# Patient Record
Sex: MALE | Race: WHITE | NOT HISPANIC OR LATINO | Employment: UNEMPLOYED | URBAN - METROPOLITAN AREA
[De-identification: names, ages, dates, MRNs, and addresses within clinical notes are randomized per-mention and may not be internally consistent; named-entity substitution may affect disease eponyms.]

---

## 2018-04-03 ENCOUNTER — OFFICE VISIT CONVERTED (OUTPATIENT)
Dept: CARDIOLOGY | Facility: CLINIC | Age: 53
End: 2018-04-03
Attending: SPECIALIST

## 2018-05-01 ENCOUNTER — OFFICE VISIT CONVERTED (OUTPATIENT)
Dept: CARDIOLOGY | Facility: CLINIC | Age: 53
End: 2018-05-01
Attending: SPECIALIST

## 2018-12-06 ENCOUNTER — OFFICE VISIT CONVERTED (OUTPATIENT)
Dept: ORTHOPEDIC SURGERY | Facility: CLINIC | Age: 53
End: 2018-12-06
Attending: ORTHOPAEDIC SURGERY

## 2018-12-31 ENCOUNTER — OFFICE VISIT CONVERTED (OUTPATIENT)
Dept: ORTHOPEDIC SURGERY | Facility: CLINIC | Age: 53
End: 2018-12-31
Attending: ORTHOPAEDIC SURGERY

## 2019-02-19 ENCOUNTER — HOSPITAL ENCOUNTER (OUTPATIENT)
Dept: PERIOP | Facility: HOSPITAL | Age: 54
Setting detail: HOSPITAL OUTPATIENT SURGERY
Discharge: HOME | End: 2019-02-19
Attending: ORTHOPAEDIC SURGERY

## 2019-02-19 LAB
ANION GAP SERPL CALC-SCNC: 15 MMOL/L (ref 8–19)
BUN SERPL-MCNC: 17 MG/DL (ref 5–25)
BUN/CREAT SERPL: 21 {RATIO} (ref 6–20)
CALCIUM SERPL-MCNC: 9.4 MG/DL (ref 8.7–10.4)
CHLORIDE SERPL-SCNC: 104 MMOL/L (ref 99–111)
CONV CO2: 28 MMOL/L (ref 22–32)
CREAT UR-MCNC: 0.8 MG/DL (ref 0.7–1.2)
GFR SERPLBLD BASED ON 1.73 SQ M-ARVRAT: >60 ML/MIN/{1.73_M2}
GLUCOSE BLD-MCNC: 105 MG/DL (ref 70–99)
GLUCOSE SERPL-MCNC: 114 MG/DL (ref 70–99)
OSMOLALITY SERPL CALC.SUM OF ELEC: 298 MOSM/KG (ref 273–304)
POTASSIUM SERPL-SCNC: 4 MMOL/L (ref 3.5–5.3)
SODIUM SERPL-SCNC: 143 MMOL/L (ref 135–147)

## 2019-03-04 ENCOUNTER — OFFICE VISIT CONVERTED (OUTPATIENT)
Dept: ORTHOPEDIC SURGERY | Facility: CLINIC | Age: 54
End: 2019-03-04
Attending: ORTHOPAEDIC SURGERY

## 2019-03-04 ENCOUNTER — HOSPITAL ENCOUNTER (OUTPATIENT)
Dept: PHYSICAL THERAPY | Facility: CLINIC | Age: 54
Setting detail: RECURRING SERIES
Discharge: HOME | End: 2019-05-02
Attending: ORTHOPAEDIC SURGERY

## 2019-03-25 ENCOUNTER — OFFICE VISIT CONVERTED (OUTPATIENT)
Dept: ORTHOPEDIC SURGERY | Facility: CLINIC | Age: 54
End: 2019-03-25
Attending: ORTHOPAEDIC SURGERY

## 2019-04-01 ENCOUNTER — OFFICE VISIT CONVERTED (OUTPATIENT)
Dept: ORTHOPEDIC SURGERY | Facility: CLINIC | Age: 54
End: 2019-04-01
Attending: ORTHOPAEDIC SURGERY

## 2019-04-26 ENCOUNTER — CONVERSION ENCOUNTER (OUTPATIENT)
Dept: ORTHOPEDIC SURGERY | Facility: CLINIC | Age: 54
End: 2019-04-26

## 2019-04-26 ENCOUNTER — OFFICE VISIT CONVERTED (OUTPATIENT)
Dept: ORTHOPEDIC SURGERY | Facility: CLINIC | Age: 54
End: 2019-04-26
Attending: ORTHOPAEDIC SURGERY

## 2019-05-09 ENCOUNTER — HOSPITAL ENCOUNTER (OUTPATIENT)
Dept: GENERAL RADIOLOGY | Facility: HOSPITAL | Age: 54
Discharge: HOME | End: 2019-05-09
Attending: ORTHOPAEDIC SURGERY

## 2019-05-13 ENCOUNTER — CONVERSION ENCOUNTER (OUTPATIENT)
Dept: ORTHOPEDIC SURGERY | Facility: CLINIC | Age: 54
End: 2019-05-13

## 2019-05-13 ENCOUNTER — OFFICE VISIT CONVERTED (OUTPATIENT)
Dept: ORTHOPEDIC SURGERY | Facility: CLINIC | Age: 54
End: 2019-05-13
Attending: ORTHOPAEDIC SURGERY

## 2019-06-10 ENCOUNTER — HOSPITAL ENCOUNTER (OUTPATIENT)
Dept: PERIOP | Facility: HOSPITAL | Age: 54
Setting detail: HOSPITAL OUTPATIENT SURGERY
Discharge: HOME | End: 2019-06-10
Attending: ORTHOPAEDIC SURGERY

## 2019-06-10 LAB
ANION GAP SERPL CALC-SCNC: 15 MMOL/L (ref 8–19)
BUN SERPL-MCNC: 11 MG/DL (ref 5–25)
BUN/CREAT SERPL: 12 {RATIO} (ref 6–20)
CALCIUM SERPL-MCNC: 9.7 MG/DL (ref 8.7–10.4)
CHLORIDE SERPL-SCNC: 101 MMOL/L (ref 99–111)
CONV CO2: 26 MMOL/L (ref 22–32)
CREAT UR-MCNC: 0.9 MG/DL (ref 0.7–1.2)
GFR SERPLBLD BASED ON 1.73 SQ M-ARVRAT: >60 ML/MIN/{1.73_M2}
GLUCOSE BLD-MCNC: 260 MG/DL (ref 70–99)
GLUCOSE BLD-MCNC: 280 MG/DL (ref 70–99)
GLUCOSE BLD-MCNC: 302 MG/DL (ref 70–99)
GLUCOSE SERPL-MCNC: 383 MG/DL (ref 70–99)
OSMOLALITY SERPL CALC.SUM OF ELEC: 301 MOSM/KG (ref 273–304)
POTASSIUM SERPL-SCNC: 4.1 MMOL/L (ref 3.5–5.3)
SODIUM SERPL-SCNC: 138 MMOL/L (ref 135–147)

## 2019-06-17 ENCOUNTER — HOSPITAL ENCOUNTER (OUTPATIENT)
Dept: PHYSICAL THERAPY | Facility: CLINIC | Age: 54
Setting detail: RECURRING SERIES
Discharge: HOME | End: 2019-08-22
Attending: ORTHOPAEDIC SURGERY

## 2019-06-27 ENCOUNTER — OFFICE VISIT CONVERTED (OUTPATIENT)
Dept: ORTHOPEDIC SURGERY | Facility: CLINIC | Age: 54
End: 2019-06-27
Attending: PHYSICIAN ASSISTANT

## 2019-07-25 ENCOUNTER — OFFICE VISIT CONVERTED (OUTPATIENT)
Dept: ORTHOPEDIC SURGERY | Facility: CLINIC | Age: 54
End: 2019-07-25
Attending: PHYSICIAN ASSISTANT

## 2019-08-15 ENCOUNTER — CONVERSION ENCOUNTER (OUTPATIENT)
Dept: ORTHOPEDIC SURGERY | Facility: CLINIC | Age: 54
End: 2019-08-15

## 2019-08-15 ENCOUNTER — OFFICE VISIT CONVERTED (OUTPATIENT)
Dept: ORTHOPEDIC SURGERY | Facility: CLINIC | Age: 54
End: 2019-08-15
Attending: PHYSICIAN ASSISTANT

## 2019-09-12 ENCOUNTER — OFFICE VISIT CONVERTED (OUTPATIENT)
Dept: ORTHOPEDIC SURGERY | Facility: CLINIC | Age: 54
End: 2019-09-12
Attending: ORTHOPAEDIC SURGERY

## 2019-09-23 ENCOUNTER — HOSPITAL ENCOUNTER (OUTPATIENT)
Dept: GENERAL RADIOLOGY | Facility: HOSPITAL | Age: 54
Discharge: HOME | End: 2019-09-23
Attending: ORTHOPAEDIC SURGERY

## 2019-09-26 ENCOUNTER — OFFICE VISIT CONVERTED (OUTPATIENT)
Dept: ORTHOPEDIC SURGERY | Facility: CLINIC | Age: 54
End: 2019-09-26
Attending: ORTHOPAEDIC SURGERY

## 2019-10-10 ENCOUNTER — OFFICE VISIT CONVERTED (OUTPATIENT)
Dept: SURGERY | Facility: CLINIC | Age: 54
End: 2019-10-10
Attending: PHYSICIAN ASSISTANT

## 2019-11-08 ENCOUNTER — HOSPITAL ENCOUNTER (OUTPATIENT)
Dept: OTHER | Facility: HOSPITAL | Age: 54
Discharge: HOME | End: 2019-11-08
Attending: PHYSICIAN ASSISTANT

## 2019-11-08 LAB — PSA SERPL-MCNC: 0.38 NG/ML (ref 0–4)

## 2019-11-11 ENCOUNTER — OFFICE VISIT CONVERTED (OUTPATIENT)
Dept: SURGERY | Facility: CLINIC | Age: 54
End: 2019-11-11
Attending: PHYSICIAN ASSISTANT

## 2019-11-11 ENCOUNTER — CONVERSION ENCOUNTER (OUTPATIENT)
Dept: SURGERY | Facility: CLINIC | Age: 54
End: 2019-11-11

## 2019-11-15 ENCOUNTER — OFFICE VISIT CONVERTED (OUTPATIENT)
Dept: ORTHOPEDIC SURGERY | Facility: CLINIC | Age: 54
End: 2019-11-15
Attending: PHYSICIAN ASSISTANT

## 2020-06-18 ENCOUNTER — OFFICE VISIT CONVERTED (OUTPATIENT)
Dept: ORTHOPEDIC SURGERY | Facility: CLINIC | Age: 55
End: 2020-06-18
Attending: ORTHOPAEDIC SURGERY

## 2020-07-09 ENCOUNTER — HOSPITAL ENCOUNTER (OUTPATIENT)
Dept: MRI IMAGING | Facility: HOSPITAL | Age: 55
Discharge: HOME | End: 2020-07-09
Attending: ORTHOPAEDIC SURGERY

## 2020-07-16 ENCOUNTER — OFFICE VISIT CONVERTED (OUTPATIENT)
Dept: ORTHOPEDIC SURGERY | Facility: CLINIC | Age: 55
End: 2020-07-16
Attending: ORTHOPAEDIC SURGERY

## 2020-07-20 ENCOUNTER — CONVERSION ENCOUNTER (OUTPATIENT)
Dept: SURGERY | Facility: CLINIC | Age: 55
End: 2020-07-20

## 2020-07-20 ENCOUNTER — OFFICE VISIT CONVERTED (OUTPATIENT)
Dept: UROLOGY | Facility: CLINIC | Age: 55
End: 2020-07-20
Attending: UROLOGY

## 2020-09-28 ENCOUNTER — OFFICE VISIT CONVERTED (OUTPATIENT)
Dept: UROLOGY | Facility: CLINIC | Age: 55
End: 2020-09-28
Attending: UROLOGY

## 2020-12-17 ENCOUNTER — OFFICE VISIT CONVERTED (OUTPATIENT)
Dept: CARDIOLOGY | Facility: CLINIC | Age: 55
End: 2020-12-17
Attending: SPECIALIST

## 2020-12-17 ENCOUNTER — CONVERSION ENCOUNTER (OUTPATIENT)
Dept: OTHER | Facility: HOSPITAL | Age: 55
End: 2020-12-17

## 2021-01-06 ENCOUNTER — HOSPITAL ENCOUNTER (OUTPATIENT)
Dept: NUCLEAR MEDICINE | Facility: HOSPITAL | Age: 56
Discharge: HOME | End: 2021-01-06
Attending: SPECIALIST

## 2021-03-25 ENCOUNTER — OFFICE VISIT CONVERTED (OUTPATIENT)
Dept: ORTHOPEDIC SURGERY | Facility: CLINIC | Age: 56
End: 2021-03-25
Attending: ORTHOPAEDIC SURGERY

## 2021-04-06 ENCOUNTER — HOSPITAL ENCOUNTER (OUTPATIENT)
Dept: MRI IMAGING | Facility: HOSPITAL | Age: 56
Discharge: HOME | End: 2021-04-06
Attending: ORTHOPAEDIC SURGERY

## 2021-04-09 ENCOUNTER — OFFICE VISIT CONVERTED (OUTPATIENT)
Dept: ORTHOPEDIC SURGERY | Facility: CLINIC | Age: 56
End: 2021-04-09
Attending: ORTHOPAEDIC SURGERY

## 2021-04-21 ENCOUNTER — HOSPITAL ENCOUNTER (OUTPATIENT)
Dept: PREADMISSION TESTING | Facility: HOSPITAL | Age: 56
Discharge: HOME | End: 2021-04-21
Attending: ORTHOPAEDIC SURGERY

## 2021-04-21 LAB — SARS-COV-2 RNA SPEC QL NAA+PROBE: NOT DETECTED

## 2021-04-26 ENCOUNTER — HOSPITAL ENCOUNTER (OUTPATIENT)
Dept: PERIOP | Facility: HOSPITAL | Age: 56
Setting detail: HOSPITAL OUTPATIENT SURGERY
Discharge: HOME | End: 2021-04-26
Attending: ORTHOPAEDIC SURGERY

## 2021-04-26 LAB
ANION GAP SERPL CALC-SCNC: 12 MMOL/L (ref 8–19)
BUN SERPL-MCNC: 12 MG/DL (ref 5–25)
BUN/CREAT SERPL: 15 {RATIO} (ref 6–20)
CALCIUM SERPL-MCNC: 9.3 MG/DL (ref 8.7–10.4)
CHLORIDE SERPL-SCNC: 100 MMOL/L (ref 99–111)
CONV CO2: 29 MMOL/L (ref 22–32)
CREAT UR-MCNC: 0.8 MG/DL (ref 0.7–1.2)
GFR SERPLBLD BASED ON 1.73 SQ M-ARVRAT: >60 ML/MIN/{1.73_M2}
GLUCOSE BLD-MCNC: 169 MG/DL (ref 70–99)
GLUCOSE SERPL-MCNC: 205 MG/DL (ref 70–99)
OSMOLALITY SERPL CALC.SUM OF ELEC: 288 MOSM/KG (ref 273–304)
POTASSIUM SERPL-SCNC: 4.6 MMOL/L (ref 3.5–5.3)
SODIUM SERPL-SCNC: 136 MMOL/L (ref 135–147)

## 2021-05-10 NOTE — H&P
History and Physical      Patient Name: Moreno Aleman   Patient ID: [id number]   Sex: Male   YOB: 1965    Primary Care Provider: Ana Flores MD   Referring Provider: Ana Flores MD    Visit Date: March 25, 2021    Provider: Kam Dick MD   Location: [facility]   Location Address: [address] [place] [address]  [id number]   Location Phone: [phone]          Chief Complaint  · Left shoulder pain       History Of Present Illness  Moreno Aleman is a 55 year old /White male who presents today to [facility].      The patient presents here today for evaluation of his left shoulder. He states about a month ago he reached to get an extension cord and his shoulder popped. He states he has been having a lot of pain in his shoulder. He locates the pain to the anterior lateral aspect of his shoulder. He had an x-ray that was negative. He has no other complaints today.       Past Medical History  Arthritis; Back pain; Bursitis: Right Shoulder; CAD (coronary artery disease); Chest pain; Colitis; Diabetes; Diabetes Mellitus, Type II; Fx -4th Metacarpal; Gallstones; Hernia; Hyperlipidemia; Hypertension; Left Epididymitis; Migraine; Phimosis; Right Shoulder: Rotator Cuff Tear; Sinus trouble; Status-post: Right shoulder arthroscopic RTC repair, SAD; 02/19/2019; Vitamin D deficiency         Past Surgical History  Back; Back surgery; Circumcision; Colonoscopy; Gallbladder; Hernia; Hernia Repair; Metal implants; Rotator Cuff repair; Surgical Clips         Medication List  Admelog SoloStar U-100 Insulin 100 unit/mL subcutaneous insulin pen; amlodipine 5 mg oral tablet; aspirin oral; atorvastatin 20 mg oral tablet; carvedilol 12.5 mg oral tablet; clopidogrel 75 mg oral tablet; isosorbide mononitrate 30 mg oral tablet extended release 24 hr; lisinopril 30 mg oral tablet; oxybutynin chloride 5 mg oral tablet extended release 24hr; sertraline 25 mg oral tablet; tadalafil 5  "mg oral tablet; tamsulosin 0.4 mg oral capsule; Tresiba FlexTouch U-100 100 unit/mL (3 mL) subcutaneous insulin pen; Trulicity subcutaneous         Allergy List  fentanyl; Procardia       Allergies Reconciled  Family Medical History  Stroke; Heart Disease; Congestive Heart Failure; Cancer, Unspecified; Diabetes, unspecified type; Family history of Arthritis         Social History  Active but no formal exercise; Alcohol Use (Current some day); Claustophobic (Unknown); .; lives with other; No known infection risk; Recreational Drug Use (Never); Single.; Tobacco (Never); Unemployed.; Working         Review of Systems  · Constitutional  o Denies  o : fever, chills, weight loss  · Cardiovascular  o Denies  o : chest pain, shortness of breath  · Gastrointestinal  o Denies  o : liver disease, heartburn, nausea, blood in stools  · Genitourinary  o Denies  o : painful urination, blood in urine  · Integument  o Denies  o : rash, itching  · Neurologic  o Denies  o : headache, weakness, loss of consciousness  · Musculoskeletal  o Denies  o : painful, swollen joints  · Psychiatric  o Denies  o : drug/alcohol addiction, anxiety, depression      Vitals  Date Time BP Position Site L\R Cuff Size HR RR TEMP (F) WT  HT  BMI kg/m2 BSA m2 O2 Sat FR L/min FiO2 HC       03/25/2021 02:44 PM      94 - R   192lbs 4oz 5'  8\" 29.23 2.05 98 %            Physical Examination  · Constitutional  o Appearance  o : well developed, well-nourished, no obvious deformities present  · Head and Face  o Head  o :   § Inspection  § : normocephalic  o Face  o :   § Inspection  § : no facial lesions  · Eyes  o Conjunctivae  o : conjunctivae normal  o Sclerae  o : sclerae white  · Ears, Nose, Mouth and Throat  o Ears  o :   § External Ears  § : appearance within normal limits  § Hearing  § : intact  o Nose  o :   § External Nose  § : appearance normal  · Neck  o Inspection/Palpation  o : normal appearance  o Range of Motion  o : full range of " motion  · Respiratory  o Respiratory Effort  o : breathing unlabored  o Inspection of Chest  o : normal appearance  o Auscultation of Lungs  o : no audible wheezing or rales  · Cardiovascular  o Heart  o : regular rate  · Gastrointestinal  o Abdominal Examination  o : soft and non-tender  · Skin and Subcutaneous Tissue  o General Inspection  o : intact, no rashes  · Psychiatric  o General  o : Alert and oriented x3  o Judgement and Insight  o : judgment and insight intact  o Mood and Affect  o : mood normal, affect appropriate  · Left Shoulder  o Inspection  o : Pain over the Anterior lateral shoulder and anterior shoulder. . Abduction 145. ER 90. IR 40. 4+ Supraspinatus strength. 5/5 infraspinatus, infrared subscap. IR to L5. Negative impingement test. Pain with cross arm adduction. Positive O'Briens.   · Injection Note/Aspiration Note  o Site  o : left shoulder  o Procedure  o : Procedure: After educating the patient, patient gave consent for procedure. After using Chloraprep, the joint space was injected. The patient tolerated the procedure well.  o Medication  o : 80 mg of DepoMedrol with 9cc of 1% Lidocaine  · Imaging  o Imaging  o : [facility] 3/15/2021- Noacutefinding          Assessment  · Left shoulder pain, unspecified chronicity     719.41/M25.512  · Rotator cuff injury     959.2/S46.009A      Plan  · Orders  o Depo-Medrol injection 80mg () - - 03/25/2021   Lot [id number] Exp 02 2022 [employer] Administered by SERGIO QUINTANA MD   o Shoulder Intra-articular Injection without US [facility] (34022) - - 03/25/2021   Lot [id number] Exp 02 01 2022 [employer] Administered by SERGIO QUINTANA MD   · Medications  o Medications have been Reconciled  o Transition of Care or Provider Policy  · Instructions  o X-rays reviewed by Dr. Quintana.  o Reviewed the patient's Past Medical, Social, and Family history as well as the ROS at today's visit, no changes.  o Call or return if worsening  symptoms.  o The above service was scribed by Leila Oscar on my behalf and I attest to the accuracy of the note. jsb  o Discussed the treatment plan with the patient. Discussed the risks and benefits of a steroid injection in his left shoulder. The patient expressed understanding and wished to proceed. He tolerated the injection well. Plan for MRI of the left shoulder to evaluate his rotator cuff. Follow up after MRI for results.   o Electronically Identified Patient Education Materials Provided Electronically            Electronically Signed by: Leila Oscar MA -Author on March 26, 2021 03:21:04 PM  Electronically Co-signed by: Kam Dick MD -Reviewer on March 28, 2021 09:17:03 PM

## 2021-05-11 ENCOUNTER — OFFICE VISIT CONVERTED (OUTPATIENT)
Dept: ORTHOPEDIC SURGERY | Facility: CLINIC | Age: 56
End: 2021-05-11
Attending: ORTHOPAEDIC SURGERY

## 2021-05-13 NOTE — PROGRESS NOTES
Progress Note      Patient Name: Moreno Aleman   Patient ID: [id number]   Sex: Male   YOB: 1965    Primary Care Provider: Ana Flores MD   Referring Provider: Ana Flores MD    Visit Date: July 16, 2020    Provider: Kam Dick MD   Location: [facility]   Location Address: [address] [place] [address]  [id number]   Location Phone: [phone]          Chief Complaint  · Follow up Right Shoulder      History Of Present Illness  Moreno Aleman is a 54 year old /White male who presents today to [facility].      The patient presents today for follow up after MRI for right shoulder pain. He states that the injection helped a lot.     He has improved a lot. He is still having neck pain that radiates down the extremities. He would like to have this evaluated.                                  Past Medical History  Arthritis; Back pain; Bursitis: Right Shoulder; CAD (coronary artery disease); Chest pain; Colitis; Diabetes; Diabetes Mellitus, Type II; Fx -4th Metacarpal; Gallstones; Hernia; Hyperlipidemia; Hypertension; Left Epididymitis; Migraine; Phimosis; Right Shoulder: Rotator Cuff Tear; Sinus trouble; Status-post: Right shoulder arthroscopic RTC repair, SAD; 02/19/2019; Vitamin D deficiency         Past Surgical History  Back; Back surgery; Circumcision; Colonoscopy; Gallbladder; Hernia; Hernia Repair; Metal implants; Rotator Cuff repair; Surgical Clips         Medication List  Admelog SoloStar U-100 Insulin 100 unit/mL subcutaneous insulin pen; amlodipine 5 mg oral tablet; aspirin oral; atorvastatin 20 mg oral tablet; carvedilol 12.5 mg oral tablet; clopidogrel 75 mg oral tablet; isosorbide mononitrate 30 mg oral tablet extended release 24 hr; lisinopril 30 mg oral tablet; oxybutynin chloride 5 mg oral tablet extended release 24hr; sertraline 25 mg oral tablet; tadalafil 5 mg oral tablet; tamsulosin 0.4 mg oral capsule; Tresiba FlexTouch U-100 100 unit/mL (3  "mL) subcutaneous insulin pen; Trulicity subcutaneous         Allergy List  fentanyl; Procardia         Family Medical History  Stroke; Heart Disease; Congestive Heart Failure; Cancer, Unspecified; Diabetes, unspecified type         Social History  Active but no formal exercise; Alcohol Use (Current some day); Claustophobic (Unknown); .; lives with other; No known infection risk; Recreational Drug Use (Never); Single.; Tobacco (Never); Working         Immunizations  Name Date Admin   Influenza    Influenza    Prevnar 13          Review of Systems  · Constitutional  o Denies  o : fever, chills, weight loss  · Cardiovascular  o Denies  o : chest pain, shortness of breath  · Gastrointestinal  o Denies  o : liver disease, heartburn, nausea, blood in stools  · Genitourinary  o Denies  o : painful urination, blood in urine  · Integument  o Denies  o : rash, itching  · Neurologic  o Denies  o : headache, weakness, loss of consciousness  · Musculoskeletal  o Denies  o : painful, swollen joints  · Psychiatric  o Denies  o : drug/alcohol addiction, anxiety, depression      Vitals  Date Time BP Position Site L\R Cuff Size HR RR TEMP (F) WT  HT  BMI kg/m2 BSA m2 O2 Sat        07/16/2020 01:34 PM      96 - R   200lbs 0oz 5'  8\" 30.41 2.09 99 %          Physical Examination  · Constitutional  o Appearance  o : well developed, well-nourished, no obvious deformities present  · Head and Face  o Head  o :   § Inspection  § : normocephalic  o Face  o :   § Inspection  § : no facial lesions  · Eyes  o Conjunctivae  o : conjunctivae normal  o Sclerae  o : sclerae white  · Ears, Nose, Mouth and Throat  o Ears  o :   § External Ears  § : appearance within normal limits  § Hearing  § : intact  o Nose  o :   § External Nose  § : appearance normal  · Neck  o Inspection/Palpation  o : normal appearance  o Range of Motion  o : full range of motion  · Respiratory  o Respiratory Effort  o : breathing unlabored  o Inspection of " Chest  o : normal appearance  o Auscultation of Lungs  o : no audible wheezing or rales  · Cardiovascular  o Heart  o : regular rate  · Gastrointestinal  o Abdominal Examination  o : soft and non-tender  · Skin and Subcutaneous Tissue  o General Inspection  o : intact, no rashes  · Psychiatric  o General  o : Alert and oriented x3  o Judgement and Insight  o : judgment and insight intact  o Mood and Affect  o : mood normal, affect appropriate  · Right Shoulder  o Inspection  o : Elbow ROM intact. Well healed scars over the shoulder E:165 AB: 120 ER: 60 IR: 20 IR:L5 4+ superspinatius. Negative impingement signs. Limited ROM of right shoulder positive spurling.   · Imaging  o Imaging  o : MRI: Mild degenerative disc disease bulge with slight left lateral component at c6-c7. There is no significant spinal canal or neural foraminal stenosis. There is no free disc fragment or significant protrusion.               Assessment  · Aftercare following rotator cuff repair     V54.81  · Right shoulder pain, unspecified chronicity     719.41/M25.511  · Cervicalgia     723.1/M54.2      Plan  · Medications  o Medications have been Reconciled  o Transition of Care or Provider Policy  · Instructions  o Dr. Dick saw and examined the patient and agrees with plan.   o Reviewed the patient's Past Medical, Social, and Family history as well as the ROS at today's visit, no changes.  o Call or return if worsening symptoms.  o The above service was scribed by Milton Martinez on my behalf and I attest to the accuracy of the note. jsb  o We discussed the plan with the patient. We discussed that there is not much to do for the bulging disc, we recommend he sees neuro-surgery. We will refer him to pain management and Neurosurgery.   o Electronically Identified Patient Education Materials Provided Electronically            Electronically Signed by: Curly Martinez - , Other -Author on July 22, 2020 09:19:10  AM  Electronically Co-signed by: Kam Dick MD -Reviewer on July 22, 2020 09:38:47 PM

## 2021-05-13 NOTE — PROGRESS NOTES
Progress Note      Patient Name: Moreno Aleman   Patient ID: [id number]   Sex: Male   YOB: 1965    Primary Care Provider: Ana Flores MD   Referring Provider: nAa Flores MD    Visit Date: September 28, 2020    Provider: Elizabeth Chambers MD   Location: [facility]   Location Address: [address] [place] [address]  [id number]   Location Phone: [phone]          Chief Complaint  · pt here for urologic issues      History Of Present Illness  The patient presents for follow-up of frequent urination, intermittent urination, urgency, splayed stream, nocturia, and post-void dribbling for the past 1 year. The frequency of urination is every 2 hours. The nocturia occurs times 2. The symptoms are continuous, worsening and moderately bothersome. The patient notes worsening associated with no known aggravating factors. The patient denies incontinence, weak stream, subjective incomplete emptying of the bladder, straining to urinate, and hematuria.   He has had no prior prostate biopsies.   Prior tests to evaluate the symptoms have not been done. Tests today to evaluate the symptoms have not been done.   There has been no prior treatment. He also complains of erectile dysfunction. He states the ED began approximately 7 months ago and has progressively worsened. He states that he has difficulty initiating and maintaining erection. He has tried no prescription medications.      10/10/19:   Initial visit with patient   Patient is a DM.   - on Trulicity  Patient is with CAD - and has had stent to LAD - medically managed with Plavix, statin and bblocker  Patient has prescription for Imdur but does not take it do to side effects - and how it makes him feel.  He if he taking - it is rarely    Patient prior to being on current antibiotic was with blood in semen.  He has not been sexually active since on antibiotic, so he does not know if it has helped.        Patient is with PSA  11/19:   .38    11/11/19:  Patient is without improvement with Flomax  Patient is a DM.  He has seen Endocrinologist.  He was started on medications to help his BS.     He is not compliant with his diet  Patient's girlfriend is with him today  He also has trouble with ED.       We discussed that some of his frequency will be related to his DM and elevated blood sugar.  If he starts watching what he eats that should improve.  If not we can then focus on his OAB that is not related to his elevated blood sugar    Patient has not had sexual activity since antibiotic to see if with resolution of hematospermia    7/20/2020: Patient presents for follow-up after 6 months.  He is not on any medication for urologic issues.  He has not seen a primary care provider or endocrinologist for several months and has been without his antidiabetic medications.  He continues to have trouble with EDtried Cialis previously although it was on-demand dosing and was expensive.  He also states he gets up nearly every hour at night to urinate.  He states that he has to push harder to urinate and has a feeling of incomplete emptying.  PVR today is 269.    9/28/2020:  He is here for follow up after starting Flomax and Cialis at his last visit.  His PVR is improved today at 173cc.  He states the Cialis works for his erections.  His Flomax has improved his stream and he states it is strong but he has still some frequency.  He does have 500 of sugar in his urine today.  We discussed that and that it could be contributing to his frequency.            Past Medical History  Arthritis; Back pain; Bursitis: Right Shoulder; CAD (coronary artery disease); Chest pain; Colitis; Diabetes; Diabetes Mellitus, Type II; Fx -4th Metacarpal; Gallstones; Hernia; Hyperlipidemia; Hypertension; Left Epididymitis; Migraine; Phimosis; Right Shoulder: Rotator Cuff Tear; Sinus trouble; Status-post: Right shoulder arthroscopic RTC repair, SAD; 02/19/2019; Vitamin D deficiency  "        Past Surgical History  Back; Back surgery; Circumcision; Colonoscopy; Gallbladder; Hernia; Hernia Repair; Metal implants; Rotator Cuff repair; Surgical Clips         Medication List  Admelog SoloStar U-100 Insulin 100 unit/mL subcutaneous insulin pen; amlodipine 5 mg oral tablet; aspirin oral; atorvastatin 20 mg oral tablet; carvedilol 12.5 mg oral tablet; clopidogrel 75 mg oral tablet; isosorbide mononitrate 30 mg oral tablet extended release 24 hr; lisinopril 30 mg oral tablet; oxybutynin chloride 5 mg oral tablet extended release 24hr; sertraline 25 mg oral tablet; tadalafil 5 mg oral tablet; tamsulosin 0.4 mg oral capsule; Tresiba FlexTouch U-100 100 unit/mL (3 mL) subcutaneous insulin pen; Trulicity subcutaneous         Allergy List  fentanyl; Procardia         Family Medical History  Stroke; Heart Disease; Congestive Heart Failure; Cancer, Unspecified; Diabetes, unspecified type         Social History  Active but no formal exercise; Alcohol Use (Current some day); Claustophobic (Unknown); .; lives with other; No known infection risk; Recreational Drug Use (Never); Single.; Tobacco (Never); Working         Immunizations  Name Date Admin   Influenza    Influenza    Prevnar 13          Review of Systems  · Constitutional  o Denies  o : chills, fever  · Gastrointestinal  o Denies  o : nausea, vomiting, flank pain      Vitals  Date Time BP Position Site L\R Cuff Size HR RR TEMP (F) WT  HT  BMI kg/m2 BSA m2 O2 Sat HC       09/28/2020 09:33 /94 Sitting       205lbs 4oz 5'  8\" 31.21 2.11           Physical Examination  · Constitutional  o Appearance  o : Well nourished, well developed patient in no acute distress. Ambulating without difficulty.  · Head and Face  o Head  o :   § Inspection  § : atraumatic, normocephalic  o Face  o :   § Inspection  § : no facial lesions  · Eyes  o Conjunctivae  o : conjunctivae normal  o Sclerae  o : sclerae white  · Neck  o Inspection/Palpation  o : normal " appearance, no masses or tenderness, trachea midline  · Respiratory  o Respiratory Effort  o : breathing unlabored  · Skin and Subcutaneous Tissue  o General Inspection  o : No rashes, lesions or areas of discoloration present. Skin turgor is normal.  · Neurologic  o Mental Status Examination  o :   § Orientation  § : grossly oriented to person, place and time  § Attention  § : attention normal, concentration abilities normal  § Fund of Knowledge  § : fund of knowledge within normal limits, patient aware of current events  o Gait and Station  o : normal gait, able to stand without difficulty  · Psychiatric  o Judgement and Insight  o : judgment and insight intact, judgement for everyday activities and social situations within normal limits, insight intact  o Mood and Affect  o : mood normal, affect appropriate          Results  · In-Office Procedures  o Lab procedure  § Automated dipstick urinalysis with microscopy (57121)   § Color Ur: Yellow   § Clarity Ur: Clear   § Glucose Ur Ql Strip: 500   § Bilirub Ur Ql Strip: Negative   § Ketones Ur Ql Strip: Negative   § Sp Gr Ur Qn: 1.020   § Hgb Ur Ql Strip: Negative   § pH Ur-LsCnc: 7.0   § Prot Ur Ql Strip: Negative   § Urobilinogen Ur Strip-mCnc: 0.2   § Nitrite Ur Ql Strip: Negative   § WBC Est Ur Ql Strip: Negative   § RBC UrnS Qn HPF: 0   § WBC UrnS Qn HPF: 0   § Bacteria UrnS Qn HPF: 0   § Crystals UrnS Qn HPF: 0   § Epithelial Cells (non renal): 0 /HPF  § Epithelial Cells (renal): 0   o Surgical procedure  § IOP - Bladder Scan/Residual Urine (87582)   § Specimen vol Ur: 173       Assessment  · Benign enlargement of prostate     600.00/N40.0  · Erectile dysfunction     607.84/N52.9  · Diabetes     250.00/E11.9  · Nocturia     788.43/R35.1  · Prostate cancer screening     V76.44/Z12.5      Plan  · Medications  o Medications have been Reconciled  o Transition of Care or Provider Policy  · Instructions  o DISCUSSION:  o The patient has symptoms of BPH. I have  discussed the diagnosis and options for treatment with him. Discussed the effects of poorly managed diabetes and elevated blood glucose levels on erectile dysfunction as well as renal function. Discussed that in conjunction with his BPH and ineffective emptying of urine he is at higher risk for renal injury and should seek care from his endocrinologist and PCP as he has not seen them in several months.   o Continue Flomax as well as daily Cialis and I will see him for PVR check in 6 months.   o Complaint of erectile dysfunction as he is with diabetes and coronary artery disease. Improved with Cialis.   o Patient needs to work on his diet. I am hoping he does that to help with his nocturia. WE discussed that his medication and his body will dump the sugar into his bladder. As long as he is with noncompliant diet he will continue dumping sugar into his bladder which could cause frequency  o Electronically Identified Patient Education Materials Provided Electronically  · Referrals  o ID: [id number] Date: 09/28/2020 Type: Inbound  Specialty: Urology  o ID: [id number] Date: 09/12/2019 Type: Inbound  Specialty: Urology            Electronically Signed by: Elizabeth Chambers MD -Author on September 28, 2020 10:31:21 AM
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
## 2021-05-13 NOTE — PROGRESS NOTES
"   Progress Note      Patient Name: Moreno Aleman   Patient ID: [id number]   Sex: Male   YOB: 1965    Primary Care Provider: Ana Flores MD   Referring Provider: Ana Flores MD    Visit Date: July 20, 2020    Provider: Elizabeth Chambers MD   Location: [facility]   Location Address: [address] [place] [address]  [id number]   Location Phone: [phone]          Chief Complaint  · \"I feel I have to go to the bathroom a lot\"            History Of Present Illness  The patient presents for follow-up of frequent urination, intermittent urination, urgency, splayed stream, nocturia, and post-void dribbling for the past 1 year. The frequency of urination is every 2 hours. The nocturia occurs times 2. The symptoms are continuous, worsening and moderately bothersome. The patient notes worsening associated with no known aggravating factors. The patient denies incontinence, weak stream, subjective incomplete emptying of the bladder, straining to urinate, and hematuria.   He has had no prior prostate biopsies.   Prior tests to evaluate the symptoms have not been done. Tests today to evaluate the symptoms have not been done.   There has been no prior treatment. He also complains of erectile dysfunction. He states the ED began approximately 7 months ago and has progressively worsened. He states that he has difficulty initiating and maintaining erection. He has tried no prescription medications.      10/10/19:   Initial visit with patient   Patient is a DM.   - on Trulicity  Patient is with CAD - and has had stent to LAD - medically managed with Plavix, statin and bblocker  Patient has prescription for Imdur but does not take it do to side effects - and how it makes him feel.  He if he taking - it is rarely    Patient prior to being on current antibiotic was with blood in semen.  He has not been sexually active since on antibiotic, so he does not know if it has helped.        Patient is with " PSA  11/19:  .38    11/11/19:  Patient is without improvement with Flomax  Patient is a DM.  He has seen Endocrinologist.  He was started on medications to help his BS.     He is not compliant with his diet  Patient's girlfriend is with him today  He also has trouble with ED.       We discussed that some of his frequency will be related to his DM and elevated blood sugar.  If he starts watching what he eats that should improve.  If not we can then focus on his OAB that is not related to his elevated blood sugar    Patient has not had sexual activity since antibiotic to see if with resolution of hematospermia    7/20/2020: Patient presents for follow-up after 6 months.  He is not on any medication for urologic issues.  He has not seen a primary care provider or endocrinologist for several months and has been without his antidiabetic medications.  He continues to have trouble with EDtried Cialis previously although it was on-demand dosing and was expensive.  He also states he gets up nearly every hour at night to urinate.  He states that he has to push harder to urinate and has a feeling of incomplete emptying.  PVR today is 269.           Past Medical History  Arthritis; Back pain; Bursitis: Right Shoulder; CAD (coronary artery disease); Chest pain; Colitis; Diabetes; Diabetes Mellitus, Type II; Fx -4th Metacarpal; Gallstones; Hernia; Hyperlipidemia; Hypertension; Left Epididymitis; Migraine; Phimosis; Right Shoulder: Rotator Cuff Tear; Sinus trouble; Status-post: Right shoulder arthroscopic RTC repair, SAD; 02/19/2019; Vitamin D deficiency         Past Surgical History  Back; Back surgery; Circumcision; Colonoscopy; Gallbladder; Hernia; Hernia Repair; Metal implants; Rotator Cuff repair; Surgical Clips         Medication List  Admelog SoloStar U-100 Insulin 100 unit/mL subcutaneous insulin pen; amlodipine 5 mg oral tablet; aspirin oral; atorvastatin 20 mg oral tablet; carvedilol 12.5 mg oral tablet; clopidogrel 75  "mg oral tablet; isosorbide mononitrate 30 mg oral tablet extended release 24 hr; lisinopril 30 mg oral tablet; oxybutynin chloride 5 mg oral tablet extended release 24hr; sertraline 25 mg oral tablet; Tresiba FlexTouch U-100 100 unit/mL (3 mL) subcutaneous insulin pen; Trulicity subcutaneous         Allergy List  fentanyl; Procardia         Family Medical History  Stroke; Heart Disease; Congestive Heart Failure; Cancer, Unspecified; Diabetes, unspecified type         Social History  Active but no formal exercise; Alcohol Use (Current some day); Claustophobic (Unknown); .; lives with other; No known infection risk; Recreational Drug Use (Never); Single.; Tobacco (Never); Working         Immunizations  Name Date Admin   Influenza    Influenza    Prevnar 13          Review of Systems  · Constitutional  o Denies  o : fever, chills  · Eyes  o Denies  o : double vision, impaired vision  · HENT  o Denies  o : headaches, vertigo  · Cardiovascular  o Denies  o : chest pain, irregular heart beats  · Respiratory  o Denies  o : hoarseness, abnormal sputum production  · Gastrointestinal  o Denies  o : nausea, vomiting  · Genitourinary  o Admits  o : frequency, nocturia, urinary hesitancy  o Denies  o : hematuria, incontinence  · Integument  o Denies  o : rash, itching      Vitals  Date Time BP Position Site L\R Cuff Size HR RR TEMP (F) WT  HT  BMI kg/m2 BSA m2 O2 Sat        07/20/2020 11:20 /81 Sitting       191lbs 0oz 5'  8\" 29.04 2.04           Physical Examination  · Constitutional  o Appearance  o : Well nourished, well developed patient in no acute distress. Ambulating without difficulty.  · Head and Face  o Head  o :   § Inspection  § : atraumatic, normocephalic  o Face  o :   § Inspection  § : no facial lesions  · Eyes  o Conjunctivae  o : conjunctivae normal  o Sclerae  o : sclerae white  · Neck  o Inspection/Palpation  o : normal appearance, no masses or tenderness, trachea " midline  · Respiratory  o Respiratory Effort  o : Breathing is unlabored without accessory muscle use  · Skin and Subcutaneous Tissue  o General Inspection  o : No rashes, lesions or areas of discoloration present. Skin turgor is normal.  · Neurologic  o Mental Status Examination  o :   § Orientation  § : grossly oriented to person, place and time  § Attention  § : attention normal, concentration abilities normal  § Fund of Knowledge  § : fund of knowledge within normal limits, patient aware of current events  o Gait and Station  o : normal gait, able to stand without difficulty  · Psychiatric  o Judgement and Insight  o : judgment and insight intact, judgement for everyday activities and social situations within normal limits, insight intact  o Mood and Affect  o : mood normal, affect appropriate          Results  · In-Office Procedures  o Lab procedure  § Automated dipstick urinalysis with microscopy (04505)   § Color Ur: Yellow   § Clarity Ur: Clear   § Glucose Ur Ql Strip: >=1000   § Bilirub Ur Ql Strip: Negative   § Ketones Ur Ql Strip: Negative   § Sp Gr Ur Qn: 1.015   § Hgb Ur Ql Strip: Negative   § pH Ur-LsCnc: 6.5   § Prot Ur Ql Strip: Negative   § Urobilinogen Ur Strip-mCnc: 0.2   § Nitrite Ur Ql Strip: Negative   § WBC Est Ur Ql Strip: Negative   o Surgical procedure  § IOP - Bladder Scan/Residual Urine (17341)   § Specimen vol Ur: 268       Assessment  · Benign enlargement of prostate     600.00/N40.0  · Bladder Neck Obstruction     596.0/N32.0  · Erectile dysfunction     607.84/N52.9  · Diabetes     250.00/E11.9  · Nocturia     788.43/R35.1  · Prostate cancer screening     V76.44/Z12.5      Plan  · Medications  o tamsulosin 0.4 mg oral capsule   SIG: take 1 capsule (0.4 mg) by oral route once daily 1/2 hour following the same meal each day for 30 days   DISP: (30) capsules with 11 refills  Prescribed on 07/20/2020     o tadalafil 5 mg oral tablet   SIG: take 1 tablet (5 mg) by oral route once daily for  30 days   DISP: (30) tablets with 11 refills  Prescribed on 07/20/2020     o Medications have been Reconciled  o Transition of Care or Provider Policy  · Instructions  o DISCUSSION:  o The patient has symptoms of BPH. I have discussed the diagnosis and options for treatment with him. Discussed the effects of poorly managed diabetes and elevated blood glucose levels on erectile dysfunction as well as renal function. Discussed that in conjunction with his BPH and ineffective emptying of urine he is at higher risk for renal injury and should seek care from his endocrinologist and PCP as he has not seen them in several months.   o PLAN:  o He would like to try medical therapy. Flomax as well as daily Cialis have been added and he will return in one month to see if he is with improvement in symptoms related to BPH. If not, we can discuss other options. If with side effects he knows to stop medication.  o Complaint of erectile dysfunction as he is with diabetes and coronary artery disease.  o Patient needs to work on his diet. I am hoping he does that to help with his nocturia. WE discussed that his medication and his body will dump the sugar into his bladder. As long as he is with noncompliant diet he will continue dumping sugar into his bladder which could cause frequency  o Electronically Identified Patient Education Materials Provided Electronically  · Referrals  o ID: [id number] Date: 09/12/2019 Type: Inbound  Specialty: Urology            Electronically Signed by: Elizabeth Chambers MD -Author on July 20, 2020 04:43:25 PM

## 2021-05-14 VITALS
SYSTOLIC BLOOD PRESSURE: 178 MMHG | BODY MASS INDEX: 29.74 KG/M2 | WEIGHT: 196.25 LBS | HEIGHT: 68 IN | HEART RATE: 65 BPM | DIASTOLIC BLOOD PRESSURE: 80 MMHG

## 2021-05-14 VITALS — BODY MASS INDEX: 28.85 KG/M2 | OXYGEN SATURATION: 98 % | HEART RATE: 102 BPM | HEIGHT: 68 IN | WEIGHT: 190.37 LBS

## 2021-05-14 VITALS — HEIGHT: 68 IN | BODY MASS INDEX: 29.14 KG/M2 | OXYGEN SATURATION: 98 % | HEART RATE: 94 BPM | WEIGHT: 192.25 LBS

## 2021-05-14 VITALS
HEIGHT: 68 IN | WEIGHT: 205.25 LBS | DIASTOLIC BLOOD PRESSURE: 94 MMHG | SYSTOLIC BLOOD PRESSURE: 166 MMHG | BODY MASS INDEX: 31.11 KG/M2

## 2021-05-14 NOTE — PROGRESS NOTES
"   Progress Note      Patient Name: Moreno Aleman   Patient ID: [id number]   Sex: Male   YOB: 1965    Primary Care Provider: Ana Flores MD   Referring Provider: Ana Flores MD    Visit Date: December 17, 2020    Provider: Ran Pan MD   Location: [facility]   Location Address: [address] [place] [address]  [id number]   Location Phone: [phone]          Chief Complaint  · Coronary artery disease   · Hypertension      History Of Present Illness  Moreno Aleman is a 55 year old /White male with history of Coronary artery disease; history of PTCA/stent a few years ago. His blood pressure has been uncontrolled for the last few weeks. He has some shortness of breath on exertion. He had an episode of chest pain about two weeks ago when his blood pressure was high lasted for about twenty minutes substernal aching not exertional. No exertional angina.   CURRENT MEDICATIONS: include . The dosage and frequency of the medications were reviewed with the patient. Lisinopril 40 mg qd; Omega 3-tid; Tamsulosin 0.4 mg qd; ASA 81 mg qd; Plavix 75 mg qd; Percocet 5-325 mg tid.   PAST MEDICAL HISTORY: Negative for diabetes. Positive for hypertension, Coronary artery disease; PTCA/stent.   PSYCHOSOCIAL HISTORY: rarely drinks alcohol and does not smoke.       Review of Systems  · Cardiovascular  o Admits  o : chest pain; shortness of breath   o Denies  o : palpitations (fast, fluttering, or skipping beats), swelling (feet, ankles, hands  · Respiratory  o Denies  o : chronic or frequent cough, asthma or wheezing      Vitals  Date Time BP Position Site L\R Cuff Size HR RR TEMP (F) WT  HT  BMI kg/m2 BSA m2 O2 Sat FR L/min FiO2 HC       12/17/2020 12:25 /80 Sitting    65 - R   196lbs 4oz 5'  8\" 29.84 2.07       12/17/2020 12:26 /84 Sitting    67 - R                   Physical Examination  · Constitutional  o Appearance  o : Awake, alert, " cooperative, pleasant.  · Respiratory  o Inspection of Chest  o : No chest wall deformities, moving equal.  o Auscultation of Lungs  o : Good air entry with vesicular breath sounds.  · Cardiovascular  o Heart  o :   § Auscultation of Heart  § : S1 and S2 regular. No S3. No S4. No murmurs.  o Peripheral Vascular System  o :   § Extremities  § : Peripheral pulses were well felt. No edema. No cyanosis.  · Gastrointestinal  o Abdominal Examination  o : No masses or organomegaly noted.  · EKG  o EKG  o : was reviewed which showed sinus rhythm with T wave motions          Assessment     IMPRESSION/PLAN    1. Coronary artery disease; history of PTCA/stent. Chest pain. In view of his chest pain and previous history of Coronary artery disease we will do a Sestamibi stress test to rule out any significant ischemia.   2. Essential hypertension uncontrolled. Hypertensive cardiovascular disease without heart failure. Continue current dose of Lisinopril. I will add Amlodipine 10 mg qd.   3. Hyperlipidemia. He has stopped his Lipitor. I will restart his Lipitor 20 mg qd. Check his lipid profile in six months.       MD KEMI Pérez/wt       Plan  · Instructions  o This note was transcribed by Aneta Jara. KEMI/wt  o The above service was transcribed by Aneta Jara on my behalf and I attest to the accuracy of the note. KEMI            Electronically Signed by: Adilia Jara-, -Author on December 18, 2020 09:54:46 AM  Electronically Co-signed by: Ran Pan MD -Reviewer on December 18, 2020 12:18:37 PM

## 2021-05-14 NOTE — PROGRESS NOTES
Progress Note      Patient Name: Moreno Aleman   Patient ID: [id number]   Sex: Male   YOB: 1965    Primary Care Provider: Ana Flores MD   Referring Provider: Ana Flores MD    Visit Date: April 9, 2021    Provider: Kam Dick MD   Location: [facility]   Location Address: [address] [place] [address]  [id number]   Location Phone: [phone]          Chief Complaint  · Left shoulder pain       History Of Present Illness  Moreno Aleman is a 55 year old /White male who presents today to [facility].      The patient presents here today for follow up evaluation of his left shoulder. He states in February he reached to get an extension cord and his shoulder popped. He states he has been having a lot of pain in his shoulder. He locates the pain to the anterior lateral aspect of his shoulder. He had an x-ray that was negative. I previously ordered an MRI of the left shoulder and is here today for the results. The patient is in pain management and takes Percocet 5/325. The patient states physical therapy made the pain worse and the injection didn't help. He has no other complaints today.       Past Medical History  Arthritis; Back pain; Bursitis: Right Shoulder; CAD (coronary artery disease); Chest pain; Colitis; Diabetes; Diabetes Mellitus, Type II; Fx -4th Metacarpal; Gallstones; Hernia; Hyperlipidemia; Hypertension; Left Epididymitis; Migraine; Phimosis; Right Shoulder: Rotator Cuff Tear; Sinus trouble; Status-post: Right shoulder arthroscopic RTC repair, SAD; 02/19/2019; Vitamin D deficiency         Past Surgical History  Back; Back surgery; Circumcision; Colonoscopy; Gallbladder; Hernia; Hernia Repair; Metal implants; Rotator Cuff repair; Surgical Clips         Medication List  Admelog SoloStar U-100 Insulin 100 unit/mL subcutaneous insulin pen; amlodipine 5 mg oral tablet; aspirin oral; atorvastatin 20 mg oral tablet; carvedilol 12.5 mg oral tablet;  "clopidogrel 75 mg oral tablet; isosorbide mononitrate 30 mg oral tablet extended release 24 hr; lisinopril 30 mg oral tablet; oxybutynin chloride 5 mg oral tablet extended release 24hr; sertraline 25 mg oral tablet; tadalafil 5 mg oral tablet; tamsulosin 0.4 mg oral capsule; Tresiba FlexTouch U-100 100 unit/mL (3 mL) subcutaneous insulin pen; Trulicity subcutaneous         Allergy List  fentanyl; Procardia       Allergies Reconciled  Family Medical History  Stroke; Heart Disease; Congestive Heart Failure; Cancer, Unspecified; Diabetes, unspecified type; Family history of Arthritis         Social History  Active but no formal exercise; Alcohol Use (Current some day); Claustophobic (Unknown); .; lives with other; No known infection risk; Recreational Drug Use (Never); Single.; Tobacco (Never); Unemployed.; Working         Review of Systems  · Constitutional  o Denies  o : fever, chills, weight loss  · Cardiovascular  o Denies  o : chest pain, shortness of breath  · Gastrointestinal  o Denies  o : liver disease, heartburn, nausea, blood in stools  · Genitourinary  o Denies  o : painful urination, blood in urine  · Integument  o Denies  o : rash, itching  · Neurologic  o Denies  o : headache, weakness, loss of consciousness  · Musculoskeletal  o Denies  o : painful, swollen joints  · Psychiatric  o Denies  o : drug/alcohol addiction, anxiety, depression      Vitals  Date Time BP Position Site L\R Cuff Size HR RR TEMP (F) WT  HT  BMI kg/m2 BSA m2 O2 Sat FR L/min FiO2 HC       04/09/2021 08:22 AM      102 - R   190lbs 6oz 5'  8\" 28.95 2.04 98 %            Physical Examination  · Constitutional  o Appearance  o : well developed, well-nourished, no obvious deformities present  · Head and Face  o Head  o :   § Inspection  § : normocephalic  o Face  o :   § Inspection  § : no facial lesions  · Eyes  o Conjunctivae  o : conjunctivae normal  o Sclerae  o : sclerae white  · Ears, Nose, Mouth and Throat  o Ears  o : "   § External Ears  § : appearance within normal limits  § Hearing  § : intact  o Nose  o :   § External Nose  § : appearance normal  · Neck  o Inspection/Palpation  o : normal appearance  o Range of Motion  o : full range of motion  · Respiratory  o Respiratory Effort  o : breathing unlabored  o Inspection of Chest  o : normal appearance  o Auscultation of Lungs  o : no audible wheezing or rales  · Cardiovascular  o Heart  o : regular rate  · Gastrointestinal  o Abdominal Examination  o : soft and non-tender  · Skin and Subcutaneous Tissue  o General Inspection  o : intact, no rashes  · Psychiatric  o General  o : Alert and oriented x3  o Judgement and Insight  o : judgment and insight intact  o Mood and Affect  o : mood normal, affect appropriate  · Left Shoulder  o Inspection  o : Pain over the Anterior lateral shoulder and anterior shoulder. FE Active 95. Passive 170. Abduction active 90. Passive 145. ER 90. IR 40. 4+ Supraspinatus strength. 5/5 infraspinatus, infrared subscap. IR to L5. Negative impingement test. Pain with cross arm adduction. Positive O'Briens.   · Imaging  o Imaging  o : MRI [facility] 4/2021- 1. Mild acromioclavicular osteoarthritis 2. Suspected full-thickness tear of the distal supraspinatus tendon with minimal tendon retraction 3. Intrasubstance tear of the infra spinatus at the musculotendinous junction 4. Mild supraspinatus and infra spinatus tendinopathy           Assessment  · Left shoulder AC (acromioclavicular) arthritis     716.91/M19.019  · Left shoulder pain, unspecified chronicity     719.41/M25.512  · Left shoulder Rotator cuff tear     840.4/M75.100      Plan  · Medications  o Medications have been Reconciled  o Transition of Care or Provider Policy  · Instructions  o Reviewed the patient's Past Medical, Social, and Family history as well as the ROS at today's visit, no changes.  o Call or return if worsening symptoms.  o Discussed surgery.  o Risks/benefits discussed with patient  including, but not limited to: infection, bleeding, neurovascular damage, malunion, nonunion, aesthetic deformity, need for further surgery, and death.  o Surgery pamphlet given.  o The above service was scribed by Leila Oscar on my behalf and I attest to the accuracy of the note. jsb  o Discussed the treatment options with patient, operative vs non-operative. Discussed the risks and benefits of operative treatment with the patient. The patient expressed understanding and wished to proceed. Plan for Left shoulder arthroscopic vs mini open rotator cuff repair, SAD, with possible biceps tenodesis.   o Electronically Identified Patient Education Materials Provided Electronically            Electronically Signed by: Leila Oscar MA -Author on April 9, 2021 08:59:21 AM  Electronically Co-signed by: Kam Dick MD -Reviewer on April 11, 2021 06:59:36 PM

## 2021-05-15 VITALS — HEIGHT: 68 IN | BODY MASS INDEX: 30.31 KG/M2 | WEIGHT: 200 LBS

## 2021-05-15 VITALS — HEIGHT: 68 IN | BODY MASS INDEX: 29.5 KG/M2 | HEART RATE: 83 BPM | OXYGEN SATURATION: 97 %

## 2021-05-15 VITALS — HEART RATE: 96 BPM | OXYGEN SATURATION: 99 % | BODY MASS INDEX: 30.31 KG/M2 | HEIGHT: 68 IN | WEIGHT: 200 LBS

## 2021-05-15 VITALS — HEART RATE: 68 BPM | BODY MASS INDEX: 29.5 KG/M2 | HEIGHT: 68 IN | OXYGEN SATURATION: 98 %

## 2021-05-15 VITALS — BODY MASS INDEX: 30.8 KG/M2 | HEART RATE: 96 BPM | OXYGEN SATURATION: 98 % | HEIGHT: 68 IN | WEIGHT: 203.25 LBS

## 2021-05-15 VITALS — BODY MASS INDEX: 28.25 KG/M2 | HEART RATE: 109 BPM | WEIGHT: 186.37 LBS | HEIGHT: 68 IN | OXYGEN SATURATION: 96 %

## 2021-05-15 VITALS — WEIGHT: 198.25 LBS | RESPIRATION RATE: 16 BRPM | HEIGHT: 68 IN | BODY MASS INDEX: 30.04 KG/M2

## 2021-05-15 VITALS
HEIGHT: 68 IN | SYSTOLIC BLOOD PRESSURE: 148 MMHG | WEIGHT: 191 LBS | DIASTOLIC BLOOD PRESSURE: 81 MMHG | BODY MASS INDEX: 28.95 KG/M2

## 2021-05-15 VITALS — OXYGEN SATURATION: 95 % | HEIGHT: 68 IN | BODY MASS INDEX: 29.1 KG/M2 | WEIGHT: 192 LBS | HEART RATE: 86 BPM

## 2021-05-15 VITALS — WEIGHT: 188.37 LBS | HEART RATE: 108 BPM | HEIGHT: 68 IN | OXYGEN SATURATION: 96 % | BODY MASS INDEX: 28.55 KG/M2

## 2021-05-15 VITALS — WEIGHT: 190.5 LBS | BODY MASS INDEX: 28.87 KG/M2 | HEART RATE: 108 BPM | HEIGHT: 68 IN | OXYGEN SATURATION: 96 %

## 2021-05-15 VITALS — HEART RATE: 89 BPM | HEIGHT: 68 IN | BODY MASS INDEX: 29.5 KG/M2 | OXYGEN SATURATION: 97 %

## 2021-05-15 VITALS — BODY MASS INDEX: 29.5 KG/M2 | HEART RATE: 90 BPM | OXYGEN SATURATION: 94 % | HEIGHT: 68 IN

## 2021-05-15 VITALS — HEIGHT: 68 IN | WEIGHT: 189.56 LBS | BODY MASS INDEX: 28.73 KG/M2 | RESPIRATION RATE: 12 BRPM

## 2021-05-15 VITALS — BODY MASS INDEX: 29.87 KG/M2 | HEART RATE: 113 BPM | OXYGEN SATURATION: 99 % | HEIGHT: 68 IN | WEIGHT: 197.12 LBS

## 2021-05-16 VITALS — OXYGEN SATURATION: 97 % | BODY MASS INDEX: 29.5 KG/M2 | HEIGHT: 68 IN | HEART RATE: 89 BPM

## 2021-05-16 VITALS
WEIGHT: 203 LBS | DIASTOLIC BLOOD PRESSURE: 100 MMHG | HEART RATE: 80 BPM | SYSTOLIC BLOOD PRESSURE: 128 MMHG | BODY MASS INDEX: 30.77 KG/M2 | HEIGHT: 68 IN

## 2021-05-16 VITALS
HEIGHT: 68 IN | SYSTOLIC BLOOD PRESSURE: 160 MMHG | BODY MASS INDEX: 31.07 KG/M2 | DIASTOLIC BLOOD PRESSURE: 94 MMHG | HEART RATE: 68 BPM | WEIGHT: 205 LBS

## 2021-05-16 VITALS — OXYGEN SATURATION: 98 % | HEIGHT: 68 IN | HEART RATE: 93 BPM | WEIGHT: 208.12 LBS | BODY MASS INDEX: 31.54 KG/M2

## 2021-05-16 VITALS — BODY MASS INDEX: 29.5 KG/M2 | HEIGHT: 68 IN | OXYGEN SATURATION: 92 % | HEART RATE: 96 BPM

## 2021-05-25 ENCOUNTER — CONVERSION ENCOUNTER (OUTPATIENT)
Dept: ORTHOPEDIC SURGERY | Facility: CLINIC | Age: 56
End: 2021-05-25

## 2021-05-25 ENCOUNTER — OFFICE VISIT CONVERTED (OUTPATIENT)
Dept: ORTHOPEDIC SURGERY | Facility: CLINIC | Age: 56
End: 2021-05-25
Attending: ORTHOPAEDIC SURGERY

## 2021-05-26 ENCOUNTER — HOSPITAL ENCOUNTER (OUTPATIENT)
Dept: OTHER | Facility: HOSPITAL | Age: 56
Setting detail: RECURRING SERIES
Discharge: STILL ADMITTED OR ADMINISTRATIVE | End: 2021-06-04
Attending: ORTHOPAEDIC SURGERY

## 2021-05-28 NOTE — PROCEDURES
Patient: GERARDO ELDRIDGE     Acct: [id number]     Report: #[id number]  MR #:  [id number]     DOS: 2021 1352     : 1965  DICTATING: KAM QUINTANA  ***Signed***  --------------------------------------------------------------------------------------------------------------------  Orthopedic Op/Procedure Note      Date       21            Pre-Operative Diagnosis:      L shoulder rtc tear            Post-Operative Diagnosis:      Same as pre-op diagnosis            Surgeon/Assistants      Kam Quintana MD            Anesthesia      General, Block            Procedure Performed/Technique      L shoulder arthroscopic sad, mini- open rtc repair            Specimen/Tissue Removed:      None            Findings:      None            Complications:      No            Estimated Blood Loss:      None            KAM QUINTANA        2021 13:52      Electronically signed by KAM QUINTANA  2021 13:52     Disclaimer: Converted hospital document may not contain table formatting or lab diagrams. Please see [employer] for authenticated document.

## 2021-05-28 NOTE — PROCEDURES
Patient: MORENO ELDRIDGE     Acct: [id number]     Report: #[id number]  MR #:  [id number]     DOS: 2021     : 1965  DICTATING: SERGIO QUINTANA  ***Signed***  --------------------------------------------------------------------------------------------------------------------                              [facility]                          [place], [place]  [address]           __________________________________________________________________________         Patient Name:                   Attending Physician:    Moreno EldridgeChelsea QUINTANA M.D.         Patient Visit # MR #            Admit Date  Disch Date     Location    [id number]    [id number]      2021                 [facility]- -         Date of Birth    1965    __________________________________________________________________________    0820 - OPERATIVE / PROCEDURE NOTE         DATE OF OPERATION/PROCEDURE:   2021         SURGEON/PHYSICIAN:             SERGIO QUINTANA M.D.         PREOPERATIVE DIAGNOSIS:    Shoulder rotator cuff tear.         POSTOPERATIVE DIAGNOSIS:    Shoulder rotator cuff tear.         PROCEDURES PERFORMED:    Left shoulder arthroscopic subacromial decompression, mini open rotator cuff    repair.         ANESTHESIA:    General anesthesia, interscalene nerve block.         COMPLICATIONS:    None.         CONDITION:    Stable to recovery.         ESTIMATED BLOOD LOSS:    20 mL.         FINDINGS:    Bursal sided rotator cuff tear, supraspinatus implants, 4.75 swivel lock    anchor x2, 5.5 Arthrex corkscrew anchor x2.         INDICATIONS:    Moreno is a 55-year-old gentleman who has left shoulder pain. MRI revealed    rotator cuff tear. He has failed conservative measures wishes to undergo    operative treatment. We have discussed risks and benefits of surgery with the    patient  including risk of bleeding, infection, damage, neurovascular    structures, heart attack, stroke, DVT/PE, anesthesia complications, including    death, continued pain, disability, need for additional procedures among    others. Informed consent was obtained. He wished to proceed.         DESCRIPTION OF PROCEDURE:    Operative site was  in the preoperative holding area. The patient was    brought to the operating room and general anesthesia was applied. The patient    was placed in a lateral decubitus position and the down arm was placed on an    arm board. The affected arm was prepped and draped in usual sterile fashion.    Preoperative antibiotic was given. SCD boots were placed on the lower    extremity. The patient shoulder was placed a deflated beanbag and axillary    roll was placed. Standard posterior portal was established and arthroscope    was inserted into the glenohumeral joint. Anterior portal was established in    the rotator interval and cannula was inserted anteriorly. Probe was used to    check the intraarticular structures. There was no evidence of significant    articulate sided tear. The cartilage was intact with mild grade 1-2    chondromalacia. The subscapularis tendon was intact. The biceps tendon and    superior labrum was intact. No evidence of labral tear. At this point, the    arthroscope was in switched to the subacromial space. The lateral portal was    established off the lateral acromion and ablation probe and motorized shaver    were used to clear the subacromial space. The acromion was resected with a    motorized bur and acromioplasty. The shoulder was externally rotated. I then    encountered a near full thickness high grade bursal sided tear of the    supraspinatus, measured at 1.5 cm from anterior to posterior, was debrided    with a motorized shaver to create a full thickness tear and then the lateral    portal was established  and enlarged to a 3 cm incision and the  subcutaneous    tissues and deltoid fascia were incised. The deltoid muscle was split in line    with the incision and the bursa was excised. The rotator cuff tear was    mobilized. A subchondral drill was used to create bleeding channels in the    tuberosity. Two 5.5 Arthrex anchors were placed anteriorly and posteriorly at    the medial footprint. The sutures were passed through the tendon using a    scorpion suture passer and were tied with alternating half hitch knots, 1    suture from each knot was then pulled back to the swivel lock anchor which    was placed in the lateral row anterior and posteriorly. The sutures were    tensioned and the anchors were deployed. The sutures were cut. At this point    the wound was irrigated. The fascia was closed with 0 Vicryl, subcutaneous    tissue with 2-0 Vicryl and the skin with staples. Portal incisions were    closed with nylon suture. Sterile dressings and cold therapy were placed.    Immobilizer was placed. The patient awoke from anesthesia in stable    condition. No complications. All counts correct. Stable to recovery.         To be electronically signed in [employer]    [id number] MEG INGRAM:vh    D:  04/26/2021 21:44    T:  04/27/2021 14:04    #[id number]         Until signed, this is an unconfirmed preliminary report that may contain    errors and is subject to change.         Electronically signed by SERGIO QUINTANA  04/27/2021 21:28     Disclaimer: Converted hospital document may not contain table formatting or lab diagrams. Please see [employer] for authenticated document.

## 2021-05-28 NOTE — PROCEDURES
Patient: GERARDO ELDRIDGE     Acct: [id number]     Report: #[id number]  MR #:  [id number]     DOS: 2021 1117     : 1965  DICTATING: JORDAN GRIMES  ***Signed***  --------------------------------------------------------------------------------------------------------------------  Anesthesia Blck Procedure Note      DATE: 21      Risks and benefits discussed and accepted with patient to include but not     limited to: bleeding, infection, paresthesia, pain at site of injection, limb     weakness, headaches, allergic reactions to injections, and seizures.            The block or continuous infusion is requested by the referring physician for     management of postoperative pain, or pain related to a procedure.      Diagnosis      Post-Op Pain Management      Block:  Single Interscalene      Site/Location:  Left      Prep Solution:  ChloraPrep      Technique:  Ultrasound (Deemed medically necessary)      Needle Type & Size:  Insulated 22 gauge Needle      Solution Injected:  0.5% Ropivacaine      Local Anesthetic Volume:  25 ml      Complications:  None      Comments:      Sterile technique used.  Negative IV test done. Negative heme with aspiration     every 5cc.  No paresthesia with block.  Patient tolerated well.            Start  1110      End    1114            JORDAN GRIMES                 2021 11:17      Electronically signed by JORDAN GRIMES  2021 11:17     Disclaimer: Converted hospital document may not contain table formatting or lab diagrams. Please see [employer] for authenticated document.

## 2021-06-05 NOTE — PROGRESS NOTES
Progress Note      Patient Name: Moreno Aleman   Patient ID: [id number]   Sex: Male   YOB: 1965    Primary Care Provider: Ana Flores MD   Referring Provider: Ana Flores MD    Visit Date: May 25, 2021    Provider: Kam Dick MD   Location: [facility]   Location Address: [address] [place] [address]  [id number]   Location Phone: [phone]          Chief Complaint  · Left shoulder pain      History Of Present Illness  Moreno Aleman is a 55 year old /White male who presents today to [facility].      The patient presents here today for follow up evaluation of his left shoulder. The patient is S/P Left shoulder arthroscopic subacromial decompression, mini open rotator cuff repair, 4/26/2021. The patient states he was dreaming the other night that he was falling and jerked his shoulder. He is wearing his sling. He reports pain to the anterior shoulder.       Past Medical History  Arthritis; Back pain; Bursitis: Right Shoulder; CAD (coronary artery disease); Chest pain; Colitis; Diabetes; Diabetes Mellitus, Type II; Fx -4th Metacarpal; Gallstones; Hernia; Hyperlipidemia; Hypertension; Left Epididymitis; Migraine; Phimosis; Right Shoulder: Rotator Cuff Tear; Sinus trouble; Status-post: Right shoulder arthroscopic RTC repair, SAD; 02/19/2019; Vitamin D deficiency         Past Surgical History  Back; Back surgery; Circumcision; Colonoscopy; Gallbladder; Hernia; Hernia Repair; Metal implants; Rotator Cuff repair; Surgical Clips         Medication List  Admelog SoloStar U-100 Insulin 100 unit/mL subcutaneous insulin pen; amlodipine 5 mg oral tablet; aspirin oral; atorvastatin 20 mg oral tablet; carvedilol 12.5 mg oral tablet; clopidogrel 75 mg oral tablet; isosorbide mononitrate 30 mg oral tablet extended release 24 hr; lisinopril 30 mg oral tablet; oxybutynin chloride 5 mg oral tablet extended release 24hr; Percocet 7.5-325 mg oral tablet; sertraline 25 mg  "oral tablet; tadalafil 5 mg oral tablet; tamsulosin 0.4 mg oral capsule; Tresiba FlexTouch U-100 100 unit/mL (3 mL) subcutaneous insulin pen; Trulicity subcutaneous         Allergy List  fentanyl; Procardia       Allergies Reconciled  Family Medical History  Stroke; Heart Disease; Congestive Heart Failure; Cancer, Unspecified; Diabetes, unspecified type; Family history of Arthritis         Social History  Active but no formal exercise; Alcohol Use (Current some day); Claustophobic (Unknown); .; lives with other; No known infection risk; Recreational Drug Use (Never); Single.; Tobacco (Never); Unemployed.; Working         Review of Systems  · Constitutional  o Denies  o : fever, chills, weight loss  · Cardiovascular  o Denies  o : chest pain, shortness of breath  · Gastrointestinal  o Denies  o : liver disease, heartburn, nausea, blood in stools  · Genitourinary  o Denies  o : painful urination, blood in urine  · Integument  o Denies  o : rash, itching  · Neurologic  o Denies  o : headache, weakness, loss of consciousness  · Musculoskeletal  o Denies  o : painful, swollen joints  · Psychiatric  o Denies  o : drug/alcohol addiction, anxiety, depression      Vitals  Date Time BP Position Site L\R Cuff Size HR RR TEMP (F) WT  HT  BMI kg/m2 BSA m2 O2 Sat FR L/min FiO2 HC       05/25/2021 11:02 AM         190lbs 0oz 5'  7.5\" 29.32 2.03             Physical Examination  · Constitutional  o Appearance  o : well developed, well-nourished, no obvious deformities present  · Head and Face  o Head  o :   § Inspection  § : normocephalic  o Face  o :   § Inspection  § : no facial lesions  · Eyes  o Conjunctivae  o : conjunctivae normal  o Sclerae  o : sclerae white  · Ears, Nose, Mouth and Throat  o Ears  o :   § External Ears  § : appearance within normal limits  § Hearing  § : intact  o Nose  o :   § External Nose  § : appearance normal  · Neck  o Inspection/Palpation  o : normal appearance  o Range of Motion  o : full " range of motion  · Respiratory  o Respiratory Effort  o : breathing unlabored  o Inspection of Chest  o : normal appearance  o Auscultation of Lungs  o : no audible wheezing or rales  · Cardiovascular  o Heart  o : regular rate  · Gastrointestinal  o Abdominal Examination  o : soft and non-tender  · Skin and Subcutaneous Tissue  o General Inspection  o : intact, no rashes  · Psychiatric  o General  o : Alert and oriented x3  o Judgement and Insight  o : judgment and insight intact  o Mood and Affect  o : mood normal, affect appropriate  · Left Shoulder  o Inspection  o : Tender to the anterior shoulder. Incision well healing. No signs of infection. No redness. Neurovascularly intact. Sensation to light touch median, radial, ulnar nerve. Positive AIN, PIN, ulnar nerve. Positive pulses.           Assessment  · Aftercare following Left shoulder arthroscopic subacromial decompression, mini open rotator cuff repair, 4/26/2021     V54.81  · Left shoulder pain, unspecified chronicity     719.41/M25.512      Plan  · Medications  o Medications have been Reconciled  o Transition of Care or Provider Policy  · Instructions  o Reviewed the patient's Past Medical, Social, and Family history as well as the ROS at today's visit, no changes.  o Call or return if worsening symptoms.  o The above service was scribed by Leila Oscar on my behalf and I attest to the accuracy of the note. jsb  o Discussed the treatment plan with the patient. Continue sling and physical therapy. Prescription refill of pain medication given today. Keep original follow up   o Electronically Identified Patient Education Materials Provided Electronically            Electronically Signed by: Leila Oscar MA -Author on May 26, 2021 08:18:41 AM  Electronically Co-signed by: Kam Dick MD -Reviewer on May 26, 2021 09:43:41 PM

## 2021-06-07 ENCOUNTER — TRANSCRIBE ORDERS (OUTPATIENT)
Dept: PHYSICAL THERAPY | Facility: CLINIC | Age: 56
End: 2021-06-07

## 2021-06-07 DIAGNOSIS — Z98.890 S/P LEFT ROTATOR CUFF REPAIR: Primary | ICD-10-CM

## 2021-06-13 ENCOUNTER — HOSPITAL ENCOUNTER (EMERGENCY)
Facility: HOSPITAL | Age: 56
Discharge: HOME | End: 2021-06-13
Attending: EMERGENCY MEDICINE | Admitting: EMERGENCY MEDICINE

## 2021-06-13 VITALS
TEMPERATURE: 98.1 F | HEIGHT: 68 IN | SYSTOLIC BLOOD PRESSURE: 196 MMHG | HEART RATE: 106 BPM | BODY MASS INDEX: 28.27 KG/M2 | WEIGHT: 186.51 LBS | OXYGEN SATURATION: 97 % | DIASTOLIC BLOOD PRESSURE: 109 MMHG | RESPIRATION RATE: 20 BRPM

## 2021-06-13 DIAGNOSIS — Z98.890 SHOULDER PAIN WITH HISTORY OF REPAIR OF ROTATOR CUFF: Primary | ICD-10-CM

## 2021-06-13 DIAGNOSIS — M25.519 SHOULDER PAIN WITH HISTORY OF REPAIR OF ROTATOR CUFF: Primary | ICD-10-CM

## 2021-06-13 PROCEDURE — 25010000002 HYDROMORPHONE 1 MG/ML SOLUTION: Performed by: EMERGENCY MEDICINE

## 2021-06-13 PROCEDURE — 99283 EMERGENCY DEPT VISIT LOW MDM: CPT

## 2021-06-13 PROCEDURE — 96372 THER/PROPH/DIAG INJ SC/IM: CPT

## 2021-06-13 RX ADMIN — HYDROMORPHONE HYDROCHLORIDE 1 MG: 1 INJECTION, SOLUTION INTRAMUSCULAR; INTRAVENOUS; SUBCUTANEOUS at 17:09

## 2021-06-13 NOTE — ED NOTES
Pt is aware that BP is elevated and will refill BP medication      Barbara Parra, ROMA  06/13/21 1739

## 2021-06-13 NOTE — ED TRIAGE NOTES
Pt had rotator cuff surgery by Dr. Dick on 4/26, now having severe pain in the shoulder, motrin not helping, has an appt with Mayelin on 6/22.

## 2021-06-13 NOTE — ED PROVIDER NOTES
Subjective   Patient complaining of left shoulder pain.  Patient states pain has been chronic since April.  Patient states had rotator cuff surgery back in May under the care of Dr. Dick.  Patient states will call Dr. Dick's office for appointment tomorrow.  But is currently in the ER trying to seek some pain management.      History provided by:  Patient   used: No        Review of Systems   Constitutional: Negative for chills and fever.   HENT: Negative for congestion, ear pain and sore throat.    Eyes: Negative for pain.   Respiratory: Negative for cough, chest tightness and shortness of breath.    Cardiovascular: Negative for chest pain.   Gastrointestinal: Negative for abdominal pain, diarrhea, nausea and vomiting.   Genitourinary: Negative for flank pain and hematuria.   Musculoskeletal: Negative for joint swelling.   Skin: Negative for pallor.   Neurological: Negative for seizures and headaches.   All other systems reviewed and are negative.      Past Medical History:   Diagnosis Date   • CAD (coronary artery disease)    • Chest pain    • Diabetes (CMS/HCC)    • Hyperlipidemia    • Hypertension        Allergies   Allergen Reactions   • Fentanyl Angioedema   • Procardia [Nifedipine] Hives and Itching       Past Surgical History:   Procedure Laterality Date   • CARDIAC CATHETERIZATION      showed significant coronary artery disease of the LAD with calcification.    • CHOLECYSTECTOMY     • CIRCUMCISION     • INGUINAL HERNIA REPAIR     • NOSE SURGERY     • AL RT/LT HEART CATHETERS N/A 3/22/2016    Procedure: Percutaneous Coronary Intervention - Rota/stent LAD;  Surgeon: Marek Emery MD;  Location: [facility] INVASIVE LOCATION;  Service: Cardiovascular   • ROTATOR CUFF REPAIR Left    • SPINAL FUSION         Family History   Problem Relation Age of Onset   • Hypertension Mother    • Diabetes Mother    • Heart attack Father    • Heart disease Father    • Heart failure Father    •  Diabetes Father        Social History     Socioeconomic History   • Marital status: Single     Spouse name: Not on file   • Number of children: Not on file   • Years of education: Not on file   • Highest education level: Not on file   Tobacco Use   • Smoking status: Never Smoker   • Smokeless tobacco: Never Used   Substance and Sexual Activity   • Alcohol use: Never     Comment: OCCASIONAL   • Drug use: Never           Objective   Physical Exam  Vitals and nursing note reviewed.   Constitutional:       General: He is not in acute distress.     Appearance: Normal appearance. He is not toxic-appearing.   HENT:      Head: Normocephalic and atraumatic.      Mouth/Throat:      Mouth: Mucous membranes are moist.   Eyes:      Extraocular Movements: Extraocular movements intact.      Pupils: Pupils are equal, round, and reactive to light.   Cardiovascular:      Rate and Rhythm: Normal rate and regular rhythm.      Pulses: Normal pulses.      Heart sounds: Normal heart sounds.   Pulmonary:      Effort: Pulmonary effort is normal. No respiratory distress.      Breath sounds: Normal breath sounds.   Abdominal:      General: Abdomen is flat.      Palpations: Abdomen is soft.      Tenderness: There is no abdominal tenderness.   Musculoskeletal:         General: Normal range of motion.        Arms:       Cervical back: Normal range of motion and neck supple.   Skin:     General: Skin is warm and dry.      Capillary Refill: Capillary refill takes less than 2 seconds.   Neurological:      Mental Status: He is alert and oriented to person, place, and time. Mental status is at baseline.         Procedures           ED Course                                           MDM  Number of Diagnoses or Management Options  Shoulder pain with history of repair of rotator cuff  Diagnosis management comments: Patient will follow up with Dr. Dick.  Will call office for next available appointment.  Patient states has been noncompliant with  hypertension medications.  Patient states will take medication and follow-up with PCP.  Patient denies any hypertension related complaints.    Risk of Complications, Morbidity, and/or Mortality  Presenting problems: low  Diagnostic procedures: low  Management options: low    Patient Progress  Patient progress: stable      Final diagnoses:   Shoulder pain with history of repair of rotator cuff       ED Disposition  ED Disposition     ED Disposition Condition Comment    Discharge Stable           Kam Dick MD  [address] [facility] [address]  [phone]    Call       Ana Flores MD  [address] [facility] [id number] [phone]      Take your blood pressure medication as directed.  Talk with your PCP about greater blood pressure control.         Medication List      No changes were made to your prescriptions during this visit.          Curly Orozco, APRN  06/13/21 174

## 2021-06-15 RX ORDER — CLONIDINE HYDROCHLORIDE 0.1 MG/1
0.1 TABLET ORAL DAILY
Qty: 30 TABLET | Refills: 0 | Status: SHIPPED | OUTPATIENT
Start: 2021-06-15 | End: 2021-08-31 | Stop reason: SDUPTHER

## 2021-06-15 RX ORDER — CLONIDINE HYDROCHLORIDE 0.1 MG/1
0.1 TABLET ORAL DAILY
COMMUNITY
Start: 2021-03-18 | End: 2021-06-15 | Stop reason: SDUPTHER

## 2021-06-15 NOTE — TELEPHONE ENCOUNTER
Caller: Moreno Aleman    Relationship: Self    Best call back number: [phone]     Medication needed:     CLONIDINE 0.1 MG     When do you need the refill by: ASAP    What additional details did the patient provide when requesting the medication: OUT OF MEDICATION.     Does the patient have less than a 3 day supply:  [x] Yes  [] No    What is the patient's preferred pharmacy: [id number]  [place], [place] - [address]  AT [facility] [address]  [phone] [facility] [phone] FX         PATIENT MADE A NEW PATIENT APPT AUG 3

## 2021-06-22 ENCOUNTER — OFFICE VISIT (OUTPATIENT)
Dept: ORTHOPEDIC SURGERY | Facility: CLINIC | Age: 56
End: 2021-06-22

## 2021-06-22 VITALS — OXYGEN SATURATION: 98 % | HEIGHT: 68 IN | WEIGHT: 185.4 LBS | HEART RATE: 111 BPM | BODY MASS INDEX: 28.1 KG/M2

## 2021-06-22 DIAGNOSIS — Z47.89 AFTERCARE FOLLOWING SURGERY OF THE MUSCULOSKELETAL SYSTEM: Primary | ICD-10-CM

## 2021-06-22 PROCEDURE — 99024 POSTOP FOLLOW-UP VISIT: CPT | Performed by: PHYSICIAN ASSISTANT

## 2021-06-22 RX ORDER — SITAGLIPTIN AND METFORMIN HYDROCHLORIDE 1000; 50 MG/1; MG/1
TABLET, FILM COATED ORAL
COMMUNITY
End: 2021-08-03

## 2021-06-22 RX ORDER — CYCLOBENZAPRINE HCL 10 MG
TABLET ORAL
COMMUNITY
Start: 2021-06-08 | End: 2021-08-03

## 2021-06-22 RX ORDER — SERTRALINE HYDROCHLORIDE 25 MG/1
TABLET, FILM COATED ORAL
COMMUNITY
End: 2021-08-03

## 2021-06-22 RX ORDER — TAMSULOSIN HYDROCHLORIDE 0.4 MG/1
CAPSULE ORAL
COMMUNITY
Start: 2021-06-18 | End: 2021-08-03 | Stop reason: SDUPTHER

## 2021-06-22 RX ORDER — CLOPIDOGREL BISULFATE 75 MG/1
TABLET ORAL
COMMUNITY
End: 2022-04-05 | Stop reason: SDUPTHER

## 2021-06-22 RX ORDER — INSULIN DEGLUDEC INJECTION 100 U/ML
INJECTION, SOLUTION SUBCUTANEOUS
COMMUNITY
End: 2021-08-03

## 2021-06-22 RX ORDER — INSULIN ASPART 100 [IU]/ML
INJECTION, SUSPENSION SUBCUTANEOUS
COMMUNITY
End: 2021-08-03

## 2021-06-22 NOTE — PATIENT INSTRUCTIONS
As we discussed, you have been given a refill of your pain medication, take as needed/as directed.  Restart physical therapy, work on range of motion and strength.  Follow-up in 4 weeks for reevaluation.

## 2021-06-22 NOTE — PROGRESS NOTES
"Chief Complaint  Pain and Follow-up of the Left Shoulder    Subjective          [unfilled] presents to [facility] for   History of Present Illness    Patient presents for follow-up evaluation of left shoulder arthroscopic subacromial decompression, mini open rotator cuff repair, 4/26/2021.  Patient states that he has been doing \"worse \"over the last 2 weeks due to his pain in his shoulder.  Patient states the pain is causing him to not be able to sleep at night, he has not been to therapy for 2 weeks due to the pain in his shoulder.  Patient ran out of pain medication 2 weeks ago.  He admits to a stabbing pain in the anterior shoulder with movement.  Patient was seen last by Dr. Dick on 5/25/2021 due to a new pain that he had he states that he was dreaming and he dream that he was falling and he jerked his shoulder during the dream and he was concerned about increased pain.  Patient states with pain medication he was able to tolerate therapy in the 2 weeks after that visit he ran out of pain medication and his pain has worsened.  Patient points anterior lateral shoulder is very worse pain.  Allergies   Allergen Reactions   • Fentanyl Angioedema   • Nifedipine Hives and Itching        Social History     Socioeconomic History   • Marital status: Single     Spouse name: Not on file   • Number of children: Not on file   • Years of education: Not on file   • Highest education level: Not on file   Tobacco Use   • Smoking status: Never Smoker   • Smokeless tobacco: Never Used   Vaping Use   • Vaping Use: Never used   Substance and Sexual Activity   • Alcohol use: Never     Comment: OCCASIONAL   • Drug use: Never        REVIEW OF SYSTEMS    Constitutional: Denies fevers, chills, weight loss  Cardiovascular: Denies chest pain, shortness of breath  Skin: Denies rashes, acute skin changes  Neurologic: Denies headache, loss of consciousness  MSK: Left shoulder pain      Objective   Vital " "Signs:   Pulse 111   Ht 171.5 cm (67.5\")   Wt 84.1 kg (185 lb 6.4 oz)   SpO2 98%   BMI 28.61 kg/m²     Body mass index is 28.61 kg/m².    Physical Exam    Incisions are well-healed, no erythema, ecchymosis, no swelling, no atrophy, no signs of infection.  Nontender to palpation, mild pain with range of motion, active forward elevation 90, passive forward elevation 130, active abduction 90, passive abduction 120, external rotation with abduction passive 60, internal rotation to his side.    Procedures    Imaging Results (Most Recent)     None           Result Review :   The following data was reviewed by: NICK Nvaa on 06/22/2021:               Assessment and Plan    Diagnoses and all orders for this visit:    1. Aftercare following surgery of left shoulder arthroscopic subacromial decompression, mini open rotator cuff repair, 4/26/2021 (Primary)  -     Ambulatory Referral to Physical Therapy Evaluate and treat (2-3 times per week for 6 to 8 weeks)        Discussed diagnosis and treatment options with the patient, we recommend that patient should start pain medication again he was given a refill of this, we advised him to restart physical therapy, continue working on range of motion and strength, follow-up in 4 weeks for reevaluation, may consider MRI for symptoms.    Call or return if worsening symptoms.    Follow Up   Return in about 4 weeks (around 7/20/2021).  Patient was given instructions and counseling regarding his condition or for health maintenance advice. Please see specific information pulled into the AVS if appropriate.   Patient Instructions   As we discussed, you have been given a refill of your pain medication, take as needed/as directed.  Restart physical therapy, work on range of motion and strength.  Follow-up in 4 weeks for reevaluation.      "

## 2021-06-23 DIAGNOSIS — Z98.890 S/P ROTATOR CUFF REPAIR: Primary | ICD-10-CM

## 2021-06-23 RX ORDER — HYDROCODONE BITARTRATE AND ACETAMINOPHEN 7.5; 325 MG/1; MG/1
1 TABLET ORAL EVERY 6 HOURS PRN
Qty: 30 TABLET | Refills: 0 | Status: SHIPPED | OUTPATIENT
Start: 2021-06-23 | End: 2021-07-20

## 2021-06-23 NOTE — TELEPHONE ENCOUNTER
PATIENT SAW CARYN YESTERDAY AND HIS PAIN MEDICINE WAS NEVER SENT IN. HE USES [facility] ON [address]. HIS PHONE NUMBER [phone]

## 2021-07-15 VITALS — BODY MASS INDEX: 29.82 KG/M2 | HEIGHT: 67 IN | WEIGHT: 190 LBS

## 2021-07-15 VITALS — WEIGHT: 190 LBS | HEIGHT: 67 IN | BODY MASS INDEX: 29.82 KG/M2

## 2021-07-19 ENCOUNTER — HOSPITAL ENCOUNTER (EMERGENCY)
Facility: HOSPITAL | Age: 56
Discharge: LEFT BEFORE DISCHARGE | End: 2021-07-19

## 2021-07-19 VITALS
TEMPERATURE: 99 F | OXYGEN SATURATION: 98 % | WEIGHT: 182.54 LBS | DIASTOLIC BLOOD PRESSURE: 99 MMHG | HEIGHT: 67 IN | RESPIRATION RATE: 16 BRPM | BODY MASS INDEX: 28.65 KG/M2 | HEART RATE: 93 BPM | SYSTOLIC BLOOD PRESSURE: 181 MMHG

## 2021-07-19 PROCEDURE — 99211 OFF/OP EST MAY X REQ PHY/QHP: CPT

## 2021-07-20 ENCOUNTER — OFFICE VISIT (OUTPATIENT)
Dept: ORTHOPEDIC SURGERY | Facility: CLINIC | Age: 56
End: 2021-07-20

## 2021-07-20 VITALS — WEIGHT: 182.4 LBS | HEART RATE: 79 BPM | OXYGEN SATURATION: 98 % | BODY MASS INDEX: 27.65 KG/M2 | HEIGHT: 68 IN

## 2021-07-20 DIAGNOSIS — Z47.89 AFTERCARE FOLLOWING SURGERY OF THE MUSCULOSKELETAL SYSTEM: Primary | ICD-10-CM

## 2021-07-20 PROCEDURE — 99213 OFFICE O/P EST LOW 20 MIN: CPT | Performed by: PHYSICIAN ASSISTANT

## 2021-07-20 RX ORDER — HYDROCODONE BITARTRATE AND ACETAMINOPHEN 5; 325 MG/1; MG/1
1 TABLET ORAL EVERY 6 HOURS PRN
Qty: 28 TABLET | Refills: 0 | OUTPATIENT
Start: 2021-07-20 | End: 2021-09-04

## 2021-07-20 NOTE — PROGRESS NOTES
"Chief Complaint  Pain and Follow-up of the Left Shoulder    Subjective          Moreno Aleman is a 55 y.o. male  presents to [facility] for   History of Present Illness    Patient presents for follow-up evaluation of left shoulder arthroscopic subacromial compression, mini open rotator cuff repair, 4/26/2021.  Patient states his left shoulder continues to cause him pain.Patient states his anterior shoulder has worse pain with movement, he states he was doing well after surgery, he was seen by Dr. Dick on 5/25/21 due to a new pain that he stated he started having after he had a dream and he jerked his shoulder during the dream and woke up with pain.  Patient was advised to continue therapy at that time, patient has been taking pain medication, he states his pain has worsened and when he runs out of medicine his pain persists. Patient denies numbness/tingling, denies new injury except for the dream episode. Patient states he has had to do HEP since he does not have a ride to therapy. He has not been to PT for several weeks.   Allergies   Allergen Reactions   • Fentanyl Angioedema   • Nifedipine Hives and Itching        Social History     Socioeconomic History   • Marital status: Single     Spouse name: Not on file   • Number of children: Not on file   • Years of education: Not on file   • Highest education level: Not on file   Tobacco Use   • Smoking status: Never Smoker   • Smokeless tobacco: Never Used   Vaping Use   • Vaping Use: Never used   Substance and Sexual Activity   • Alcohol use: Never     Comment: OCCASIONAL   • Drug use: Never        REVIEW OF SYSTEMS    Constitutional: Denies fevers, chills, weight loss  Cardiovascular: Denies chest pain, shortness of breath  Skin: Denies rashes, acute skin changes  Neurologic: Denies headache, loss of consciousness  MSK: Left shoulder pain.      Objective   Vital Signs:   Pulse 79   Ht 171.5 cm (67.5\")   Wt 82.7 kg (182 lb 6.4 " oz)   SpO2 98%   BMI 28.15 kg/m²     Body mass index is 28.15 kg/m².    Physical Exam    Incisions are well-healed, no erythema, ecchymosis, no swelling, no atrophy, no signs of infection.  Tender to palpation anterior shoulder and lateral shoulder, pain with range of motion, active forward elevation 85, passive forward elevation 160, active abduction 90, passive abduction 120, external rotation with abduction  60, internal rotation to his side. 4/5 supraspinatus, 5/5 infra and subscap.     Procedures    Imaging Results (Most Recent)     None           Result Review :   The following data was reviewed by: NICK Nava on 07/20/2021:               Assessment and Plan    Diagnoses and all orders for this visit:    1. Aftercare following surgery of left shoulder arthroscopic subacromial decompression, mini open rotator cuff repair, 4/26/2021 (Primary)        Discussed diagnosis treatment options with patient, he was given pain medication refill, he was advised we will order MRI of the left shoulder for further evaluation to rule out internal derangement.  Follow-up after MRI.    Call or return if worsening symptoms.    Follow Up   Return for After MRI.  Patient was given instructions and counseling regarding his condition or for health maintenance advice. Please see specific information pulled into the AVS if appropriate.

## 2021-07-28 DIAGNOSIS — Z98.890 S/P ROTATOR CUFF REPAIR: Primary | ICD-10-CM

## 2021-07-28 RX ORDER — DIAZEPAM 10 MG/1
TABLET ORAL
Qty: 1 TABLET | Refills: 0 | Status: SHIPPED | OUTPATIENT
Start: 2021-07-28 | End: 2021-08-03

## 2021-07-28 NOTE — TELEPHONE ENCOUNTER
Patient is having an MRI on Friday and is requesting something to help calm him because he is claustrophobic. He uses [facility] on [address]. His phone number is [phone].

## 2021-07-30 ENCOUNTER — HOSPITAL ENCOUNTER (OUTPATIENT)
Dept: MRI IMAGING | Facility: HOSPITAL | Age: 56
Discharge: HOME | End: 2021-07-30
Admitting: PHYSICIAN ASSISTANT

## 2021-07-30 DIAGNOSIS — Z47.89 AFTERCARE FOLLOWING SURGERY OF THE MUSCULOSKELETAL SYSTEM: ICD-10-CM

## 2021-07-30 PROCEDURE — 73221 MRI JOINT UPR EXTREM W/O DYE: CPT

## 2021-07-30 PROCEDURE — 73221 MRI JOINT UPR EXTREM W/O DYE: CPT | Performed by: RADIOLOGY

## 2021-07-31 ENCOUNTER — HOSPITAL ENCOUNTER (EMERGENCY)
Facility: HOSPITAL | Age: 56
Discharge: HOME | End: 2021-07-31
Attending: EMERGENCY MEDICINE | Admitting: EMERGENCY MEDICINE

## 2021-07-31 VITALS
BODY MASS INDEX: 27.77 KG/M2 | HEART RATE: 76 BPM | SYSTOLIC BLOOD PRESSURE: 128 MMHG | OXYGEN SATURATION: 100 % | RESPIRATION RATE: 16 BRPM | HEIGHT: 68 IN | TEMPERATURE: 98.6 F | WEIGHT: 183.2 LBS | DIASTOLIC BLOOD PRESSURE: 80 MMHG

## 2021-07-31 DIAGNOSIS — M25.512 LEFT SHOULDER PAIN, UNSPECIFIED CHRONICITY: Primary | ICD-10-CM

## 2021-07-31 DIAGNOSIS — M25.512 CHRONIC LEFT SHOULDER PAIN: ICD-10-CM

## 2021-07-31 DIAGNOSIS — G89.29 CHRONIC LEFT SHOULDER PAIN: ICD-10-CM

## 2021-07-31 PROCEDURE — 99283 EMERGENCY DEPT VISIT LOW MDM: CPT

## 2021-07-31 RX ORDER — HYDROCODONE BITARTRATE AND ACETAMINOPHEN 5; 325 MG/1; MG/1
1 TABLET ORAL EVERY 6 HOURS PRN
Qty: 15 TABLET | Refills: 0 | Status: SHIPPED | OUTPATIENT
Start: 2021-07-31 | End: 2021-08-06 | Stop reason: SDUPTHER

## 2021-07-31 RX ORDER — HYDROCODONE BITARTRATE AND ACETAMINOPHEN 5; 325 MG/1; MG/1
1 TABLET ORAL EVERY 6 HOURS PRN
Status: DISCONTINUED | OUTPATIENT
Start: 2021-07-31 | End: 2021-07-31 | Stop reason: HOSPADM

## 2021-07-31 RX ADMIN — HYDROCODONE BITARTRATE AND ACETAMINOPHEN 1 TABLET: 5; 325 TABLET ORAL at 18:43

## 2021-08-03 ENCOUNTER — OFFICE VISIT (OUTPATIENT)
Dept: FAMILY MEDICINE CLINIC | Facility: CLINIC | Age: 56
End: 2021-08-03

## 2021-08-03 VITALS
TEMPERATURE: 97.6 F | WEIGHT: 184 LBS | HEIGHT: 68 IN | SYSTOLIC BLOOD PRESSURE: 124 MMHG | HEART RATE: 92 BPM | BODY MASS INDEX: 27.89 KG/M2 | OXYGEN SATURATION: 96 % | DIASTOLIC BLOOD PRESSURE: 82 MMHG

## 2021-08-03 DIAGNOSIS — N52.9 ERECTILE DYSFUNCTION, UNSPECIFIED ERECTILE DYSFUNCTION TYPE: ICD-10-CM

## 2021-08-03 DIAGNOSIS — G47.00 INSOMNIA, UNSPECIFIED TYPE: Primary | ICD-10-CM

## 2021-08-03 DIAGNOSIS — I10 ESSENTIAL HYPERTENSION: ICD-10-CM

## 2021-08-03 DIAGNOSIS — Z12.5 SCREENING FOR PROSTATE CANCER: ICD-10-CM

## 2021-08-03 DIAGNOSIS — N40.0 BENIGN PROSTATIC HYPERPLASIA, UNSPECIFIED WHETHER LOWER URINARY TRACT SYMPTOMS PRESENT: ICD-10-CM

## 2021-08-03 DIAGNOSIS — Z13.220 SCREENING FOR LIPID DISORDERS: ICD-10-CM

## 2021-08-03 DIAGNOSIS — E11.65 TYPE 2 DIABETES MELLITUS WITH HYPERGLYCEMIA, WITHOUT LONG-TERM CURRENT USE OF INSULIN (HCC): ICD-10-CM

## 2021-08-03 PROBLEM — K52.3 INDETERMINATE COLITIS: Status: ACTIVE | Noted: 2021-08-03

## 2021-08-03 PROBLEM — G43.909 MIGRAINE: Status: ACTIVE | Noted: 2021-08-03

## 2021-08-03 PROBLEM — E11.9 DIABETES: Status: ACTIVE | Noted: 2021-08-03

## 2021-08-03 PROBLEM — M54.9 BACK PAIN: Status: ACTIVE | Noted: 2021-08-03

## 2021-08-03 PROBLEM — M19.90 ARTHRITIS: Status: ACTIVE | Noted: 2021-08-03

## 2021-08-03 PROCEDURE — 99203 OFFICE O/P NEW LOW 30 MIN: CPT | Performed by: NURSE PRACTITIONER

## 2021-08-03 RX ORDER — LISINOPRIL 30 MG/1
30 TABLET ORAL DAILY
Qty: 90 TABLET | Refills: 1 | Status: SHIPPED | OUTPATIENT
Start: 2021-08-03 | End: 2021-08-31

## 2021-08-03 RX ORDER — DAPAGLIFLOZIN AND METFORMIN HYDROCHLORIDE 10; 1000 MG/1; MG/1
1 TABLET, FILM COATED, EXTENDED RELEASE ORAL DAILY
Qty: 90 TABLET | Refills: 1 | Status: SHIPPED | OUTPATIENT
Start: 2021-08-03 | End: 2021-08-11 | Stop reason: CLARIF

## 2021-08-03 RX ORDER — TAMSULOSIN HYDROCHLORIDE 0.4 MG/1
1 CAPSULE ORAL DAILY
Qty: 90 CAPSULE | Refills: 1 | Status: SHIPPED | OUTPATIENT
Start: 2021-08-03 | End: 2021-09-27

## 2021-08-03 RX ORDER — SILDENAFIL 100 MG/1
100 TABLET, FILM COATED ORAL DAILY PRN
Qty: 30 TABLET | Refills: 1 | Status: SHIPPED | OUTPATIENT
Start: 2021-08-03 | End: 2023-02-07

## 2021-08-03 NOTE — PROGRESS NOTES
"Chief Complaint  Establish Care (Needing a new practioner ), Hypertension, Hyperlipidemia, and Diabetes    Subjective          Moreno Aleman presents to [facility]  Presents to the office today to establish care.    He does have a history of hypertension, diabetes and enlarged prostate.  Patient does state that he has not been on diabetic medications for over a year.  Patient has not had routine lab work completed in a while as well.  He does state that he is drinking sodas on a routine basis.  His blood pressure elevated 124/82.  Denies any chest pain shortness breath palpitations time.  Does state that he has been refills on his medications.  Patient also states that he is struggling with erectile dysfunction.  I did discuss that this could be secondary to his diabetes or his medications.  He is requesting a prescription for Viagra.      Objective   Vital Signs:   /82 (BP Location: Left arm, Patient Position: Sitting, Cuff Size: Adult)   Pulse 92   Temp 97.6 °F (36.4 °C) (Temporal)   Ht 171.5 cm (67.52\")   Wt 83.5 kg (184 lb)   SpO2 96%   BMI 28.38 kg/m²     Physical Exam  Vitals reviewed.   Constitutional:       Appearance: Normal appearance.   Cardiovascular:      Rate and Rhythm: Normal rate and regular rhythm.      Heart sounds: Normal heart sounds, S1 normal and S2 normal. No murmur heard.     Pulmonary:      Effort: Pulmonary effort is normal. No respiratory distress.      Breath sounds: Normal breath sounds.   Skin:     General: Skin is warm and dry.   Neurological:      Mental Status: He is alert and oriented to person, place, and time.   Psychiatric:         Attention and Perception: Attention normal.         Mood and Affect: Mood normal.         Behavior: Behavior normal.        Result Review :                Assessment and Plan    Diagnoses and all orders for this visit:    1. Insomnia, unspecified type (Primary)  Comments:  Continue OTC insomnia " medication.    2. Screening for prostate cancer  -     PSA SCREENING; Future    3. Type 2 diabetes mellitus with hyperglycemia, without long-term current use of insulin (CMS/HCC)  -     CBC (No Diff); Future  -     Lipid Panel; Future  -     Hemoglobin A1c; Future  -     dapagliflozin-metformin HCl ER (Xigduo XR)  MG tablet; Take 1 tablet by mouth Daily.  Dispense: 90 tablet; Refill: 1  -     lisinopril (PRINIVIL,ZESTRIL) 30 MG tablet; Take 1 tablet by mouth Daily.  Dispense: 90 tablet; Refill: 1    4. Essential hypertension  Comments:  Patient's blood pressure stable.  We will continue his lisinopril 30 mg daily  Orders:  -     CBC (No Diff); Future  -     Comprehensive Metabolic Panel; Future  -     lisinopril (PRINIVIL,ZESTRIL) 30 MG tablet; Take 1 tablet by mouth Daily.  Dispense: 90 tablet; Refill: 1    5. Screening for lipid disorders  -     Lipid Panel; Future    6. Erectile dysfunction, unspecified erectile dysfunction type  -     sildenafil (Viagra) 100 MG tablet; Take 1 tablet by mouth Daily As Needed for Erectile Dysfunction.  Dispense: 30 tablet; Refill: 1    7. Benign prostatic hyperplasia, unspecified whether lower urinary tract symptoms present  -     tamsulosin (FLOMAX) 0.4 MG capsule 24 hr capsule; Take 1 capsule by mouth Daily.  Dispense: 90 capsule; Refill: 1        Follow Up   Return in about 3 months (around 11/3/2021) for Recheck.  Patient was given instructions and counseling regarding his condition or for health maintenance advice. Please see specific information pulled into the AVS if appropriate.

## 2021-08-06 ENCOUNTER — OFFICE VISIT (OUTPATIENT)
Dept: ORTHOPEDIC SURGERY | Facility: CLINIC | Age: 56
End: 2021-08-06

## 2021-08-06 ENCOUNTER — TELEPHONE (OUTPATIENT)
Dept: FAMILY MEDICINE CLINIC | Facility: CLINIC | Age: 56
End: 2021-08-06

## 2021-08-06 VITALS — HEIGHT: 68 IN | HEART RATE: 98 BPM | WEIGHT: 190.6 LBS | BODY MASS INDEX: 28.89 KG/M2 | OXYGEN SATURATION: 94 %

## 2021-08-06 DIAGNOSIS — M25.512 LEFT SHOULDER PAIN, UNSPECIFIED CHRONICITY: ICD-10-CM

## 2021-08-06 DIAGNOSIS — Z47.89 AFTERCARE FOLLOWING SURGERY OF THE MUSCULOSKELETAL SYSTEM: Primary | ICD-10-CM

## 2021-08-06 DIAGNOSIS — Z47.89 AFTERCARE FOLLOWING SURGERY OF THE MUSCULOSKELETAL SYSTEM: ICD-10-CM

## 2021-08-06 PROCEDURE — 99213 OFFICE O/P EST LOW 20 MIN: CPT | Performed by: PHYSICIAN ASSISTANT

## 2021-08-06 RX ORDER — HYDROCODONE BITARTRATE AND ACETAMINOPHEN 5; 325 MG/1; MG/1
1 TABLET ORAL EVERY 6 HOURS PRN
Qty: 20 TABLET | Refills: 0 | OUTPATIENT
Start: 2021-08-06 | End: 2021-09-04

## 2021-08-06 RX ORDER — DICLOFENAC SODIUM 75 MG/1
75 TABLET, DELAYED RELEASE ORAL 2 TIMES DAILY
Qty: 60 TABLET | Refills: 2 | Status: SHIPPED | OUTPATIENT
Start: 2021-08-06 | End: 2021-11-08 | Stop reason: ALTCHOICE

## 2021-08-06 NOTE — PROGRESS NOTES
"Chief Complaint  Pain and Follow-up of the Left Shoulder    Subjective          Moreno Aleman is a 55 y.o. male  presents to [facility] for   History of Present Illness    Patient presents for follow-up evaluation of left shoulder arthroscopic subacromial decompression, mini open rotator cuff repair, 4/26/2021 and left shoulder pain.  Patient had MRI he is here to review the results.  Patient states he was having pain in the shoulder he went to the ER and they gave him a pain medication refill for about 15 pills.  Patient states he is about to run out of those pills.  Patient points anterior lateral shoulder as areas worse pain.  Patient denies swelling, denies numbness and tingling.  Patient has not been attending physical therapy for several weeks since he had to states he has a transportation issue.  He has been doing home exercises.  Allergies   Allergen Reactions   • Fentanyl Angioedema   • Nifedipine Hives and Itching        Social History     Socioeconomic History   • Marital status: Single     Spouse name: Not on file   • Number of children: Not on file   • Years of education: Not on file   • Highest education level: Not on file   Tobacco Use   • Smoking status: Never Smoker   • Smokeless tobacco: Never Used   Vaping Use   • Vaping Use: Never used   Substance and Sexual Activity   • Alcohol use: Yes     Comment: OCCASIONAL   • Drug use: Never   • Sexual activity: Defer        REVIEW OF SYSTEMS    Constitutional: Denies fevers, chills, weight loss  Cardiovascular: Denies chest pain, shortness of breath  Skin: Denies rashes, acute skin changes  Neurologic: Denies headache, loss of consciousness  MSK: Left shoulder pain:      Objective   Vital Signs:   Pulse 98   Ht 171.5 cm (67.5\")   Wt 86.5 kg (190 lb 9.6 oz)   SpO2 94%   BMI 29.41 kg/m²     Body mass index is 29.41 kg/m².    Physical Exam    Left shoulder: Incisions are well-healed, no erythema, ecchymosis, no swelling, no " signs of infection.  Active forward elevation 140, passive forward elevation 160, active abduction 110, external rotation with abduction 70, internal rotation to his buttock.  5 out of 5 strength    Procedures    Imaging Results (Most Recent)     None       MRI Shoulder Left Without Contrast    Result Date: 7/30/2021  Narrative: PROCEDURE: MRI SHOULDER LEFT WO CONTRAST  COMPARISON: [facility], MR, MRI LEFT SHOULDER WO CONTRAST, 4/06/2021, 12:59.  [facility], CR, SHOULDER >OR= 2V LT, 5/22/2021, 12:46.  INDICATIONS: Left shoulder pain, history of rotator cuff repair  TECHNIQUE: A variety of imaging planes and parameters were utilized for visualization of suspected pathology.  Images were performed without contrast.   FINDINGS:  No fracture or malalignment is identified.  The acromioclavicular joint appears within normal limits.  A small amount of fluid is noted in the subdeltoid/subacromial bursa  There is been a previous rotator cuff tear repair.  Moderate to severe supraspinatus tendinopathy is noted.  Signal changes may also be related to previous surgery.  There is articular surface tear/fraying of the supraspinatus tendon.  Moderate infra spinatus tendinopathy is noted.  The rotator cuff otherwise appears unremarkable.  No muscle body atrophy is seen.  Mild intermediate signal abnormality in the biceps tendon is consistent with tendinopathy.  There is a complex tear of the superior labrum at the biceps anchor extending anteriorly.  The tear extends into the biceps tendon.  The labrum otherwise appears unremarkable.  No significant joint effusion is seen.  Cartilage in the glenohumeral joint is intact.  No loose body is seen.  CONCLUSION:  1. Previous rotator cuff tear repair 2. Irregularity along the articular surface of the supraspinatus tendon may be postsurgical versus secondary to tearing/fraying. 3. Supraspinatus, infra spinatus and biceps tendinopathy, as above 4. Tear of the  superior labrum with extension into the biceps tendon.      Devon Garg M.D.       Electronically Signed and Approved By: Devon Garg M.D. on 7/30/2021 at 17:11               Result Review :   The following data was reviewed by: NICK Nava on 08/06/2021:  Data reviewed: Radiologic studies Reviewed by me with the patient, Dr. Dick also reviewed the results.             Assessment and Plan    Diagnoses and all orders for this visit:    1. Aftercare following surgery of left shoulder arthroscopic subacromial decompression, mini open rotator cuff repair, 4/26/2021 (Primary)  -     Ambulatory Referral to Physical Therapy Evaluate and treat (2-3x/week for 6-8 weeks)    2. Left shoulder pain, unspecified chronicity  -     Ambulatory Referral to Physical Therapy Evaluate and treat (2-3x/week for 6-8 weeks)    Other orders  -     diclofenac (VOLTAREN) 75 MG EC tablet; Take 1 tablet by mouth 2 (Two) Times a Day.  Dispense: 60 tablet; Refill: 2        Reviewed MRI with the patient, advised him that Dr. Dick and myself do not recommend surgical intervention, he was advised to continue physical therapy he was given new order for this and he will try to get a ride at least once a week and do home exercises.  Patient was also given prescription for diclofenac, follow-up in 6 weeks for reevaluation    Call or return if worsening symptoms.    Follow Up   Return in about 6 weeks (around 9/17/2021) for Recheck.  Patient was given instructions and counseling regarding his condition or for health maintenance advice. Please see specific information pulled into the AVS if appropriate.

## 2021-08-06 NOTE — TELEPHONE ENCOUNTER
Caller: Moreno Eldridge    Relationship: Self    Best call back number: [phone]     What is the best time to reach you: ANYTIME     Who are you requesting to speak with (clinical staff, provider,  specific staff member): CLINICAL STAFF     Do you know the name of the person who called: MORENO ELDRIDGE     What was the call regarding: PATIENT IS CALLING ABOUT THE INSURANCE NOT WANTING TO PAY FOR THE NEW MEDICATION YOU PRESCRIBE.CALLED  [facility] .    PATIENT IS ALSO NEEDING SOMETHING THAT CAN HELP HIM SLEEP. PLEASE CALL AND ADVISE.     Do you require a callback: YES

## 2021-08-06 NOTE — TELEPHONE ENCOUNTER
Patient in office this morning with Charlie ROMERO, and is needing a refill of pain medication. Norco 5/325 mg.

## 2021-08-10 ENCOUNTER — LAB (OUTPATIENT)
Dept: LAB | Facility: HOSPITAL | Age: 56
End: 2021-08-10

## 2021-08-10 ENCOUNTER — DOCUMENTATION (OUTPATIENT)
Dept: FAMILY MEDICINE CLINIC | Facility: CLINIC | Age: 56
End: 2021-08-10

## 2021-08-10 DIAGNOSIS — Z12.5 SCREENING FOR PROSTATE CANCER: ICD-10-CM

## 2021-08-10 DIAGNOSIS — E11.65 TYPE 2 DIABETES MELLITUS WITH HYPERGLYCEMIA, WITHOUT LONG-TERM CURRENT USE OF INSULIN (HCC): ICD-10-CM

## 2021-08-10 DIAGNOSIS — Z13.220 SCREENING FOR LIPID DISORDERS: ICD-10-CM

## 2021-08-10 DIAGNOSIS — I10 ESSENTIAL HYPERTENSION: ICD-10-CM

## 2021-08-10 LAB
ALBUMIN SERPL-MCNC: 4.6 G/DL (ref 3.5–5.2)
ALBUMIN/GLOB SERPL: 2.1 G/DL
ALP SERPL-CCNC: 126 U/L (ref 39–117)
ALT SERPL W P-5'-P-CCNC: 11 U/L (ref 1–41)
ANION GAP SERPL CALCULATED.3IONS-SCNC: 12.6 MMOL/L (ref 5–15)
AST SERPL-CCNC: 9 U/L (ref 1–40)
BILIRUB SERPL-MCNC: 0.4 MG/DL (ref 0–1.2)
BUN SERPL-MCNC: 9 MG/DL (ref 6–20)
BUN/CREAT SERPL: 10.8 (ref 7–25)
CALCIUM SPEC-SCNC: 9.4 MG/DL (ref 8.6–10.5)
CHLORIDE SERPL-SCNC: 97 MMOL/L (ref 98–107)
CHOLEST SERPL-MCNC: 188 MG/DL (ref 0–200)
CO2 SERPL-SCNC: 23.4 MMOL/L (ref 22–29)
CREAT SERPL-MCNC: 0.83 MG/DL (ref 0.76–1.27)
DEPRECATED RDW RBC AUTO: 43.2 FL (ref 37–54)
ERYTHROCYTE [DISTWIDTH] IN BLOOD BY AUTOMATED COUNT: 13.3 % (ref 12.3–15.4)
GFR SERPL CREATININE-BSD FRML MDRD: 97 ML/MIN/1.73
GLOBULIN UR ELPH-MCNC: 2.2 GM/DL
GLUCOSE SERPL-MCNC: 475 MG/DL (ref 65–99)
HBA1C MFR BLD: 13.39 % (ref 4.8–5.6)
HCT VFR BLD AUTO: 44.1 % (ref 37.5–51)
HDLC SERPL-MCNC: 34 MG/DL (ref 40–60)
HGB BLD-MCNC: 14.8 G/DL (ref 13–17.7)
LDLC SERPL CALC-MCNC: 85 MG/DL (ref 0–100)
LDLC/HDLC SERPL: 2.04 {RATIO}
MCH RBC QN AUTO: 29.9 PG (ref 26.6–33)
MCHC RBC AUTO-ENTMCNC: 33.6 G/DL (ref 31.5–35.7)
MCV RBC AUTO: 89.1 FL (ref 79–97)
PLATELET # BLD AUTO: 172 10*3/MM3 (ref 140–450)
PMV BLD AUTO: 10.2 FL (ref 6–12)
POTASSIUM SERPL-SCNC: 3.9 MMOL/L (ref 3.5–5.2)
PROT SERPL-MCNC: 6.8 G/DL (ref 6–8.5)
PSA SERPL-MCNC: 0.55 NG/ML (ref 0–4)
RBC # BLD AUTO: 4.95 10*6/MM3 (ref 4.14–5.8)
SODIUM SERPL-SCNC: 133 MMOL/L (ref 136–145)
TRIGL SERPL-MCNC: 423 MG/DL (ref 0–150)
VLDLC SERPL-MCNC: 69 MG/DL (ref 5–40)
WBC # BLD AUTO: 7.34 10*3/MM3 (ref 3.4–10.8)

## 2021-08-10 PROCEDURE — 80061 LIPID PANEL: CPT

## 2021-08-10 PROCEDURE — 85027 COMPLETE CBC AUTOMATED: CPT

## 2021-08-10 PROCEDURE — 80053 COMPREHEN METABOLIC PANEL: CPT

## 2021-08-10 PROCEDURE — 36415 COLL VENOUS BLD VENIPUNCTURE: CPT

## 2021-08-10 PROCEDURE — 83036 HEMOGLOBIN GLYCOSYLATED A1C: CPT

## 2021-08-10 PROCEDURE — G0103 PSA SCREENING: HCPCS

## 2021-08-10 RX ORDER — TRAZODONE HYDROCHLORIDE 50 MG/1
50 TABLET ORAL NIGHTLY
Qty: 90 TABLET | Refills: 1 | Status: SHIPPED | OUTPATIENT
Start: 2021-08-10 | End: 2021-11-08 | Stop reason: SDUPTHER

## 2021-08-10 NOTE — PROGRESS NOTES
Blood sugar; 475 and A1c above 13.0    I called patient to inform the critical blood work/blood sugar. Patient is currently not taking any medications for diabetes. Patient feels normal to his baseline, denies any symptoms of hyperglycemia. I have advised to eat healthy food and call the primary care doctor tomorrow morning. ER precautions discussed/ER visit offered for further evaluation and insulin shots.    Patient to call/follow-up with Ollie tomorrow morning.

## 2021-08-11 ENCOUNTER — TELEPHONE (OUTPATIENT)
Dept: FAMILY MEDICINE CLINIC | Facility: CLINIC | Age: 56
End: 2021-08-11

## 2021-08-11 DIAGNOSIS — E11.65 TYPE 2 DIABETES MELLITUS WITH HYPERGLYCEMIA, WITHOUT LONG-TERM CURRENT USE OF INSULIN (HCC): ICD-10-CM

## 2021-08-11 DIAGNOSIS — E11.43 TYPE 2 DIABETES MELLITUS WITH DIABETIC AUTONOMIC NEUROPATHY, WITHOUT LONG-TERM CURRENT USE OF INSULIN (HCC): Primary | ICD-10-CM

## 2021-08-11 RX ORDER — DAPAGLIFLOZIN 10 MG/1
10 TABLET, FILM COATED ORAL DAILY
Qty: 90 TABLET | Refills: 1 | Status: SHIPPED | OUTPATIENT
Start: 2021-08-11 | End: 2022-08-09 | Stop reason: SDUPTHER

## 2021-08-11 NOTE — TELEPHONE ENCOUNTER
----- Message from NINFA Walsh sent at 8/11/2021 10:23 AM EDT -----  Did send Metformin as well as Farxiga to the pharmacy for the patient to start to get his blood sugars under control.  All other labs were unremarkable

## 2021-08-29 PROBLEM — Z95.5 HISTORY OF CORONARY ANGIOPLASTY WITH INSERTION OF STENT: Status: ACTIVE | Noted: 2021-08-29

## 2021-08-29 NOTE — PROGRESS NOTES
[facility]  Cardiology progress Note    Patient Name: Moreno Aleman  : 1965    CHIEF COMPLAINT  Coronary artery disease   Hypertension      Subjective   Subjective     HISTORY OF PRESENT ILLNESS    Moreno Aleman is a 55 y.o. male history of coronary disease s/p PTCA/stent.  No further chest pain.  No further shortness of breath.  No exertional angina.    Review of Systems:   Constitutional no fever,  no weight loss   Skin no rash   Otolaryngeal no difficulty swallowing   Cardiovascular See HPI   Pulmonary no cough, no sputum production   Gastrointestinal no constipation, no diarrhea   Genitourinary no dysuria, no hematuria   Hematologic no easy bruisability, no abnormal bleeding   Musculoskeletal no muscle pain   Neurologic no dizziness, no falls         Personal History     Social History:  reports that he has never smoked. He has never used smokeless tobacco. He reports current alcohol use. He reports that he does not use drugs.    Home Medications:  Current Outpatient Medications on File Prior to Visit   Medication Sig   • clopidogrel (Plavix) 75 MG tablet Plavix 75 mg oral tablet take 1 tablet (75 mg) by oral route once daily   Suspended   • Dapagliflozin Propanediol (Farxiga) 10 MG tablet Take 10 mg by mouth Daily.   • lisinopril (PRINIVIL,ZESTRIL) 30 MG tablet Take 1 tablet by mouth Daily.   • metFORMIN (Glucophage) 1000 MG tablet Take 1 tablet by mouth 2 (Two) Times a Day With Meals.   • sildenafil (Viagra) 100 MG tablet Take 1 tablet by mouth Daily As Needed for Erectile Dysfunction.   • tamsulosin (FLOMAX) 0.4 MG capsule 24 hr capsule Take 1 capsule by mouth Daily.   • traZODone (DESYREL) 50 MG tablet Take 1 tablet by mouth Every Night.   • cloNIDine (CATAPRES) 0.1 MG tablet Take 1 tablet by mouth Daily.   • diclofenac (VOLTAREN) 75 MG EC tablet Take 1 tablet by mouth 2 (Two) Times a Day.   • HYDROcodone-acetaminophen (NORCO) 5-325 MG per tablet Take 1 tablet by mouth Every 6 (Six)  Hours As Needed for Moderate Pain .   • HYDROcodone-acetaminophen (NORCO) 5-325 MG per tablet Take 1 tablet by mouth Every 6 (Six) Hours As Needed for Moderate Pain .     No current facility-administered medications on file prior to visit.     Allergies:  Allergies   Allergen Reactions   • Fentanyl Angioedema   • Nifedipine Hives and Itching       Objective    Objective       Vitals:   Heart Rate:  [82] 82  BP: (138)/(76) 138/76  Body mass index is 27.83 kg/m².     Physical Exam:   Constitutional: Awake, alert, No acute distress    Eyes: PERRLA, sclerae anicteric, no conjunctival injection   HENT: NCAT, mucous membranes moist   Neck: Supple, no thyromegaly, no lymphadenopathy, trachea midline   Respiratory: Clear to auscultation bilaterally, nonlabored respirations    Cardiovascular: RRR, no murmurs or rubs. Palpable pedal pulses bilaterally   Gastrointestinal: Positive bowel sounds, soft, nontender, nondistended   Musculoskeletal: No bilateral ankle edema, no cyanosis to extremities   Psychiatric: Appropriate affect, cooperative   Neurologic: Oriented x 3, strength symmetric in all extremities, Cranial Nerves grossly intact to confrontation, speech clear   Skin: No rashes.    Result Review    Result Review:  I have personally reviewed the available results from  [x]  Laboratory  [x]  EKG  [x]  Cardiology  [x]  Medications  [x]  Old records  []  Other:   Procedures  Lab Results   Component Value Date    CHOL 188 08/10/2021     Lab Results   Component Value Date    TRIG 423 (H) 08/10/2021     Lab Results   Component Value Date    HDL 34 (L) 08/10/2021     Lab Results   Component Value Date    LDL 85 08/10/2021     Lab Results   Component Value Date    VLDL 69 (H) 08/10/2021         Impression/Plan:  1.  Coronary disease s/p PTCA/stent stable: Continue aspirin and Plavix.  Recent sestamibi stress test negative.  2.  Essential hypertension uncontrolled: Continue lisinopril.  Increase dose to 20 mg twice daily.   Increase clonidine 0.1 mg twice daily.  Monitor blood pressure regularly.  3.  Hyperlipidemia: Lipid profile reviewed.  Low-fat diet advised.  Continue Lipitor.  Low-carb diet was advised.  Check lipid and hepatic profile next visit.           Ran Pan MD   08/31/21   09:10 EDT

## 2021-08-31 ENCOUNTER — OFFICE VISIT (OUTPATIENT)
Dept: CARDIOLOGY | Facility: CLINIC | Age: 56
End: 2021-08-31

## 2021-08-31 VITALS
HEART RATE: 82 BPM | WEIGHT: 183 LBS | DIASTOLIC BLOOD PRESSURE: 76 MMHG | BODY MASS INDEX: 27.74 KG/M2 | HEIGHT: 68 IN | SYSTOLIC BLOOD PRESSURE: 138 MMHG

## 2021-08-31 DIAGNOSIS — I25.10 CORONARY ARTERY DISEASE INVOLVING NATIVE CORONARY ARTERY OF NATIVE HEART WITHOUT ANGINA PECTORIS: Primary | ICD-10-CM

## 2021-08-31 DIAGNOSIS — I10 HYPERTENSION, ESSENTIAL: ICD-10-CM

## 2021-08-31 DIAGNOSIS — Z95.5 HISTORY OF CORONARY ANGIOPLASTY WITH INSERTION OF STENT: ICD-10-CM

## 2021-08-31 DIAGNOSIS — E78.2 HYPERLIPEMIA, MIXED: ICD-10-CM

## 2021-08-31 PROCEDURE — 99214 OFFICE O/P EST MOD 30 MIN: CPT | Performed by: SPECIALIST

## 2021-08-31 RX ORDER — CLONIDINE HYDROCHLORIDE 0.1 MG/1
0.1 TABLET ORAL 2 TIMES DAILY
Qty: 30 TABLET | Refills: 0 | Status: SHIPPED | OUTPATIENT
Start: 2021-08-31 | End: 2021-09-13

## 2021-08-31 RX ORDER — LISINOPRIL 20 MG/1
20 TABLET ORAL 2 TIMES DAILY
Qty: 180 TABLET | Refills: 3 | Status: SHIPPED | OUTPATIENT
Start: 2021-08-31 | End: 2021-12-01

## 2021-09-04 ENCOUNTER — HOSPITAL ENCOUNTER (EMERGENCY)
Facility: HOSPITAL | Age: 56
Discharge: HOME | End: 2021-09-04
Attending: EMERGENCY MEDICINE | Admitting: EMERGENCY MEDICINE

## 2021-09-04 VITALS
HEART RATE: 84 BPM | WEIGHT: 190.7 LBS | HEIGHT: 67 IN | TEMPERATURE: 98.8 F | SYSTOLIC BLOOD PRESSURE: 112 MMHG | RESPIRATION RATE: 16 BRPM | DIASTOLIC BLOOD PRESSURE: 81 MMHG | OXYGEN SATURATION: 99 % | BODY MASS INDEX: 29.93 KG/M2

## 2021-09-04 DIAGNOSIS — K08.89 PAIN, DENTAL: Primary | ICD-10-CM

## 2021-09-04 DIAGNOSIS — S02.5XXA CLOSED FRACTURE OF TOOTH, INITIAL ENCOUNTER: ICD-10-CM

## 2021-09-04 DIAGNOSIS — K04.7 DENTAL INFECTION: ICD-10-CM

## 2021-09-04 PROCEDURE — 99283 EMERGENCY DEPT VISIT LOW MDM: CPT

## 2021-09-04 PROCEDURE — 63710000001 ONDANSETRON ODT 4 MG TABLET DISPERSIBLE: Performed by: EMERGENCY MEDICINE

## 2021-09-04 RX ORDER — OXYCODONE AND ACETAMINOPHEN 7.5; 325 MG/1; MG/1
1 TABLET ORAL ONCE AS NEEDED
Status: COMPLETED | OUTPATIENT
Start: 2021-09-04 | End: 2021-09-04

## 2021-09-04 RX ORDER — LIDOCAINE HYDROCHLORIDE 20 MG/ML
5 SOLUTION OROPHARYNGEAL
Status: DISCONTINUED | OUTPATIENT
Start: 2021-09-04 | End: 2021-09-04 | Stop reason: HOSPADM

## 2021-09-04 RX ORDER — HYDROCODONE BITARTRATE AND ACETAMINOPHEN 5; 325 MG/1; MG/1
1 TABLET ORAL EVERY 6 HOURS PRN
Qty: 8 TABLET | Refills: 0 | Status: SHIPPED | OUTPATIENT
Start: 2021-09-04 | End: 2022-02-20

## 2021-09-04 RX ORDER — ACETAMINOPHEN 160 MG/5ML
650 SOLUTION ORAL ONCE
Status: COMPLETED | OUTPATIENT
Start: 2021-09-04 | End: 2021-09-04

## 2021-09-04 RX ORDER — ONDANSETRON 4 MG/1
4 TABLET, ORALLY DISINTEGRATING ORAL ONCE
Status: COMPLETED | OUTPATIENT
Start: 2021-09-04 | End: 2021-09-04

## 2021-09-04 RX ORDER — CLINDAMYCIN HYDROCHLORIDE 300 MG/1
300 CAPSULE ORAL 4 TIMES DAILY
Qty: 40 CAPSULE | Refills: 0 | Status: SHIPPED | OUTPATIENT
Start: 2021-09-04 | End: 2021-09-14

## 2021-09-04 RX ADMIN — LIDOCAINE HYDROCHLORIDE 5 ML: 20 SOLUTION ORAL; TOPICAL at 17:08

## 2021-09-04 RX ADMIN — ACETAMINOPHEN 650 MG: 160 SOLUTION ORAL at 17:09

## 2021-09-04 RX ADMIN — ONDANSETRON 4 MG: 4 TABLET, ORALLY DISINTEGRATING ORAL at 18:08

## 2021-09-04 RX ADMIN — BENZOCAINE 2 SPRAY: 200 SPRAY DENTAL; ORAL; PERIODONTAL at 17:09

## 2021-09-04 RX ADMIN — OXYCODONE HYDROCHLORIDE AND ACETAMINOPHEN 1 TABLET: 7.5; 325 TABLET ORAL at 18:08

## 2021-09-04 NOTE — ED PROVIDER NOTES
Subjective   Cracked tooth about a week ago (right upper molar) and has been having pain since then. Saw his dentis who told him he needed to see an oral surgeon; he called the oral surgeon and they cannot get him in until December. He has an appointment with another Dentist in 3 days, but is having a lot of pain and it is radiating into his face with some swelling      History provided by:  Patient   used: No        Review of Systems   Constitutional: Negative.    HENT: Positive for dental problem.    Eyes: Negative.    Respiratory: Negative.    Cardiovascular: Negative.    Gastrointestinal: Negative.    Endocrine: Negative.    Genitourinary: Negative.    Musculoskeletal: Negative.    Skin: Negative.    Allergic/Immunologic: Negative.    Neurological: Negative.    Hematological: Negative.    Psychiatric/Behavioral: Negative.        Past Medical History:   Diagnosis Date   • CAD (coronary artery disease)    • Chest pain    • Diabetes (CMS/HCC)    • Hyperlipidemia    • Hypertension        Allergies   Allergen Reactions   • Fentanyl Angioedema   • Nifedipine Hives and Itching       Past Surgical History:   Procedure Laterality Date   • CARDIAC CATHETERIZATION      showed significant coronary artery disease of the LAD with calcification.    • CAROTID STENT     • CHOLECYSTECTOMY     • CIRCUMCISION     • INGUINAL HERNIA REPAIR     • NOSE SURGERY     • MA RT/LT HEART CATHETERS N/A 3/22/2016    Procedure: Percutaneous Coronary Intervention - Rota/stent LAD;  Surgeon: Marek Emery MD;  Location: [facility] INVASIVE LOCATION;  Service: Cardiovascular   • ROTATOR CUFF REPAIR Left    • SPINAL FUSION         Family History   Problem Relation Age of Onset   • Hypertension Mother    • Diabetes Mother    • Heart attack Father    • Heart disease Father    • Heart failure Father    • Diabetes Father        Social History     Socioeconomic History   • Marital status: Single     Spouse name: Not on file   • Number  of children: Not on file   • Years of education: Not on file   • Highest education level: Not on file   Tobacco Use   • Smoking status: Never Smoker   • Smokeless tobacco: Never Used   Vaping Use   • Vaping Use: Never used   Substance and Sexual Activity   • Alcohol use: Never     Comment: OCCASIONAL   • Drug use: Never   • Sexual activity: Defer           Objective   Physical Exam  Constitutional:       Appearance: Normal appearance.   HENT:      Head: Normocephalic and atraumatic.      Nose: Nose normal.      Mouth/Throat:      Mouth: Mucous membranes are moist.     Eyes:      Pupils: Pupils are equal, round, and reactive to light.   Cardiovascular:      Rate and Rhythm: Normal rate and regular rhythm.      Pulses: Normal pulses.   Pulmonary:      Effort: Pulmonary effort is normal.      Breath sounds: Normal breath sounds.   Abdominal:      General: Abdomen is flat. Bowel sounds are normal.      Palpations: Abdomen is soft.   Musculoskeletal:         General: Normal range of motion.      Cervical back: Normal range of motion.   Skin:     General: Skin is warm and dry.      Capillary Refill: Capillary refill takes less than 2 seconds.   Neurological:      General: No focal deficit present.      Mental Status: He is alert and oriented to person, place, and time. Mental status is at baseline.   Psychiatric:         Mood and Affect: Mood normal.         Procedures           ED Course                                           MDM  Number of Diagnoses or Management Options  Closed fracture of tooth, initial encounter: minor  Dental infection: minor  Pain, dental: minor  Risk of Complications, Morbidity, and/or Mortality  Presenting problems: low  Diagnostic procedures: low  Management options: low    Patient Progress  Patient progress: stable      Final diagnoses:   Pain, dental   Closed fracture of tooth, initial encounter   Dental infection       ED Disposition  ED Disposition     ED Disposition Condition Comment     Discharge Stable           Ollie Moreland, APRN  [address]  [address]  [place] [address]  [phone]      As needed         Medication List      New Prescriptions    clindamycin 300 MG capsule  Commonly known as: CLEOCIN  Take 1 capsule by mouth 4 (Four) Times a Day for 10 days.        Changed    HYDROcodone-acetaminophen 5-325 MG per tablet  Commonly known as: NORCO  Take 1 tablet by mouth Every 6 (Six) Hours As Needed for Moderate Pain .  What changed: Another medication with the same name was removed. Continue taking this medication, and follow the directions you see here.           Where to Get Your Medications      These medications were sent to [id number] - [place], [place] - [address] AT [facility] [phone]  - [phone] FX  [address] [place] [address]    Phone: [phone]   · clindamycin 300 MG capsule  · HYDROcodone-acetaminophen 5-325 MG per tablet          Mari Christopher, APRN  09/04/21 1945

## 2021-09-09 PROCEDURE — U0004 COV-19 TEST NON-CDC HGH THRU: HCPCS | Performed by: FAMILY MEDICINE

## 2021-09-13 RX ORDER — CLONIDINE HYDROCHLORIDE 0.1 MG/1
TABLET ORAL
Qty: 90 TABLET | Refills: 3 | Status: SHIPPED | OUTPATIENT
Start: 2021-09-13 | End: 2022-04-18

## 2021-09-13 NOTE — TELEPHONE ENCOUNTER
Patient last seen on 08.31.21 Medication was document at that visit and increase to BID     Next OV 04.05.22

## 2021-09-17 ENCOUNTER — OFFICE VISIT (OUTPATIENT)
Dept: ORTHOPEDIC SURGERY | Facility: CLINIC | Age: 56
End: 2021-09-17

## 2021-09-17 ENCOUNTER — OFFICE VISIT (OUTPATIENT)
Dept: FAMILY MEDICINE CLINIC | Facility: CLINIC | Age: 56
End: 2021-09-17

## 2021-09-17 VITALS — WEIGHT: 185 LBS | HEIGHT: 67 IN | BODY MASS INDEX: 29.03 KG/M2 | OXYGEN SATURATION: 97 % | HEART RATE: 75 BPM

## 2021-09-17 VITALS
OXYGEN SATURATION: 97 % | DIASTOLIC BLOOD PRESSURE: 72 MMHG | RESPIRATION RATE: 16 BRPM | HEART RATE: 80 BPM | SYSTOLIC BLOOD PRESSURE: 134 MMHG | TEMPERATURE: 96.9 F

## 2021-09-17 DIAGNOSIS — M25.512 CHRONIC LEFT SHOULDER PAIN: ICD-10-CM

## 2021-09-17 DIAGNOSIS — Z23 NEED FOR INFLUENZA VACCINATION: Primary | ICD-10-CM

## 2021-09-17 DIAGNOSIS — E11.65 TYPE 2 DIABETES MELLITUS WITH HYPERGLYCEMIA, WITHOUT LONG-TERM CURRENT USE OF INSULIN (HCC): ICD-10-CM

## 2021-09-17 DIAGNOSIS — R68.82 DECREASED LIBIDO: ICD-10-CM

## 2021-09-17 DIAGNOSIS — Z20.828 EXPOSURE TO HERPES SIMPLEX VIRUS (HSV): ICD-10-CM

## 2021-09-17 DIAGNOSIS — B37.49 CANDIDA INFECTION OF GENITAL REGION: ICD-10-CM

## 2021-09-17 DIAGNOSIS — Z47.89 AFTERCARE FOLLOWING SURGERY OF THE MUSCULOSKELETAL SYSTEM: Primary | ICD-10-CM

## 2021-09-17 DIAGNOSIS — G89.29 CHRONIC LEFT SHOULDER PAIN: ICD-10-CM

## 2021-09-17 DIAGNOSIS — R53.83 FATIGUE, UNSPECIFIED TYPE: ICD-10-CM

## 2021-09-17 PROBLEM — R07.9 CHEST PAIN: Status: ACTIVE | Noted: 2021-08-03

## 2021-09-17 PROBLEM — M75.50 BURSITIS OF SHOULDER: Status: ACTIVE | Noted: 2018-12-06

## 2021-09-17 PROBLEM — M67.919 DISORDER OF ROTATOR CUFF: Status: ACTIVE | Noted: 2018-12-06

## 2021-09-17 PROBLEM — Z47.1 AFTERCARE FOLLOWING JOINT REPLACEMENT: Status: ACTIVE | Noted: 2019-03-06

## 2021-09-17 PROCEDURE — 99214 OFFICE O/P EST MOD 30 MIN: CPT | Performed by: NURSE PRACTITIONER

## 2021-09-17 PROCEDURE — 99213 OFFICE O/P EST LOW 20 MIN: CPT | Performed by: PHYSICIAN ASSISTANT

## 2021-09-17 PROCEDURE — 90471 IMMUNIZATION ADMIN: CPT | Performed by: NURSE PRACTITIONER

## 2021-09-17 PROCEDURE — 90686 IIV4 VACC NO PRSV 0.5 ML IM: CPT | Performed by: NURSE PRACTITIONER

## 2021-09-17 RX ORDER — FLUCONAZOLE 150 MG/1
150 TABLET ORAL ONCE
Qty: 1 TABLET | Refills: 1 | Status: SHIPPED | OUTPATIENT
Start: 2021-09-17 | End: 2021-09-17

## 2021-09-17 NOTE — PROGRESS NOTES
Chief Complaint  Rash (around penis)    Subjective        Moreno Aleman presents to [facility]  Notes girlfriend had been exposed to herpes.  But has also started Farxiga in the last 2 months.  Noticed the rash a month after starting Farxiga.  Monistat took care the rash.  The rash was itching.  Has cold sores that he has had for year. Rash almost gone.    Also needs referral for pain management. States has chronic shoulder pain with ruptured bicep tendon.    Would like a test for fatigue and decreased libido.        Past History:    Medical History: has a past medical history of CAD (coronary artery disease), Chest pain, Diabetes (CMS/HCC), Hyperlipidemia, and Hypertension.     Surgical History: has a past surgical history that includes Cardiac catheterization; Inguinal hernia repair; Nose surgery; Cholecystectomy; Spinal fusion; Circumcision, non-; pr rt/lt heart catheters (N/A, 3/22/2016); Rotator cuff repair (Left); and Carotid stent.     Family History: family history includes Diabetes in his father and mother; Heart attack in his father; Heart disease in his father; Heart failure in his father; Hypertension in his mother.     Social History: reports that he has never smoked. He has never used smokeless tobacco. He reports that he does not drink alcohol and does not use drugs.    Allergies: Fentanyl and Nifedipine          Current Outpatient Medications:   •  cloNIDine (CATAPRES) 0.1 MG tablet, TAKE 1 TABLET BY MOUTH TWICE DAILY, Disp: 90 tablet, Rfl: 3  •  clopidogrel (Plavix) 75 MG tablet, Plavix 75 mg oral tablet take 1 tablet (75 mg) by oral route once daily   Suspended, Disp: , Rfl:   •  Dapagliflozin Propanediol (Farxiga) 10 MG tablet, Take 10 mg by mouth Daily., Disp: 90 tablet, Rfl: 1  •  diclofenac (VOLTAREN) 75 MG EC tablet, Take 1 tablet by mouth 2 (Two) Times a Day., Disp: 60 tablet, Rfl: 2  •  HYDROcodone-acetaminophen (NORCO) 5-325 MG per tablet, Take 1  tablet by mouth Every 6 (Six) Hours As Needed for Moderate Pain ., Disp: 8 tablet, Rfl: 0  •  lisinopril (PRINIVIL,ZESTRIL) 20 MG tablet, Take 1 tablet by mouth 2 (two) times a day., Disp: 180 tablet, Rfl: 3  •  metFORMIN (Glucophage) 1000 MG tablet, Take 1 tablet by mouth 2 (Two) Times a Day With Meals., Disp: 180 tablet, Rfl: 1  •  sildenafil (Viagra) 100 MG tablet, Take 1 tablet by mouth Daily As Needed for Erectile Dysfunction., Disp: 30 tablet, Rfl: 1  •  tamsulosin (FLOMAX) 0.4 MG capsule 24 hr capsule, Take 1 capsule by mouth Daily., Disp: 90 capsule, Rfl: 1  •  traZODone (DESYREL) 50 MG tablet, Take 1 tablet by mouth Every Night., Disp: 90 tablet, Rfl: 1  •  fluconazole (Diflucan) 150 MG tablet, Take 1 tablet by mouth 1 (One) Time for 1 dose., Disp: 1 tablet, Rfl: 1    There are no discontinued medications.      Review of Systems   Constitutional: Negative for fever.   Respiratory: Negative for cough and shortness of breath.    Cardiovascular: Negative for chest pain, palpitations and leg swelling.   Neurological: Negative for dizziness, weakness, numbness and headache.        Objective         Vitals:    09/17/21 0837   BP: 134/72   BP Location: Right arm   Patient Position: Sitting   Cuff Size: Adult   Pulse: 80   Resp: 16   Temp: 96.9 °F (36.1 °C)   TempSrc: Infrared   SpO2: 97%     There is no height or weight on file to calculate BMI.         Physical Exam  Vitals reviewed.   Constitutional:       Appearance: Normal appearance. He is well-developed.   HENT:      Head: Normocephalic and atraumatic.      Mouth/Throat:      Pharynx: No oropharyngeal exudate.   Eyes:      Conjunctiva/sclera: Conjunctivae normal.      Pupils: Pupils are equal, round, and reactive to light.   Cardiovascular:      Rate and Rhythm: Normal rate and regular rhythm.      Heart sounds: No murmur heard.   No friction rub. No gallop.    Pulmonary:      Effort: Pulmonary effort is normal.      Breath sounds: Normal breath sounds. No  wheezing or rhonchi.   Skin:     General: Skin is warm and dry.   Neurological:      Mental Status: He is alert and oriented to person, place, and time.   Psychiatric:         Mood and Affect: Mood and affect normal.         Behavior: Behavior normal.         Thought Content: Thought content normal.         Judgment: Judgment normal.             Result Review :               Assessment and Plan     Diagnoses and all orders for this visit:    1. Need for influenza vaccination (Primary)  -     FluLaval >6 Months ([id number])    2. Type 2 diabetes mellitus with hyperglycemia, without long-term current use of insulin (CMS/[facility])  Comments:  Yeast may be from A1C or medication farxiga will treat and check lab to see if has been a benefit.  Orders:  -     Hemoglobin A1c; Future    3. Fatigue, unspecified type  -     Testosterone; Future  -     TSH; Future  -     Vitamin B12; Future    4. Decreased libido  -     Testosterone; Future  -     TSH; Future  -     Vitamin B12; Future    5. Chronic left shoulder pain  -     Ambulatory Referral to Pain Management    6. Candida infection of genital region  -     fluconazole (Diflucan) 150 MG tablet; Take 1 tablet by mouth 1 (One) Time for 1 dose.  Dispense: 1 tablet; Refill: 1    7. Exposure to herpes simplex virus (HSV)  -     HSV 1 & 2 - Specific Antibody, IgG; Future              Follow Up     Return in about 1 month (around 10/17/2021) for Next scheduled follow up.    Patient was given instructions and counseling regarding his condition or for health maintenance advice. Please see specific information pulled into the AVS if appropriate.

## 2021-09-17 NOTE — PROGRESS NOTES
"Chief Complaint  Follow-up and Pain of the Left Shoulder    Subjective          Moreno Aleman is a 55 y.o. male  presents to [facility] for   History of Present Illness    Patient presents for follow-up evaluation of left shoulder arthroscopic subacromial decompression, mini open rotator cuff repair, 4/26/2021.  Patient at last visit had MRI reviewed, he was advised to continue conservative treatments he was taking diclofenac, doing home exercises and physical therapy.  Patient states that he has been doing well he states the shoulder is not hurting him he states he has regained range of motion to the shoulder and strength he states he gets some pain in the bicep occasionally points anterior shoulder/bicep region.  Patient denies limitations with his shoulder movement or bicep movement.  Denies numbness and tingling, patient has been taking diclofenac, no new complaints today.  Allergies   Allergen Reactions   • Fentanyl Angioedema   • Nifedipine Hives and Itching        Social History     Socioeconomic History   • Marital status: Single     Spouse name: Not on file   • Number of children: Not on file   • Years of education: Not on file   • Highest education level: Not on file   Tobacco Use   • Smoking status: Never Smoker   • Smokeless tobacco: Never Used   Vaping Use   • Vaping Use: Never used   Substance and Sexual Activity   • Alcohol use: Never     Comment: OCCASIONAL   • Drug use: Never   • Sexual activity: Defer        REVIEW OF SYSTEMS    Constitutional: Denies fevers, chills, weight loss  Cardiovascular: Denies chest pain, shortness of breath  Skin: Denies rashes, acute skin changes  Neurologic: Denies headache, loss of consciousness  MSK: Left shoulder pain      Objective   Vital Signs:   Pulse 75   Ht 170.2 cm (67\")   Wt 83.9 kg (185 lb)   SpO2 97%   BMI 28.98 kg/m²     Body mass index is 28.98 kg/m².    Physical Exam    Left shoulder: Incisions are well-healed, no " erythema, no ecchymosis, no swelling, no atrophy, no signs of infection, nontender to palpation, active forward elevation 175, active abduction 120, external rotation with abduction 80, internal rotation to T12, 5 out of 5 rotator cuff strength, neurovascularly intact.    Procedures    Imaging Results (Most Recent)     None           Result Review :{Labs  Result Review  Imaging  Med Tab  Media  Procedures :23}   The following data was reviewed by: NICK Nava on 09/17/2021:               Assessment and Plan    Diagnoses and all orders for this visit:    1. Aftercare following surgery of left shoulder arthroscopic subacromial decompression, mini open rotator cuff repair, 4/26/2021 (Primary)        Discussed diagnosis and treatment options with patient, patient was advised to continue home exercises, continue diclofenac as needed, follow-up in 8 weeks for reevaluation and possible release.    Call or return if worsening symptoms.    Follow Up   Return in about 8 weeks (around 11/12/2021) for Recheck.  Patient was given instructions and counseling regarding his condition or for health maintenance advice. Please see specific information pulled into the AVS if appropriate.

## 2021-09-27 DIAGNOSIS — N40.0 BENIGN PROSTATIC HYPERPLASIA, UNSPECIFIED WHETHER LOWER URINARY TRACT SYMPTOMS PRESENT: ICD-10-CM

## 2021-09-27 RX ORDER — TAMSULOSIN HYDROCHLORIDE 0.4 MG/1
CAPSULE ORAL
Qty: 30 CAPSULE | OUTPATIENT
Start: 2021-09-27

## 2021-09-27 RX ORDER — TAMSULOSIN HYDROCHLORIDE 0.4 MG/1
1 CAPSULE ORAL DAILY
Qty: 30 CAPSULE | Refills: 1 | Status: SHIPPED | OUTPATIENT
Start: 2021-09-27 | End: 2021-11-26

## 2021-09-28 ENCOUNTER — LAB (OUTPATIENT)
Dept: LAB | Facility: HOSPITAL | Age: 56
End: 2021-09-28

## 2021-09-28 DIAGNOSIS — E11.65 TYPE 2 DIABETES MELLITUS WITH HYPERGLYCEMIA, WITHOUT LONG-TERM CURRENT USE OF INSULIN (HCC): ICD-10-CM

## 2021-09-28 DIAGNOSIS — R68.82 DECREASED LIBIDO: ICD-10-CM

## 2021-09-28 DIAGNOSIS — R53.83 FATIGUE, UNSPECIFIED TYPE: ICD-10-CM

## 2021-09-28 DIAGNOSIS — Z20.828 EXPOSURE TO HERPES SIMPLEX VIRUS (HSV): ICD-10-CM

## 2021-09-28 LAB
HBA1C MFR BLD: 11.7 % (ref 4.8–5.6)
TESTOST SERPL-MCNC: 197 NG/DL (ref 193–740)

## 2021-09-28 PROCEDURE — 84403 ASSAY OF TOTAL TESTOSTERONE: CPT

## 2021-09-28 PROCEDURE — 86696 HERPES SIMPLEX TYPE 2 TEST: CPT

## 2021-09-28 PROCEDURE — 83036 HEMOGLOBIN GLYCOSYLATED A1C: CPT

## 2021-09-28 PROCEDURE — 84443 ASSAY THYROID STIM HORMONE: CPT

## 2021-09-28 PROCEDURE — 86695 HERPES SIMPLEX TYPE 1 TEST: CPT

## 2021-09-28 PROCEDURE — 82607 VITAMIN B-12: CPT

## 2021-09-28 PROCEDURE — 36415 COLL VENOUS BLD VENIPUNCTURE: CPT

## 2021-09-29 LAB
TSH SERPL DL<=0.05 MIU/L-ACNC: 1.64 UIU/ML (ref 0.27–4.2)
VIT B12 BLD-MCNC: 290 PG/ML (ref 211–946)

## 2021-09-30 LAB
HSV1 IGG SER IA-ACNC: 50.6 INDEX (ref 0–0.9)
HSV2 IGG SER IA-ACNC: <0.91 INDEX (ref 0–0.9)

## 2021-10-12 ENCOUNTER — HOSPITAL ENCOUNTER (EMERGENCY)
Facility: HOSPITAL | Age: 56
Discharge: HOME | End: 2021-10-12
Attending: EMERGENCY MEDICINE | Admitting: EMERGENCY MEDICINE

## 2021-10-12 VITALS
DIASTOLIC BLOOD PRESSURE: 75 MMHG | HEIGHT: 68 IN | WEIGHT: 183.42 LBS | SYSTOLIC BLOOD PRESSURE: 141 MMHG | RESPIRATION RATE: 20 BRPM | BODY MASS INDEX: 27.8 KG/M2 | TEMPERATURE: 98.2 F | OXYGEN SATURATION: 99 % | HEART RATE: 109 BPM

## 2021-10-12 DIAGNOSIS — L02.821 FURUNCLE OF HEAD OR SCALP: Primary | ICD-10-CM

## 2021-10-12 PROCEDURE — 99283 EMERGENCY DEPT VISIT LOW MDM: CPT

## 2021-10-12 RX ORDER — NAPROXEN 500 MG/1
500 TABLET ORAL 2 TIMES DAILY PRN
Qty: 16 TABLET | Refills: 0 | Status: SHIPPED | OUTPATIENT
Start: 2021-10-12 | End: 2021-11-26

## 2021-10-12 RX ORDER — NAPROXEN 250 MG/1
500 TABLET ORAL ONCE
Status: DISCONTINUED | OUTPATIENT
Start: 2021-10-12 | End: 2021-10-12 | Stop reason: HOSPADM

## 2021-10-12 RX ORDER — SULFAMETHOXAZOLE AND TRIMETHOPRIM 800; 160 MG/1; MG/1
1 TABLET ORAL 2 TIMES DAILY
Qty: 14 TABLET | Refills: 0 | Status: SHIPPED | OUTPATIENT
Start: 2021-10-12 | End: 2021-10-15

## 2021-10-12 RX ORDER — GINSENG 100 MG
1 CAPSULE ORAL ONCE
Status: COMPLETED | OUTPATIENT
Start: 2021-10-12 | End: 2021-10-12

## 2021-10-12 RX ORDER — GINSENG 100 MG
1 CAPSULE ORAL DAILY
Qty: 5 EACH | Refills: 0 | Status: SHIPPED | OUTPATIENT
Start: 2021-10-12 | End: 2021-11-26

## 2021-10-12 RX ADMIN — Medication 1 APPLICATION: at 18:47

## 2021-10-12 NOTE — ED PROVIDER NOTES
"Subjective   55-year-old male presents to the emergency department with concern about abscess on left side of scalp x2 days.  Patient reports the area was previously draining, he \"had his wife squeezed out which she could.\"  He reports a history of recurrent folliculitis, cellulitis, abscesses for which he has been treated.  He has a history of MRSA.  He reports no other pertinent medical history.  He has no other complaints to report at this time.  He is resting comfortably in the room.  Vitals within normal range.  He reports no history of fevers or chills, shortness of breath or difficulty breathing, chest pain, abdominal pain, bowel or bladder complaints.          Review of Systems   Constitutional: Negative for chills and fever.   HENT: Negative for congestion, ear pain and sore throat.    Eyes: Negative for pain.   Respiratory: Negative for cough, chest tightness and shortness of breath.    Cardiovascular: Negative for chest pain.   Gastrointestinal: Negative for abdominal pain, diarrhea, nausea and vomiting.   Genitourinary: Negative for flank pain and hematuria.   Musculoskeletal: Negative for joint swelling.   Skin: Negative for pallor.        Abscess left scalp.   Neurological: Negative for seizures and headaches.   All other systems reviewed and are negative.      Past Medical History:   Diagnosis Date   • CAD (coronary artery disease)    • Chest pain    • Diabetes (HCC)    • Hyperlipidemia    • Hypertension        Allergies   Allergen Reactions   • Fentanyl Angioedema   • Nifedipine Hives and Itching       Past Surgical History:   Procedure Laterality Date   • CARDIAC CATHETERIZATION      showed significant coronary artery disease of the LAD with calcification.    • CAROTID STENT     • CHOLECYSTECTOMY     • CIRCUMCISION     • INGUINAL HERNIA REPAIR     • NOSE SURGERY     • MS RT/LT HEART CATHETERS N/A 3/22/2016    Procedure: Percutaneous Coronary Intervention - Rota/stent LAD;  Surgeon: Marek Emery MD;  " Location: [facility] INVASIVE LOCATION;  Service: Cardiovascular   • ROTATOR CUFF REPAIR Left    • SPINAL FUSION         Family History   Problem Relation Age of Onset   • Hypertension Mother    • Diabetes Mother    • Heart attack Father    • Heart disease Father    • Heart failure Father    • Diabetes Father        Social History     Socioeconomic History   • Marital status: Single   Tobacco Use   • Smoking status: Never Smoker   • Smokeless tobacco: Never Used   Vaping Use   • Vaping Use: Never used   Substance and Sexual Activity   • Alcohol use: Never     Comment: OCCASIONAL   • Drug use: Never   • Sexual activity: Defer           Objective   Physical Exam  Vitals and nursing note reviewed.   Constitutional:       General: He is not in acute distress.     Appearance: Normal appearance. He is not toxic-appearing.   HENT:      Head: Normocephalic and atraumatic.      Mouth/Throat:      Mouth: Mucous membranes are moist.   Eyes:      Extraocular Movements: Extraocular movements intact.      Pupils: Pupils are equal, round, and reactive to light.   Cardiovascular:      Rate and Rhythm: Normal rate and regular rhythm.      Pulses: Normal pulses.      Heart sounds: Normal heart sounds.   Pulmonary:      Effort: Pulmonary effort is normal. No respiratory distress.      Breath sounds: Normal breath sounds.   Abdominal:      General: Abdomen is flat.      Palpations: Abdomen is soft.      Tenderness: There is no abdominal tenderness.   Musculoskeletal:         General: Normal range of motion.      Cervical back: Normal range of motion and neck supple.   Skin:     General: Skin is warm and dry.      Findings: Abscess and lesion present. No rash.      Comments: Furuncle present on the left scalp, area without drainage present.  Patient reported previous drainage.  Area tender to touch, with some redness, minimal swelling noted.   Neurological:      Mental Status: He is alert and oriented to person, place, and time. Mental  status is at baseline.         Procedures           ED Course                                           MDM  Number of Diagnoses or Management Options  Diagnosis management comments: Mr. Aleman came in with furuncle to the left scalp, as a result of initial folliculitis.  Patient reported a history of MRSA, I told him I would treat him accordingly.  There is no need for I&D at this time, area was indurated and nondraining.  Patient is appropriate for discharge with outpatient follow-up with primary care provider for reevaluation of wound in 1 week.  He should return to the emergency department for any new or worsening symptoms.  He verbalized understanding and agreement with this treatment plan.  Vitals stable for discharge at this time.    Risk of Complications, Morbidity, and/or Mortality  Presenting problems: low  Diagnostic procedures: low  Management options: low    Patient Progress  Patient progress: stable      Final diagnoses:   Furuncle of head or scalp       ED Disposition  ED Disposition     ED Disposition Condition Comment    Discharge Stable           Ollie Moreland, APRN  [address]  [address]  [place] [address]  [phone]    Schedule an appointment as soon as possible for a visit   As needed, If symptoms worsen         Medication List      New Prescriptions    bacitracin 500 UNIT/GM ointment  Apply 1 application topically to the appropriate area as directed Daily.     naproxen 500 MG EC tablet  Commonly known as: EC NAPROSYN  Take 1 tablet by mouth 2 (Two) Times a Day As Needed for Mild Pain  or Headache.     sulfamethoxazole-trimethoprim 800-160 MG per tablet  Commonly known as: BACTRIM DS,SEPTRA DS  Take 1 tablet by mouth 2 (Two) Times a Day for 7 days.           Where to Get Your Medications      These medications were sent to [id number] - [place] - [address] AT [facility] [phone] [facility] [phone]   [address],  [place] [address]    Phone: [phone]   · bacitracin 500 UNIT/GM ointment  · naproxen 500 MG EC tablet  · sulfamethoxazole-trimethoprim 800-160 MG per tablet          Stephanie Harvey PA-C  10/12/21 1845

## 2021-10-15 ENCOUNTER — HOSPITAL ENCOUNTER (EMERGENCY)
Facility: HOSPITAL | Age: 56
Discharge: HOME | End: 2021-10-15
Attending: EMERGENCY MEDICINE | Admitting: EMERGENCY MEDICINE

## 2021-10-15 VITALS
HEIGHT: 68 IN | OXYGEN SATURATION: 98 % | TEMPERATURE: 98.8 F | RESPIRATION RATE: 18 BRPM | DIASTOLIC BLOOD PRESSURE: 80 MMHG | WEIGHT: 186.29 LBS | SYSTOLIC BLOOD PRESSURE: 144 MMHG | BODY MASS INDEX: 28.23 KG/M2 | HEART RATE: 98 BPM

## 2021-10-15 DIAGNOSIS — L03.811 CELLULITIS OF HEAD EXCEPT FACE: Primary | ICD-10-CM

## 2021-10-15 LAB
ALBUMIN SERPL-MCNC: 4.2 G/DL (ref 3.5–5.2)
ALBUMIN/GLOB SERPL: 1.8 G/DL
ALP SERPL-CCNC: 105 U/L (ref 39–117)
ALT SERPL W P-5'-P-CCNC: 8 U/L (ref 1–41)
ANION GAP SERPL CALCULATED.3IONS-SCNC: 15.2 MMOL/L (ref 5–15)
AST SERPL-CCNC: 9 U/L (ref 1–40)
BASOPHILS # BLD AUTO: 0.04 10*3/MM3 (ref 0–0.2)
BASOPHILS NFR BLD AUTO: 0.5 % (ref 0–1.5)
BILIRUB SERPL-MCNC: 0.4 MG/DL (ref 0–1.2)
BUN SERPL-MCNC: 14 MG/DL (ref 6–20)
BUN/CREAT SERPL: 15.4 (ref 7–25)
CALCIUM SPEC-SCNC: 8.9 MG/DL (ref 8.6–10.5)
CHLORIDE SERPL-SCNC: 97 MMOL/L (ref 98–107)
CO2 SERPL-SCNC: 21.8 MMOL/L (ref 22–29)
CREAT SERPL-MCNC: 0.91 MG/DL (ref 0.76–1.27)
DEPRECATED RDW RBC AUTO: 37.5 FL (ref 37–54)
EOSINOPHIL # BLD AUTO: 0.09 10*3/MM3 (ref 0–0.4)
EOSINOPHIL NFR BLD AUTO: 1 % (ref 0.3–6.2)
ERYTHROCYTE [DISTWIDTH] IN BLOOD BY AUTOMATED COUNT: 12 % (ref 12.3–15.4)
GFR SERPL CREATININE-BSD FRML MDRD: 87 ML/MIN/1.73
GLOBULIN UR ELPH-MCNC: 2.4 GM/DL
GLUCOSE SERPL-MCNC: 334 MG/DL (ref 65–99)
HCT VFR BLD AUTO: 39.5 % (ref 37.5–51)
HGB BLD-MCNC: 14.1 G/DL (ref 13–17.7)
HOLD SPECIMEN: NORMAL
IMM GRANULOCYTES # BLD AUTO: 0.04 10*3/MM3 (ref 0–0.05)
IMM GRANULOCYTES NFR BLD AUTO: 0.5 % (ref 0–0.5)
LARGE PLATELETS: NORMAL
LYMPHOCYTES # BLD AUTO: 2.23 10*3/MM3 (ref 0.7–3.1)
LYMPHOCYTES NFR BLD AUTO: 25.5 % (ref 19.6–45.3)
MCH RBC QN AUTO: 30.8 PG (ref 26.6–33)
MCHC RBC AUTO-ENTMCNC: 35.7 G/DL (ref 31.5–35.7)
MCV RBC AUTO: 86.2 FL (ref 79–97)
MONOCYTES # BLD AUTO: 0.72 10*3/MM3 (ref 0.1–0.9)
MONOCYTES NFR BLD AUTO: 8.2 % (ref 5–12)
NEUTROPHILS NFR BLD AUTO: 5.63 10*3/MM3 (ref 1.7–7)
NEUTROPHILS NFR BLD AUTO: 64.3 % (ref 42.7–76)
NRBC BLD AUTO-RTO: 0 /100 WBC (ref 0–0.2)
PLATELET # BLD AUTO: 123 10*3/MM3 (ref 140–450)
PMV BLD AUTO: 9.7 FL (ref 6–12)
POTASSIUM SERPL-SCNC: 4.3 MMOL/L (ref 3.5–5.2)
PROT SERPL-MCNC: 6.6 G/DL (ref 6–8.5)
RBC # BLD AUTO: 4.58 10*6/MM3 (ref 4.14–5.8)
RBC MORPH BLD: NORMAL
SODIUM SERPL-SCNC: 134 MMOL/L (ref 136–145)
WBC # BLD AUTO: 8.75 10*3/MM3 (ref 3.4–10.8)
WBC MORPH BLD: NORMAL
WHOLE BLOOD HOLD SPECIMEN: NORMAL

## 2021-10-15 PROCEDURE — 85025 COMPLETE CBC W/AUTO DIFF WBC: CPT

## 2021-10-15 PROCEDURE — 99283 EMERGENCY DEPT VISIT LOW MDM: CPT

## 2021-10-15 PROCEDURE — 85007 BL SMEAR W/DIFF WBC COUNT: CPT

## 2021-10-15 PROCEDURE — 80053 COMPREHEN METABOLIC PANEL: CPT

## 2021-10-15 PROCEDURE — 36415 COLL VENOUS BLD VENIPUNCTURE: CPT

## 2021-10-15 RX ORDER — CLINDAMYCIN HYDROCHLORIDE 300 MG/1
300 CAPSULE ORAL ONCE
Status: COMPLETED | OUTPATIENT
Start: 2021-10-15 | End: 2021-10-15

## 2021-10-15 RX ORDER — DOXYCYCLINE 100 MG/1
100 CAPSULE ORAL ONCE
Status: COMPLETED | OUTPATIENT
Start: 2021-10-15 | End: 2021-10-15

## 2021-10-15 RX ORDER — HYDROCODONE BITARTRATE AND ACETAMINOPHEN 5; 325 MG/1; MG/1
1 TABLET ORAL EVERY 8 HOURS PRN
Qty: 9 TABLET | Refills: 0 | Status: SHIPPED | OUTPATIENT
Start: 2021-10-15 | End: 2021-10-18

## 2021-10-15 RX ORDER — HYDROCODONE BITARTRATE AND ACETAMINOPHEN 7.5; 325 MG/1; MG/1
1 TABLET ORAL ONCE
Status: COMPLETED | OUTPATIENT
Start: 2021-10-15 | End: 2021-10-15

## 2021-10-15 RX ORDER — CLINDAMYCIN HYDROCHLORIDE 300 MG/1
300 CAPSULE ORAL 4 TIMES DAILY
Qty: 28 CAPSULE | Refills: 0 | OUTPATIENT
Start: 2021-10-15 | End: 2021-10-16

## 2021-10-15 RX ORDER — DOXYCYCLINE HYCLATE 100 MG/1
100 TABLET, DELAYED RELEASE ORAL 2 TIMES DAILY
Qty: 14 TABLET | Refills: 0 | OUTPATIENT
Start: 2021-10-15 | End: 2021-10-16

## 2021-10-15 RX ADMIN — HYDROCODONE BITARTRATE AND ACETAMINOPHEN 1 TABLET: 7.5; 325 TABLET ORAL at 18:00

## 2021-10-15 RX ADMIN — DOXYCYCLINE 100 MG: 100 CAPSULE ORAL at 18:01

## 2021-10-15 RX ADMIN — CLINDAMYCIN HYDROCHLORIDE 300 MG: 300 CAPSULE ORAL at 18:21

## 2021-10-15 NOTE — ED PROVIDER NOTES
"Subjective   Moreno Aleman is a 55 y.o. male that presents to the ED via private vehicle accompanied by spouse for ABSCESS to the scalp. This started several days ago and is still present and constant. It is moderate in severity. Nothing improves or worsens symptoms.     Pt reports pain to the area that \"shoots into\" his neck. He was seen in this ED 3 days ago for the abscess and was started on bacitracin with no improvement.           History provided by:  Patient      Review of Systems   Constitutional: Negative for chills, diaphoresis and fever.   HENT: Negative for ear discharge and nosebleeds.    Eyes: Negative for photophobia.   Respiratory: Negative for shortness of breath.    Cardiovascular: Negative for chest pain.   Gastrointestinal: Negative for diarrhea, nausea and vomiting.   Genitourinary: Negative for dysuria.   Musculoskeletal: Negative for back pain and neck pain.   Skin: Negative for rash.        Abscess to the scalp.    Neurological: Negative for headaches.   All other systems reviewed and are negative.      Past Medical History:   Diagnosis Date   • CAD (coronary artery disease)    • Chest pain    • Diabetes (HCC)    • Hyperlipidemia    • Hypertension        Allergies   Allergen Reactions   • Cefdinir Itching   • Fentanyl Angioedema   • Nifedipine Hives and Itching       Past Surgical History:   Procedure Laterality Date   • CARDIAC CATHETERIZATION      showed significant coronary artery disease of the LAD with calcification.    • CAROTID STENT     • CHOLECYSTECTOMY     • CIRCUMCISION     • INGUINAL HERNIA REPAIR      x2   • NOSE SURGERY     • MS RT/LT HEART CATHETERS N/A 3/22/2016    Procedure: Percutaneous Coronary Intervention - Rota/stent LAD;  Surgeon: Marek Emery MD;  Location: [facility] INVASIVE LOCATION;  Service: Cardiovascular   • ROTATOR CUFF REPAIR Left    • SPINAL FUSION         Family History   Problem Relation Age of Onset   • Hypertension Mother    • Diabetes Mother    • Heart " attack Father    • Heart disease Father    • Heart failure Father    • Diabetes Father        Social History     Socioeconomic History   • Marital status: Single   Tobacco Use   • Smoking status: Never Smoker   • Smokeless tobacco: Never Used   Vaping Use   • Vaping Use: Never used   Substance and Sexual Activity   • Alcohol use: Never     Comment: OCCASIONAL   • Drug use: Never   • Sexual activity: Defer         Objective   Physical Exam  Vitals and nursing note reviewed.   Constitutional:       General: He is not in acute distress.     Appearance: Normal appearance.   HENT:      Head: Normocephalic and atraumatic.      Comments: 0.5 cm abscess of scalp with spontaneous drainage and surrounding cellulitis of scalp and left forehead.      Nose: Nose normal.   Eyes:      General: No scleral icterus.  Cardiovascular:      Rate and Rhythm: Normal rate and regular rhythm.      Heart sounds: Normal heart sounds.   Pulmonary:      Effort: Pulmonary effort is normal. No respiratory distress.      Breath sounds: Normal breath sounds.   Abdominal:      Palpations: Abdomen is soft.      Tenderness: There is no abdominal tenderness.   Musculoskeletal:         General: Normal range of motion.      Cervical back: Neck supple.      Right lower leg: No edema.      Left lower leg: No edema.   Skin:     General: Skin is warm and dry.   Neurological:      General: No focal deficit present.      Mental Status: He is alert and oriented to person, place, and time.      Sensory: No sensory deficit.      Motor: No weakness.         Procedures         ED Course                                            MDM  Number of Diagnoses or Management Options     Amount and/or Complexity of Data Reviewed  Clinical lab tests: reviewed  Obtain history from someone other than the patient: yes (Significant other.)  Review and summarize past medical records: yes (Pt was seen 3 days ago for abscess on head. )      Differential diagnosis includes  cellulitis, abscess, necrotizing fasciitis, and allergic reaction among others.    The patient's work-up indicates a normal CBC and comprehensive metabolic panel indicates hyperglycemia.  The anion gap and bicarbonate are very slightly off.  Type II diabetic.  Doubt DKA.    The patient will be changed to dual oral antibiotic therapy.  The abscess is already spontaneously drained prior to arrival with no remaining fluid collection.  Antonella reasons for early return to the emergency department with the patient and his significant other.  Final diagnoses:   Cellulitis of head except face       Documentation assistance provided by marisela Rose.  Information recorded by the scribe was done at my direction and has been verified and validated by me.     Marcella Rose  10/15/21 1715       Kam Leonard DO  10/15/21 7123

## 2021-10-16 ENCOUNTER — HOSPITAL ENCOUNTER (EMERGENCY)
Facility: HOSPITAL | Age: 56
Discharge: HOME | End: 2021-10-16
Attending: EMERGENCY MEDICINE | Admitting: EMERGENCY MEDICINE

## 2021-10-16 VITALS
BODY MASS INDEX: 27.4 KG/M2 | DIASTOLIC BLOOD PRESSURE: 105 MMHG | HEIGHT: 68 IN | OXYGEN SATURATION: 100 % | HEART RATE: 102 BPM | SYSTOLIC BLOOD PRESSURE: 183 MMHG | TEMPERATURE: 98.8 F | WEIGHT: 180.78 LBS | RESPIRATION RATE: 20 BRPM

## 2021-10-16 DIAGNOSIS — L03.811 CELLULITIS OF HEAD EXCEPT FACE: Primary | ICD-10-CM

## 2021-10-16 PROCEDURE — 99283 EMERGENCY DEPT VISIT LOW MDM: CPT

## 2021-10-16 RX ORDER — CEPHALEXIN 500 MG/1
500 CAPSULE ORAL 4 TIMES DAILY
Qty: 28 CAPSULE | Refills: 0 | Status: SHIPPED | OUTPATIENT
Start: 2021-10-16 | End: 2021-11-08

## 2021-10-16 RX ORDER — DOXYCYCLINE HYCLATE 100 MG/1
100 TABLET, DELAYED RELEASE ORAL 2 TIMES DAILY
Qty: 14 TABLET | Refills: 0 | Status: SHIPPED | OUTPATIENT
Start: 2021-10-16 | End: 2021-11-08

## 2021-10-16 RX ORDER — CEPHALEXIN 250 MG/1
500 CAPSULE ORAL ONCE
Status: COMPLETED | OUTPATIENT
Start: 2021-10-16 | End: 2021-10-16

## 2021-10-16 RX ORDER — DOXYCYCLINE 100 MG/1
100 CAPSULE ORAL ONCE
Status: COMPLETED | OUTPATIENT
Start: 2021-10-16 | End: 2021-10-16

## 2021-10-16 RX ORDER — HYDROCODONE BITARTRATE AND ACETAMINOPHEN 7.5; 325 MG/1; MG/1
1 TABLET ORAL ONCE
Status: COMPLETED | OUTPATIENT
Start: 2021-10-16 | End: 2021-10-16

## 2021-10-16 RX ADMIN — CEPHALEXIN 500 MG: 250 CAPSULE ORAL at 15:41

## 2021-10-16 RX ADMIN — DOXYCYCLINE 100 MG: 100 CAPSULE ORAL at 15:41

## 2021-10-16 RX ADMIN — HYDROCODONE BITARTRATE AND ACETAMINOPHEN 1 TABLET: 7.5; 325 TABLET ORAL at 15:41

## 2021-10-16 NOTE — ED PROVIDER NOTES
Time: 3:19 PM EDT  Arrived by: private car  Chief Complaint: Scalp Pain  History provided by: Patient  History is limited by: N/A     History of Present Illness:  Patient is a 55 y.o. year old male that presents to the emergency department with moderate intermittent scalp pain that began a few days ago but has not gotten better.    Yesterday, he was discharged with a prescription for hydrocodone. He was not able to pick it up from the pharmacy due to insurance issues. The patient states that he does not have money to  his prescription.    He does continue to have cellulitis on his head along with mild swelling on the forehead, which he was seen in the ED for and discharged with pain medication yesterday.    He denies having a cephalosporin allergy.      Pain  Location:  Scalp  Severity:  Moderate  Duration: A few days.  Timing:  Intermittent  Associated symptoms: no abdominal pain, no chest pain, no congestion, no cough, no diarrhea, no ear pain, no fever, no headaches, no nausea, no shortness of breath, no sore throat and no vomiting        Patient Care Team  Primary Care Provider: Ollie Moreland APRN    Past Medical History:     Allergies   Allergen Reactions   • Cefdinir Itching   • Fentanyl Angioedema   • Nifedipine Hives and Itching     Past Medical History:   Diagnosis Date   • CAD (coronary artery disease)    • Chest pain    • Diabetes (HCC)    • Hyperlipidemia    • Hypertension      Past Surgical History:   Procedure Laterality Date   • CARDIAC CATHETERIZATION      showed significant coronary artery disease of the LAD with calcification.    • CAROTID STENT     • CHOLECYSTECTOMY     • CIRCUMCISION     • INGUINAL HERNIA REPAIR      x2   • NOSE SURGERY     • SC RT/LT HEART CATHETERS N/A 3/22/2016    Procedure: Percutaneous Coronary Intervention - Rota/stent LAD;  Surgeon: Marek Emery MD;  Location: [facility] INVASIVE LOCATION;  Service: Cardiovascular   • ROTATOR CUFF REPAIR Left    • SPINAL FUSION        Family History   Problem Relation Age of Onset   • Hypertension Mother    • Diabetes Mother    • Heart attack Father    • Heart disease Father    • Heart failure Father    • Diabetes Father        Home Medications:  Prior to Admission medications    Medication Sig Start Date End Date Taking? Authorizing Provider   bacitracin 500 UNIT/GM ointment Apply 1 application topically to the appropriate area as directed Daily. 10/12/21   Stephanie Harvey PA-C   clindamycin (CLEOCIN) 300 MG capsule Take 1 capsule by mouth 4 (Four) Times a Day. 10/15/21   Kam Leonard DO   cloNIDine (CATAPRES) 0.1 MG tablet TAKE 1 TABLET BY MOUTH TWICE DAILY 9/13/21   Lisa Reece APRN   clopidogrel (Plavix) 75 MG tablet Plavix 75 mg oral tablet take 1 tablet (75 mg) by oral route once daily   Suspended    Provider, MD Jody   Dapagliflozin Propanediol (Farxiga) 10 MG tablet Take 10 mg by mouth Daily. 8/11/21   Ollie Moreland APRN   diclofenac (VOLTAREN) 75 MG EC tablet Take 1 tablet by mouth 2 (Two) Times a Day. 8/6/21   Kahlil Alejandra PA   doxycycline (DORYX) 100 MG enteric coated tablet Take 1 tablet by mouth 2 (Two) Times a Day. 10/15/21   Kam Leonard DO   HYDROcodone-acetaminophen (NORCO) 5-325 MG per tablet Take 1 tablet by mouth Every 6 (Six) Hours As Needed for Moderate Pain . 9/4/21   Donta Low DO   HYDROcodone-acetaminophen (NORCO) 5-325 MG per tablet Take 1 tablet by mouth Every 8 (Eight) Hours As Needed for Moderate Pain . 10/15/21   Kam Leonard DO   lisinopril (PRINIVIL,ZESTRIL) 20 MG tablet Take 1 tablet by mouth 2 (two) times a day. 8/31/21   Ran Pan MD   metFORMIN (Glucophage) 1000 MG tablet Take 1 tablet by mouth 2 (Two) Times a Day With Meals. 8/11/21   Ollie Moreland APRN   naproxen (EC NAPROSYN) 500 MG EC tablet Take 1 tablet by mouth 2 (Two) Times a Day As Needed for Mild Pain  or Headache. 10/12/21   Stephanie Harvey PA-C   sildenafil (Viagra) 100 MG tablet Take 1 tablet by  "mouth Daily As Needed for Erectile Dysfunction. 8/3/21   Ollie Moreland APRN   tamsulosin (FLOMAX) 0.4 MG capsule 24 hr capsule Take 1 capsule by mouth Daily for 60 days. 9/27/21 11/26/21  Dulce Miguel APRN   traZODone (DESYREL) 50 MG tablet Take 1 tablet by mouth Every Night. 8/10/21   Ollie Moreland APRN        Social History:   Social History     Tobacco Use   • Smoking status: Never Smoker   • Smokeless tobacco: Never Used   Vaping Use   • Vaping Use: Never used   Substance Use Topics   • Alcohol use: Never     Comment: OCCASIONAL   • Drug use: Never     Recent travel: not applicable     Review of Systems:  Review of Systems   Constitutional: Negative for chills and fever.   HENT: Negative for congestion, ear pain and sore throat.         Scalp pain and cellulitis   Eyes: Negative for pain.   Respiratory: Negative for cough, chest tightness and shortness of breath.    Cardiovascular: Negative for chest pain.   Gastrointestinal: Negative for abdominal pain, diarrhea, nausea and vomiting.   Genitourinary: Negative for flank pain and hematuria.   Musculoskeletal: Negative for joint swelling.   Skin: Negative for pallor.   Neurological: Negative for seizures and headaches.   All other systems reviewed and are negative.       Physical Exam:  BP (!) 183/105   Pulse 102   Temp 98.8 °F (37.1 °C)   Resp 20   Ht 172.7 cm (68\")   Wt 82 kg (180 lb 12.4 oz)   SpO2 100%   BMI 27.49 kg/m²     Physical Exam  Vitals and nursing note reviewed.   Constitutional:       General: He is not in acute distress.     Appearance: Normal appearance. He is not toxic-appearing.   HENT:      Head: Normocephalic.      Comments: Cellulitis of the left scalp and left forehead with no reaccumulation of abscess     Mouth/Throat:      Mouth: Mucous membranes are moist.   Eyes:      Extraocular Movements: Extraocular movements intact.      Pupils: Pupils are equal, round, and reactive to light.   Cardiovascular:      Rate and Rhythm: " Normal rate and regular rhythm.      Pulses: Normal pulses.      Heart sounds: Normal heart sounds.   Pulmonary:      Effort: Pulmonary effort is normal. No respiratory distress.      Breath sounds: Normal breath sounds.   Abdominal:      General: Abdomen is flat.      Palpations: Abdomen is soft.      Tenderness: There is no abdominal tenderness.   Musculoskeletal:         General: Normal range of motion.      Cervical back: Normal range of motion and neck supple.   Skin:     General: Skin is warm and dry.   Neurological:      Mental Status: He is alert and oriented to person, place, and time. Mental status is at baseline.                Medications in the Emergency Department:  Medications   cephalexin (KEFLEX) capsule 500 mg (500 mg Oral Given 10/16/21 1541)   doxycycline (MONODOX) capsule 100 mg (100 mg Oral Given 10/16/21 1541)   HYDROcodone-acetaminophen (NORCO) 7.5-325 MG per tablet 1 tablet (1 tablet Oral Given 10/16/21 1541)        Labs  Lab Results (last 24 hours)     ** No results found for the last 24 hours. **           Imaging:  No Radiology Exams Resulted Within Past 24 Hours    Procedures:  Procedures    Progress                            Medical Decision Making:  MDM  Number of Diagnoses or Management Options     Amount and/or Complexity of Data Reviewed  Obtain history from someone other than the patient: yes (Family member.)  Review and summarize past medical records: yes (The patient was seen twice in the past week in the emergency department for an infection of his scalp but has had difficulty filling prescriptions and taking medications and has not improved.)  Discuss the patient with other providers: yes (Spoke with case management regarding the patient.)    Differential diagnosis includes cellulitis, necrotizing fasciitis, abscess, and allergic reaction among others.    Case management was consulted to see the patient and because he is assigned to a pharmacy due to chronic opiate  prescriptions and due to that pharmacy being closed it was suggested that he received printed prescriptions and that his prescriptions be changed to 2 more common and generic things that should be easier to afford by cash.  These were provided to the patient.    Final diagnoses:   Cellulitis of head except face        Disposition:  ED Disposition     ED Disposition Condition Comment    Discharge Stable           This medical record created using voice recognition software and may contain unintended errors.    Documentation assistance provided by Brando Samayoa acting as scribe for Kam Leonard DO. Information recorded by the scribe was done at my direction and has been verified and validated by me.        Brando Samayoa  10/16/21 1558       Kam Leonard DO  10/16/21 2022

## 2021-10-18 ENCOUNTER — OFFICE VISIT (OUTPATIENT)
Dept: FAMILY MEDICINE CLINIC | Facility: CLINIC | Age: 56
End: 2021-10-18

## 2021-10-18 VITALS
HEART RATE: 96 BPM | DIASTOLIC BLOOD PRESSURE: 88 MMHG | OXYGEN SATURATION: 96 % | HEIGHT: 68 IN | WEIGHT: 190 LBS | BODY MASS INDEX: 28.79 KG/M2 | TEMPERATURE: 98 F | SYSTOLIC BLOOD PRESSURE: 124 MMHG

## 2021-10-18 DIAGNOSIS — L03.811 CELLULITIS OF HEAD EXCEPT FACE: Primary | ICD-10-CM

## 2021-10-18 PROCEDURE — 99213 OFFICE O/P EST LOW 20 MIN: CPT | Performed by: NURSE PRACTITIONER

## 2021-10-18 RX ORDER — CHLORAL HYDRATE 500 MG
CAPSULE ORAL
COMMUNITY
End: 2021-11-26

## 2021-10-18 NOTE — PROGRESS NOTES
"Chief Complaint  Follow-up (labs)    Subjective          Moreno Aleman presents to [facility]  Presents to the office today for follow-up regarding a recent emergency department visit.  Patient was seen for a wound to the left scalp.  Patient was placed on doxycycline as well as cephalexin.  He states that he started the antibiotics yesterday.  He denies any fevers at this time.  I did explain to the patient that I want to follow-up with him at the end of the week and if the wound was not getting better we would change the antibiotics.      Objective   Vital Signs:   /88 (BP Location: Right arm, Patient Position: Sitting, Cuff Size: Adult)   Pulse 96   Temp 98 °F (36.7 °C) (Temporal)   Ht 172.7 cm (68\")   Wt 86.2 kg (190 lb)   SpO2 96%   BMI 28.89 kg/m²     Physical Exam  Constitutional:       Appearance: Normal appearance.   Cardiovascular:      Rate and Rhythm: Normal rate and regular rhythm.      Heart sounds: Normal heart sounds, S1 normal and S2 normal. No murmur heard.      Pulmonary:      Effort: Pulmonary effort is normal. No respiratory distress.      Breath sounds: Normal breath sounds.   Skin:     General: Skin is warm and dry.      Findings: Wound present.      Comments: 1.5 x 3 cm gapping wound noted to the left parietal region of the scalp no drainage at this time.   Neurological:      Mental Status: He is alert and oriented to person, place, and time.   Psychiatric:         Attention and Perception: Attention normal.         Mood and Affect: Mood normal.         Behavior: Behavior normal.        Result Review :                Assessment and Plan    Diagnoses and all orders for this visit:    1. Cellulitis of head except face (Primary)  Comments:  Continue current antibiotics.  We will follow up with the patient in 1 week to reevaluate wound        Follow Up   Return in about 4 days (around 10/22/2021).  Patient was given instructions and counseling " regarding his condition or for health maintenance advice. Please see specific information pulled into the AVS if appropriate.

## 2021-11-08 ENCOUNTER — OFFICE VISIT (OUTPATIENT)
Dept: FAMILY MEDICINE CLINIC | Facility: CLINIC | Age: 56
End: 2021-11-08

## 2021-11-08 VITALS
BODY MASS INDEX: 29.86 KG/M2 | TEMPERATURE: 97.7 F | SYSTOLIC BLOOD PRESSURE: 122 MMHG | HEART RATE: 94 BPM | HEIGHT: 68 IN | DIASTOLIC BLOOD PRESSURE: 78 MMHG | OXYGEN SATURATION: 98 % | WEIGHT: 197 LBS

## 2021-11-08 DIAGNOSIS — I10 HYPERTENSION, ESSENTIAL: ICD-10-CM

## 2021-11-08 DIAGNOSIS — Z79.4 TYPE 2 DIABETES MELLITUS WITH HYPERGLYCEMIA, WITH LONG-TERM CURRENT USE OF INSULIN (HCC): Primary | ICD-10-CM

## 2021-11-08 DIAGNOSIS — F51.01 PRIMARY INSOMNIA: ICD-10-CM

## 2021-11-08 DIAGNOSIS — R51.9 ACUTE NONINTRACTABLE HEADACHE, UNSPECIFIED HEADACHE TYPE: ICD-10-CM

## 2021-11-08 DIAGNOSIS — E78.2 HYPERLIPEMIA, MIXED: ICD-10-CM

## 2021-11-08 DIAGNOSIS — E11.65 TYPE 2 DIABETES MELLITUS WITH HYPERGLYCEMIA, WITH LONG-TERM CURRENT USE OF INSULIN (HCC): Primary | ICD-10-CM

## 2021-11-08 DIAGNOSIS — R53.83 FATIGUE, UNSPECIFIED TYPE: ICD-10-CM

## 2021-11-08 DIAGNOSIS — E29.1 HYPOGONADISM IN MALE: ICD-10-CM

## 2021-11-08 DIAGNOSIS — N52.9 ERECTILE DYSFUNCTION, UNSPECIFIED ERECTILE DYSFUNCTION TYPE: ICD-10-CM

## 2021-11-08 LAB

## 2021-11-08 PROCEDURE — 96372 THER/PROPH/DIAG INJ SC/IM: CPT | Performed by: NURSE PRACTITIONER

## 2021-11-08 PROCEDURE — 99214 OFFICE O/P EST MOD 30 MIN: CPT | Performed by: NURSE PRACTITIONER

## 2021-11-08 PROCEDURE — U0004 COV-19 TEST NON-CDC HGH THRU: HCPCS | Performed by: NURSE PRACTITIONER

## 2021-11-08 PROCEDURE — 82962 GLUCOSE BLOOD TEST: CPT | Performed by: NURSE PRACTITIONER

## 2021-11-08 PROCEDURE — 80305 DRUG TEST PRSMV DIR OPT OBS: CPT | Performed by: NURSE PRACTITIONER

## 2021-11-08 RX ORDER — TESTOSTERONE CYPIONATE 100 MG/ML
50 INJECTION, SOLUTION INTRAMUSCULAR
Qty: 1 ML | Refills: 0 | Status: SHIPPED | OUTPATIENT
Start: 2021-11-08 | End: 2021-11-12

## 2021-11-08 RX ORDER — TRAZODONE HYDROCHLORIDE 50 MG/1
50 TABLET ORAL NIGHTLY
Qty: 90 TABLET | Refills: 1 | Status: SHIPPED | OUTPATIENT
Start: 2021-11-08 | End: 2022-02-03

## 2021-11-08 RX ORDER — KETOROLAC TROMETHAMINE 30 MG/ML
60 INJECTION, SOLUTION INTRAMUSCULAR; INTRAVENOUS ONCE
Status: COMPLETED | OUTPATIENT
Start: 2021-11-08 | End: 2021-11-08

## 2021-11-08 RX ORDER — INSULIN DEGLUDEC 200 U/ML
22 INJECTION, SOLUTION SUBCUTANEOUS DAILY
Qty: 2 PEN | Refills: 5 | Status: SHIPPED | OUTPATIENT
Start: 2021-11-08 | End: 2022-08-09 | Stop reason: SDUPTHER

## 2021-11-08 RX ADMIN — KETOROLAC TROMETHAMINE 60 MG: 30 INJECTION, SOLUTION INTRAMUSCULAR; INTRAVENOUS at 08:07

## 2021-11-08 NOTE — PROGRESS NOTES
"Chief Complaint  Diabetes (3 month follow up), Fever, and Headache    Subjective          Moreno Aleman presents to [facility]  Patient presents to the office today for 3-month follow-up regarding his diabetes, hyperlipidemia and hypertension.  Blood pressure arrival today is 120/78.  I did review recent lab work with patient with a glucose of 334.  I did explain to the patient that we were going to have to start insulin due to his uncontrolled glucose levels.  Patient states that he does not have a glucometer at home.  I did give the patient a paper prescription for glucometer and test strips of choice.  I did explain to him that he would need to check his fasting glucose every day and keep a log.  Did start an initial injection of Tresiba 20 units subcu.  Glucose was 283 this morning.  I did explain to the patient that I want him to call us in 2 weeks to inform us of his glucose readings.  Also states that he is still having difficult time with erectile dysfunction fatigue.  Patient did have a low testosterone level.  I did discuss starting testosterone injections.  He states that he will get the serum from the pharmacy and bring it into the office for the injection.  Patient also states he has been having a headache all weekend.  He states that he is taking Tylenol and ibuprofen with no improvement.  He states that his boss wants a Covid swab before he can return to work.      Objective   Vital Signs:   /78 (BP Location: Right arm, Patient Position: Sitting, Cuff Size: Adult)   Pulse 94   Temp 97.7 °F (36.5 °C) (Temporal)   Ht 172.7 cm (68\")   Wt 89.4 kg (197 lb)   SpO2 98%   BMI 29.95 kg/m²     Physical Exam  Vitals reviewed.   Constitutional:       Appearance: Normal appearance.   Cardiovascular:      Rate and Rhythm: Normal rate and regular rhythm.      Heart sounds: Normal heart sounds, S1 normal and S2 normal. No murmur heard.      Pulmonary:      Effort: " Pulmonary effort is normal. No respiratory distress.      Breath sounds: Normal breath sounds.   Skin:     General: Skin is warm and dry.   Neurological:      Mental Status: He is alert and oriented to person, place, and time.   Psychiatric:         Attention and Perception: Attention normal.         Mood and Affect: Mood normal.         Behavior: Behavior normal.        Result Review :     CMP    CMP 4/26/21 8/10/21 10/15/21   Glucose 205 (A) 475 (A) 334 (A)   BUN 12 9 14   Creatinine 0.80 0.83 0.91   eGFR Non African Am  97 87   Sodium 136 133 (A) 134 (A)   Potassium 4.6 3.9 4.3   Chloride 100 97 (A) 97 (A)   Calcium 9.3 9.4 8.9   Albumin  4.60 4.20   Total Bilirubin  0.4 0.4   Alkaline Phosphatase  126 (A) 105   AST (SGOT)  9 9   ALT (SGPT)  11 8   (A) Abnormal value            CBC    CBC 4/11/21 8/10/21 10/15/21   WBC 11.21 (A) 7.34 8.75   RBC 4.82 4.95 4.58   Hemoglobin 14.8 14.8 14.1   Hematocrit 42.0 44.1 39.5   MCV 87.1 89.1 86.2   MCH 30.7 29.9 30.8   MCHC 35.2 33.6 35.7   RDW 12.2 13.3 12.0 (A)   Platelets 141 172 123 (A)   (A) Abnormal value            Most Recent A1C    HGBA1C Most Recent 9/28/21   Hemoglobin A1C 11.70 (A)   (A) Abnormal value                      Assessment and Plan    Diagnoses and all orders for this visit:    1. Type 2 diabetes mellitus with hyperglycemia, with long-term current use of insulin (HCC) (Primary)  -     CBC (No Diff); Future  -     Comprehensive Metabolic Panel; Future  -     Hemoglobin A1c; Future  -     Insulin Degludec (Tresiba FlexTouch) 200 UNIT/ML solution pen-injector pen injection; Inject 22 Units under the skin into the appropriate area as directed Daily.  Dispense: 2 pen; Refill: 5  -     POCT Glucose    2. Acute nonintractable headache, unspecified headache type  -     COVID-19,[facility], NP SWAB IN TRANSPORT MEDIA 48-72 HR TAT - Swab, Nasopharynx; Future  -     COVID-19,[facility], NP SWAB IN TRANSPORT MEDIA 48-72 HR TAT - Swab,  Nasopharynx  -     ketorolac (TORADOL) injection 60 mg    3. Hyperlipemia, mixed  -     CBC (No Diff); Future  -     Comprehensive Metabolic Panel; Future  -     Lipid Panel; Future    4. Hypertension, essential  -     CBC (No Diff); Future  -     Comprehensive Metabolic Panel; Future    5. Erectile dysfunction, unspecified erectile dysfunction type  -     testosterone cypionate (Depo-Testosterone) 100 MG/ML solution injection; Inject 0.5 mL into the appropriate muscle as directed by prescriber Every 14 (Fourteen) Days.  Dispense: 1 mL; Refill: 0    6. Fatigue, unspecified type  -     COVID-19,[facility], NP SWAB IN TRANSPORT MEDIA 48-72 HR TAT - Swab, Nasopharynx; Future  -     COVID-19,[facility], NP SWAB IN TRANSPORT MEDIA 48-72 HR TAT - Swab, Nasopharynx    7. Primary insomnia  -     traZODone (DESYREL) 50 MG tablet; Take 1 tablet by mouth Every Night.  Dispense: 90 tablet; Refill: 1    8. Hypogonadism in male  -     testosterone cypionate (Depo-Testosterone) 100 MG/ML solution injection; Inject 0.5 mL into the appropriate muscle as directed by prescriber Every 14 (Fourteen) Days.  Dispense: 1 mL; Refill: 0  -     POC Urine Drug Screen Premier Bio-Cup        Follow Up   Return in about 3 months (around 2/8/2022) for Recheck.  Patient was given instructions and counseling regarding his condition or for health maintenance advice. Please see specific information pulled into the AVS if appropriate.

## 2021-11-09 ENCOUNTER — TELEPHONE (OUTPATIENT)
Dept: FAMILY MEDICINE CLINIC | Facility: CLINIC | Age: 56
End: 2021-11-09

## 2021-11-09 LAB — SARS-COV-2 RNA NOSE QL NAA+PROBE: NOT DETECTED

## 2021-11-09 NOTE — TELEPHONE ENCOUNTER
Caller: Moreno Aleman    Relationship: Self    Best call back number: [phone]    Who are you requesting to speak with (clinical staff, provider,  specific staff member): MEDICAL STAFF    What was the call regarding: PATIENT WANTED TO LET PCP KNOW THAT PHARMACY IS SENDING A FAX TO GET CLARIFICATION ON HOW MANY TIMES A DAY THAT PATIENT NEEDS TO USE GLUCOMETER. THEY ALSO NEED TO KNOW THE UNITS ON THE TESTOSTERONE.     [id number] [place] [facility] [address] AT [facility] - [phone] [facility] [phone] FX

## 2021-11-12 ENCOUNTER — HOSPITAL ENCOUNTER (EMERGENCY)
Facility: HOSPITAL | Age: 56
Discharge: HOME | End: 2021-11-12
Attending: EMERGENCY MEDICINE | Admitting: EMERGENCY MEDICINE

## 2021-11-12 ENCOUNTER — APPOINTMENT (OUTPATIENT)
Dept: GENERAL RADIOLOGY | Facility: HOSPITAL | Age: 56
End: 2021-11-12

## 2021-11-12 ENCOUNTER — APPOINTMENT (OUTPATIENT)
Dept: CT IMAGING | Facility: HOSPITAL | Age: 56
End: 2021-11-12

## 2021-11-12 ENCOUNTER — TELEPHONE (OUTPATIENT)
Dept: FAMILY MEDICINE CLINIC | Facility: CLINIC | Age: 56
End: 2021-11-12

## 2021-11-12 VITALS
TEMPERATURE: 98.4 F | WEIGHT: 192.24 LBS | DIASTOLIC BLOOD PRESSURE: 93 MMHG | OXYGEN SATURATION: 96 % | BODY MASS INDEX: 29.14 KG/M2 | RESPIRATION RATE: 21 BRPM | HEIGHT: 68 IN | SYSTOLIC BLOOD PRESSURE: 151 MMHG | HEART RATE: 97 BPM

## 2021-11-12 DIAGNOSIS — R79.89 ELEVATED LACTIC ACID LEVEL: ICD-10-CM

## 2021-11-12 DIAGNOSIS — J06.9 VIRAL URI: Primary | ICD-10-CM

## 2021-11-12 DIAGNOSIS — R51.9 ACUTE NONINTRACTABLE HEADACHE, UNSPECIFIED HEADACHE TYPE: ICD-10-CM

## 2021-11-12 LAB
ACETONE BLD QL: NEGATIVE
ALBUMIN SERPL-MCNC: 4.3 G/DL (ref 3.5–5.2)
ALBUMIN/GLOB SERPL: 1.5 G/DL
ALP SERPL-CCNC: 81 U/L (ref 39–117)
ALT SERPL W P-5'-P-CCNC: 9 U/L (ref 1–41)
ANION GAP SERPL CALCULATED.3IONS-SCNC: 15.1 MMOL/L (ref 5–15)
AST SERPL-CCNC: 11 U/L (ref 1–40)
BASE EXCESS BLDV CALC-SCNC: -0.3 MMOL/L (ref -2–2)
BASOPHILS # BLD AUTO: 0.04 10*3/MM3 (ref 0–0.2)
BASOPHILS NFR BLD AUTO: 0.5 % (ref 0–1.5)
BDY SITE: ABNORMAL
BILIRUB SERPL-MCNC: 0.6 MG/DL (ref 0–1.2)
BUN SERPL-MCNC: 11 MG/DL (ref 6–20)
BUN/CREAT SERPL: 12.4 (ref 7–25)
CA-I BLDA-SCNC: 1.09 MMOL/L (ref 1.13–1.32)
CALCIUM SPEC-SCNC: 9.4 MG/DL (ref 8.6–10.5)
CHLORIDE BLDV-SCNC: 99 MMOL/L (ref 98–106)
CHLORIDE SERPL-SCNC: 97 MMOL/L (ref 98–107)
CO2 SERPL-SCNC: 21.9 MMOL/L (ref 22–29)
COHGB MFR BLD: 1.4 % (ref 0–1.5)
CREAT SERPL-MCNC: 0.89 MG/DL (ref 0.76–1.27)
CRP SERPL-MCNC: 3.73 MG/DL (ref 0–0.5)
D-LACTATE SERPL-SCNC: 4.2 MMOL/L (ref 0.5–2)
DEPRECATED RDW RBC AUTO: 38.2 FL (ref 37–54)
EOSINOPHIL # BLD AUTO: 0.07 10*3/MM3 (ref 0–0.4)
EOSINOPHIL NFR BLD AUTO: 0.9 % (ref 0.3–6.2)
ERYTHROCYTE [DISTWIDTH] IN BLOOD BY AUTOMATED COUNT: 12.2 % (ref 12.3–15.4)
ERYTHROCYTE [SEDIMENTATION RATE] IN BLOOD: 22 MM/HR (ref 0–20)
FHHB: 14.4 % (ref 0–5)
FLUAV AG NPH QL: NEGATIVE
FLUBV AG NPH QL IA: NEGATIVE
GFR SERPL CREATININE-BSD FRML MDRD: 89 ML/MIN/1.73
GLOBULIN UR ELPH-MCNC: 2.9 GM/DL
GLUCOSE BLDA-MCNC: 272 MMOL/L (ref 70–99)
GLUCOSE BLDC GLUCOMTR-MCNC: 370 MG/DL (ref 70–99)
GLUCOSE SERPL-MCNC: 262 MG/DL (ref 65–99)
HCO3 BLDV-SCNC: 23.1 MMOL/L (ref 22–26)
HCT VFR BLD AUTO: 40.7 % (ref 37.5–51)
HGB BLD-MCNC: 14.6 G/DL (ref 13–17.7)
HGB BLDA-MCNC: 15.6 G/DL (ref 13.8–16.4)
HOLD SPECIMEN: NORMAL
HOLD SPECIMEN: NORMAL
IMM GRANULOCYTES # BLD AUTO: 0.02 10*3/MM3 (ref 0–0.05)
IMM GRANULOCYTES NFR BLD AUTO: 0.2 % (ref 0–0.5)
INHALED O2 CONCENTRATION: 21 %
LACTATE BLDA-SCNC: 4.1 MMOL/L (ref 0.5–2)
LYMPHOCYTES # BLD AUTO: 1.45 10*3/MM3 (ref 0.7–3.1)
LYMPHOCYTES NFR BLD AUTO: 17.8 % (ref 19.6–45.3)
MCH RBC QN AUTO: 30.7 PG (ref 26.6–33)
MCHC RBC AUTO-ENTMCNC: 35.9 G/DL (ref 31.5–35.7)
MCV RBC AUTO: 85.7 FL (ref 79–97)
METHGB BLD QL: 0.2 % (ref 0–1.5)
MODALITY: ABNORMAL
MONOCYTES # BLD AUTO: 0.71 10*3/MM3 (ref 0.1–0.9)
MONOCYTES NFR BLD AUTO: 8.7 % (ref 5–12)
NEUTROPHILS NFR BLD AUTO: 5.87 10*3/MM3 (ref 1.7–7)
NEUTROPHILS NFR BLD AUTO: 71.9 % (ref 42.7–76)
NRBC BLD AUTO-RTO: 0 /100 WBC (ref 0–0.2)
NT-PROBNP SERPL-MCNC: 819.7 PG/ML (ref 0–900)
OXYHGB MFR BLDV: 84 % (ref 94–99)
PCO2 BLDV: 34.5 MM HG (ref 41–51)
PH BLDV: 7.44 PH UNITS (ref 7.31–7.41)
PLATELET # BLD AUTO: 145 10*3/MM3 (ref 140–450)
PMV BLD AUTO: 9.3 FL (ref 6–12)
PO2 BLDV: 48.5 MM HG (ref 35–42)
POTASSIUM BLDV-SCNC: 4.3 MMOL/L (ref 3.5–5)
POTASSIUM SERPL-SCNC: 4.3 MMOL/L (ref 3.5–5.2)
PROT SERPL-MCNC: 7.2 G/DL (ref 6–8.5)
QT INTERVAL: 324 MS
RBC # BLD AUTO: 4.75 10*6/MM3 (ref 4.14–5.8)
SAO2 % BLDCOV: 85.4 % (ref 95–99)
SARS-COV-2 RNA RESP QL NAA+PROBE: NOT DETECTED
SODIUM BLDV-SCNC: 131.6 MMOL/L (ref 136–146)
SODIUM SERPL-SCNC: 134 MMOL/L (ref 136–145)
WBC # BLD AUTO: 8.16 10*3/MM3 (ref 3.4–10.8)
WHOLE BLOOD HOLD SPECIMEN: NORMAL
WHOLE BLOOD HOLD SPECIMEN: NORMAL

## 2021-11-12 PROCEDURE — U0003 INFECTIOUS AGENT DETECTION BY NUCLEIC ACID (DNA OR RNA); SEVERE ACUTE RESPIRATORY SYNDROME CORONAVIRUS 2 (SARS-COV-2) (CORONAVIRUS DISEASE [COVID-19]), AMPLIFIED PROBE TECHNIQUE, MAKING USE OF HIGH THROUGHPUT TECHNOLOGIES AS DESCRIBED BY CMS-2020-01-R: HCPCS | Performed by: NURSE PRACTITIONER

## 2021-11-12 PROCEDURE — 85652 RBC SED RATE AUTOMATED: CPT | Performed by: NURSE PRACTITIONER

## 2021-11-12 PROCEDURE — 99283 EMERGENCY DEPT VISIT LOW MDM: CPT

## 2021-11-12 PROCEDURE — 82820 HEMOGLOBIN-OXYGEN AFFINITY: CPT | Performed by: EMERGENCY MEDICINE

## 2021-11-12 PROCEDURE — 87804 INFLUENZA ASSAY W/OPTIC: CPT | Performed by: NURSE PRACTITIONER

## 2021-11-12 PROCEDURE — 93010 ELECTROCARDIOGRAM REPORT: CPT | Performed by: INTERNAL MEDICINE

## 2021-11-12 PROCEDURE — 94799 UNLISTED PULMONARY SVC/PX: CPT

## 2021-11-12 PROCEDURE — 25010000002 DROPERIDOL PER 5 MG: Performed by: NURSE PRACTITIONER

## 2021-11-12 PROCEDURE — 82962 GLUCOSE BLOOD TEST: CPT

## 2021-11-12 PROCEDURE — 36415 COLL VENOUS BLD VENIPUNCTURE: CPT

## 2021-11-12 PROCEDURE — 25010000002 DEXAMETHASONE PER 1 MG: Performed by: NURSE PRACTITIONER

## 2021-11-12 PROCEDURE — 25010000002 DIPHENHYDRAMINE PER 50 MG: Performed by: NURSE PRACTITIONER

## 2021-11-12 PROCEDURE — 96375 TX/PRO/DX INJ NEW DRUG ADDON: CPT

## 2021-11-12 PROCEDURE — 25010000002 KETOROLAC TROMETHAMINE PER 15 MG: Performed by: NURSE PRACTITIONER

## 2021-11-12 PROCEDURE — 63710000001 ONDANSETRON ODT 4 MG TABLET DISPERSIBLE: Performed by: NURSE PRACTITIONER

## 2021-11-12 PROCEDURE — 85025 COMPLETE CBC W/AUTO DIFF WBC: CPT | Performed by: NURSE PRACTITIONER

## 2021-11-12 PROCEDURE — 83880 ASSAY OF NATRIURETIC PEPTIDE: CPT | Performed by: NURSE PRACTITIONER

## 2021-11-12 PROCEDURE — 70450 CT HEAD/BRAIN W/O DYE: CPT

## 2021-11-12 PROCEDURE — 94640 AIRWAY INHALATION TREATMENT: CPT

## 2021-11-12 PROCEDURE — 71045 X-RAY EXAM CHEST 1 VIEW: CPT

## 2021-11-12 PROCEDURE — 83605 ASSAY OF LACTIC ACID: CPT | Performed by: NURSE PRACTITIONER

## 2021-11-12 PROCEDURE — 80053 COMPREHEN METABOLIC PANEL: CPT | Performed by: NURSE PRACTITIONER

## 2021-11-12 PROCEDURE — 82009 KETONE BODYS QUAL: CPT | Performed by: NURSE PRACTITIONER

## 2021-11-12 PROCEDURE — 82805 BLOOD GASES W/O2 SATURATION: CPT | Performed by: EMERGENCY MEDICINE

## 2021-11-12 PROCEDURE — 72125 CT NECK SPINE W/O DYE: CPT

## 2021-11-12 PROCEDURE — 86140 C-REACTIVE PROTEIN: CPT | Performed by: NURSE PRACTITIONER

## 2021-11-12 PROCEDURE — 96374 THER/PROPH/DIAG INJ IV PUSH: CPT

## 2021-11-12 PROCEDURE — 93005 ELECTROCARDIOGRAM TRACING: CPT | Performed by: EMERGENCY MEDICINE

## 2021-11-12 RX ORDER — SODIUM CHLORIDE 0.9 % (FLUSH) 0.9 %
10 SYRINGE (ML) INJECTION AS NEEDED
Status: DISCONTINUED | OUTPATIENT
Start: 2021-11-12 | End: 2021-11-12 | Stop reason: HOSPADM

## 2021-11-12 RX ORDER — DEXAMETHASONE SODIUM PHOSPHATE 10 MG/ML
6 INJECTION INTRAMUSCULAR; INTRAVENOUS ONCE
Status: COMPLETED | OUTPATIENT
Start: 2021-11-12 | End: 2021-11-12

## 2021-11-12 RX ORDER — BENZONATATE 100 MG/1
200 CAPSULE ORAL 3 TIMES DAILY PRN
Qty: 15 CAPSULE | Refills: 0 | Status: SHIPPED | OUTPATIENT
Start: 2021-11-12 | End: 2021-11-17

## 2021-11-12 RX ORDER — DROPERIDOL 2.5 MG/ML
2.5 INJECTION, SOLUTION INTRAMUSCULAR; INTRAVENOUS ONCE
Status: COMPLETED | OUTPATIENT
Start: 2021-11-12 | End: 2021-11-12

## 2021-11-12 RX ORDER — BENZONATATE 100 MG/1
200 CAPSULE ORAL ONCE
Status: COMPLETED | OUTPATIENT
Start: 2021-11-12 | End: 2021-11-12

## 2021-11-12 RX ORDER — KETOROLAC TROMETHAMINE 30 MG/ML
30 INJECTION, SOLUTION INTRAMUSCULAR; INTRAVENOUS ONCE
Status: COMPLETED | OUTPATIENT
Start: 2021-11-12 | End: 2021-11-12

## 2021-11-12 RX ORDER — DIPHENHYDRAMINE HYDROCHLORIDE 50 MG/ML
25 INJECTION INTRAMUSCULAR; INTRAVENOUS ONCE
Status: COMPLETED | OUTPATIENT
Start: 2021-11-12 | End: 2021-11-12

## 2021-11-12 RX ORDER — BUTALBITAL, ACETAMINOPHEN AND CAFFEINE 50; 325; 40 MG/1; MG/1; MG/1
1 TABLET ORAL EVERY 6 HOURS PRN
Qty: 8 TABLET | Refills: 0 | Status: SHIPPED | OUTPATIENT
Start: 2021-11-12 | End: 2021-11-26

## 2021-11-12 RX ORDER — ONDANSETRON 4 MG/1
4 TABLET, ORALLY DISINTEGRATING ORAL EVERY 6 HOURS PRN
Qty: 15 TABLET | Refills: 0 | Status: SHIPPED | OUTPATIENT
Start: 2021-11-12

## 2021-11-12 RX ORDER — ONDANSETRON 4 MG/1
4 TABLET, ORALLY DISINTEGRATING ORAL ONCE
Status: COMPLETED | OUTPATIENT
Start: 2021-11-12 | End: 2021-11-12

## 2021-11-12 RX ORDER — IPRATROPIUM BROMIDE AND ALBUTEROL SULFATE 2.5; .5 MG/3ML; MG/3ML
3 SOLUTION RESPIRATORY (INHALATION) ONCE
Status: COMPLETED | OUTPATIENT
Start: 2021-11-12 | End: 2021-11-12

## 2021-11-12 RX ORDER — TESTOSTERONE CYPIONATE 200 MG/ML
100 INJECTION, SOLUTION INTRAMUSCULAR
Qty: 1 ML | Refills: 0 | Status: SHIPPED | OUTPATIENT
Start: 2021-11-12 | End: 2021-11-15 | Stop reason: SDUPTHER

## 2021-11-12 RX ORDER — BUTALBITAL, ACETAMINOPHEN AND CAFFEINE 300; 40; 50 MG/1; MG/1; MG/1
1 CAPSULE ORAL ONCE
Status: COMPLETED | OUTPATIENT
Start: 2021-11-12 | End: 2021-11-12

## 2021-11-12 RX ADMIN — SODIUM CHLORIDE 1000 ML: 9 INJECTION, SOLUTION INTRAVENOUS at 11:20

## 2021-11-12 RX ADMIN — SODIUM CHLORIDE 1000 ML: 9 INJECTION, SOLUTION INTRAVENOUS at 08:43

## 2021-11-12 RX ADMIN — KETOROLAC TROMETHAMINE 30 MG: 30 INJECTION, SOLUTION INTRAMUSCULAR at 08:43

## 2021-11-12 RX ADMIN — DROPERIDOL 2.5 MG: 2.5 INJECTION, SOLUTION INTRAMUSCULAR; INTRAVENOUS at 15:33

## 2021-11-12 RX ADMIN — ONDANSETRON 4 MG: 4 TABLET, ORALLY DISINTEGRATING ORAL at 08:33

## 2021-11-12 RX ADMIN — DIPHENHYDRAMINE HYDROCHLORIDE 25 MG: 50 INJECTION, SOLUTION INTRAMUSCULAR; INTRAVENOUS at 15:34

## 2021-11-12 RX ADMIN — IPRATROPIUM BROMIDE AND ALBUTEROL SULFATE 3 ML: 2.5; .5 SOLUTION RESPIRATORY (INHALATION) at 08:42

## 2021-11-12 RX ADMIN — BENZONATATE 200 MG: 100 CAPSULE ORAL at 08:33

## 2021-11-12 RX ADMIN — DEXAMETHASONE SODIUM PHOSPHATE 6 MG: 10 INJECTION INTRAMUSCULAR; INTRAVENOUS at 08:43

## 2021-11-12 RX ADMIN — BUTALBITAL, ACETAMINOPHEN AND CAFFEINE 1 CAPSULE: 300; 40; 50 CAPSULE ORAL at 10:15

## 2021-11-12 NOTE — ED PROVIDER NOTES
"Subjective   Cough, chest congestion, productive cough for 1 week. Tested for COVID per PCP on 11/8/21, states it was negative. Has had persistent headache since yesterday with a few episodes of vomiting, rates pain \"8/10\" at this time.       History provided by:  Patient   used: No        Review of Systems   Constitutional: Negative.    HENT: Positive for congestion.    Eyes: Negative.    Respiratory: Positive for cough.    Cardiovascular: Negative.    Gastrointestinal: Negative.    Endocrine: Negative.    Genitourinary: Negative.    Musculoskeletal: Negative.    Skin: Negative.    Allergic/Immunologic: Negative.    Neurological: Positive for headaches.   Hematological: Negative.    Psychiatric/Behavioral: Negative.        Past Medical History:   Diagnosis Date   • CAD (coronary artery disease)    • Chest pain    • Diabetes (HCC)    • Hyperlipidemia    • Hypertension        Allergies   Allergen Reactions   • Cefdinir Itching   • Fentanyl Angioedema   • Nifedipine Hives and Itching       Past Surgical History:   Procedure Laterality Date   • CARDIAC CATHETERIZATION      showed significant coronary artery disease of the LAD with calcification.    • CAROTID STENT     • CHOLECYSTECTOMY     • CIRCUMCISION     • INGUINAL HERNIA REPAIR      x2   • NOSE SURGERY     • OR RT/LT HEART CATHETERS N/A 3/22/2016    Procedure: Percutaneous Coronary Intervention - Rota/stent LAD;  Surgeon: Marek Emery MD;  Location: [facility] INVASIVE LOCATION;  Service: Cardiovascular   • ROTATOR CUFF REPAIR Left    • SPINAL FUSION         Family History   Problem Relation Age of Onset   • Hypertension Mother    • Diabetes Mother    • Heart attack Father    • Heart disease Father    • Heart failure Father    • Diabetes Father        Social History     Socioeconomic History   • Marital status: Single   Tobacco Use   • Smoking status: Never Smoker   • Smokeless tobacco: Never Used   Vaping Use   • Vaping Use: Never used "   Substance and Sexual Activity   • Alcohol use: Never     Comment: OCCASIONAL   • Drug use: Never   • Sexual activity: Defer           Objective   Physical Exam  Vitals and nursing note reviewed.   Constitutional:       Appearance: Normal appearance. He is well-developed and normal weight.   HENT:      Head: Normocephalic and atraumatic.      Nose: Nose normal.      Mouth/Throat:      Mouth: Mucous membranes are moist.   Eyes:      Pupils: Pupils are equal, round, and reactive to light.   Cardiovascular:      Rate and Rhythm: Normal rate and regular rhythm.      Pulses: Normal pulses.   Pulmonary:      Effort: Pulmonary effort is normal.      Breath sounds: Decreased breath sounds present.   Abdominal:      General: Abdomen is flat. Bowel sounds are normal.      Palpations: Abdomen is soft.   Musculoskeletal:         General: Normal range of motion.      Cervical back: Normal range of motion.   Skin:     General: Skin is warm and dry.      Capillary Refill: Capillary refill takes less than 2 seconds.   Neurological:      General: No focal deficit present.      Mental Status: He is alert and oriented to person, place, and time. Mental status is at baseline.      Cranial Nerves: No cranial nerve deficit.      Motor: No weakness.   Psychiatric:         Mood and Affect: Mood normal.         Behavior: Behavior normal.         Procedures           ED Course  ED Course as of 11/12/21 1609   Fri Nov 12, 2021   1454 Staffed with Dr Myers. Discussed diagnostic results, continued stable vital signs, ambulating O2 sats of 94%, no active N/V, negative COVID and Influenza results and overall non toxic appearance. Although his glucose is elevated at 262, the patient reports that his PCP (JUAN F Moreland) is currently adjusting his medications in an attempt to get this under control. It was agreed that pt is stable to be discharged home for outpatient follow up with his PCP in 2 or 3 days and instructed to return to the ED for any onset  of high fever, intractable vomiting, abdominal pain, chest pain, shortness of breath, worsening headache, etc. The patient is agreeable to this treatment plan. [TP]      ED Course User Index  [TP] Mari Christopher APRN                                           MDM  Number of Diagnoses or Management Options  Acute nonintractable headache, unspecified headache type: new and requires workup  Elevated lactic acid level: new and requires workup  Viral URI: established and worsening  Diagnosis management comments: I have spoken with the patient. I have explained the patient´s condition, diagnoses and treatment plan based on the information available to me at this time. I have answered the pt's questions and addressed any concerns. The patient has a good  understanding of his diagnosis, condition, and treatment plan as can be expected at this point. The vital signs have been stable. The patient´s condition is stable and appropriate for discharge from the emergency department.      The patient will pursue further outpatient evaluation with the primary care physician or other designated or consulting physician as outlined in the discharge instructions. They are agreeable to this plan of care and follow-up instructions have been explained in detail. The pt has received these instructions in written format and have expressed an understanding of the discharge instructions. The patient is aware that any significant change in condition or worsening of symptoms should prompt an immediate return to this or the closest emergency department or call to 911.       Amount and/or Complexity of Data Reviewed  Clinical lab tests: reviewed and ordered  Tests in the radiology section of CPT®: reviewed and ordered  Tests in the medicine section of CPT®: reviewed and ordered    Risk of Complications, Morbidity, and/or Mortality  Presenting problems: low  Diagnostic procedures: low  Management options: low    Patient Progress  Patient  progress: stable      Final diagnoses:   Viral URI   Acute nonintractable headache, unspecified headache type   Elevated lactic acid level       ED Disposition  ED Disposition     ED Disposition Condition Comment    Discharge Stable           MerlyOllie, APRN  [address]  [facility] [address]  [facility] [id number] [phone]    In 3 days  for re evaluation         Medication List      New Prescriptions    benzonatate 100 MG capsule  Commonly known as: TESSALON  Take 2 capsules by mouth 3 (Three) Times a Day As Needed for Cough for up to 5 days.     butalbital-acetaminophen-caffeine -40 MG per tablet  Commonly known as: FIORICET, ESGIC  Take 1 tablet by mouth Every 6 (Six) Hours As Needed for Headache.     ondansetron ODT 4 MG disintegrating tablet  Commonly known as: ZOFRAN-ODT  Place 1 tablet on the tongue Every 6 (Six) Hours As Needed for Nausea or Vomiting.     Testosterone Cypionate 200 MG/ML injection  Commonly known as: Depo-Testosterone  Inject 0.5 mL into the appropriate muscle as directed by prescriber Every 14 (Fourteen) Days.           Where to Get Your Medications      These medications were sent to [facility] #[id number] - [place], [place] - [address] AT [facility] [phone] [facility] [phone] FX  [address] [place] [address]    Phone: [phone]   · benzonatate 100 MG capsule  · butalbital-acetaminophen-caffeine -40 MG per tablet  · ondansetron ODT 4 MG disintegrating tablet  · Testosterone Cypionate 200 MG/ML injection          Mari Christopher, APRN  11/12/21 9228

## 2021-11-12 NOTE — DISCHARGE INSTRUCTIONS
Drink plenty of fluids, rest, take OTC tylenol/ibuprofen as needed for headache/body aches/fever. Take your prescribed medications as directed and follow up with your PCP in 3 days for re evaluation. Return to the ED for chest pain, shortness of breath, fever, intractable vomiting, uncontrollable hyperglycemia or any other symptoms of concern.

## 2021-11-12 NOTE — TELEPHONE ENCOUNTER
Caller: Moreno Aleman    Relationship: Self    Best call back number: [phone]    Who are you requesting to speak with (clinical staff, provider,  specific staff member): MEDICAL STAFF    What was the call regarding: PATIENT SPOKE WITH PHARMACY AND THEY NEED SOME CLARIFICATION ON THE DOSAGE. INSURANCE WILL NOT COVER testosterone cypionate (Depo-Testosterone) 100 MG/ML solution injection AT THE MOMENT.

## 2021-11-15 ENCOUNTER — CLINICAL SUPPORT (OUTPATIENT)
Dept: FAMILY MEDICINE CLINIC | Facility: CLINIC | Age: 56
End: 2021-11-15

## 2021-11-15 DIAGNOSIS — E29.1 HYPOGONADISM IN MALE: Primary | ICD-10-CM

## 2021-11-15 PROCEDURE — 99211 OFF/OP EST MAY X REQ PHY/QHP: CPT | Performed by: NURSE PRACTITIONER

## 2021-11-15 PROCEDURE — 96372 THER/PROPH/DIAG INJ SC/IM: CPT | Performed by: NURSE PRACTITIONER

## 2021-11-15 RX ORDER — TESTOSTERONE CYPIONATE 200 MG/ML
100 INJECTION, SOLUTION INTRAMUSCULAR ONCE
Status: COMPLETED | OUTPATIENT
Start: 2021-11-15 | End: 2021-11-15

## 2021-11-15 RX ADMIN — TESTOSTERONE CYPIONATE 100 MG: 200 INJECTION, SOLUTION INTRAMUSCULAR at 08:50

## 2021-12-01 ENCOUNTER — OFFICE VISIT (OUTPATIENT)
Dept: FAMILY MEDICINE CLINIC | Facility: CLINIC | Age: 56
End: 2021-12-01

## 2021-12-01 VITALS
RESPIRATION RATE: 16 BRPM | SYSTOLIC BLOOD PRESSURE: 162 MMHG | DIASTOLIC BLOOD PRESSURE: 91 MMHG | OXYGEN SATURATION: 98 % | HEART RATE: 105 BPM | HEIGHT: 68 IN | WEIGHT: 187.8 LBS | BODY MASS INDEX: 28.46 KG/M2 | TEMPERATURE: 98.2 F

## 2021-12-01 DIAGNOSIS — E29.1 HYPOGONADISM IN MALE: Primary | ICD-10-CM

## 2021-12-01 DIAGNOSIS — J01.00 ACUTE NON-RECURRENT MAXILLARY SINUSITIS: ICD-10-CM

## 2021-12-01 DIAGNOSIS — Z79.4 TYPE 2 DIABETES MELLITUS WITH HYPERGLYCEMIA, WITH LONG-TERM CURRENT USE OF INSULIN (HCC): ICD-10-CM

## 2021-12-01 DIAGNOSIS — E11.65 TYPE 2 DIABETES MELLITUS WITH HYPERGLYCEMIA, WITH LONG-TERM CURRENT USE OF INSULIN (HCC): ICD-10-CM

## 2021-12-01 PROCEDURE — 99213 OFFICE O/P EST LOW 20 MIN: CPT | Performed by: NURSE PRACTITIONER

## 2021-12-01 PROCEDURE — 96372 THER/PROPH/DIAG INJ SC/IM: CPT | Performed by: NURSE PRACTITIONER

## 2021-12-01 RX ORDER — DOXYCYCLINE 100 MG/1
100 CAPSULE ORAL 2 TIMES DAILY
Qty: 20 CAPSULE | Refills: 0 | Status: SHIPPED | OUTPATIENT
Start: 2021-12-01 | End: 2022-02-08 | Stop reason: ALTCHOICE

## 2021-12-01 RX ORDER — TESTOSTERONE CYPIONATE 200 MG/ML
100 INJECTION, SOLUTION INTRAMUSCULAR
Qty: 1 ML | Refills: 0 | Status: SHIPPED | OUTPATIENT
Start: 2021-12-01 | End: 2022-02-08 | Stop reason: SDUPTHER

## 2021-12-01 RX ORDER — PEN NEEDLE, DIABETIC 32 GX 1/6"
30 NEEDLE, DISPOSABLE MISCELLANEOUS DAILY
Qty: 100 EACH | Refills: 2 | Status: SHIPPED | OUTPATIENT
Start: 2021-12-01 | End: 2022-02-08

## 2021-12-01 RX ORDER — TESTOSTERONE CYPIONATE 200 MG/ML
INJECTION, SOLUTION INTRAMUSCULAR
COMMUNITY
End: 2021-12-01 | Stop reason: SDUPTHER

## 2021-12-01 RX ORDER — BLOOD-GLUCOSE METER
KIT MISCELLANEOUS
COMMUNITY
Start: 2021-11-09 | End: 2022-10-31 | Stop reason: SDUPTHER

## 2021-12-01 RX ORDER — LISINOPRIL 30 MG/1
30 TABLET ORAL DAILY
COMMUNITY
Start: 2021-11-02 | End: 2022-02-03

## 2021-12-01 RX ORDER — SYRINGE W-NEEDLE,DISPOSAB,3 ML 25GX5/8"
100 SYRINGE, EMPTY DISPOSABLE MISCELLANEOUS
Qty: 20 EACH | Refills: 2 | Status: SHIPPED | OUTPATIENT
Start: 2021-12-01

## 2021-12-01 RX ORDER — BLOOD-GLUCOSE METER
KIT MISCELLANEOUS SEE ADMIN INSTRUCTIONS
COMMUNITY
Start: 2021-11-08

## 2021-12-01 RX ORDER — TESTOSTERONE CYPIONATE 200 MG/ML
100 INJECTION, SOLUTION INTRAMUSCULAR ONCE
Status: DISCONTINUED | OUTPATIENT
Start: 2021-12-01 | End: 2021-12-01

## 2021-12-01 RX ADMIN — TESTOSTERONE CYPIONATE 100 MG: 200 INJECTION, SOLUTION INTRAMUSCULAR at 16:53

## 2021-12-01 NOTE — PROGRESS NOTES
"Chief Complaint  Sinus pressure      Subjective          Moreno Aleman presents to [facility]  Patient presents to the office today with complaints of right sinus pressure.  He states that he went to urgent care on the last week and was placed on Augmentin.  Patient states the medication is not helping with any of his symptoms.  He states that he continues to have the maxillary pressure and pain.  I did discuss switching his antibiotic.  Explained to the patient if he was not doing better in the next 5 or 6 days that we would obtain a CT of his sinuses.  Patient does also state that he needs a refill of his testosterone and is needing pen needles for his insulin.  Patient does state that his fasting glucoses are staying around 200.  I did instruct him to increase his Tresiba 2 units every 3 days until his fasting glucoses are 140 or below.  Patient does state that he is still drinking regular sodas.  I did explain to the patient that he would have to make changes in his dietary considerations if he wanted to get his blood sugars under control      Objective   Vital Signs:   /91 (BP Location: Right arm, Patient Position: Sitting, Cuff Size: Adult)   Pulse 105   Temp 98.2 °F (36.8 °C) (Infrared)   Resp 16   Ht 172.7 cm (68\")   Wt 85.2 kg (187 lb 12.8 oz)   SpO2 98%   BMI 28.55 kg/m²     Physical Exam  Vitals reviewed.   Constitutional:       Appearance: Normal appearance.   HENT:      Nose:      Right Sinus: Maxillary sinus tenderness present.   Cardiovascular:      Rate and Rhythm: Normal rate and regular rhythm.      Heart sounds: Normal heart sounds, S1 normal and S2 normal. No murmur heard.      Pulmonary:      Effort: Pulmonary effort is normal. No respiratory distress.      Breath sounds: Normal breath sounds.   Skin:     General: Skin is warm and dry.   Neurological:      Mental Status: He is alert and oriented to person, place, and time.   Psychiatric:         " "Attention and Perception: Attention normal.         Mood and Affect: Mood normal.         Behavior: Behavior normal.        Result Review :                Assessment and Plan    Diagnoses and all orders for this visit:    1. Hypogonadism in male (Primary)  -     Discontinue: Testosterone Cypionate (DEPOTESTOTERONE CYPIONATE) injection 100 mg  -     Syringe 21G X 1\" 3 ML misc; 100 mg Every 14 (Fourteen) Days.  Dispense: 20 each; Refill: 2  -     Testosterone Cypionate (DEPOTESTOTERONE CYPIONATE) 200 MG/ML injection; Inject 0.5 mL into the appropriate muscle as directed by prescriber Every 14 (Fourteen) Days.  Dispense: 1 mL; Refill: 0    2. Acute non-recurrent maxillary sinusitis  -     doxycycline (MONODOX) 100 MG capsule; Take 1 capsule by mouth 2 (Two) Times a Day.  Dispense: 20 capsule; Refill: 0    3. Type 2 diabetes mellitus with hyperglycemia, with long-term current use of insulin (HCC)  -     Insulin Pen Needle (NovoFine Plus Pen Needle) 32G X 4 MM misc; 30 Units Daily.  Dispense: 100 each; Refill: 2        Follow Up   Return if symptoms worsen or fail to improve.  Patient was given instructions and counseling regarding his condition or for health maintenance advice. Please see specific information pulled into the AVS if appropriate.       "

## 2022-01-31 ENCOUNTER — TELEPHONE (OUTPATIENT)
Dept: ORTHOPEDIC SURGERY | Facility: CLINIC | Age: 57
End: 2022-01-31

## 2022-01-31 NOTE — TELEPHONE ENCOUNTER
Called and left message that Friday is the soonest we would be able to get him in. If he calls back, please schedule. He can even see Charlie.

## 2022-01-31 NOTE — TELEPHONE ENCOUNTER
Caller: PATIENT       Relationship to patient: SELF     Best call back number: [phone]      Chief complaint: LEFT SHOULDER AND BICEP PAIN    Type of visit: WORK IN       Additional notes: PT. STATES THAT DR. QUINTANA DID SURGERY ON HIS LEFT SHOULDER LAST April.   PT. STATES THAT HE IS HAVING SEVERE SHOULDER AND BICEP PAIN AND WOULD LIKE TO HAVE IT CHECKED OUT.   CHECKED DR. QUINTANA AND CARYN SMITH SCHEDULES.   NO OPENINGS UNTIL Friday.   PT. ASKING IF HE CAN BE WORKED IN SOONER.   PLEASE CALL TO ADVISE.

## 2022-02-03 DIAGNOSIS — E11.65 TYPE 2 DIABETES MELLITUS WITH HYPERGLYCEMIA, WITHOUT LONG-TERM CURRENT USE OF INSULIN: ICD-10-CM

## 2022-02-03 DIAGNOSIS — F51.01 PRIMARY INSOMNIA: ICD-10-CM

## 2022-02-03 RX ORDER — TRAZODONE HYDROCHLORIDE 50 MG/1
50 TABLET ORAL NIGHTLY
Qty: 90 TABLET | Refills: 1 | Status: SHIPPED | OUTPATIENT
Start: 2022-02-03 | End: 2022-02-08 | Stop reason: SDUPTHER

## 2022-02-03 RX ORDER — LISINOPRIL 30 MG/1
TABLET ORAL
Qty: 90 TABLET | Refills: 1 | Status: SHIPPED | OUTPATIENT
Start: 2022-02-03 | End: 2022-04-05 | Stop reason: SDUPTHER

## 2022-02-03 NOTE — TELEPHONE ENCOUNTER
Please advise medication   Last visit:12/1/2021  Next visit:2/8/2022      I do not see where you prescribe the Lisinopril.

## 2022-02-08 ENCOUNTER — OFFICE VISIT (OUTPATIENT)
Dept: FAMILY MEDICINE CLINIC | Facility: CLINIC | Age: 57
End: 2022-02-08

## 2022-02-08 ENCOUNTER — LAB (OUTPATIENT)
Dept: LAB | Facility: HOSPITAL | Age: 57
End: 2022-02-08

## 2022-02-08 VITALS
WEIGHT: 201.6 LBS | DIASTOLIC BLOOD PRESSURE: 82 MMHG | BODY MASS INDEX: 30.55 KG/M2 | HEIGHT: 68 IN | OXYGEN SATURATION: 97 % | SYSTOLIC BLOOD PRESSURE: 126 MMHG | TEMPERATURE: 97.8 F | HEART RATE: 88 BPM

## 2022-02-08 DIAGNOSIS — E78.2 HYPERLIPEMIA, MIXED: ICD-10-CM

## 2022-02-08 DIAGNOSIS — G47.00 INSOMNIA, UNSPECIFIED TYPE: ICD-10-CM

## 2022-02-08 DIAGNOSIS — E11.65 TYPE 2 DIABETES MELLITUS WITH HYPERGLYCEMIA, WITH LONG-TERM CURRENT USE OF INSULIN: ICD-10-CM

## 2022-02-08 DIAGNOSIS — Z79.4 TYPE 2 DIABETES MELLITUS WITH HYPERGLYCEMIA, WITH LONG-TERM CURRENT USE OF INSULIN: ICD-10-CM

## 2022-02-08 DIAGNOSIS — E29.1 HYPOGONADISM IN MALE: ICD-10-CM

## 2022-02-08 DIAGNOSIS — F51.01 PRIMARY INSOMNIA: ICD-10-CM

## 2022-02-08 DIAGNOSIS — R36.1 HEMATOSPERMIA: ICD-10-CM

## 2022-02-08 DIAGNOSIS — R36.1 HEMATOSPERMIA: Primary | ICD-10-CM

## 2022-02-08 DIAGNOSIS — R19.00 MASS OF SOFT TISSUE OF ABDOMEN: ICD-10-CM

## 2022-02-08 PROBLEM — M25.512 PAIN IN JOINT OF LEFT SHOULDER: Status: ACTIVE | Noted: 2021-11-16

## 2022-02-08 LAB
ALBUMIN SERPL-MCNC: 4.3 G/DL (ref 3.5–5.2)
ALBUMIN/GLOB SERPL: 2.2 G/DL
ALP SERPL-CCNC: 102 U/L (ref 39–117)
ALT SERPL W P-5'-P-CCNC: 14 U/L (ref 1–41)
ANION GAP SERPL CALCULATED.3IONS-SCNC: 12.3 MMOL/L (ref 5–15)
AST SERPL-CCNC: 8 U/L (ref 1–40)
BILIRUB BLD-MCNC: NEGATIVE MG/DL
BILIRUB SERPL-MCNC: 0.3 MG/DL (ref 0–1.2)
BUN SERPL-MCNC: 10 MG/DL (ref 6–20)
BUN/CREAT SERPL: 14.7 (ref 7–25)
C TRACH RRNA CVX QL NAA+PROBE: NOT DETECTED
CALCIUM SPEC-SCNC: 9.3 MG/DL (ref 8.6–10.5)
CHLORIDE SERPL-SCNC: 93 MMOL/L (ref 98–107)
CHOLEST SERPL-MCNC: 184 MG/DL (ref 0–200)
CLARITY, POC: CLEAR
CO2 SERPL-SCNC: 24.7 MMOL/L (ref 22–29)
COLOR UR: YELLOW
CREAT SERPL-MCNC: 0.68 MG/DL (ref 0.76–1.27)
DEPRECATED RDW RBC AUTO: 45.4 FL (ref 37–54)
ERYTHROCYTE [DISTWIDTH] IN BLOOD BY AUTOMATED COUNT: 13.4 % (ref 12.3–15.4)
EXPIRATION DATE: ABNORMAL
GFR SERPL CREATININE-BSD FRML MDRD: 121 ML/MIN/1.73
GLOBULIN UR ELPH-MCNC: 2 GM/DL
GLUCOSE SERPL-MCNC: 513 MG/DL (ref 65–99)
GLUCOSE UR STRIP-MCNC: ABNORMAL MG/DL
HBA1C MFR BLD: 12.6 % (ref 4.8–5.6)
HCT VFR BLD AUTO: 42.3 % (ref 37.5–51)
HDLC SERPL-MCNC: 37 MG/DL (ref 40–60)
HGB BLD-MCNC: 13.7 G/DL (ref 13–17.7)
KETONES UR QL: NEGATIVE
LDLC SERPL CALC-MCNC: 78 MG/DL (ref 0–100)
LDLC/HDLC SERPL: 1.65 {RATIO}
LEUKOCYTE EST, POC: NEGATIVE
Lab: ABNORMAL
MCH RBC QN AUTO: 29.9 PG (ref 26.6–33)
MCHC RBC AUTO-ENTMCNC: 32.4 G/DL (ref 31.5–35.7)
MCV RBC AUTO: 92.4 FL (ref 79–97)
N GONORRHOEA RRNA SPEC QL NAA+PROBE: NOT DETECTED
NITRITE UR-MCNC: NEGATIVE MG/ML
PH UR: 6 [PH] (ref 5–8)
PLATELET # BLD AUTO: 137 10*3/MM3 (ref 140–450)
PMV BLD AUTO: 10.6 FL (ref 6–12)
POTASSIUM SERPL-SCNC: 4.1 MMOL/L (ref 3.5–5.2)
PROT SERPL-MCNC: 6.3 G/DL (ref 6–8.5)
PROT UR STRIP-MCNC: NEGATIVE MG/DL
PSA SERPL-MCNC: 0.5 NG/ML (ref 0–4)
RBC # BLD AUTO: 4.58 10*6/MM3 (ref 4.14–5.8)
RBC # UR STRIP: NEGATIVE /UL
SODIUM SERPL-SCNC: 130 MMOL/L (ref 136–145)
SP GR UR: 1.01 (ref 1–1.03)
TRIGL SERPL-MCNC: 429 MG/DL (ref 0–150)
UROBILINOGEN UR QL: NORMAL
VLDLC SERPL-MCNC: 69 MG/DL (ref 5–40)
WBC NRBC COR # BLD: 6.3 10*3/MM3 (ref 3.4–10.8)

## 2022-02-08 PROCEDURE — 87491 CHLMYD TRACH DNA AMP PROBE: CPT

## 2022-02-08 PROCEDURE — 99214 OFFICE O/P EST MOD 30 MIN: CPT | Performed by: NURSE PRACTITIONER

## 2022-02-08 PROCEDURE — 80061 LIPID PANEL: CPT

## 2022-02-08 PROCEDURE — 80053 COMPREHEN METABOLIC PANEL: CPT

## 2022-02-08 PROCEDURE — 84153 ASSAY OF PSA TOTAL: CPT

## 2022-02-08 PROCEDURE — 87591 N.GONORRHOEAE DNA AMP PROB: CPT

## 2022-02-08 PROCEDURE — 36415 COLL VENOUS BLD VENIPUNCTURE: CPT

## 2022-02-08 PROCEDURE — 83036 HEMOGLOBIN GLYCOSYLATED A1C: CPT

## 2022-02-08 PROCEDURE — 85027 COMPLETE CBC AUTOMATED: CPT

## 2022-02-08 RX ORDER — PEN NEEDLE, DIABETIC 32 GX 1/6"
30 NEEDLE, DISPOSABLE MISCELLANEOUS DAILY
Qty: 100 EACH | Refills: 2 | Status: CANCELLED | OUTPATIENT
Start: 2022-02-08

## 2022-02-08 RX ORDER — TRAZODONE HYDROCHLORIDE 100 MG/1
100 TABLET ORAL NIGHTLY
Qty: 90 TABLET | Refills: 1 | Status: SHIPPED | OUTPATIENT
Start: 2022-02-08 | End: 2022-05-17

## 2022-02-08 RX ORDER — PEN NEEDLE, DIABETIC 32 GX 1/6"
22 NEEDLE, DISPOSABLE MISCELLANEOUS DAILY
Qty: 100 EACH | Refills: 5 | Status: SHIPPED | OUTPATIENT
Start: 2022-02-08

## 2022-02-08 RX ORDER — TESTOSTERONE CYPIONATE 200 MG/ML
100 INJECTION, SOLUTION INTRAMUSCULAR
Qty: 1 ML | Refills: 0 | Status: SHIPPED | OUTPATIENT
Start: 2022-02-08 | End: 2022-08-26 | Stop reason: SDUPTHER

## 2022-02-08 NOTE — PROGRESS NOTES
"Chief Complaint  Diabetes (3 month follow up)    Subjective          Moreno Aleman presents to [facility]  Patient presents to the office today for 3-month follow-up.  Patient states that he needs a refill of his pen needles for his Tresiba.  Patient also states that he needs a refill of his foster home.  Patient does state that he has noticed blood in his semen a couple of times.  Patient denies any scrotal pain.  He denies any pain with urination or pain with ejaculation.  He denies any history of STDs or any lesions.  He does state that Dr. Ocampo had circumcised him approximately 2 years ago.  He denies any recent urology procedures   Patient also states that he has noticed a nodule/mass to his right lower quadrant.  He states it is mildly tender with palpation.  Patient also states that he is not sleeping well.  He does state that he took 2 of his trazodone which worked much better.  I did discuss increasing it to 100 mg nightly.      Objective   Vital Signs:   /82 (BP Location: Right arm, Patient Position: Sitting, Cuff Size: Adult)   Pulse 88   Temp 97.8 °F (36.6 °C) (Temporal)   Ht 171.5 cm (67.5\")   Wt 91.4 kg (201 lb 9.6 oz)   SpO2 97%   BMI 31.11 kg/m²     Physical Exam  Vitals reviewed.   Constitutional:       Appearance: Normal appearance.   Cardiovascular:      Rate and Rhythm: Normal rate and regular rhythm.      Heart sounds: Normal heart sounds, S1 normal and S2 normal. No murmur heard.      Pulmonary:      Effort: Pulmonary effort is normal. No respiratory distress.      Breath sounds: Normal breath sounds.   Abdominal:      Palpations: There is mass.      Tenderness: There is abdominal tenderness.          Comments: 4 cm mass noted with palpation to the right lower quadrant.  Mobile, mild tenderness noted   Skin:     General: Skin is warm and dry.   Neurological:      Mental Status: He is alert and oriented to person, place, and time.   Psychiatric:   "       Attention and Perception: Attention normal.         Mood and Affect: Mood normal.         Behavior: Behavior normal.        Result Review :               Assessment and Plan    Diagnoses and all orders for this visit:    1. Hematospermia (Primary)  -     CBC (No Diff); Future  -     Comprehensive Metabolic Panel; Future  -     PSA DIAGNOSTIC ONLY; Future  -     US Scrotum & Testicles; Future  -     POCT urinalysis dipstick, automated  -     Chlamydia trachomatis, Neisseria gonorrhoeae, PCR - , Urine, Clean Catch; Future    2. Type 2 diabetes mellitus with hyperglycemia, with long-term current use of insulin (HCC)  -     Insulin Pen Needle (NovoFine Plus Pen Needle) 32G X 4 MM misc; 22 Units Daily.  Dispense: 100 each; Refill: 5    3. Mass of soft tissue of abdomen  -     US Soft Tissue; Future    4. Hypogonadism in male  -     Testosterone Cypionate (DEPOTESTOTERONE CYPIONATE) 200 MG/ML injection; Inject 0.5 mL into the appropriate muscle as directed by prescriber Every 14 (Fourteen) Days.  Dispense: 1 mL; Refill: 0    5. Insomnia, unspecified type    6. Primary insomnia  -     traZODone (DESYREL) 100 MG tablet; Take 1 tablet by mouth Every Night.  Dispense: 90 tablet; Refill: 1        Follow Up   Return in about 6 months (around 8/8/2022) for Recheck.  Patient was given instructions and counseling regarding his condition or for health maintenance advice. Please see specific information pulled into the AVS if appropriate.

## 2022-02-09 ENCOUNTER — TELEPHONE (OUTPATIENT)
Dept: FAMILY MEDICINE CLINIC | Facility: CLINIC | Age: 57
End: 2022-02-09

## 2022-02-09 NOTE — TELEPHONE ENCOUNTER
----- Message from NINFA Walsh sent at 2/9/2022  7:32 AM EST -----  Patient's glucose levels are extremely elevated.  Patient should be increasing his Tresiba 2 units every 3 days until his fasting glucoses are 150 or below.  If patient cannot get his sugars lowered by reducing his carbohydrates and sugars as well as taking his medications as prescribed then we will have to refer him to endocrinology for further evaluation and treatment.  A1c was 12.6%.

## 2022-02-10 ENCOUNTER — CLINICAL SUPPORT (OUTPATIENT)
Dept: FAMILY MEDICINE CLINIC | Facility: CLINIC | Age: 57
End: 2022-02-10

## 2022-02-10 DIAGNOSIS — E29.1 HYPOGONADISM IN MALE: Primary | ICD-10-CM

## 2022-02-10 PROCEDURE — 96372 THER/PROPH/DIAG INJ SC/IM: CPT | Performed by: NURSE PRACTITIONER

## 2022-02-10 RX ORDER — TESTOSTERONE CYPIONATE 100 MG/ML
100 INJECTION, SOLUTION INTRAMUSCULAR ONCE
Status: CANCELLED | OUTPATIENT
Start: 2022-02-10 | End: 2022-02-10

## 2022-02-10 NOTE — PROGRESS NOTES
Pt presents today for testosterone injection 0.5ml every 14 days.  Patient brought in his wife to teach her how to give injection. She verbalized understanding

## 2022-02-11 RX ORDER — TESTOSTERONE CYPIONATE 100 MG/ML
100 INJECTION, SOLUTION INTRAMUSCULAR ONCE
Status: COMPLETED | OUTPATIENT
Start: 2022-02-11 | End: 2022-02-11

## 2022-02-11 RX ADMIN — TESTOSTERONE CYPIONATE 100 MG: 100 INJECTION, SOLUTION INTRAMUSCULAR at 07:27

## 2022-02-20 ENCOUNTER — APPOINTMENT (OUTPATIENT)
Dept: GENERAL RADIOLOGY | Facility: HOSPITAL | Age: 57
End: 2022-02-20

## 2022-02-20 ENCOUNTER — HOSPITAL ENCOUNTER (EMERGENCY)
Facility: HOSPITAL | Age: 57
Discharge: HOME | End: 2022-02-20
Attending: EMERGENCY MEDICINE | Admitting: EMERGENCY MEDICINE

## 2022-02-20 VITALS
BODY MASS INDEX: 32.35 KG/M2 | DIASTOLIC BLOOD PRESSURE: 86 MMHG | HEART RATE: 79 BPM | OXYGEN SATURATION: 97 % | TEMPERATURE: 98.1 F | HEIGHT: 67 IN | RESPIRATION RATE: 18 BRPM | WEIGHT: 206.13 LBS | SYSTOLIC BLOOD PRESSURE: 138 MMHG

## 2022-02-20 DIAGNOSIS — M25.512 LEFT SHOULDER PAIN, UNSPECIFIED CHRONICITY: Primary | ICD-10-CM

## 2022-02-20 PROCEDURE — 73030 X-RAY EXAM OF SHOULDER: CPT

## 2022-02-20 PROCEDURE — 99282 EMERGENCY DEPT VISIT SF MDM: CPT

## 2022-02-20 PROCEDURE — 99283 EMERGENCY DEPT VISIT LOW MDM: CPT

## 2022-02-20 NOTE — ED NOTES
States call [facility] office and was instructed to come to Dinorah [place]cido, RN  02/20/22 2474

## 2022-02-20 NOTE — ED PROVIDER NOTES
Subjective   Pt reports left shoulder pain for about 1 year that is worsening. Denies recent injury but does have to use shovel at his job. He reports he seen Dr. Dick in March of last year for same issue. He called the office and was told to come to ER to be seen so they could make him an appointment in the office.       History provided by:  Patient      Review of Systems   Constitutional: Negative for chills and fever.   HENT: Negative for congestion, ear pain and sore throat.    Eyes: Negative for pain.   Respiratory: Negative for cough, chest tightness and shortness of breath.    Cardiovascular: Negative for chest pain.   Gastrointestinal: Negative for abdominal pain, diarrhea, nausea and vomiting.   Genitourinary: Negative for flank pain and hematuria.   Musculoskeletal: Negative for joint swelling.   Skin: Negative for pallor.   Neurological: Negative for seizures and headaches.   All other systems reviewed and are negative.      Past Medical History:   Diagnosis Date   • CAD (coronary artery disease)    • Chest pain    • Diabetes (HCC)    • Hyperlipidemia    • Hypertension        Allergies   Allergen Reactions   • Cefdinir Itching   • Fentanyl Angioedema   • Nifedipine Hives and Itching       Past Surgical History:   Procedure Laterality Date   • CARDIAC CATHETERIZATION      showed significant coronary artery disease of the LAD with calcification.    • CAROTID STENT     • CHOLECYSTECTOMY     • CIRCUMCISION     • INGUINAL HERNIA REPAIR      x2   • NOSE SURGERY     • CT RT/LT HEART CATHETERS N/A 3/22/2016    Procedure: Percutaneous Coronary Intervention - Rota/stent LAD;  Surgeon: Marek Emery MD;  Location: [facility] INVASIVE LOCATION;  Service: Cardiovascular   • ROTATOR CUFF REPAIR Left    • SPINAL FUSION         Family History   Problem Relation Age of Onset   • Hypertension Mother    • Diabetes Mother    • Heart attack Father    • Heart disease Father    • Heart failure Father    • Diabetes Father         Social History     Socioeconomic History   • Marital status: Single   Tobacco Use   • Smoking status: Never Smoker   • Smokeless tobacco: Never Used   Vaping Use   • Vaping Use: Never used   Substance and Sexual Activity   • Alcohol use: Yes     Comment: OCCASIONAL   • Drug use: Never   • Sexual activity: Defer           Objective   Physical Exam  Vitals and nursing note reviewed.   Constitutional:       General: He is not in acute distress.     Appearance: Normal appearance. He is not toxic-appearing.   HENT:      Head: Normocephalic and atraumatic.      Mouth/Throat:      Mouth: Mucous membranes are moist.   Eyes:      General: No scleral icterus.  Cardiovascular:      Rate and Rhythm: Normal rate and regular rhythm.      Pulses: Normal pulses.      Heart sounds: Normal heart sounds.   Pulmonary:      Effort: Pulmonary effort is normal. No respiratory distress.      Breath sounds: Normal breath sounds.   Abdominal:      General: Abdomen is flat.      Palpations: Abdomen is soft.      Tenderness: There is no abdominal tenderness.   Musculoskeletal:      Left shoulder: No swelling, deformity, effusion, laceration, bony tenderness or crepitus. Decreased range of motion. Normal pulse.        Arms:       Cervical back: Normal range of motion and neck supple.   Skin:     General: Skin is warm and dry.   Neurological:      Mental Status: He is alert and oriented to person, place, and time. Mental status is at baseline.         Procedures           ED Course                                                 MDM  Number of Diagnoses or Management Options  Left shoulder pain, unspecified chronicity: established and worsening  Diagnosis management comments: I have spoken with the patient. I have explained the patient´s condition, diagnoses and treatment plan based on the information available to me at this time. I have answered the patient's questions and addressed any concerns. The patient has a good  understanding of  the patient´s diagnosis, condition, and treatment plan as can be expected at this point. The vital signs have been stable. The patient´s condition is stable and appropriate for discharge from the emergency department.      The patient will pursue further outpatient evaluation with the primary care physician or other designated or consulting physician as outlined in the discharge instructions. They are agreeable to this plan of care and follow-up instructions have been explained in detail. The patient has received these instructions in written format and have expressed an understanding of the discharge instructions. The patient is aware that any significant change in condition or worsening of symptoms should prompt an immediate return to this or the closest emergency department or call to 911.       Amount and/or Complexity of Data Reviewed  Tests in the radiology section of CPT®: reviewed and ordered    Risk of Complications, Morbidity, and/or Mortality  Presenting problems: low  Diagnostic procedures: low  Management options: low    Patient Progress  Patient progress: stable      Final diagnoses:   Left shoulder pain, unspecified chronicity       ED Disposition  ED Disposition     ED Disposition Condition Comment    Discharge Stable           Ollie Moreland, APRN  [address]  [address]  [facility] [address]  [phone]    In 3 days      Kam Dick MD  [address]  [facility] [address]  [phone]    Schedule an appointment as soon as possible for a visit            Medication List      No changes were made to your prescriptions during this visit.          Davy Lindsay, APRN  02/20/22 [id number]

## 2022-02-24 ENCOUNTER — OFFICE VISIT (OUTPATIENT)
Dept: ORTHOPEDIC SURGERY | Facility: CLINIC | Age: 57
End: 2022-02-24

## 2022-02-24 VITALS — BODY MASS INDEX: 30.31 KG/M2 | HEIGHT: 68 IN | WEIGHT: 200 LBS

## 2022-02-24 DIAGNOSIS — S43.432D SUPERIOR GLENOID LABRUM LESION OF LEFT SHOULDER, SUBSEQUENT ENCOUNTER: ICD-10-CM

## 2022-02-24 DIAGNOSIS — Z47.89 AFTERCARE FOLLOWING SURGERY OF THE MUSCULOSKELETAL SYSTEM: Primary | ICD-10-CM

## 2022-02-24 DIAGNOSIS — M25.512 LEFT SHOULDER PAIN, UNSPECIFIED CHRONICITY: ICD-10-CM

## 2022-02-24 PROCEDURE — 99213 OFFICE O/P EST LOW 20 MIN: CPT | Performed by: ORTHOPAEDIC SURGERY

## 2022-02-24 NOTE — PROGRESS NOTES
"Chief Complaint  Pain of the Left Shoulder     Subjective      Moreno Aleman presents to [facility] for follow up evaluation of the left shoulder. The patient is S/P left shoulder arthroscopic subacromial decompression, mini open rotator cuff repair, 4/26/2021. He has returned to work. He reports his left shoulder is getting work. He has had an MRI since his surgery that showed Tear of the superior labrum with extension into the biceps tendon and irregularity to the rotator cuff in July. He has decreased ROM.     Allergies   Allergen Reactions   • Cefdinir Itching   • Fentanyl Angioedema   • Nifedipine Hives and Itching        Social History     Socioeconomic History   • Marital status: Single   Tobacco Use   • Smoking status: Never Smoker   • Smokeless tobacco: Never Used   Vaping Use   • Vaping Use: Never used   Substance and Sexual Activity   • Alcohol use: Yes     Comment: OCCASIONAL   • Drug use: Never   • Sexual activity: Defer        Review of Systems     Objective   Vital Signs:   Ht 172.7 cm (68\")   Wt 90.7 kg (200 lb)   BMI 30.41 kg/m²       Physical Exam  Constitutional:       Appearance: Normal appearance. The patient is well-developed and normal weight.   HENT:      Head: Normocephalic.      Right Ear: Hearing and external ear normal.      Left Ear: Hearing and external ear normal.      Nose: Nose normal.   Eyes:      Conjunctiva/sclera: Conjunctivae normal.   Cardiovascular:      Rate and Rhythm: Normal rate.   Pulmonary:      Effort: Pulmonary effort is normal.      Breath sounds: No wheezing or rales.   Abdominal:      Palpations: Abdomen is soft.      Tenderness: There is no abdominal tenderness.   Musculoskeletal:      Cervical back: Normal range of motion.   Skin:     Findings: No rash.   Neurological:      Mental Status: The patient is alert and oriented to person, place, and time.   Psychiatric:         Mood and Affect: Mood and affect normal.         Judgment: " Judgment normal.       Ortho Exam      Left shoulder- Forward elevation 165. Abduction 120. External Rotation 70. Internal rotation 50. 4+ supraspinatus strength 5/5 infraspinatus  And subscapularis. Possible biceps and labral testing. Positive O'isaac. Neurovascularly intact. Sensation to light touch median, radial, ulnar nerve. Positive AIN, PIN, ulnar nerve. Positive pulses. Scope scars well healed.     Procedures      Imaging Results (Most Recent)     None           Result Review :       XR Shoulder 2+ View Left    Result Date: 2/20/2022  Narrative: PROCEDURE: XR SHOULDER 2+ VW LEFT  COMPARISON: [facility], CR, SHOULDER >OR= 2V LT, 5/22/2021, 12:46.  INDICATIONS: LEFT SHOULDER PAIN /Injury  FINDINGS:  No fracture.  No dislocation.  Moderate glenohumeral and AC joint arthrosis.      Impression:  Degenerative change.  No acute findings      TOOTIE CASTRO MD       Electronically Signed and Approved By: TOOTIE CASTRO MD on 2/20/2022 at 12:13                      Assessment and Plan     DX: S/P left shoulder arthroscopic subacromial decompression, mini open rotator cuff repair  SLAP tear    Discussed the treatment options with the patient, operative vs non-operative. Plan for Repeat MRI of the left shoulder to evaluate for operative treatment.     Call or return if worsening symptoms.    Follow Up     After MRI      Patient was given instructions and counseling regarding his condition or for health maintenance advice. Please see specific information pulled into the AVS if appropriate.     Scribed for Kam Dick MD by Leila Oscar.  02/24/22   15:52 EST  I have personally performed the services described in this document as scribed by the above individual and it is both accurate and complete. Kam Dick MD 02/24/22

## 2022-02-28 ENCOUNTER — TELEPHONE (OUTPATIENT)
Dept: ORTHOPEDIC SURGERY | Facility: CLINIC | Age: 57
End: 2022-02-28

## 2022-02-28 DIAGNOSIS — Z47.89 AFTERCARE FOLLOWING SURGERY OF THE MUSCULOSKELETAL SYSTEM: Primary | ICD-10-CM

## 2022-02-28 RX ORDER — DIAZEPAM 10 MG/1
TABLET ORAL
Qty: 1 TABLET | Refills: 0 | Status: SHIPPED | OUTPATIENT
Start: 2022-02-28 | End: 2022-04-05

## 2022-02-28 NOTE — TELEPHONE ENCOUNTER
Provider: DR. EAN QUINTANA  Caller:  DUNIA ELDRIDGE  Relationship to Patient: WIFE (PATIENT IN ROOM)  Pharmacy: [facility] [phone]  Phone Number: [phone]  Reason for Call:  PATIENT IS HAVING LEFT SHOULDER MRI ON 3/16/22. PATIENT IS CLAUSTROPHOBIC AND ASKING FOR SEDATION RX.

## 2022-03-16 ENCOUNTER — HOSPITAL ENCOUNTER (OUTPATIENT)
Dept: MRI IMAGING | Facility: HOSPITAL | Age: 57
Discharge: HOME | End: 2022-03-16
Admitting: ORTHOPAEDIC SURGERY

## 2022-03-16 DIAGNOSIS — Z47.89 AFTERCARE FOLLOWING SURGERY OF THE MUSCULOSKELETAL SYSTEM: ICD-10-CM

## 2022-03-16 DIAGNOSIS — M25.512 LEFT SHOULDER PAIN, UNSPECIFIED CHRONICITY: ICD-10-CM

## 2022-03-16 PROCEDURE — 73221 MRI JOINT UPR EXTREM W/O DYE: CPT

## 2022-03-22 ENCOUNTER — PREP FOR SURGERY (OUTPATIENT)
Dept: OTHER | Facility: HOSPITAL | Age: 57
End: 2022-03-22

## 2022-03-22 ENCOUNTER — OFFICE VISIT (OUTPATIENT)
Dept: ORTHOPEDIC SURGERY | Facility: CLINIC | Age: 57
End: 2022-03-22

## 2022-03-22 VITALS — HEIGHT: 68 IN | WEIGHT: 200 LBS | BODY MASS INDEX: 30.31 KG/M2

## 2022-03-22 DIAGNOSIS — M75.22 BICEPS TENDONITIS ON LEFT: ICD-10-CM

## 2022-03-22 DIAGNOSIS — M75.102 TEAR OF LEFT ROTATOR CUFF, UNSPECIFIED TEAR EXTENT, UNSPECIFIED WHETHER TRAUMATIC: ICD-10-CM

## 2022-03-22 DIAGNOSIS — Z98.890 S/P ARTHROSCOPY OF LEFT SHOULDER: Primary | ICD-10-CM

## 2022-03-22 PROCEDURE — 99214 OFFICE O/P EST MOD 30 MIN: CPT | Performed by: ORTHOPAEDIC SURGERY

## 2022-03-22 RX ORDER — CLINDAMYCIN PHOSPHATE 900 MG/50ML
900 INJECTION INTRAVENOUS ONCE
Status: CANCELLED | OUTPATIENT
Start: 2022-03-22 | End: 2022-03-22

## 2022-03-23 ENCOUNTER — TELEPHONE (OUTPATIENT)
Dept: CARDIOLOGY | Facility: CLINIC | Age: 57
End: 2022-03-23

## 2022-03-23 NOTE — TELEPHONE ENCOUNTER
Procedure: L Shoulder Scope     Med Directive: Aspirin, Plavix    PMH: CAD sp stent 3/22/16, HTN, hyperlipidemia     Last Seen: 8/31/21

## 2022-03-24 ENCOUNTER — TELEPHONE (OUTPATIENT)
Dept: ORTHOPEDIC SURGERY | Facility: CLINIC | Age: 57
End: 2022-03-24

## 2022-03-24 NOTE — TELEPHONE ENCOUNTER
CALLED PATIENT AND INFORMED PER SHRUTI THAT PATIENT COULD HOLD ASPIRIN FOR 7 DAYS PRIOR TO SURGERY AND PLAVIX FOR 5 DAYS.  PATIENT VOICES UNDERSTANDING.  DR QUINTANA AWARE.

## 2022-04-01 ENCOUNTER — LAB (OUTPATIENT)
Dept: LAB | Facility: HOSPITAL | Age: 57
End: 2022-04-01

## 2022-04-01 DIAGNOSIS — Z98.890 S/P ARTHROSCOPY OF LEFT SHOULDER: ICD-10-CM

## 2022-04-01 DIAGNOSIS — E78.2 HYPERLIPEMIA, MIXED: ICD-10-CM

## 2022-04-01 DIAGNOSIS — I10 HYPERTENSION, ESSENTIAL: ICD-10-CM

## 2022-04-01 LAB
ALBUMIN SERPL-MCNC: 4.4 G/DL (ref 3.5–5.2)
ALP SERPL-CCNC: 80 U/L (ref 39–117)
ALT SERPL W P-5'-P-CCNC: 6 U/L (ref 1–41)
ANION GAP SERPL CALCULATED.3IONS-SCNC: 7 MMOL/L (ref 5–15)
AST SERPL-CCNC: 9 U/L (ref 1–40)
BILIRUB CONJ SERPL-MCNC: <0.2 MG/DL (ref 0–0.3)
BILIRUB INDIRECT SERPL-MCNC: NORMAL MG/DL
BILIRUB SERPL-MCNC: 0.3 MG/DL (ref 0–1.2)
BILIRUB UR QL STRIP: NEGATIVE
BUN SERPL-MCNC: 8 MG/DL (ref 6–20)
BUN/CREAT SERPL: 10.3 (ref 7–25)
CALCIUM SPEC-SCNC: 8.8 MG/DL (ref 8.6–10.5)
CHLORIDE SERPL-SCNC: 103 MMOL/L (ref 98–107)
CHOLEST SERPL-MCNC: 171 MG/DL (ref 0–200)
CLARITY UR: CLEAR
CO2 SERPL-SCNC: 28 MMOL/L (ref 22–29)
COLOR UR: YELLOW
CREAT SERPL-MCNC: 0.78 MG/DL (ref 0.76–1.27)
EGFRCR SERPLBLD CKD-EPI 2021: 105.3 ML/MIN/1.73
GLUCOSE SERPL-MCNC: 317 MG/DL (ref 65–99)
GLUCOSE UR STRIP-MCNC: ABNORMAL MG/DL
HDLC SERPL-MCNC: 39 MG/DL (ref 40–60)
HGB UR QL STRIP.AUTO: NEGATIVE
KETONES UR QL STRIP: NEGATIVE
LDLC SERPL CALC-MCNC: 101 MG/DL (ref 0–100)
LDLC/HDLC SERPL: 2.46 {RATIO}
LEUKOCYTE ESTERASE UR QL STRIP.AUTO: NEGATIVE
NITRITE UR QL STRIP: NEGATIVE
PH UR STRIP.AUTO: 6.5 [PH] (ref 5–8)
POTASSIUM SERPL-SCNC: 3.9 MMOL/L (ref 3.5–5.2)
PROT SERPL-MCNC: 6.2 G/DL (ref 6–8.5)
PROT UR QL STRIP: NEGATIVE
SODIUM SERPL-SCNC: 138 MMOL/L (ref 136–145)
SP GR UR STRIP: >=1.03 (ref 1–1.03)
TRIGL SERPL-MCNC: 181 MG/DL (ref 0–150)
UROBILINOGEN UR QL STRIP: ABNORMAL
VLDLC SERPL-MCNC: 31 MG/DL (ref 5–40)

## 2022-04-01 PROCEDURE — 36415 COLL VENOUS BLD VENIPUNCTURE: CPT

## 2022-04-01 PROCEDURE — 80061 LIPID PANEL: CPT

## 2022-04-01 PROCEDURE — 80076 HEPATIC FUNCTION PANEL: CPT

## 2022-04-01 PROCEDURE — 81003 URINALYSIS AUTO W/O SCOPE: CPT

## 2022-04-01 PROCEDURE — 80048 BASIC METABOLIC PNL TOTAL CA: CPT

## 2022-04-04 NOTE — PROGRESS NOTES
[facility]  Cardiology progress Note    Patient Name: Moreno Aleman  : 1965    CHIEF COMPLAINT  CORONARY ARTERY DISEASE, hypertension.      Subjective   Subjective     HISTORY OF PRESENT ILLNESS    Moreno Aleman is a 56 y.o. male with history of CAD PTCA/stent.  No chest pain or shortness of breath.    Review of Systems:   Constitutional no fever,  no weight loss   Skin no rash   Otolaryngeal no difficulty swallowing   Cardiovascular See HPI   Pulmonary no cough, no sputum production   Gastrointestinal no constipation, no diarrhea   Genitourinary no dysuria, no hematuria   Hematologic no easy bruisability, no abnormal bleeding   Musculoskeletal no muscle pain   Neurologic no dizziness, no falls         Personal History     Social History:  reports that he has never smoked. He has never used smokeless tobacco. He reports current alcohol use. He reports that he does not use drugs.    Home Medications:  Current Outpatient Medications on File Prior to Visit   Medication Sig   • Blood Glucose Monitoring Suppl (FreeStyle Lite) w/Device kit See Admin Instructions.   • cloNIDine (CATAPRES) 0.1 MG tablet TAKE 1 TABLET BY MOUTH TWICE DAILY   • Dapagliflozin Propanediol (Farxiga) 10 MG tablet Take 10 mg by mouth Daily.   • diazePAM (Valium) 10 MG tablet TAKE 1 TABLET PRIOR TO MRI   • docusate sodium (COLACE) 100 MG capsule Take 1 capsule by mouth 2 (Two) Times a Day.   • FREESTYLE LITE test strip USE TO TEST BLOOD SUGAR ONCE A DAY   • HYDROcodone-acetaminophen (NORCO) 7.5-325 MG per tablet Take 1 tablet by mouth 3 (Three) Times a Day As Needed.   • Insulin Degludec (Tresiba FlexTouch) 200 UNIT/ML solution pen-injector pen injection Inject 22 Units under the skin into the appropriate area as directed Daily.   • Insulin Pen Needle (NovoFine Plus Pen Needle) 32G X 4 MM misc 22 Units Daily.   • metFORMIN (GLUCOPHAGE) 1000 MG tablet TAKE 1 TABLET BY MOUTH TWICE DAILY WITH MEALS   • ondansetron ODT  "(ZOFRAN-ODT) 4 MG disintegrating tablet Place 1 tablet on the tongue Every 6 (Six) Hours As Needed for Nausea or Vomiting.   • sildenafil (Viagra) 100 MG tablet Take 1 tablet by mouth Daily As Needed for Erectile Dysfunction.   • Syringe 21G X 1\" 3 ML misc 100 mg Every 14 (Fourteen) Days.   • Testosterone Cypionate (DEPOTESTOTERONE CYPIONATE) 200 MG/ML injection Inject 0.5 mL into the appropriate muscle as directed by prescriber Every 14 (Fourteen) Days.   • traZODone (DESYREL) 100 MG tablet Take 1 tablet by mouth Every Night.   • [DISCONTINUED] clopidogrel (PLAVIX) 75 MG tablet Plavix 75 mg oral tablet take 1 tablet (75 mg) by oral route once daily   Suspended   • [DISCONTINUED] lisinopril (PRINIVIL,ZESTRIL) 30 MG tablet TAKE 1 TABLET BY MOUTH DAILY     No current facility-administered medications on file prior to visit.     Allergies:  Allergies   Allergen Reactions   • Cefdinir Itching   • Fentanyl Angioedema   • Nifedipine Hives and Itching       Objective    Objective       Vitals:   Heart Rate:  [66] 66  BP: (118)/(72) 118/72  Body mass index is 30.87 kg/m².     Physical Exam:   Constitutional: Awake, alert, No acute distress    Eyes: PERRLA, sclerae anicteric, no conjunctival injection   HENT: NCAT, mucous membranes moist   Neck: Supple, no thyromegaly, no lymphadenopathy, trachea midline   Respiratory: Clear to auscultation bilaterally, nonlabored respirations    Cardiovascular: RRR, no murmurs or rubs. Palpable pedal pulses bilaterally   Musculoskeletal: No bilateral ankle edema, no cyanosis to extremities   Psychiatric: Appropriate affect, cooperative   Neurologic: Oriented x 3, strength symmetric in all extremities, Cranial Nerves grossly intact to confrontation, speech clear   Skin: No rashes.    Result Review    Result Review:  I have personally reviewed the available results from  [x]  Laboratory  [x]  EKG  [x]  Cardiology  [x]  Medications  [x]  Old records  []  Other:   Procedures  Lab Results "   Component Value Date    CHOL 171 04/01/2022    CHOL 184 02/08/2022    CHOL 188 08/10/2021     Lab Results   Component Value Date    TRIG 181 (H) 04/01/2022    TRIG 429 (H) 02/08/2022    TRIG 423 (H) 08/10/2021     Lab Results   Component Value Date    HDL 39 (L) 04/01/2022    HDL 37 (L) 02/08/2022    HDL 34 (L) 08/10/2021     Lab Results   Component Value Date     (H) 04/01/2022    LDL 78 02/08/2022    LDL 85 08/10/2021     Lab Results   Component Value Date    VLDL 31 04/01/2022    VLDL 69 (H) 02/08/2022    VLDL 69 (H) 08/10/2021        Impression/Plan:  1.  Coronary disease s/p PTCA/stent stable: Continue aspirin 81 mg a day.  Continue Plavix 75 mg a day.  2.  Essential hypertension controlled: Continue lisinopril 30 mg a day.  3.  Hyperlipidemia: Could not tolerate Lipitor.  Start Crestor 10 mg once a day.  Low-fat diet advised.  Check lipid and hepatic profile in 3 months           Ran Pan MD   04/05/22   10:28 EDT

## 2022-04-05 ENCOUNTER — OFFICE VISIT (OUTPATIENT)
Dept: CARDIOLOGY | Facility: CLINIC | Age: 57
End: 2022-04-05

## 2022-04-05 VITALS
SYSTOLIC BLOOD PRESSURE: 118 MMHG | DIASTOLIC BLOOD PRESSURE: 72 MMHG | WEIGHT: 203 LBS | BODY MASS INDEX: 30.77 KG/M2 | HEIGHT: 68 IN | HEART RATE: 66 BPM

## 2022-04-05 DIAGNOSIS — I25.10 CORONARY ARTERY DISEASE INVOLVING NATIVE CORONARY ARTERY OF NATIVE HEART WITHOUT ANGINA PECTORIS: Primary | ICD-10-CM

## 2022-04-05 DIAGNOSIS — Z95.5 HISTORY OF CORONARY ANGIOPLASTY WITH INSERTION OF STENT: ICD-10-CM

## 2022-04-05 DIAGNOSIS — E78.2 HYPERLIPEMIA, MIXED: ICD-10-CM

## 2022-04-05 DIAGNOSIS — I10 HYPERTENSION, ESSENTIAL: ICD-10-CM

## 2022-04-05 PROCEDURE — 99214 OFFICE O/P EST MOD 30 MIN: CPT | Performed by: SPECIALIST

## 2022-04-05 RX ORDER — CLOPIDOGREL BISULFATE 75 MG/1
75 TABLET ORAL DAILY
Qty: 30 TABLET | Refills: 11 | Status: SHIPPED | OUTPATIENT
Start: 2022-04-05

## 2022-04-05 RX ORDER — ROSUVASTATIN CALCIUM 10 MG/1
10 TABLET, COATED ORAL DAILY
Qty: 90 TABLET | Refills: 3 | Status: SHIPPED | OUTPATIENT
Start: 2022-04-05 | End: 2022-08-10 | Stop reason: SDUPTHER

## 2022-04-05 RX ORDER — HYDROCODONE BITARTRATE AND ACETAMINOPHEN 7.5; 325 MG/1; MG/1
1 TABLET ORAL 3 TIMES DAILY PRN
COMMUNITY
Start: 2022-03-18 | End: 2022-04-11 | Stop reason: HOSPADM

## 2022-04-05 RX ORDER — LISINOPRIL 30 MG/1
30 TABLET ORAL DAILY
Qty: 30 TABLET | Refills: 11 | Status: SHIPPED | OUTPATIENT
Start: 2022-04-05 | End: 2022-08-08

## 2022-04-05 NOTE — PRE-PROCEDURE INSTRUCTIONS
Patient instructed to have no food past midnight, clears up to 2 hours prior to arrival time. Patient instructed to wear no lotions, jewelry or piercing's day of surgery.  Patient to shower with surgical soap am of surgery.  Patient to take 1/2 dose of Tresiba, Crestor, Clonidine and norco if needed.

## 2022-04-06 ENCOUNTER — ANESTHESIA EVENT (OUTPATIENT)
Dept: PERIOP | Facility: HOSPITAL | Age: 57
End: 2022-04-06

## 2022-04-11 ENCOUNTER — HOSPITAL ENCOUNTER (OUTPATIENT)
Facility: HOSPITAL | Age: 57
Setting detail: HOSPITAL OUTPATIENT SURGERY
Discharge: HOME | End: 2022-04-11
Attending: ORTHOPAEDIC SURGERY | Admitting: ORTHOPAEDIC SURGERY

## 2022-04-11 ENCOUNTER — ANESTHESIA (OUTPATIENT)
Dept: PERIOP | Facility: HOSPITAL | Age: 57
End: 2022-04-11

## 2022-04-11 VITALS
TEMPERATURE: 96.9 F | BODY MASS INDEX: 31.17 KG/M2 | SYSTOLIC BLOOD PRESSURE: 155 MMHG | RESPIRATION RATE: 16 BRPM | HEART RATE: 80 BPM | HEIGHT: 68 IN | OXYGEN SATURATION: 91 % | WEIGHT: 205.69 LBS | DIASTOLIC BLOOD PRESSURE: 87 MMHG

## 2022-04-11 DIAGNOSIS — M75.102 NONTRAUMATIC TEAR OF LEFT ROTATOR CUFF, UNSPECIFIED TEAR EXTENT: ICD-10-CM

## 2022-04-11 DIAGNOSIS — M75.102 NONTRAUMATIC TEAR OF LEFT ROTATOR CUFF, UNSPECIFIED TEAR EXTENT: Primary | ICD-10-CM

## 2022-04-11 DIAGNOSIS — Z98.890 S/P ARTHROSCOPY OF LEFT SHOULDER: ICD-10-CM

## 2022-04-11 LAB
GLUCOSE BLDC GLUCOMTR-MCNC: 54 MG/DL (ref 70–99)
GLUCOSE BLDC GLUCOMTR-MCNC: 77 MG/DL (ref 70–99)
GLUCOSE BLDC GLUCOMTR-MCNC: 97 MG/DL (ref 70–99)

## 2022-04-11 PROCEDURE — 76942 ECHO GUIDE FOR BIOPSY: CPT | Performed by: ORTHOPAEDIC SURGERY

## 2022-04-11 PROCEDURE — 25010000002 HYDROMORPHONE 1 MG/ML SOLUTION

## 2022-04-11 PROCEDURE — 0 HYDROMORPHONE 1 MG/ML SOLUTION

## 2022-04-11 PROCEDURE — 29822 SHO ARTHRS SRG LMTD DBRDMT: CPT | Performed by: ORTHOPAEDIC SURGERY

## 2022-04-11 PROCEDURE — 25010000002 ROPIVACAINE PER 1 MG: Performed by: ANESTHESIOLOGY

## 2022-04-11 PROCEDURE — 25010000002 ONDANSETRON PER 1 MG

## 2022-04-11 PROCEDURE — 25010000002 DEXAMETHASONE PER 1 MG

## 2022-04-11 PROCEDURE — 25010000002 PROPOFOL 10 MG/ML EMULSION

## 2022-04-11 PROCEDURE — 82962 GLUCOSE BLOOD TEST: CPT

## 2022-04-11 PROCEDURE — 25010000002 MIDAZOLAM PER 1 MG: Performed by: ANESTHESIOLOGY

## 2022-04-11 RX ORDER — ONDANSETRON 2 MG/ML
4 INJECTION INTRAMUSCULAR; INTRAVENOUS ONCE AS NEEDED
Status: DISCONTINUED | OUTPATIENT
Start: 2022-04-11 | End: 2022-04-11 | Stop reason: HOSPADM

## 2022-04-11 RX ORDER — ROCURONIUM BROMIDE 10 MG/ML
INJECTION, SOLUTION INTRAVENOUS AS NEEDED
Status: DISCONTINUED | OUTPATIENT
Start: 2022-04-11 | End: 2022-04-11 | Stop reason: SURG

## 2022-04-11 RX ORDER — MIDAZOLAM HYDROCHLORIDE 1 MG/ML
4 INJECTION INTRAMUSCULAR; INTRAVENOUS ONCE
Status: COMPLETED | OUTPATIENT
Start: 2022-04-11 | End: 2022-04-11

## 2022-04-11 RX ORDER — GLYCOPYRROLATE 0.2 MG/ML
0.2 INJECTION INTRAMUSCULAR; INTRAVENOUS
Status: COMPLETED | OUTPATIENT
Start: 2022-04-11 | End: 2022-04-11

## 2022-04-11 RX ORDER — PROPOFOL 10 MG/ML
VIAL (ML) INTRAVENOUS AS NEEDED
Status: DISCONTINUED | OUTPATIENT
Start: 2022-04-11 | End: 2022-04-11 | Stop reason: SURG

## 2022-04-11 RX ORDER — ROPIVACAINE HYDROCHLORIDE 5 MG/ML
INJECTION, SOLUTION EPIDURAL; INFILTRATION; PERINEURAL
Status: COMPLETED | OUTPATIENT
Start: 2022-04-11 | End: 2022-04-11

## 2022-04-11 RX ORDER — LIDOCAINE HYDROCHLORIDE 20 MG/ML
INJECTION, SOLUTION INFILTRATION; PERINEURAL AS NEEDED
Status: DISCONTINUED | OUTPATIENT
Start: 2022-04-11 | End: 2022-04-11 | Stop reason: SURG

## 2022-04-11 RX ORDER — PROMETHAZINE HYDROCHLORIDE 12.5 MG/1
25 TABLET ORAL ONCE AS NEEDED
Status: DISCONTINUED | OUTPATIENT
Start: 2022-04-11 | End: 2022-04-11 | Stop reason: HOSPADM

## 2022-04-11 RX ORDER — SODIUM CHLORIDE, SODIUM LACTATE, POTASSIUM CHLORIDE, CALCIUM CHLORIDE 600; 310; 30; 20 MG/100ML; MG/100ML; MG/100ML; MG/100ML
9 INJECTION, SOLUTION INTRAVENOUS CONTINUOUS PRN
Status: DISCONTINUED | OUTPATIENT
Start: 2022-04-11 | End: 2022-04-11 | Stop reason: HOSPADM

## 2022-04-11 RX ORDER — OXYCODONE AND ACETAMINOPHEN 7.5; 325 MG/1; MG/1
1-2 TABLET ORAL EVERY 4 HOURS PRN
Qty: 36 TABLET | Refills: 0 | Status: SHIPPED | OUTPATIENT
Start: 2022-04-11 | End: 2022-04-18 | Stop reason: SDUPTHER

## 2022-04-11 RX ORDER — PROMETHAZINE HYDROCHLORIDE 25 MG/1
25 SUPPOSITORY RECTAL ONCE AS NEEDED
Status: DISCONTINUED | OUTPATIENT
Start: 2022-04-11 | End: 2022-04-11 | Stop reason: HOSPADM

## 2022-04-11 RX ORDER — OXYCODONE AND ACETAMINOPHEN 7.5; 325 MG/1; MG/1
1-2 TABLET ORAL EVERY 4 HOURS PRN
Qty: 36 TABLET | Refills: 0 | Status: SHIPPED | OUTPATIENT
Start: 2022-04-11 | End: 2022-04-11 | Stop reason: SDUPTHER

## 2022-04-11 RX ORDER — DEXAMETHASONE SODIUM PHOSPHATE 4 MG/ML
INJECTION, SOLUTION INTRA-ARTICULAR; INTRALESIONAL; INTRAMUSCULAR; INTRAVENOUS; SOFT TISSUE AS NEEDED
Status: DISCONTINUED | OUTPATIENT
Start: 2022-04-11 | End: 2022-04-11 | Stop reason: SURG

## 2022-04-11 RX ORDER — ONDANSETRON 2 MG/ML
INJECTION INTRAMUSCULAR; INTRAVENOUS AS NEEDED
Status: DISCONTINUED | OUTPATIENT
Start: 2022-04-11 | End: 2022-04-11 | Stop reason: SURG

## 2022-04-11 RX ORDER — CLINDAMYCIN PHOSPHATE 900 MG/50ML
900 INJECTION INTRAVENOUS ONCE
Status: COMPLETED | OUTPATIENT
Start: 2022-04-11 | End: 2022-04-11

## 2022-04-11 RX ORDER — ACETAMINOPHEN 500 MG
1000 TABLET ORAL ONCE
Status: COMPLETED | OUTPATIENT
Start: 2022-04-11 | End: 2022-04-11

## 2022-04-11 RX ORDER — OXYCODONE HYDROCHLORIDE 5 MG/1
5 TABLET ORAL
Status: DISCONTINUED | OUTPATIENT
Start: 2022-04-11 | End: 2022-04-11 | Stop reason: HOSPADM

## 2022-04-11 RX ADMIN — HYDROMORPHONE HYDROCHLORIDE 0.5 MG: 1 INJECTION, SOLUTION INTRAMUSCULAR; INTRAVENOUS; SUBCUTANEOUS at 12:33

## 2022-04-11 RX ADMIN — GLYCOPYRROLATE 0.2 MG: 0.2 INJECTION INTRAMUSCULAR; INTRAVENOUS at 10:41

## 2022-04-11 RX ADMIN — ROPIVACAINE HYDROCHLORIDE 30 ML: 5 INJECTION, SOLUTION EPIDURAL; INFILTRATION; PERINEURAL at 09:55

## 2022-04-11 RX ADMIN — MIDAZOLAM HYDROCHLORIDE 4 MG: 1 INJECTION, SOLUTION INTRAMUSCULAR; INTRAVENOUS at 09:55

## 2022-04-11 RX ADMIN — SODIUM CHLORIDE, POTASSIUM CHLORIDE, SODIUM LACTATE AND CALCIUM CHLORIDE 9 ML/HR: 600; 310; 30; 20 INJECTION, SOLUTION INTRAVENOUS at 09:34

## 2022-04-11 RX ADMIN — HYDROMORPHONE HYDROCHLORIDE 0.5 MG: 1 INJECTION, SOLUTION INTRAMUSCULAR; INTRAVENOUS; SUBCUTANEOUS at 10:54

## 2022-04-11 RX ADMIN — PROPOFOL 200 MG: 10 INJECTION, EMULSION INTRAVENOUS at 10:54

## 2022-04-11 RX ADMIN — LIDOCAINE HYDROCHLORIDE 50 MG: 20 INJECTION, SOLUTION INFILTRATION; PERINEURAL at 10:54

## 2022-04-11 RX ADMIN — HYDROMORPHONE HYDROCHLORIDE 0.5 MG: 1 INJECTION, SOLUTION INTRAMUSCULAR; INTRAVENOUS; SUBCUTANEOUS at 12:21

## 2022-04-11 RX ADMIN — ONDANSETRON 4 MG: 2 INJECTION INTRAMUSCULAR; INTRAVENOUS at 11:16

## 2022-04-11 RX ADMIN — DEXAMETHASONE SODIUM PHOSPHATE 4 MG: 4 INJECTION, SOLUTION INTRA-ARTICULAR; INTRALESIONAL; INTRAMUSCULAR; INTRAVENOUS; SOFT TISSUE at 11:16

## 2022-04-11 RX ADMIN — CLINDAMYCIN IN 5 PERCENT DEXTROSE 900 MG: 18 INJECTION, SOLUTION INTRAVENOUS at 11:00

## 2022-04-11 RX ADMIN — ROCURONIUM BROMIDE 50 MG: 10 INJECTION INTRAVENOUS at 10:54

## 2022-04-11 RX ADMIN — SUGAMMADEX 200 MG: 100 INJECTION, SOLUTION INTRAVENOUS at 11:58

## 2022-04-11 RX ADMIN — ACETAMINOPHEN 1000 MG: 500 TABLET ORAL at 09:33

## 2022-04-11 RX ADMIN — OXYCODONE HYDROCHLORIDE 5 MG: 5 TABLET ORAL at 12:34

## 2022-04-11 NOTE — ANESTHESIA PROCEDURE NOTES
Peripheral Block      Patient reassessed immediately prior to procedure    Patient location during procedure: pre-op  Reason for block: at surgeon's request and post-op pain management  Preanesthetic Checklist  Completed: patient identified, IV checked, site marked, risks and benefits discussed, surgical consent, monitors and equipment checked, pre-op evaluation and timeout performed  Prep:  Pt Position: supine (HOB elevated)  Sterile barriers:cap, washed/disinfected hands, sterile barriers, gloves, mask, partial drape and alcohol skin prep  Prep: ChloraPrep  Patient monitoring: blood pressure monitoring, continuous pulse oximetry and EKG  Procedure    Sedation: yes  Performed under: local infiltration  Guidance:ultrasound guided and nerve stimulator    ULTRASOUND INTERPRETATION.  Using ultrasound guidance a 22 G gauge needle was placed in close proximity to the brachial plexus nerve, at which point, under ultrasound guidance anesthetic was injected in the area of the nerve and spread of the anesthesia was seen on ultrasound in close proximity thereto.  There were no abnormalities seen on ultrasound; a digital image was taken; and the patient tolerated the procedure with no complications. Images:still images obtained, printed/placed on chart    Laterality:left  Block Type:interscalene  Injection Technique:single-shot  Needle Type:echogenic  Needle Gauge:22 G (2in)  Resistance on Injection: none    Medications Used: ropivacaine (NAROPIN) 0.5 % injection, 30 mL      Post Assessment  Injection Assessment: negative aspiration for heme, no paresthesia on injection and incremental injection  Patient Tolerance:comfortable throughout block  Complications:no  Additional Notes  The block or continuous infusion is requested by the referring physician for management of postoperative pain, or pain related to a procedure. Ultrasound guidance (deemed medically necessary). Painless injection, pt was awake and conversant during the  procedure without complications. Needle and surrounding structures visualized throughout procedure. No adverse reactions or complications seen during this period. Post-procedure image showed no signs of complication, and anatomy was consistent with an uncomplicated nerve blockade.

## 2022-04-11 NOTE — DISCHARGE INSTRUCTIONS
DISCHARGE INSTRUCTIONS  Post-Operative  Arthroscopic Shoulder Surgery      For your surgery you had:  General anesthesia (you may have a sore throat for the first 24 hours)  You received an anesthesia medication today that can cause hormonal forms of birth control to be ineffective. You should use a different form of birth control (to prevent pregnancy) for 7 days.   IV sedation.  Local anesthesia  Monitored anesthesia care  You may experience dizziness, drowsiness, or light-headedness for several hours following surgery/procedure.  Do not stay alone today or tonight.  Limit your activity for 24 hours.  Resume your diet slowly.  Follow whatever special dietary instructions you may have been given by your doctor.  You should not drive or operate machinery or drink alcohol for 24 hours or while you are taking pain medication.  You should not sign legally binding documents.  Post-Operative Day #1 is counted as the 1st day after surgery.  [] If you had a regional block, you will not have control of your arm for up to 12 hours.  Protect the arm with a sling or follow your physician's specific instructions.  Post-Operative Day #2  Remove your dressing.  Under the dressing you will either have sutures or staples.  Change dressing once or twice daily.  Place a dry dressing or band-aids over the small incisions.  Prior to dressing change, wash your hands.  Post-Operative Day #4  You may shower.  During the shower, you may let the water hit the incision and roll down but do not scrub or place any soap on the incision.  Do not soak it.  Pat it dry and do not put any ointments on the incision unless directed by surgeon. Then replace the dry dressing.    General Instructions  [] Use ice pack to shoulder for 72 hours.  This can help with reducing swelling and pain relief.  Apply 20 minutes on and 20 minutes off.  Never place ice directly on skin.  Avoid getting dressing wet.  The Cold Therapy System can help reduce swelling and  decrease pain.  Utilize device for 30-60 minutes per session, with 30-60 minute breaks in between sessions.  It is recommended to use, as directed, for the first 72 hours after surgery until bedtime.  After 72 hours, continue using the device as needed until your follow-up appointment with your physician.  Never place directly on skin.  Please refer to the instruction sheet given.  Keep arm elevated with sling to decrease swelling and minimize throbbing pain.  The sling will provide support for your shoulder.  It is important to remove the sling 3-5 times daily to work on range-of-motion of the elbow, wrist and hand.  You will receive a prescription for physical therapy, unless otherwise instructed by physician.  After most shoulder surgeries, it is permissible to start exercises by slowing trying to crawl your fingers up a wall until your arm is parallel to the floor.  While doing this support the affected arm at the elbow or closer to the shoulder.  You may also lean over and let the arm and hand do small or large circles or write the alphabet with your hanging arm to keep it loose.  It is normal to have some pain with these gentle exercises.  The exercises help reduce swelling and pain overall and help to avoid a stiff shoulder post-operatively.  It is okay to come out of the sling for showering or for certain activities of daily living but avoid any lifting or carrying with the affected arm until instructed by the physician especially if you have had a repair of the rotator cuff or some type of reconstructive procedure.  It is okay to come out of the sling and support the arm with a pillow if you remain sedentary.  In general, sling use is preferred following a repair or reconstruction for 4 weeks.  If you have had a SAD (sub acromial decompression), continue sling use more liberally because there was not a repair or reconstruction that could be harmed.          DISCHARGE INSTRUCTIONS  Post-Operative    Arthroscopic Shoulder Surgery      Exercise fingers for 10 minutes every hour while awake.  The physician will typically inform the family and the patient whether or not a repair or reconstruction could be harmed.  If you have had a rotator cuff repair or reconstructive procedure, do not let the arm drop down suddenly from an elevated position down to your side despite improvements made in pain and over the 3 months following repair and reconstruction.  It generally takes 8-12 weeks before the repair or reconstruction does not rely on sutures and anchors that are placed for the repair.  You are generally given a prescription for a narcotic medication.  Take as prescribed.  You may use over-the-counter anti-inflammatory medications such as Motrin or Aleve for additional pain or fever reduction if not allergic.  If you are taking the pain medication prescribed, do not take Tylenol too unless you consult with the pharmacist. The pain medications generally have Tylenol in them and too much Tylenol can be harmful.  Sometimes it is recommended to take an anti-inflammatory in between doses of prescription pain medication if additional pain relief is needed.  Consult with your physician or your pharmacist before taking over-the-counter pain medications/anti-inflammatories.  It is not uncommon to have a fever post-operatively especially in the first 24-48 hours.  Anti-inflammatories can be used for fever reduction also.  It is normal to have some redness or irritation around skin sutures or staples, however, if you have any expanding areas of redness with a persistent fever and increasing pain notify the physician as soon as possible.  The incidence of blood clots is low following arthroscopic procedures, however, if you notice any increasing pain or swelling in your calves or legs, contact the physician as soon as possible.   It is difficult to sleep in the customary fashion following a shoulder surgery.  It is usually  necessary to sleep with the shoulder above the level of the heart such as in a recliner, couch or with the head of the bed elevated.  This should slowly improve over the weeks following shoulder surgery.  If unable to urinate 6 to 8 hours after surgery or urinating frequently in small amounts, notify the physician or go to the nearest Emergency Room.  SPECIAL INSTRUCTIONS:        NOTIFY YOUR PHYSICIAN IF YOU EXPERIENCE:  Numbness of fingers.  Inability to move fingers.  Extreme coldness, paleness or blue dis-coloration of fingers.  Any pain other than from the incision, such as swelling of the arm that blocks circulation of fingers.  Follow verbal instructions from your doctor.  Medications per physician instructions as indicated on Discharge Medication Information Sheet.  You should see  ______________________for follow-up care  on     Phone number: __________________________________  Missing your appointment/follow-up could lead to serious complications  If you have an emergency and cannot reach your doctor, go to an Emergency Room nearest your home.

## 2022-04-11 NOTE — OP NOTE
SHOULDER ARTHROSCOPY WITH SUBACROMIAL DECOMPRESSION  Procedure Report    Patient Name:  Moreno Aleman  YOB: 1965    Date of Surgery:  4/11/2022     Indications:  Patient has rotator cuff tear and has failed conservative treatment. Risks and benefits of surgery were discussed, including bleeding, infection, damage to neurovascular structures, anesthesia complications including death, continued pain and disability, need for additional procedures, among others. Informed consent was obtained and they wished to proceed.    Pre-op Diagnosis:   S/P arthroscopy of left shoulder [Z98.890]   Left shoulder recurrent rotator cuff tear       Post-Op Diagnosis Codes:     * S/P arthroscopy of left shoulder [Z98.890]   Left shoulder subacromial impingement, bursitis    Procedure/CPT® Codes:      Procedure(s):  left SHOULDER ARTHROSCOPY SUBACROMIAL DECOMPRESSION, BURSECTOMY WITH ROTATOR CUFF DEBRIDEMENT    Staff:  Surgeon(s):  Kam Dick MD    Assistant: Krzysztof Hua RN    Anesthesia: General    Estimated Blood Loss: 5 cc    Implants:    Nothing was implanted during the procedure    Specimen:          None        Findings: rotator cuff tear    Complications: none    Description of Procedure: The operative site was marked in preoperative holding area.  Interscalene nerve block was performed by the anesthesia provider.  The patient was brought the operating room and general anesthesia was applied.  There were placed in the lateral decubitus position.  An axillary roll was placed and the patient was secured with a deflated beanbag.  The legs were padded and SCD boots were placed.  The contralateral limb was placed on an arm board and the affected limb was prepped and draped in usual sterile fashion and placed into a sterile traction arm tavares.  Preoperative antibiotic was given.  Formal timeout was held.    Standard posterior portal was established and the arthroscope was inserted into the  glenohumeral joint.  An anterior portal was established in the rotator cuff interval and a cannula was placed anteriorly.  The joint was inspected and the intra-articular and findings included intact minimal cartilage with minimal chondromalacia.  Intact biceps tendon and superior labrum.  No evidence of labral tear.  There was some synovitis and inflammation within the joint.  The rotator cuff repair appeared intact from the articular side with no residual tearing.    The arthroscope was removed and reinserted into the subacromial space posteriorly.  The lateral portal was established of the lateral acromion.  A motorized shaver and ablation probe were used to clear the subacromial space of adhesions and bursitis.  A motorized bur was used to resect approximately 1 cm from the anterior acromion and flatten the acromion from anterior to posterior and lateral to medial.    The subacromial findings include subacromial impingement.  Downsloping acromion laterally and anteriorly.  Subacromial bursitis with inflammation of the bursal tissue.  A motorized shaver was used to debride the bursal tissue.  The rotator cuff was inspected from the bursal side.  There is some mild bursal sided fraying but no evidence of high-grade or full-thickness retear of the tendon.      At this point the fluid was drained from the shoulder.  The portal incisions were closed with nylon suture.      Sterile dressing was placed.  The patient was placed into cold therapy and a sling.  The patient awoke from anesthesia in stable condition.  There is no complications.  All counts were correct.    Assistant: Krzysztof Hua RN  was responsible for performing the following activities: Retraction, Suction, Irrigation and Placing Dressing and their skilled assistance was necessary for the success of this case.    Kam Dick MD     Date: 4/11/2022  Time: 12:04 EDT

## 2022-04-11 NOTE — ANESTHESIA POSTPROCEDURE EVALUATION
Patient: Moreno Aleman    Procedure Summary     Date: 04/11/22 Room / Location: [facility] OR  / [facility]    Anesthesia Start: 1050 Anesthesia Stop: 1205    Procedure: left SHOULDER ARTHROSCOPY SUBACROMIAL DECOMPRESSION WITH ROTATOR CUFF DEBRIDEMENT (Left Shoulder) Diagnosis:       S/P arthroscopy of left shoulder      (S/P arthroscopy of left shoulder [Z98.890])    Surgeons: Kam Dick MD Provider: Yeison Cobos MD    Anesthesia Type: general with block ASA Status: 3          Anesthesia Type: general with block    Vitals  Vitals Value Taken Time   /79 04/11/22 1207   Temp     Pulse 75 04/11/22 1210   Resp     SpO2 98 % 04/11/22 1210   Vitals shown include unvalidated device data.        Post Anesthesia Care and Evaluation    Patient location during evaluation: bedside  Patient participation: complete - patient participated  Level of consciousness: awake  Pain management: adequate  Airway patency: patent  Anesthetic complications: No anesthetic complications  PONV Status: none  Cardiovascular status: acceptable and stable  Respiratory status: acceptable and room air  Hydration status: acceptable    Comments: An Anesthesiologist personally participated in the most demanding procedures (including induction and emergence if applicable) in the anesthesia plan, monitored the course of anesthesia administration at frequent intervals and remained physically present and available for immediate diagnosis and treatment of emergencies.

## 2022-04-16 ENCOUNTER — APPOINTMENT (OUTPATIENT)
Dept: GENERAL RADIOLOGY | Facility: HOSPITAL | Age: 57
End: 2022-04-16

## 2022-04-16 ENCOUNTER — HOSPITAL ENCOUNTER (EMERGENCY)
Facility: HOSPITAL | Age: 57
Discharge: HOME | End: 2022-04-16
Attending: EMERGENCY MEDICINE | Admitting: EMERGENCY MEDICINE

## 2022-04-16 VITALS
OXYGEN SATURATION: 97 % | BODY MASS INDEX: 30.14 KG/M2 | TEMPERATURE: 97.8 F | RESPIRATION RATE: 18 BRPM | DIASTOLIC BLOOD PRESSURE: 104 MMHG | SYSTOLIC BLOOD PRESSURE: 152 MMHG | HEIGHT: 68 IN | HEART RATE: 66 BPM | WEIGHT: 198.85 LBS

## 2022-04-16 DIAGNOSIS — M54.12 CERVICAL RADICULOPATHY: Primary | ICD-10-CM

## 2022-04-16 PROCEDURE — 96374 THER/PROPH/DIAG INJ IV PUSH: CPT

## 2022-04-16 PROCEDURE — 25010000002 KETOROLAC TROMETHAMINE PER 15 MG: Performed by: EMERGENCY MEDICINE

## 2022-04-16 PROCEDURE — 72050 X-RAY EXAM NECK SPINE 4/5VWS: CPT

## 2022-04-16 PROCEDURE — 25010000002 HYDROMORPHONE 1 MG/ML SOLUTION: Performed by: EMERGENCY MEDICINE

## 2022-04-16 PROCEDURE — 96375 TX/PRO/DX INJ NEW DRUG ADDON: CPT

## 2022-04-16 PROCEDURE — 96372 THER/PROPH/DIAG INJ SC/IM: CPT

## 2022-04-16 PROCEDURE — 25010000002 ORPHENADRINE CITRATE PER 60 MG: Performed by: EMERGENCY MEDICINE

## 2022-04-16 PROCEDURE — 99283 EMERGENCY DEPT VISIT LOW MDM: CPT

## 2022-04-16 RX ORDER — KETOROLAC TROMETHAMINE 15 MG/ML
15 INJECTION, SOLUTION INTRAMUSCULAR; INTRAVENOUS ONCE
Status: COMPLETED | OUTPATIENT
Start: 2022-04-16 | End: 2022-04-16

## 2022-04-16 RX ORDER — CYCLOBENZAPRINE HCL 10 MG
10 TABLET ORAL 3 TIMES DAILY
Qty: 20 TABLET | Refills: 0 | Status: SHIPPED | OUTPATIENT
Start: 2022-04-16 | End: 2022-08-09 | Stop reason: SDUPTHER

## 2022-04-16 RX ORDER — ORPHENADRINE CITRATE 30 MG/ML
60 INJECTION INTRAMUSCULAR; INTRAVENOUS ONCE
Status: COMPLETED | OUTPATIENT
Start: 2022-04-16 | End: 2022-04-16

## 2022-04-16 RX ORDER — OXYCODONE HYDROCHLORIDE AND ACETAMINOPHEN 5; 325 MG/1; MG/1
1 TABLET ORAL EVERY 6 HOURS PRN
Qty: 12 TABLET | Refills: 0 | Status: SHIPPED | OUTPATIENT
Start: 2022-04-16 | End: 2022-08-08

## 2022-04-16 RX ORDER — KETOROLAC TROMETHAMINE 10 MG/1
10 TABLET, FILM COATED ORAL EVERY 6 HOURS PRN
Qty: 15 TABLET | Refills: 0 | Status: SHIPPED | OUTPATIENT
Start: 2022-04-16 | End: 2022-08-09

## 2022-04-16 RX ADMIN — ORPHENADRINE CITRATE 60 MG: 60 INJECTION INTRAMUSCULAR; INTRAVENOUS at 13:36

## 2022-04-16 RX ADMIN — KETOROLAC TROMETHAMINE 15 MG: 15 INJECTION, SOLUTION INTRAMUSCULAR; INTRAVENOUS at 13:36

## 2022-04-16 RX ADMIN — HYDROMORPHONE HYDROCHLORIDE 1 MG: 1 INJECTION, SOLUTION INTRAMUSCULAR; INTRAVENOUS; SUBCUTANEOUS at 13:36

## 2022-04-16 NOTE — ED PROVIDER NOTES
"Time: 12:52 PM EDT  Arrived by: private car  Chief Complaint: LEFT SHOULDER PAIN   History provided by: pt  History is limited by: N/A     History of Present Illness:  Patient is a 56 y.o. male who is 5 days post op from left rotator cuff repair surgery that presents to the emergency department with left SHOULDER PAIN that started 2 days ago. The pain is still present and has been constant. It is gradually worsening and is currently moderate in severity. Nothing improvers or worsens the pain. The pain radiates to the head and pt does endorse neck pain. He denies fall or injury.     No numbness or tingling.     History provided by:  Patient  Arm Pain  Location:  Left shoulder   Quality:  \"pain\"  Severity:  Moderate  Onset quality:  Gradual  Duration:  2 days  Timing:  Constant  Progression:  Worsening  Context:  Radiates into head and neck  Relieved by:  Nothing  Worsened by:  Nothing  Associated symptoms: no abdominal pain, no chest pain, no congestion, no cough, no diarrhea, no fever, no headaches, no myalgias, no nausea, no rhinorrhea, no shortness of breath, no sore throat and no vomiting        Similar Symptoms Previously: no  Recently seen: Pt had rotator cuff surgery on 4/11/22.     Patient Care Team  Primary Care Provider: Ollie Moreland APRN    Past Medical History:     Allergies   Allergen Reactions   • Fentanyl Angioedema   • Cefdinir Itching   • Nifedipine Hives and Itching     Past Medical History:   Diagnosis Date   • CAD (coronary artery disease)    • Chest pain    • Diabetes (HCC)    • Hyperlipidemia    • Hypertension      Past Surgical History:   Procedure Laterality Date   • CARDIAC CATHETERIZATION      showed significant coronary artery disease of the LAD with calcification.    • CHOLECYSTECTOMY     • CIRCUMCISION     • CORONARY ANGIOPLASTY WITH STENT PLACEMENT     • INGUINAL HERNIA REPAIR      x2   • NOSE SURGERY     • IN RT/LT HEART CATHETERS N/A 03/22/2016    Procedure: Percutaneous Coronary " Intervention - Rota/stent LAD;  Surgeon: Marek Emery MD;  Location:  [facility];  Service: Cardiovascular   • ROTATOR CUFF REPAIR Left    • SHOULDER ARTHROSCOPY Right     x2   • SHOULDER ARTHROSCOPY WITH SUBACROMIAL DECOMPRESSION Left 4/11/2022    Procedure: left SHOULDER ARTHROSCOPY SUBACROMIAL DECOMPRESSION WITH ROTATOR CUFF DEBRIDEMENT;  Surgeon: Kam Dick MD;  Location: [facility];  Service: Orthopedics;  Laterality: Left;   • SPINAL FUSION       Family History   Problem Relation Age of Onset   • Hypertension Mother    • Diabetes Mother    • Heart attack Father    • Heart disease Father    • Heart failure Father    • Diabetes Father        Home Medications:  Prior to Admission medications    Medication Sig Start Date End Date Taking? Authorizing Provider   Blood Glucose Monitoring Suppl (FreeStyle Lite) w/Device kit See Admin Instructions. 11/8/21   Jody Bejarano MD   cloNIDine (CATAPRES) 0.1 MG tablet TAKE 1 TABLET BY MOUTH TWICE DAILY 9/13/21   Lisa Reece APRN   clopidogrel (PLAVIX) 75 MG tablet Take 1 tablet by mouth Daily.  Patient taking differently: Take 75 mg by mouth Daily. Last dose 04/03/22 as directed by Dr. Dick 4/5/22   Ran Pan MD   Dapagliflozin Propanediol (Farxiga) 10 MG tablet Take 10 mg by mouth Daily. 8/11/21   Ollie Moreland APRN   FREESTYLE LITE test strip USE TO TEST BLOOD SUGAR ONCE A DAY 11/9/21   Jody Bejarano MD   Insulin Degludec (Tresiba FlexTouch) 200 UNIT/ML solution pen-injector pen injection Inject 22 Units under the skin into the appropriate area as directed Daily. 11/8/21   Ollie Moreland APRN   Insulin Pen Needle (NovoFine Plus Pen Needle) 32G X 4 MM misc 22 Units Daily. 2/8/22   Ollie Moreland APRN   lisinopril (PRINIVIL,ZESTRIL) 30 MG tablet Take 1 tablet by mouth Daily. 4/5/22   Ran Pan MD   metFORMIN (GLUCOPHAGE) 1000 MG tablet TAKE 1 TABLET BY MOUTH TWICE DAILY WITH MEALS 2/3/22    "Ollie Moreland APRN   ondansetron ODT (ZOFRAN-ODT) 4 MG disintegrating tablet Place 1 tablet on the tongue Every 6 (Six) Hours As Needed for Nausea or Vomiting. 11/12/21   Mari Christopher APRN   oxyCODONE-acetaminophen (PERCOCET) 7.5-325 MG per tablet Take 1-2 tablets by mouth Every 4 (Four) Hours As Needed for Moderate Pain . 4/11/22   Kam Dick MD   rosuvastatin (CRESTOR) 10 MG tablet Take 1 tablet by mouth Daily. 4/5/22   Ran Pan MD   sildenafil (Viagra) 100 MG tablet Take 1 tablet by mouth Daily As Needed for Erectile Dysfunction. 8/3/21   Ollie Moreland APRN   Syringe 21G X 1\" 3 ML misc 100 mg Every 14 (Fourteen) Days. 12/1/21   Ollie Moreland APRN   Testosterone Cypionate (DEPOTESTOTERONE CYPIONATE) 200 MG/ML injection Inject 0.5 mL into the appropriate muscle as directed by prescriber Every 14 (Fourteen) Days.  Patient taking differently: Inject 100 mg into the appropriate muscle as directed by prescriber Every 14 (Fourteen) Days. Has not received in a couple months 2/8/22   Ollie Moreland APRN   traZODone (DESYREL) 100 MG tablet Take 1 tablet by mouth Every Night. 2/8/22   Ollie Moreland APRN        Social History:   Social History     Tobacco Use   • Smoking status: Never Smoker   • Smokeless tobacco: Never Used   Vaping Use   • Vaping Use: Never used   Substance Use Topics   • Alcohol use: Yes     Comment: OCCASIONAL   • Drug use: Never     Recent travel: no     Review of Systems:  Review of Systems   Constitutional: Negative for chills and fever.   HENT: Negative for congestion, rhinorrhea and sore throat.    Eyes: Negative for pain and visual disturbance.   Respiratory: Negative for apnea, cough, chest tightness and shortness of breath.    Cardiovascular: Negative for chest pain and palpitations.   Gastrointestinal: Negative for abdominal pain, diarrhea, nausea and vomiting.   Genitourinary: Negative for difficulty urinating and dysuria.   Musculoskeletal: Positive for neck pain. " "Negative for joint swelling and myalgias.        Left shoulder pain radiating to head and neck.    Skin: Negative for color change.   Neurological: Negative for seizures, numbness and headaches.   Psychiatric/Behavioral: Negative.    All other systems reviewed and are negative.       Physical Exam:  BP (!) 152/104   Pulse 82   Temp 97.8 °F (36.6 °C) (Oral)   Resp 18   Ht 172.7 cm (68\")   Wt 90.2 kg (198 lb 13.7 oz)   SpO2 94%   BMI 30.24 kg/m²     Physical Exam  Musculoskeletal:      Comments: Left-sided muscular tenderness.                 Medications in the Emergency Department:  Medications   ketorolac (TORADOL) injection 15 mg (15 mg Intravenous Given 4/16/22 1336)   HYDROmorphone (DILAUDID) injection 1 mg (1 mg Intravenous Given 4/16/22 1336)   orphenadrine (NORFLEX) injection 60 mg (60 mg Intramuscular Given 4/16/22 1336)        Labs  Lab Results (last 24 hours)     ** No results found for the last 24 hours. **           Imaging:  XR Spine Cervical Complete 4 or 5 View    Result Date: 4/16/2022  PROCEDURE: XR SPINE CERVICAL COMPLETE 4 OR 5 VW  COMPARISON: [facility], CT, CT CERVICAL SPINE WO CONTRAST, 11/12/2021, 14:28.  [facility], CR, C-SPINE >OR= 4V W/OBLIQUE, 11/01/2020, 14:02.  INDICATIONS: LEFT SIDE NECK PAIN  FINDINGS:  C7-T1 is partially obscured on the lateral and swimmer's views.  The visualized cervical vertebral bodies demonstrate well preserved height and alignment.  No evidence of acute fracture or subluxation of the cervical spine.  The intervertebral disc spaces are well maintained.  The bony neural foramina widely patent bilaterally.  The prevertebral soft tissues are unremarkable.  The included lung apices are clear.       No radiographic evidence of acute fracture or subluxation.     AMA CHAMORRO MD       Electronically Signed and Approved By: AMA CHAMORRO MD on 4/16/2022 at 13:45               Procedures:  Procedures    Progress                      "       Medical Decision Making:  MDM  Number of Diagnoses or Management Options  Cervical radiculopathy  Diagnosis management comments: The patient´s symptoms are consistent with musculoskeletal neck pain.  X-rays negative for fracture/dislocation.  The patient is now resting comfortably, feels better, is alert, talkative, interactive and in no distress. The repeat examination is unremarkable and benign. The patient is neurologically intact and is ambulatory in the ED. The patient has no fever, no bowel or bladder incontinence, no saddle anesthesia, and is otherwise alert and well appearing. The history, physical exam, and diagnostics (if any) do not suggest the presence of acute spinal epidural abscess, acute spinal epidural bleed, cauda equina syndrome, abdominal aortic aneurysm, aortic dissection or other process requiring further testing, treatment or consultation in the emergency department. The vital signs have been stable. The patient's condition is stable and appropriate for discharge. The patient will pursue further outpatient evaluation with the primary care physician or other designated for consulting position as indicated in the discharge instructions.       Amount and/or Complexity of Data Reviewed  Tests in the radiology section of CPT®: reviewed  Independent visualization of images, tracings, or specimens: yes    Risk of Complications, Morbidity, and/or Mortality  Presenting problems: moderate  Management options: moderate    Patient Progress  Patient progress: stable       Final diagnoses:   Cervical radiculopathy        Disposition:  ED Disposition     ED Disposition   Discharge    Condition   Stable    Comment   --             Documentation assistance provided by Marcella Rose acting as scribe for Lourdes Bustos MD. Information recorded by the scribe was done at my direction and has been verified and validated by me.         Marcella Rose  04/16/22 [id number]       Lourdes Bustos  MD  04/16/22 1211

## 2022-04-18 ENCOUNTER — TELEPHONE (OUTPATIENT)
Dept: ORTHOPEDIC SURGERY | Facility: CLINIC | Age: 57
End: 2022-04-18

## 2022-04-18 DIAGNOSIS — M75.102 NONTRAUMATIC TEAR OF LEFT ROTATOR CUFF, UNSPECIFIED TEAR EXTENT: ICD-10-CM

## 2022-04-18 RX ORDER — OXYCODONE AND ACETAMINOPHEN 7.5; 325 MG/1; MG/1
1-2 TABLET ORAL EVERY 4 HOURS PRN
Qty: 36 TABLET | Refills: 0 | Status: SHIPPED | OUTPATIENT
Start: 2022-04-18 | End: 2022-05-11 | Stop reason: SDUPTHER

## 2022-04-18 RX ORDER — CLONIDINE HYDROCHLORIDE 0.1 MG/1
TABLET ORAL
Qty: 90 TABLET | Refills: 3 | Status: SHIPPED | OUTPATIENT
Start: 2022-04-18 | End: 2022-08-08 | Stop reason: SDUPTHER

## 2022-04-18 NOTE — TELEPHONE ENCOUNTER
PATIENT CALLED AND REQUESTING A REFILL OF PAIN MEDS.  [facility] [place]

## 2022-04-26 ENCOUNTER — OFFICE VISIT (OUTPATIENT)
Dept: ORTHOPEDIC SURGERY | Facility: CLINIC | Age: 57
End: 2022-04-26

## 2022-04-26 VITALS — WEIGHT: 200.8 LBS | HEIGHT: 68 IN | OXYGEN SATURATION: 96 % | HEART RATE: 93 BPM | BODY MASS INDEX: 30.43 KG/M2

## 2022-04-26 DIAGNOSIS — Z47.89 AFTERCARE FOLLOWING SURGERY OF THE MUSCULOSKELETAL SYSTEM: Primary | ICD-10-CM

## 2022-04-26 PROCEDURE — 99024 POSTOP FOLLOW-UP VISIT: CPT | Performed by: PHYSICIAN ASSISTANT

## 2022-04-26 RX ORDER — HYDROCODONE BITARTRATE AND ACETAMINOPHEN 7.5; 325 MG/1; MG/1
TABLET ORAL
Qty: 36 TABLET | Refills: 0 | Status: SHIPPED | OUTPATIENT
Start: 2022-04-26 | End: 2022-05-02 | Stop reason: SDUPTHER

## 2022-04-26 NOTE — PROGRESS NOTES
"Chief Complaint  Pain and Follow-up of the Left Shoulder and Suture / Staple Removal    Subjective          Moreno Aleman is a 56 y.o. male  presents to [facility] for   History of Present Illness    Patient presents for 2-week postoperative evaluation of left shoulder arthroscopic subacromial decompression, bursectomy with rotator cuff debridement, 4/11/2022.  Patient states he has run out of his pain medicine he would like to switch from Percocet to [place].  Patient states that his pain is in the shoulder he admits to some burning type pain in the shoulder.  He states the incisions are healing well denies drainage or redness or swelling.  Patient states he has a sling at home he did not wear the sling today.  Patient was supposed to start physical therapy but he states he missed his first appointment he has to reschedule this.  He states he has pain at night, denies numbness and tingling, denies injury.  Allergies   Allergen Reactions   • Fentanyl Angioedema   • Cefdinir Itching   • Nifedipine Hives and Itching        Social History     Socioeconomic History   • Marital status:    Tobacco Use   • Smoking status: Never Smoker   • Smokeless tobacco: Never Used   Vaping Use   • Vaping Use: Never used   Substance and Sexual Activity   • Alcohol use: Yes     Comment: OCCASIONAL   • Drug use: Never   • Sexual activity: Defer        REVIEW OF SYSTEMS    Constitutional: Denies fevers, chills, weight loss  Cardiovascular: Denies chest pain, shortness of breath  Skin: Denies rashes, acute skin changes  Neurologic: Denies headache, loss of consciousness  MSK: Left shoulder pain      Objective   Vital Signs:   Pulse 93   Ht 171.5 cm (67.5\")   Wt 91.1 kg (200 lb 12.8 oz)   SpO2 96%   BMI 30.99 kg/m²     Body mass index is 30.99 kg/m².    Physical Exam    Left shoulder: Incisions are healing well, sutures were removed today.  No drainage, no redness, no swelling, no signs of infection.  " Active forward elevation 90, passive forward elevation 160, active abduction 80, external rotation with abduction 45, internal rotation to his side, neurovascular intact, elbow wrist hand range of motion intact    Procedures    Imaging Results (Most Recent)     None           Result Review :   The following data was reviewed by: NICK Nava on 04/26/2022:               Assessment and Plan    Diagnoses and all orders for this visit:    1. Aftercare following surgery of left shoulder arthroscopic subacromial decompression, bursectomy, rotator cuff debridement, 4/11/2022 (Primary)  -     Ambulatory Referral to Physical Therapy Evaluate and treat (2-3x/week for 6-8 week)        Discussed surgical procedure with the patient reviewed operative images, patient wants advised we will give him pain medication refill, he was given order to restart physical therapy, he was advised to wear the sling for the next 2 to 3 weeks, follow-up in 4 weeks for reevaluation follow-up sooner if any new or concerning symptoms occur, patient agreed.    Call or return if worsening symptoms.    Follow Up   Return in about 4 weeks (around 5/24/2022) for Recheck.  Patient was given instructions and counseling regarding his condition or for health maintenance advice. Please see specific information pulled into the AVS if appropriate.

## 2022-04-26 NOTE — TELEPHONE ENCOUNTER
PATIENT SAW CARYN TODAY AND MEDS WERE ORDERED AT FOLLOW UP. [facility]. NORCO 7.5/325MG 1 Q 4-6H PRN #[id number] ORDERED BY CARYN.

## 2022-05-02 ENCOUNTER — TELEPHONE (OUTPATIENT)
Dept: ORTHOPEDIC SURGERY | Facility: CLINIC | Age: 57
End: 2022-05-02

## 2022-05-02 DIAGNOSIS — Z47.89 AFTERCARE FOLLOWING SURGERY OF THE MUSCULOSKELETAL SYSTEM: ICD-10-CM

## 2022-05-02 RX ORDER — HYDROCODONE BITARTRATE AND ACETAMINOPHEN 7.5; 325 MG/1; MG/1
TABLET ORAL
Qty: 36 TABLET | Refills: 0 | Status: SHIPPED | OUTPATIENT
Start: 2022-05-02 | End: 2022-05-09

## 2022-05-09 ENCOUNTER — OFFICE VISIT (OUTPATIENT)
Dept: FAMILY MEDICINE CLINIC | Facility: CLINIC | Age: 57
End: 2022-05-09

## 2022-05-09 VITALS
BODY MASS INDEX: 30.34 KG/M2 | WEIGHT: 200.2 LBS | SYSTOLIC BLOOD PRESSURE: 150 MMHG | HEIGHT: 68 IN | DIASTOLIC BLOOD PRESSURE: 93 MMHG | HEART RATE: 100 BPM | TEMPERATURE: 98.4 F | OXYGEN SATURATION: 97 %

## 2022-05-09 DIAGNOSIS — M25.512 ACUTE PAIN OF LEFT SHOULDER: ICD-10-CM

## 2022-05-09 DIAGNOSIS — Z79.4 TYPE 2 DIABETES MELLITUS WITH HYPERGLYCEMIA, WITH LONG-TERM CURRENT USE OF INSULIN: Primary | ICD-10-CM

## 2022-05-09 DIAGNOSIS — E11.65 TYPE 2 DIABETES MELLITUS WITH HYPERGLYCEMIA, WITH LONG-TERM CURRENT USE OF INSULIN: Primary | ICD-10-CM

## 2022-05-09 DIAGNOSIS — E78.2 HYPERLIPEMIA, MIXED: ICD-10-CM

## 2022-05-09 DIAGNOSIS — I10 HYPERTENSION, ESSENTIAL: ICD-10-CM

## 2022-05-09 LAB
AMPHET+METHAMPHET UR QL: NEGATIVE
AMPHETAMINE INTERNAL CONTROL: ABNORMAL
AMPHETAMINES UR QL: NEGATIVE
BARBITURATE INTERNAL CONTROL: ABNORMAL
BARBITURATES UR QL SCN: NEGATIVE
BENZODIAZ UR QL SCN: NEGATIVE
BENZODIAZEPINE INTERNAL CONTROL: ABNORMAL
BUPRENORPHINE INTERNAL CONTROL: ABNORMAL
BUPRENORPHINE SERPL-MCNC: NEGATIVE NG/ML
CANNABINOIDS SERPL QL: NEGATIVE
COCAINE INTERNAL CONTROL: ABNORMAL
COCAINE UR QL: NEGATIVE
EXPIRATION DATE: ABNORMAL
Lab: ABNORMAL
MDMA (ECSTASY) INTERNAL CONTROL: ABNORMAL
MDMA UR QL SCN: NEGATIVE
METHADONE INTERNAL CONTROL: ABNORMAL
METHADONE UR QL SCN: NEGATIVE
METHAMPHETAMINE INTERNAL CONTROL: ABNORMAL
OPIATES INTERNAL CONTROL: ABNORMAL
OPIATES UR QL: NEGATIVE
OXYCODONE INTERNAL CONTROL: ABNORMAL
OXYCODONE UR QL SCN: POSITIVE
PCP UR QL SCN: NEGATIVE
PHENCYCLIDINE INTERNAL CONTROL: ABNORMAL
THC INTERNAL CONTROL: ABNORMAL

## 2022-05-09 PROCEDURE — 99214 OFFICE O/P EST MOD 30 MIN: CPT | Performed by: NURSE PRACTITIONER

## 2022-05-09 PROCEDURE — 80305 DRUG TEST PRSMV DIR OPT OBS: CPT | Performed by: NURSE PRACTITIONER

## 2022-05-09 RX ORDER — HYDROCODONE BITARTRATE AND ACETAMINOPHEN 5; 325 MG/1; MG/1
1 TABLET ORAL EVERY 6 HOURS PRN
Qty: 12 TABLET | Refills: 0 | Status: SHIPPED | OUTPATIENT
Start: 2022-05-09 | End: 2022-05-24 | Stop reason: DRUGHIGH

## 2022-05-09 NOTE — PROGRESS NOTES
"Chief Complaint  Hypogonadism    Subjective          Moreno Aleman presents to [facility]  She presents to the office today for 3-month follow-up regarding his diabetes.  I did asked the patient how his blood sugars have been running.  He states that they have been staying around 200 mahin.  Blood pressure is 150/93.  Denies any chest pain shortness of breath at this time.  He is currently taking Tresiba 22 units daily.  I did ask him about our previous discussion on how to titrate his insulin.  Patient was unable to recall the discussion.  I reiterated to the patient that I want him to increase his basal insulin 2 units every 3 days until his fasting glucoses were below 150.  I explained to the patient that his A1c remains extremely elevated.  Explained to the patient that if he did not improve on his A1c that I would refer him to endocrinology for possible insulin pump.  Patient recently had shoulder surgery as well.  He states that Dr. Dick was given him hydrocodone for the pain but states that he ran out this morning.  I explained to the patient that I would give him a day supply with him over until he could discuss this with Dr. Dick.  Explained to the patient also that we would follow-up with him every 3 months until his A1c was less than 8.      Objective   Vital Signs:  /93 (BP Location: Right arm, Patient Position: Sitting, Cuff Size: Adult)   Pulse 100   Temp 98.4 °F (36.9 °C) (Temporal)   Ht 171.5 cm (67.52\")   Wt 90.8 kg (200 lb 3.2 oz)   SpO2 97%   BMI 30.87 kg/m²     BMI is >= 30 and <= 34.9 (Class 1 obesity). The following options were offered after discussion: nutrition counseling/recommendations      Physical Exam  Vitals reviewed.   Constitutional:       Appearance: Normal appearance.   Cardiovascular:      Rate and Rhythm: Normal rate and regular rhythm.      Pulses: Normal pulses.      Heart sounds: Normal heart sounds, S1 normal and S2 " normal. No murmur heard.  Pulmonary:      Effort: Pulmonary effort is normal. No respiratory distress.      Breath sounds: Normal breath sounds.   Skin:     General: Skin is warm and dry.   Neurological:      Mental Status: He is alert and oriented to person, place, and time.   Psychiatric:         Attention and Perception: Attention normal.         Mood and Affect: Mood normal.         Behavior: Behavior normal.        Result Review :                Assessment and Plan    Diagnoses and all orders for this visit:    1. Type 2 diabetes mellitus with hyperglycemia, with long-term current use of insulin (HCC) (Primary)  -     Comprehensive Metabolic Panel; Future  -     Hemoglobin A1c; Future    2. Hyperlipemia, mixed  -     CBC (No Diff); Future  -     Comprehensive Metabolic Panel; Future  -     Lipid Panel; Future    3. Hypertension, essential  -     CBC (No Diff); Future  -     Comprehensive Metabolic Panel; Future    4. Acute pain of left shoulder  -     HYDROcodone-acetaminophen (NORCO) 5-325 MG per tablet; Take 1 tablet by mouth Every 6 (Six) Hours As Needed for Severe Pain .  Dispense: 12 tablet; Refill: 0  -     POC Urine Drug Screen [facility]           Follow Up   Return in about 3 months (around 8/9/2022).  Patient was given instructions and counseling regarding his condition or for health maintenance advice. Please see specific information pulled into the AVS if appropriate.

## 2022-05-11 DIAGNOSIS — M75.102 NONTRAUMATIC TEAR OF LEFT ROTATOR CUFF, UNSPECIFIED TEAR EXTENT: ICD-10-CM

## 2022-05-11 RX ORDER — OXYCODONE AND ACETAMINOPHEN 7.5; 325 MG/1; MG/1
1 TABLET ORAL EVERY 4 HOURS PRN
Qty: 36 TABLET | Refills: 0 | Status: SHIPPED | OUTPATIENT
Start: 2022-05-11 | End: 2022-08-08

## 2022-05-11 NOTE — TELEPHONE ENCOUNTER
Spoke with patient regarding pain medicine. Informed him that this would be the last time we would fill Oxycodone and that it would change to Norco. He is going to call pain management regarding refills since they prescribe him Norco 7.5 anyway.

## 2022-05-17 DIAGNOSIS — F51.01 PRIMARY INSOMNIA: ICD-10-CM

## 2022-05-17 RX ORDER — TRAZODONE HYDROCHLORIDE 100 MG/1
100 TABLET ORAL NIGHTLY
Qty: 90 TABLET | Refills: 1 | Status: SHIPPED | OUTPATIENT
Start: 2022-05-17 | End: 2022-08-09 | Stop reason: ALTCHOICE

## 2022-05-24 ENCOUNTER — OFFICE VISIT (OUTPATIENT)
Dept: ORTHOPEDIC SURGERY | Facility: CLINIC | Age: 57
End: 2022-05-24

## 2022-05-24 VITALS — OXYGEN SATURATION: 92 % | HEART RATE: 89 BPM | WEIGHT: 207.8 LBS | HEIGHT: 68 IN | BODY MASS INDEX: 31.49 KG/M2

## 2022-05-24 DIAGNOSIS — Z47.89 AFTERCARE FOLLOWING SURGERY OF THE MUSCULOSKELETAL SYSTEM: Primary | ICD-10-CM

## 2022-05-24 PROCEDURE — 99024 POSTOP FOLLOW-UP VISIT: CPT | Performed by: PHYSICIAN ASSISTANT

## 2022-05-24 RX ORDER — HYDROCODONE BITARTRATE AND ACETAMINOPHEN 7.5; 325 MG/1; MG/1
1 TABLET ORAL EVERY 8 HOURS PRN
COMMUNITY
Start: 2022-05-18 | End: 2022-11-09

## 2022-05-24 RX ORDER — GLUCOSAMINE SULFATE 500 MG
CAPSULE ORAL
COMMUNITY

## 2022-05-24 NOTE — PROGRESS NOTES
"Chief Complaint  Follow-up of the Left Shoulder    Subjective          Moreno Aleman is a 56 y.o. male  presents to [facility] for   History of Present Illness      Patient presents for follow-up evaluation of left shoulder arthroscopic subacromial decompression, bursectomy with rotator cuff debridement, 4/11/2022.  Patient states that he has been doing well he did not do physical therapy he states he has been doing home exercises and is happy with his progress.  He stopped his sling about 2 weeks ago.  He states he has no pain with range of motion he states he has been back to pain management for his pain medication he states he has been pleased with his progress of range of motion and strength, states he is happy with surgery results, no new complaints today.      Allergies   Allergen Reactions   • Fentanyl Angioedema   • Cefdinir Itching   • Nifedipine Hives and Itching        Social History     Socioeconomic History   • Marital status:    Tobacco Use   • Smoking status: Never Smoker   • Smokeless tobacco: Never Used   Vaping Use   • Vaping Use: Never used   Substance and Sexual Activity   • Alcohol use: Yes     Comment: OCCASIONAL   • Drug use: Never   • Sexual activity: Defer        REVIEW OF SYSTEMS    Constitutional: Denies fevers, chills, weight loss  Cardiovascular: Denies chest pain, shortness of breath  Skin: Denies rashes, acute skin changes  Neurologic: Denies headache, loss of consciousness  MSK: Left shoulder pain      Objective   Vital Signs:   Pulse 89   Ht 171.5 cm (67.5\")   Wt 94.3 kg (207 lb 12.8 oz)   SpO2 92%   BMI 32.07 kg/m²     Body mass index is 32.07 kg/m².    Physical Exam    Left shoulder: Incisions are well-healed, no erythema, no ecchymosis, no swelling, no signs of infection, active forward elevation 170, active abduction 110, external rotation with abduction 80, internal rotation to T12, 5 out of 5 strength, neurovascular intact.  No pain " with range of motion.    Procedures    Imaging Results (Most Recent)     None           Result Review :   The following data was reviewed by: NICK Nava on 05/24/2022:               Assessment and Plan    Diagnoses and all orders for this visit:    1. Aftercare following surgery of left shoulder arthroscopic subacromial decompression, bursectomy, rotator cuff debridement, 4/11/2022 (Primary)        Based on patient history and physical exam he was advised to continue home exercise program, avoid heavy lift push pull activities, states he will continue to guard his shoulder, he would like to avoid reinjury, follow-up in 6 weeks for recheck.    Call or return if worsening symptoms.    Follow Up   Return in about 6 weeks (around 7/5/2022) for Recheck.  Patient was given instructions and counseling regarding his condition or for health maintenance advice. Please see specific information pulled into the AVS if appropriate.

## 2022-08-06 DIAGNOSIS — E11.65 TYPE 2 DIABETES MELLITUS WITH HYPERGLYCEMIA, WITHOUT LONG-TERM CURRENT USE OF INSULIN: ICD-10-CM

## 2022-08-08 RX ORDER — LISINOPRIL 30 MG/1
30 TABLET ORAL DAILY
Qty: 90 TABLET | Refills: 1 | Status: SHIPPED | OUTPATIENT
Start: 2022-08-08

## 2022-08-08 RX ORDER — CLONIDINE HYDROCHLORIDE 0.1 MG/1
0.1 TABLET ORAL
COMMUNITY

## 2022-08-08 RX ORDER — LIDOCAINE 50 MG/G
OINTMENT TOPICAL
COMMUNITY
End: 2023-03-21

## 2022-08-09 ENCOUNTER — DOCUMENTATION (OUTPATIENT)
Dept: FAMILY MEDICINE CLINIC | Facility: CLINIC | Age: 57
End: 2022-08-09

## 2022-08-09 ENCOUNTER — OFFICE VISIT (OUTPATIENT)
Dept: FAMILY MEDICINE CLINIC | Facility: CLINIC | Age: 57
End: 2022-08-09

## 2022-08-09 ENCOUNTER — LAB (OUTPATIENT)
Dept: LAB | Facility: HOSPITAL | Age: 57
End: 2022-08-09

## 2022-08-09 VITALS
DIASTOLIC BLOOD PRESSURE: 90 MMHG | SYSTOLIC BLOOD PRESSURE: 146 MMHG | TEMPERATURE: 97.5 F | OXYGEN SATURATION: 97 % | HEIGHT: 68 IN | WEIGHT: 190.6 LBS | BODY MASS INDEX: 28.89 KG/M2 | HEART RATE: 81 BPM

## 2022-08-09 DIAGNOSIS — E78.2 HYPERLIPEMIA, MIXED: ICD-10-CM

## 2022-08-09 DIAGNOSIS — E78.2 HYPERLIPEMIA, MIXED: Primary | ICD-10-CM

## 2022-08-09 DIAGNOSIS — E11.65 TYPE 2 DIABETES MELLITUS WITH HYPERGLYCEMIA, WITH LONG-TERM CURRENT USE OF INSULIN: ICD-10-CM

## 2022-08-09 DIAGNOSIS — I10 HYPERTENSION, ESSENTIAL: ICD-10-CM

## 2022-08-09 DIAGNOSIS — Z79.4 TYPE 2 DIABETES MELLITUS WITH HYPERGLYCEMIA, WITH LONG-TERM CURRENT USE OF INSULIN: ICD-10-CM

## 2022-08-09 DIAGNOSIS — Z12.5 SCREENING FOR PROSTATE CANCER: ICD-10-CM

## 2022-08-09 DIAGNOSIS — G47.00 INSOMNIA, UNSPECIFIED TYPE: ICD-10-CM

## 2022-08-09 DIAGNOSIS — E29.1 HYPOGONADISM IN MALE: ICD-10-CM

## 2022-08-09 DIAGNOSIS — E11.65 TYPE 2 DIABETES MELLITUS WITH HYPERGLYCEMIA, WITHOUT LONG-TERM CURRENT USE OF INSULIN: ICD-10-CM

## 2022-08-09 DIAGNOSIS — F51.01 PRIMARY INSOMNIA: ICD-10-CM

## 2022-08-09 LAB
ALBUMIN SERPL-MCNC: 4.6 G/DL (ref 3.5–5.2)
ALBUMIN/GLOB SERPL: 1.9 G/DL
ALP SERPL-CCNC: 90 U/L (ref 39–117)
ALT SERPL W P-5'-P-CCNC: 11 U/L (ref 1–41)
ANION GAP SERPL CALCULATED.3IONS-SCNC: 15.6 MMOL/L (ref 5–15)
AST SERPL-CCNC: 13 U/L (ref 1–40)
BASOPHILS # BLD AUTO: 0.06 10*3/MM3 (ref 0–0.2)
BASOPHILS NFR BLD AUTO: 0.9 % (ref 0–1.5)
BILIRUB CONJ SERPL-MCNC: <0.2 MG/DL (ref 0–0.3)
BILIRUB SERPL-MCNC: 0.4 MG/DL (ref 0–1.2)
BUN SERPL-MCNC: 7 MG/DL (ref 6–20)
BUN/CREAT SERPL: 8.3 (ref 7–25)
CALCIUM SPEC-SCNC: 9.9 MG/DL (ref 8.6–10.5)
CHLORIDE SERPL-SCNC: 97 MMOL/L (ref 98–107)
CHOLEST SERPL-MCNC: 174 MG/DL (ref 0–200)
CO2 SERPL-SCNC: 24.4 MMOL/L (ref 22–29)
CREAT SERPL-MCNC: 0.84 MG/DL (ref 0.76–1.27)
DEPRECATED RDW RBC AUTO: 43.5 FL (ref 37–54)
EGFRCR SERPLBLD CKD-EPI 2021: 103 ML/MIN/1.73
EOSINOPHIL # BLD AUTO: 0.1 10*3/MM3 (ref 0–0.4)
EOSINOPHIL NFR BLD AUTO: 1.5 % (ref 0.3–6.2)
ERYTHROCYTE [DISTWIDTH] IN BLOOD BY AUTOMATED COUNT: 13.1 % (ref 12.3–15.4)
EXPIRATION DATE: ABNORMAL
GLOBULIN UR ELPH-MCNC: 2.4 GM/DL
GLUCOSE SERPL-MCNC: 402 MG/DL (ref 65–99)
HBA1C MFR BLD: 10.8 %
HBA1C MFR BLD: 11.2 % (ref 4.8–5.6)
HCT VFR BLD AUTO: 46.5 % (ref 37.5–51)
HDLC SERPL-MCNC: 35 MG/DL (ref 40–60)
HGB BLD-MCNC: 15.5 G/DL (ref 13–17.7)
IMM GRANULOCYTES # BLD AUTO: 0.02 10*3/MM3 (ref 0–0.05)
IMM GRANULOCYTES NFR BLD AUTO: 0.3 % (ref 0–0.5)
LDLC SERPL CALC-MCNC: 76 MG/DL (ref 0–100)
LDLC/HDLC SERPL: 1.73 {RATIO}
LYMPHOCYTES # BLD AUTO: 2.1 10*3/MM3 (ref 0.7–3.1)
LYMPHOCYTES NFR BLD AUTO: 32.2 % (ref 19.6–45.3)
Lab: ABNORMAL
MCH RBC QN AUTO: 30.2 PG (ref 26.6–33)
MCHC RBC AUTO-ENTMCNC: 33.3 G/DL (ref 31.5–35.7)
MCV RBC AUTO: 90.6 FL (ref 79–97)
MONOCYTES # BLD AUTO: 0.54 10*3/MM3 (ref 0.1–0.9)
MONOCYTES NFR BLD AUTO: 8.3 % (ref 5–12)
NEUTROPHILS NFR BLD AUTO: 3.71 10*3/MM3 (ref 1.7–7)
NEUTROPHILS NFR BLD AUTO: 56.8 % (ref 42.7–76)
NRBC BLD AUTO-RTO: 0 /100 WBC (ref 0–0.2)
PLATELET # BLD AUTO: 170 10*3/MM3 (ref 140–450)
PMV BLD AUTO: 10.3 FL (ref 6–12)
POTASSIUM SERPL-SCNC: 4 MMOL/L (ref 3.5–5.2)
PROT SERPL-MCNC: 7 G/DL (ref 6–8.5)
PSA SERPL-MCNC: 0.5 NG/ML (ref 0–4)
RBC # BLD AUTO: 5.13 10*6/MM3 (ref 4.14–5.8)
SODIUM SERPL-SCNC: 137 MMOL/L (ref 136–145)
TESTOST SERPL-MCNC: 214 NG/DL (ref 193–740)
TRIGL SERPL-MCNC: 393 MG/DL (ref 0–150)
VLDLC SERPL-MCNC: 63 MG/DL (ref 5–40)
WBC NRBC COR # BLD: 6.53 10*3/MM3 (ref 3.4–10.8)

## 2022-08-09 PROCEDURE — 80061 LIPID PANEL: CPT

## 2022-08-09 PROCEDURE — 85025 COMPLETE CBC W/AUTO DIFF WBC: CPT

## 2022-08-09 PROCEDURE — 84403 ASSAY OF TOTAL TESTOSTERONE: CPT

## 2022-08-09 PROCEDURE — G0103 PSA SCREENING: HCPCS

## 2022-08-09 PROCEDURE — 83036 HEMOGLOBIN GLYCOSYLATED A1C: CPT | Performed by: NURSE PRACTITIONER

## 2022-08-09 PROCEDURE — 83036 HEMOGLOBIN GLYCOSYLATED A1C: CPT

## 2022-08-09 PROCEDURE — 80053 COMPREHEN METABOLIC PANEL: CPT

## 2022-08-09 PROCEDURE — 99214 OFFICE O/P EST MOD 30 MIN: CPT | Performed by: NURSE PRACTITIONER

## 2022-08-09 PROCEDURE — 36415 COLL VENOUS BLD VENIPUNCTURE: CPT

## 2022-08-09 PROCEDURE — 82248 BILIRUBIN DIRECT: CPT

## 2022-08-09 PROCEDURE — 3046F HEMOGLOBIN A1C LEVEL >9.0%: CPT | Performed by: NURSE PRACTITIONER

## 2022-08-09 RX ORDER — DAPAGLIFLOZIN 10 MG/1
10 TABLET, FILM COATED ORAL DAILY
Qty: 90 TABLET | Refills: 1 | Status: SHIPPED | OUTPATIENT
Start: 2022-08-09 | End: 2022-11-09

## 2022-08-09 RX ORDER — INSULIN DEGLUDEC 200 U/ML
22 INJECTION, SOLUTION SUBCUTANEOUS DAILY
Qty: 2 PEN | Refills: 5 | Status: SHIPPED | OUTPATIENT
Start: 2022-08-09 | End: 2022-09-26 | Stop reason: SDUPTHER

## 2022-08-09 RX ORDER — ZOLPIDEM TARTRATE 10 MG/1
10 TABLET ORAL NIGHTLY PRN
Qty: 30 TABLET | Refills: 2 | Status: SHIPPED | OUTPATIENT
Start: 2022-08-09 | End: 2022-10-17 | Stop reason: SDUPTHER

## 2022-08-09 RX ORDER — CYCLOBENZAPRINE HCL 10 MG
10 TABLET ORAL 3 TIMES DAILY
Qty: 20 TABLET | Refills: 0 | Status: SHIPPED | OUTPATIENT
Start: 2022-08-09 | End: 2022-09-04

## 2022-08-09 NOTE — PROGRESS NOTES
On Call:    Pt was informed his glucose was 406. He has been out of his insulin for 3 months. He just picked it up today and will restart it immediately and monitor his BS. He feels fine this evening.    Smooth Bennett

## 2022-08-09 NOTE — PROGRESS NOTES
"Chief Complaint  Diabetes (3 month follow up )    Subjective        Moreno Aleman presents to [facility]  Patient presents to the office today for 3-month follow-up regarding his diabetes.  He states that he has been out of Tresiba due to the PA not being completed.  He also states that he has not had his testosterone.  I did explain to the patient that he needs to notify the office if he does not have the medications.  I did explain we were going to recheck his labs.  His A1c in the office today is 10.8%.  I did explain that he needs to be checking his glucose levels minimum of daily and to increase his insulin 2 units every 3 days until his fasting glucoses are 130 or less consistently.  Patient also states that he needs refills on his Flexeril Farxiga and his Tresiba.    Diabetes        Objective   Vital Signs:  /90 (BP Location: Right arm, Patient Position: Sitting, Cuff Size: Adult)   Pulse 81   Temp 97.5 °F (36.4 °C) (Temporal)   Ht 171.5 cm (67.5\")   Wt 86.5 kg (190 lb 9.6 oz)   SpO2 97%   BMI 29.41 kg/m²   Estimated body mass index is 29.41 kg/m² as calculated from the following:    Height as of this encounter: 171.5 cm (67.5\").    Weight as of this encounter: 86.5 kg (190 lb 9.6 oz).    BMI is >= 25 and <30. (Overweight) The following options were offered after discussion;: nutrition counseling/recommendations      Physical Exam  Vitals reviewed.   Constitutional:       Appearance: Normal appearance.   Cardiovascular:      Rate and Rhythm: Normal rate and regular rhythm.      Pulses: Normal pulses.      Heart sounds: Normal heart sounds, S1 normal and S2 normal. No murmur heard.  Pulmonary:      Effort: Pulmonary effort is normal. No respiratory distress.      Breath sounds: Normal breath sounds.   Skin:     General: Skin is warm and dry.   Neurological:      Mental Status: He is alert and oriented to person, place, and time.   Psychiatric:         Attention " and Perception: Attention normal.         Mood and Affect: Mood normal.         Behavior: Behavior normal.        Result Review :               Assessment and Plan   Diagnoses and all orders for this visit:    1. Hyperlipemia, mixed (Primary)  -     CBC & Differential; Future  -     Comprehensive Metabolic Panel; Future  -     Lipid Panel; Future    2. Hypertension, essential  -     CBC & Differential; Future  -     Comprehensive Metabolic Panel; Future  -     Lipid Panel; Future    3. Hypogonadism in male  -     Testosterone; Future    4. Insomnia, unspecified type    5. Screening for prostate cancer  -     PSA Screen; Future    6. Type 2 diabetes mellitus with hyperglycemia, with long-term current use of insulin (HCC)  -     CBC & Differential; Future  -     Comprehensive Metabolic Panel; Future  -     Hemoglobin A1c; Future  -     Lipid Panel; Future  -     MicroAlbumin, Urine, Random - Urine, Clean Catch; Future  -     POC Glycosylated Hemoglobin (Hb A1C)  -     Insulin Degludec (Tresiba FlexTouch) 200 UNIT/ML solution pen-injector pen injection; Inject 22 Units under the skin into the appropriate area as directed Daily.  Dispense: 2 pen; Refill: 5    7. Type 2 diabetes mellitus with hyperglycemia, without long-term current use of insulin (HCC)  -     dapagliflozin Propanediol (Farxiga) 10 MG tablet; Take 10 mg by mouth Daily.  Dispense: 90 tablet; Refill: 1    8. Primary insomnia  -     zolpidem (AMBIEN) 10 MG tablet; Take 1 tablet by mouth At Night As Needed for Sleep.  Dispense: 30 tablet; Refill: 2    Other orders  -     cyclobenzaprine (FLEXERIL) 10 MG tablet; Take 1 tablet by mouth 3 (Three) Times a Day.  Dispense: 20 tablet; Refill: 0             Follow Up   Return in about 3 months (around 11/9/2022) for Recheck.  Patient was given instructions and counseling regarding his condition or for health maintenance advice. Please see specific information pulled into the AVS if appropriate.

## 2022-08-10 ENCOUNTER — TELEPHONE (OUTPATIENT)
Dept: CARDIOLOGY | Facility: CLINIC | Age: 57
End: 2022-08-10

## 2022-08-10 RX ORDER — ROSUVASTATIN CALCIUM 10 MG/1
10 TABLET, COATED ORAL DAILY
Qty: 90 TABLET | Refills: 3 | Status: SHIPPED | OUTPATIENT
Start: 2022-08-10

## 2022-08-10 NOTE — TELEPHONE ENCOUNTER
----- Message from NINFA Tesfaye sent at 8/10/2022  8:18 AM EDT -----  CALL patient and notify him glucose is above 400, if symptomatic he should go to ER or call PCP for insulin instructions.  Notify pt LDL is not to goal of less than 70, recommend increasing crestor dose from 10 to 20 mg nightly. Repeat lipid panel in 3 months. Renal function looks good.

## 2022-08-10 NOTE — TELEPHONE ENCOUNTER
Spoke with patient about LDL he said that he has not been taking his cholesterol medication at all.     He is also aware of his glucose levels and he has been in contact with PCP.

## 2022-08-11 ENCOUNTER — HOSPITAL ENCOUNTER (EMERGENCY)
Facility: HOSPITAL | Age: 57
Discharge: HOME | End: 2022-08-11
Attending: EMERGENCY MEDICINE | Admitting: EMERGENCY MEDICINE

## 2022-08-11 VITALS
TEMPERATURE: 97.9 F | HEART RATE: 85 BPM | OXYGEN SATURATION: 97 % | BODY MASS INDEX: 29 KG/M2 | DIASTOLIC BLOOD PRESSURE: 99 MMHG | RESPIRATION RATE: 18 BRPM | HEIGHT: 68 IN | SYSTOLIC BLOOD PRESSURE: 163 MMHG | WEIGHT: 191.36 LBS

## 2022-08-11 DIAGNOSIS — R73.9 HYPERGLYCEMIA: Primary | ICD-10-CM

## 2022-08-11 LAB
ALBUMIN SERPL-MCNC: 4.7 G/DL (ref 3.5–5.2)
ALBUMIN/GLOB SERPL: 1.9 G/DL
ALP SERPL-CCNC: 99 U/L (ref 39–117)
ALT SERPL W P-5'-P-CCNC: 11 U/L (ref 1–41)
ANION GAP SERPL CALCULATED.3IONS-SCNC: 14.9 MMOL/L (ref 5–15)
AST SERPL-CCNC: 15 U/L (ref 1–40)
BASOPHILS # BLD AUTO: 0.04 10*3/MM3 (ref 0–0.2)
BASOPHILS NFR BLD AUTO: 0.5 % (ref 0–1.5)
BILIRUB SERPL-MCNC: 0.5 MG/DL (ref 0–1.2)
BILIRUB UR QL STRIP: NEGATIVE
BUN SERPL-MCNC: 11 MG/DL (ref 6–20)
BUN/CREAT SERPL: 12.1 (ref 7–25)
CALCIUM SPEC-SCNC: 9.8 MG/DL (ref 8.6–10.5)
CHLORIDE SERPL-SCNC: 99 MMOL/L (ref 98–107)
CLARITY UR: CLEAR
CO2 SERPL-SCNC: 24.1 MMOL/L (ref 22–29)
COLOR UR: YELLOW
CREAT SERPL-MCNC: 0.91 MG/DL (ref 0.76–1.27)
DEPRECATED RDW RBC AUTO: 37.9 FL (ref 37–54)
EGFRCR SERPLBLD CKD-EPI 2021: 99.5 ML/MIN/1.73
EOSINOPHIL # BLD AUTO: 0.06 10*3/MM3 (ref 0–0.4)
EOSINOPHIL NFR BLD AUTO: 0.8 % (ref 0.3–6.2)
ERYTHROCYTE [DISTWIDTH] IN BLOOD BY AUTOMATED COUNT: 12.6 % (ref 12.3–15.4)
GLOBULIN UR ELPH-MCNC: 2.5 GM/DL
GLUCOSE BLDC GLUCOMTR-MCNC: 236 MG/DL (ref 70–99)
GLUCOSE BLDC GLUCOMTR-MCNC: 326 MG/DL (ref 70–99)
GLUCOSE SERPL-MCNC: 383 MG/DL (ref 65–99)
GLUCOSE UR STRIP-MCNC: ABNORMAL MG/DL
HCT VFR BLD AUTO: 42.7 % (ref 37.5–51)
HGB BLD-MCNC: 15.6 G/DL (ref 13–17.7)
HGB UR QL STRIP.AUTO: NEGATIVE
HOLD SPECIMEN: NORMAL
HOLD SPECIMEN: NORMAL
IMM GRANULOCYTES # BLD AUTO: 0.02 10*3/MM3 (ref 0–0.05)
IMM GRANULOCYTES NFR BLD AUTO: 0.3 % (ref 0–0.5)
KETONES UR QL STRIP: NEGATIVE
LEUKOCYTE ESTERASE UR QL STRIP.AUTO: NEGATIVE
LYMPHOCYTES # BLD AUTO: 2.24 10*3/MM3 (ref 0.7–3.1)
LYMPHOCYTES NFR BLD AUTO: 28.7 % (ref 19.6–45.3)
MCH RBC QN AUTO: 30.5 PG (ref 26.6–33)
MCHC RBC AUTO-ENTMCNC: 36.5 G/DL (ref 31.5–35.7)
MCV RBC AUTO: 83.6 FL (ref 79–97)
MONOCYTES # BLD AUTO: 0.49 10*3/MM3 (ref 0.1–0.9)
MONOCYTES NFR BLD AUTO: 6.3 % (ref 5–12)
NEUTROPHILS NFR BLD AUTO: 4.96 10*3/MM3 (ref 1.7–7)
NEUTROPHILS NFR BLD AUTO: 63.4 % (ref 42.7–76)
NITRITE UR QL STRIP: NEGATIVE
NRBC BLD AUTO-RTO: 0 /100 WBC (ref 0–0.2)
PH UR STRIP.AUTO: 6 [PH] (ref 5–8)
PLATELET # BLD AUTO: 161 10*3/MM3 (ref 140–450)
PMV BLD AUTO: 9.6 FL (ref 6–12)
POTASSIUM SERPL-SCNC: 3.8 MMOL/L (ref 3.5–5.2)
PROT SERPL-MCNC: 7.2 G/DL (ref 6–8.5)
PROT UR QL STRIP: NEGATIVE
RBC # BLD AUTO: 5.11 10*6/MM3 (ref 4.14–5.8)
SODIUM SERPL-SCNC: 138 MMOL/L (ref 136–145)
SP GR UR STRIP: >1.03 (ref 1–1.03)
UROBILINOGEN UR QL STRIP: ABNORMAL
WBC NRBC COR # BLD: 7.81 10*3/MM3 (ref 3.4–10.8)
WHOLE BLOOD HOLD COAG: NORMAL
WHOLE BLOOD HOLD SPECIMEN: NORMAL

## 2022-08-11 PROCEDURE — 82962 GLUCOSE BLOOD TEST: CPT

## 2022-08-11 PROCEDURE — 36415 COLL VENOUS BLD VENIPUNCTURE: CPT

## 2022-08-11 PROCEDURE — 81003 URINALYSIS AUTO W/O SCOPE: CPT

## 2022-08-11 PROCEDURE — 80053 COMPREHEN METABOLIC PANEL: CPT | Performed by: EMERGENCY MEDICINE

## 2022-08-11 PROCEDURE — 99283 EMERGENCY DEPT VISIT LOW MDM: CPT

## 2022-08-11 PROCEDURE — 85025 COMPLETE CBC W/AUTO DIFF WBC: CPT

## 2022-08-11 RX ORDER — SODIUM CHLORIDE 0.9 % (FLUSH) 0.9 %
10 SYRINGE (ML) INJECTION AS NEEDED
Status: DISCONTINUED | OUTPATIENT
Start: 2022-08-11 | End: 2022-08-11 | Stop reason: HOSPADM

## 2022-08-11 NOTE — ED NOTES
Pt states he did not take his metformin this morning because it gives him loose stools. Has a script for another med from provider but has not picked it up yet. Checked his blood sugar last a few days ago at home, his BS is 326 at this time

## 2022-08-11 NOTE — DISCHARGE INSTRUCTIONS
Drink plenty of fluids.  Take medications as prescribed.  Follow diabetic diet.  Return for worsening symptoms.  Follow-up with your provider in 2 days if no better

## 2022-08-11 NOTE — ED PROVIDER NOTES
Time: 1:17 PM EDT  Arrived by: private car  Chief Complaint: High blood sugar, fatigue  History provided by: Patient  History is limited by: N/A     History of Present Illness:  Patient is a 56 y.o. year old male who presents to the emergency department with high blood sugar and fatigue.  Pt has h/o DM. Pt states he did not take his metformin this morning because it gives him loose stools. Pt states he measured his blood sugar last a few days ago at home and was 326. Pt reports receiving a call today by PCP for Hyperglycemia of over 400 on lab work from 2 days ago. Pt has been out of his insulin for 3 months, has a prescription for more medication that hasn't picked up yet. Other than high blood sugar, Pt only c/o fatigue and intermittent diaphoresis. Pt denies nausea or emesis. Pt notes intermittent episodes of diarrhea, denies hematochezia and melena. Pt denies dysuria, hematuria, urinary urgency, hesitancy or frequency. Pt denies fever, chills, nasal congestion, rhinorrhea, sore throat, cough, chest tightness and SOB. Pt is not on steroid medication currently. Pt admits to drinking a lot of coke.      History provided by:  Patient   used: No        Similar Symptoms Previously: No  Recently seen: No      Patient Care Team  Primary Care Provider: Ollie Moreland APRN    Past Medical History:     Allergies   Allergen Reactions   • Fentanyl Angioedema and Swelling   • Cefdinir Itching   • Nifedipine Hives and Itching     Past Medical History:   Diagnosis Date   • CAD (coronary artery disease)    • Chest pain    • Diabetes (HCC)    • Hyperlipidemia    • Hypertension      Past Surgical History:   Procedure Laterality Date   • CARDIAC CATHETERIZATION      showed significant coronary artery disease of the LAD with calcification.    • CHOLECYSTECTOMY     • CIRCUMCISION     • CORONARY ANGIOPLASTY WITH STENT PLACEMENT     • INGUINAL HERNIA REPAIR      x2   • NOSE SURGERY     • CA RT/LT HEART CATHETERS N/A  03/22/2016    Procedure: Percutaneous Coronary Intervention - Rota/stent LAD;  Surgeon: Marek Emery MD;  Location:  [facility];  Service: Cardiovascular   • ROTATOR CUFF REPAIR Left    • SHOULDER ARTHROSCOPY Right     x2   • SHOULDER ARTHROSCOPY WITH SUBACROMIAL DECOMPRESSION Left 4/11/2022    Procedure: left SHOULDER ARTHROSCOPY SUBACROMIAL DECOMPRESSION WITH ROTATOR CUFF DEBRIDEMENT;  Surgeon: Kam Dick MD;  Location: [facility] [place];  Service: Orthopedics;  Laterality: Left;   • SPINAL FUSION       Family History   Problem Relation Age of Onset   • Hypertension Mother    • Diabetes Mother    • Heart attack Father    • Heart disease Father    • Heart failure Father    • Diabetes Father        Home Medications:  Prior to Admission medications    Medication Sig Start Date End Date Taking? Authorizing Provider   Blood Glucose Monitoring Suppl (FreeStyle Lite) w/Device kit See Admin Instructions. 11/8/21   Jody Bejarano MD   cloNIDine (CATAPRES) 0.1 MG tablet Take 0.1 mg by mouth.    Jody Bejarano MD   clopidogrel (PLAVIX) 75 MG tablet Take 1 tablet by mouth Daily.  Patient taking differently: Take 75 mg by mouth Daily. Last dose 04/03/22 as directed by Dr. Dick 4/5/22   Ran Pan MD   cyclobenzaprine (FLEXERIL) 10 MG tablet Take 1 tablet by mouth 3 (Three) Times a Day. 8/9/22   Ollie Moreland APRN   dapagliflozin Propanediol (Farxiga) 10 MG tablet Take 10 mg by mouth Daily. 8/9/22   Ollie Moreland APRN   FREESTYLE LITE test strip USE TO TEST BLOOD SUGAR ONCE A DAY 11/9/21   Jody Bejarano MD   Glucosamine 500 MG capsule Glucosamine    Jody Bejarano MD   HYDROcodone-acetaminophen (NORCO) 7.5-325 MG per tablet Take 1 tablet by mouth Every 8 (Eight) Hours As Needed. 5/18/22   Jody Bejarano MD   Insulin Degludec (Tresiba FlexTouch) 200 UNIT/ML solution pen-injector pen injection Inject 22 Units under the skin into the appropriate area as  "directed Daily. 8/9/22   Ollie Moreland APRN   Insulin Pen Needle (NovoFine Plus Pen Needle) 32G X 4 MM misc 22 Units Daily. 2/8/22   Ollie Moreland APRN   lidocaine (XYLOCAINE) 5 % ointment lidocaine 5 % topical ointment   APPLY nickel size amount BY TOPICAL ROUTE 3 to 4 TIMES DAILY AS NEEDED to the left shoulder   3 RF given 7/12/2022    Provider, MD Jody   lisinopril (PRINIVIL,ZESTRIL) 30 MG tablet TAKE 1 TABLET BY MOUTH DAILY 8/8/22   Ollie Moreland APRN   metFORMIN (GLUCOPHAGE) 1000 MG tablet TAKE 1 TABLET BY MOUTH TWICE DAILY WITH MEALS 8/8/22   Ollie Moreland APRN   ondansetron ODT (ZOFRAN-ODT) 4 MG disintegrating tablet Place 1 tablet on the tongue Every 6 (Six) Hours As Needed for Nausea or Vomiting. 11/12/21   Mari Christopher APRN   rosuvastatin (CRESTOR) 10 MG tablet Take 1 tablet by mouth Daily. 8/10/22   Ran Pan MD   sildenafil (Viagra) 100 MG tablet Take 1 tablet by mouth Daily As Needed for Erectile Dysfunction. 8/3/21   Ollie Moreland APRN   Syringe 21G X 1\" 3 ML misc 100 mg Every 14 (Fourteen) Days. 12/1/21   Ollie Moreland APRN   Testosterone Cypionate (DEPOTESTOTERONE CYPIONATE) 200 MG/ML injection Inject 0.5 mL into the appropriate muscle as directed by prescriber Every 14 (Fourteen) Days.  Patient taking differently: Inject 100 mg into the appropriate muscle as directed by prescriber Every 14 (Fourteen) Days. Has not received in a couple months 2/8/22   Ollie Moreland APRN   zolpidem (AMBIEN) 10 MG tablet Take 1 tablet by mouth At Night As Needed for Sleep. 8/9/22   Ollie Moreland APRN        Social History:   Social History     Tobacco Use   • Smoking status: Never Smoker   • Smokeless tobacco: Never Used   Vaping Use   • Vaping Use: Never used   Substance Use Topics   • Alcohol use: Yes     Comment: OCCASIONAL   • Drug use: Never         Review of Systems:  Review of Systems   Constitutional: Positive for diaphoresis and fatigue. Negative for activity change, chills, fever and " "unexpected weight change.   HENT: Negative for congestion, rhinorrhea, sinus pressure, sore throat and trouble swallowing.    Eyes: Negative for pain, discharge, redness and visual disturbance.   Respiratory: Negative for cough, chest tightness, shortness of breath and wheezing.    Cardiovascular: Negative for chest pain and palpitations.   Gastrointestinal: Negative for abdominal pain, blood in stool, diarrhea, nausea and vomiting.   Endocrine: Negative for cold intolerance and polydipsia.   Genitourinary: Negative for dysuria, frequency, hematuria and urgency.   Musculoskeletal: Negative for arthralgias, joint swelling, neck pain and neck stiffness.   Skin: Negative for color change and rash.   Allergic/Immunologic: Negative for environmental allergies and immunocompromised state.   Neurological: Negative for dizziness, weakness and light-headedness.   Hematological: Does not bruise/bleed easily.   Psychiatric/Behavioral: Negative for agitation, confusion, dysphoric mood and suicidal ideas.        Physical Exam:  /99   Pulse 85   Temp 97.9 °F (36.6 °C) (Oral)   Resp 18   Ht 172.7 cm (68\")   Wt 86.8 kg (191 lb 5.8 oz)   SpO2 97%   BMI 29.10 kg/m²     Physical Exam  Vitals and nursing note reviewed.   Constitutional:       General: He is not in acute distress.     Appearance: Normal appearance. He is not toxic-appearing.   HENT:      Head: Normocephalic and atraumatic.      Jaw: There is normal jaw occlusion.   Eyes:      General: Lids are normal.      Extraocular Movements: Extraocular movements intact.      Conjunctiva/sclera: Conjunctivae normal.      Pupils: Pupils are equal, round, and reactive to light.   Cardiovascular:      Rate and Rhythm: Normal rate and regular rhythm.      Pulses: Normal pulses.      Heart sounds: Normal heart sounds.   Pulmonary:      Effort: Pulmonary effort is normal. No respiratory distress.      Breath sounds: Normal breath sounds. No wheezing or rhonchi.   Abdominal: "      General: Abdomen is flat.      Palpations: Abdomen is soft.      Tenderness: There is no abdominal tenderness. There is no guarding or rebound.   Musculoskeletal:         General: Normal range of motion.      Cervical back: Normal range of motion and neck supple.      Right lower leg: No edema.      Left lower leg: No edema.   Skin:     General: Skin is warm and dry.   Neurological:      Mental Status: He is alert and oriented to person, place, and time. Mental status is at baseline.   Psychiatric:         Mood and Affect: Mood normal.                Medications in the Emergency Department:  Medications   sodium chloride 0.9 % flush 10 mL (has no administration in time range)        Labs  Lab Results (last 24 hours)     Procedure Component Value Units Date/Time    CBC & Differential [[id number]]  (Abnormal) Collected: 08/11/22 1140    Specimen: Blood Updated: 08/11/22 1156    Narrative:      The following orders were created for panel order CBC & Differential.  Procedure                               Abnormality         Status                     ---------                               -----------         ------                     CBC Auto Differential[[id number]]        Abnormal            Final result                 Please view results for these tests on the individual orders.    Comprehensive Metabolic Panel [[id number]]  (Abnormal) Collected: 08/11/22 1140    Specimen: Blood Updated: 08/11/22 1220     Glucose 383 mg/dL      BUN 11 mg/dL      Creatinine 0.91 mg/dL      Sodium 138 mmol/L      Potassium 3.8 mmol/L      Chloride 99 mmol/L      CO2 24.1 mmol/L      Calcium 9.8 mg/dL      Total Protein 7.2 g/dL      Albumin 4.70 g/dL      ALT (SGPT) 11 U/L      AST (SGOT) 15 U/L      Alkaline Phosphatase 99 U/L      Total Bilirubin 0.5 mg/dL      Globulin 2.5 gm/dL      A/G Ratio 1.9 g/dL      BUN/Creatinine Ratio 12.1     Anion Gap 14.9 mmol/L      eGFR 99.5 mL/min/1.73      Comment: National Kidney Foundation  and American Society of Nephrology (ASN) Task Force recommended calculation based on the Chronic Kidney Disease Epidemiology Collaboration (CKD-EPI) equation refit without adjustment for race.       Narrative:      GFR Normal >60  Chronic Kidney Disease <60  Kidney Failure <15      Urinalysis With Microscopic If Indicated (No Culture) - Urine, Clean Catch [[id number]]  (Abnormal) Collected: 08/11/22 1140    Specimen: Urine, Clean Catch Updated: 08/11/22 1158     Color, UA Yellow     Appearance, UA Clear     pH, UA 6.0     Specific Gravity, UA >1.030     Glucose, UA >=1000 mg/dL (3+)     Ketones, UA Negative     Bilirubin, UA Negative     Blood, UA Negative     Protein, UA Negative     Leuk Esterase, UA Negative     Nitrite, UA Negative     Urobilinogen, UA 0.2 E.U./dL    Narrative:      Urine microscopic not indicated.    CBC Auto Differential [[id number]]  (Abnormal) Collected: 08/11/22 1140    Specimen: Blood Updated: 08/11/22 1156     WBC 7.81 10*3/mm3      RBC 5.11 10*6/mm3      Hemoglobin 15.6 g/dL      Hematocrit 42.7 %      MCV 83.6 fL      MCH 30.5 pg      MCHC 36.5 g/dL      RDW 12.6 %      RDW-SD 37.9 fl      MPV 9.6 fL      Platelets 161 10*3/mm3      Neutrophil % 63.4 %      Lymphocyte % 28.7 %      Monocyte % 6.3 %      Eosinophil % 0.8 %      Basophil % 0.5 %      Immature Grans % 0.3 %      Neutrophils, Absolute 4.96 10*3/mm3      Lymphocytes, Absolute 2.24 10*3/mm3      Monocytes, Absolute 0.49 10*3/mm3      Eosinophils, Absolute 0.06 10*3/mm3      Basophils, Absolute 0.04 10*3/mm3      Immature Grans, Absolute 0.02 10*3/mm3      nRBC 0.0 /100 WBC     POC Glucose Once [[id number]]  (Abnormal) Collected: 08/11/22 1205    Specimen: Blood Updated: 08/11/22 1206     Glucose 326 mg/dL      Comment: Serial Number: [id number]:  [id number]       POC Glucose Once [[id number]]  (Abnormal) Collected: 08/11/22 1326    Specimen: Blood Updated: 08/11/22 1328     Glucose 236 mg/dL      Comment: Serial Number:  [id number]:  [id number]              Imaging:  No Radiology Exams Resulted Within Past 24 Hours    Procedures:  Procedures    Progress                            Medical Decision Making:  MDM  Number of Diagnoses or Management Options     Amount and/or Complexity of Data Reviewed  Clinical lab tests: reviewed  Decide to obtain previous medical records or to obtain history from someone other than the patient: yes  Obtain history from someone other than the patient: yes  Review and summarize past medical records: yes  Independent visualization of images, tracings, or specimens: yes         Final diagnoses:   Hyperglycemia        Disposition:  ED Disposition     ED Disposition   Discharge    Condition   Stable    Comment   --             This medical record created using voice recognition software and a virtual scribe.    Documentation assistance provided by Lisette Alexander acting as scribe for Dez lAlred DO. Information recorded by the scribe was done at my direction and has been verified and validated by me.          Lisette Alexander  08/11/22 1415       Dez Allred DO  08/11/22 1503

## 2022-08-26 ENCOUNTER — TELEPHONE (OUTPATIENT)
Dept: FAMILY MEDICINE CLINIC | Facility: CLINIC | Age: 57
End: 2022-08-26

## 2022-08-26 DIAGNOSIS — E29.1 HYPOGONADISM IN MALE: ICD-10-CM

## 2022-08-26 RX ORDER — TESTOSTERONE CYPIONATE 200 MG/ML
100 INJECTION, SOLUTION INTRAMUSCULAR
Qty: 1 ML | Refills: 0 | Status: SHIPPED | OUTPATIENT
Start: 2022-08-26 | End: 2022-09-26 | Stop reason: SDUPTHER

## 2022-08-26 NOTE — TELEPHONE ENCOUNTER
Caller: Moreno Aleman    Relationship: Self    Best call back number: [phone]    Requested Prescriptions:   Requested Prescriptions     Pending Prescriptions Disp Refills   • Testosterone Cypionate (DEPOTESTOTERONE CYPIONATE) 200 MG/ML injection 1 mL 0     Sig: Inject 0.5 mL into the appropriate muscle as directed by prescriber Every 14 (Fourteen) Days.        Pharmacy where request should be sent: [id number] [place] [facility] [address] AT [facility] [phone] [facility] [phone] FX       Does the patient have less than a 3 day supply:  [x] Yes  [] No    Huber Palacios Rep   08/26/22 08:52 EDT

## 2022-09-04 ENCOUNTER — HOSPITAL ENCOUNTER (EMERGENCY)
Facility: HOSPITAL | Age: 57
Discharge: HOME | End: 2022-09-04
Attending: STUDENT IN AN ORGANIZED HEALTH CARE EDUCATION/TRAINING PROGRAM | Admitting: STUDENT IN AN ORGANIZED HEALTH CARE EDUCATION/TRAINING PROGRAM

## 2022-09-04 VITALS
BODY MASS INDEX: 32.11 KG/M2 | OXYGEN SATURATION: 98 % | WEIGHT: 204.59 LBS | HEART RATE: 72 BPM | DIASTOLIC BLOOD PRESSURE: 97 MMHG | SYSTOLIC BLOOD PRESSURE: 182 MMHG | RESPIRATION RATE: 18 BRPM | HEIGHT: 67 IN | TEMPERATURE: 98.3 F

## 2022-09-04 DIAGNOSIS — M77.8 SHOULDER TENDINITIS, LEFT: ICD-10-CM

## 2022-09-04 DIAGNOSIS — G56.02 CARPAL TUNNEL SYNDROME OF LEFT WRIST: ICD-10-CM

## 2022-09-04 DIAGNOSIS — M54.2 NECK PAIN ON LEFT SIDE: Primary | ICD-10-CM

## 2022-09-04 PROCEDURE — 99283 EMERGENCY DEPT VISIT LOW MDM: CPT

## 2022-09-04 PROCEDURE — 25010000002 DEXAMETHASONE SODIUM PHOSPHATE 10 MG/ML SOLUTION

## 2022-09-04 PROCEDURE — 96372 THER/PROPH/DIAG INJ SC/IM: CPT

## 2022-09-04 PROCEDURE — 25010000002 KETOROLAC TROMETHAMINE PER 15 MG

## 2022-09-04 RX ORDER — CYCLOBENZAPRINE HCL 10 MG
10 TABLET ORAL ONCE
Status: COMPLETED | OUTPATIENT
Start: 2022-09-04 | End: 2022-09-04

## 2022-09-04 RX ORDER — METHYLPREDNISOLONE 4 MG/1
TABLET ORAL
Qty: 21 TABLET | Refills: 0 | Status: SHIPPED | OUTPATIENT
Start: 2022-09-04 | End: 2022-09-10

## 2022-09-04 RX ORDER — KETOROLAC TROMETHAMINE 30 MG/ML
30 INJECTION, SOLUTION INTRAMUSCULAR; INTRAVENOUS ONCE
Status: COMPLETED | OUTPATIENT
Start: 2022-09-04 | End: 2022-09-04

## 2022-09-04 RX ORDER — KETOROLAC TROMETHAMINE 10 MG/1
10 TABLET, FILM COATED ORAL EVERY 6 HOURS PRN
Qty: 15 TABLET | Refills: 0 | Status: SHIPPED | OUTPATIENT
Start: 2022-09-04 | End: 2023-03-21

## 2022-09-04 RX ORDER — TRAZODONE HYDROCHLORIDE 100 MG/1
100 TABLET ORAL NIGHTLY
COMMUNITY
Start: 2022-08-13 | End: 2022-11-09 | Stop reason: ALTCHOICE

## 2022-09-04 RX ORDER — ORPHENADRINE CITRATE 100 MG/1
100 TABLET, EXTENDED RELEASE ORAL 2 TIMES DAILY
Qty: 20 TABLET | Refills: 0 | OUTPATIENT
Start: 2022-09-04 | End: 2023-02-07

## 2022-09-04 RX ORDER — DEXAMETHASONE SODIUM PHOSPHATE 10 MG/ML
10 INJECTION INTRAMUSCULAR; INTRAVENOUS ONCE
Status: COMPLETED | OUTPATIENT
Start: 2022-09-04 | End: 2022-09-04

## 2022-09-04 RX ADMIN — CYCLOBENZAPRINE HYDROCHLORIDE 10 MG: 10 TABLET, FILM COATED ORAL at 15:53

## 2022-09-04 RX ADMIN — DEXAMETHASONE SODIUM PHOSPHATE 10 MG: 10 INJECTION, SOLUTION INTRAMUSCULAR; INTRAVENOUS at 15:56

## 2022-09-04 RX ADMIN — KETOROLAC TROMETHAMINE 30 MG: 30 INJECTION, SOLUTION INTRAMUSCULAR; INTRAVENOUS at 15:59

## 2022-09-04 NOTE — ED PROVIDER NOTES
Subjective   Patient is a 56-year-old male presenting today due to left shoulder pain that started about 3 to 4 weeks ago.  Patient 2 previous shoulder surgeries with the most recent being April and last saw orthopedics in June of this year.  Patient states that he has been having sharp pain radiating from the left shoulder up his left side of the neck as well as numbness to the third fourth and fifth digit for the past 3 to 4 weeks as well.  He denies recent fall or injury.  States that he has been taking Motrin and hydrocodone with no relief.      History provided by:  Patient   used: No        Review of Systems   Constitutional: Negative.    HENT: Negative.    Eyes: Negative.    Respiratory: Negative.    Cardiovascular: Negative.    Gastrointestinal: Negative.    Endocrine: Negative.    Genitourinary: Negative.    Musculoskeletal: Positive for arthralgias (shoulder) and neck pain. Negative for joint swelling.   Skin: Negative.    Allergic/Immunologic: Negative.    Neurological: Negative.    Hematological: Negative.    Psychiatric/Behavioral: Negative.        Past Medical History:   Diagnosis Date   • CAD (coronary artery disease)    • Chest pain    • Diabetes (HCC)    • Hyperlipidemia    • Hypertension        Allergies   Allergen Reactions   • Fentanyl Angioedema and Swelling   • Cefdinir Itching   • Nifedipine Hives and Itching       Past Surgical History:   Procedure Laterality Date   • CARDIAC CATHETERIZATION      showed significant coronary artery disease of the LAD with calcification.    • CHOLECYSTECTOMY     • CIRCUMCISION     • CORONARY ANGIOPLASTY WITH STENT PLACEMENT     • INGUINAL HERNIA REPAIR      x2   • NOSE SURGERY     • NE RT/LT HEART CATHETERS N/A 03/22/2016    Procedure: Percutaneous Coronary Intervention - Rota/stent LAD;  Surgeon: Marek Emery MD;  Location: [facility] INVASIVE LOCATION;  Service: Cardiovascular   • ROTATOR CUFF REPAIR Left    • SHOULDER ARTHROSCOPY Right      x2   • SHOULDER ARTHROSCOPY WITH SUBACROMIAL DECOMPRESSION Left 4/11/2022    Procedure: left SHOULDER ARTHROSCOPY SUBACROMIAL DECOMPRESSION WITH ROTATOR CUFF DEBRIDEMENT;  Surgeon: Kam Dick MD;  [place] [facility] [place]  Service: Orthopedics;  Laterality: Left;   • SPINAL FUSION         Family History   Problem Relation Age of Onset   • Hypertension Mother    • Diabetes Mother    • Heart attack Father    • Heart disease Father    • Heart failure Father    • Diabetes Father        Social History     Socioeconomic History   • Marital status:    Tobacco Use   • Smoking status: Never Smoker   • Smokeless tobacco: Never Used   Vaping Use   • Vaping Use: Never used   Substance and Sexual Activity   • Alcohol use: Yes     Comment: OCCASIONAL   • Drug use: Never   • Sexual activity: Defer           Objective   Physical Exam  Vitals and nursing note reviewed.   Constitutional:       Appearance: Normal appearance. He is normal weight.   HENT:      Head: Normocephalic and atraumatic.      Right Ear: Tympanic membrane normal.      Left Ear: Tympanic membrane normal.      Nose: Nose normal.      Mouth/Throat:      Mouth: Mucous membranes are moist.   Eyes:      Extraocular Movements: Extraocular movements intact.      Conjunctiva/sclera: Conjunctivae normal.      Pupils: Pupils are equal, round, and reactive to light.   Neck:        Comments: spurling test negative   Cardiovascular:      Rate and Rhythm: Normal rate and regular rhythm.      Pulses: Normal pulses.      Heart sounds: Normal heart sounds.   Pulmonary:      Effort: Pulmonary effort is normal.      Breath sounds: Normal breath sounds.   Musculoskeletal:         General: Tenderness present. No swelling or signs of injury.      Right shoulder: Normal.      Left shoulder: Tenderness present. No swelling. Decreased range of motion. Normal strength. Normal pulse.      Right hand: Normal.      Left hand: No tenderness. Normal range of motion. Normal  strength. Normal sensation. Normal capillary refill. Normal pulse.        Arms:       Cervical back: Normal range of motion and neck supple. Tenderness present. No rigidity.      Comments: Tinel sign positive on L hand    Skin:     General: Skin is warm and dry.      Capillary Refill: Capillary refill takes 2 to 3 seconds.      Findings: No bruising or erythema.   Neurological:      General: No focal deficit present.      Mental Status: He is alert and oriented to person, place, and time.   Psychiatric:         Mood and Affect: Mood normal.         Behavior: Behavior normal.         Procedures           ED Course                                           MDM  Number of Diagnoses or Management Options  Diagnosis management comments: I have spoken with patient. I have explained the patient´s condition, diagnoses and treatment plan based on the information available to me at this time. I have answered the patient's questions and addressed any concerns. The patient has a good  understanding of the patient´s diagnosis, condition, and treatment plan as can be expected at this point. The vital signs have been stable. The patient´s condition is stable and appropriate for discharge from the emergency department.      The patient will pursue further outpatient evaluation with the primary care physician or other designated or consulting physician as outlined in the discharge instructions. They are agreeable to this plan of care and follow-up instructions have been explained in detail. The patient has received these instructions in written format and have expressed an understanding of the discharge instructions. The patient is aware that any significant change in condition or worsening of symptoms should prompt an immediate return to this or the closest emergency department or call to 911.      Risk of Complications, Morbidity, and/or Mortality  Presenting problems: low  Diagnostic procedures: low  Management options:  low    Patient Progress  Patient progress: stable      Final diagnoses:   Neck pain on left side   Carpal tunnel syndrome of left wrist   Shoulder tendinitis, left       ED Disposition  ED Disposition     ED Disposition   Discharge    Condition   Stable    Comment   --             Ollie Moreland, APRN  [address] [facility] [address]  [place] [address]  [phone]      If symptoms worsen    Ronn Alejandre MD  [address] [place] [address]  [phone]    Schedule an appointment as soon as possible for a visit   If symptoms worsen         Medication List      New Prescriptions    ketorolac 10 MG tablet  Commonly known as: TORADOL  Take 1 tablet by mouth Every 6 (Six) Hours As Needed for Moderate Pain.     methylPREDNISolone 4 MG dose pack  Commonly known as: MEDROL  Take 6 tablets by mouth Daily for 1 day, THEN 5 tablets Daily for 1 day, THEN 4 tablets Daily for 1 day, THEN 3 tablets Daily for 1 day, THEN 2 tablets Daily for 1 day, THEN 1 tablet Daily for 1 day. Take as directed on package instructions.  Start taking on: September 4, 2022     orphenadrine 100 MG 12 hr tablet  Commonly known as: NORFLEX  Take 1 tablet by mouth 2 (Two) Times a Day.        Changed    clopidogrel 75 MG tablet  Commonly known as: PLAVIX  Take 1 tablet by mouth Daily.  What changed: additional instructions           Where to Get Your Medications      These medications were sent to [id number] - [place] - [address] AT [facility] - [phone]  - [phone] FX  [address] [facility] [address]    Phone: [phone]   · ketorolac 10 MG tablet  · methylPREDNISolone 4 MG dose pack  · orphenadrine 100 MG 12 hr tablet          Damon Aguilar PA-C  09/04/22 [id number]

## 2022-09-04 NOTE — DISCHARGE INSTRUCTIONS
Please take Toradol every 6-8 hours as needed for pain  You can use muscle relaxer as needed  Please apply ice to left wrist and left shoulder as needed    If any worsening symptoms please follow-up with Ortho

## 2022-09-19 ENCOUNTER — TRANSCRIBE ORDERS (OUTPATIENT)
Dept: ADMINISTRATIVE | Facility: HOSPITAL | Age: 57
End: 2022-09-19

## 2022-09-19 DIAGNOSIS — M54.12 CERVICAL RADICULOPATHY: Primary | ICD-10-CM

## 2022-09-26 DIAGNOSIS — E29.1 HYPOGONADISM IN MALE: ICD-10-CM

## 2022-09-26 DIAGNOSIS — Z79.4 TYPE 2 DIABETES MELLITUS WITH HYPERGLYCEMIA, WITH LONG-TERM CURRENT USE OF INSULIN: ICD-10-CM

## 2022-09-26 DIAGNOSIS — E11.65 TYPE 2 DIABETES MELLITUS WITH HYPERGLYCEMIA, WITH LONG-TERM CURRENT USE OF INSULIN: ICD-10-CM

## 2022-09-26 RX ORDER — INSULIN DEGLUDEC 200 U/ML
22 INJECTION, SOLUTION SUBCUTANEOUS DAILY
Qty: 6 ML | Refills: 1 | Status: SHIPPED | OUTPATIENT
Start: 2022-09-26 | End: 2022-10-31 | Stop reason: SDUPTHER

## 2022-09-26 RX ORDER — TESTOSTERONE CYPIONATE 200 MG/ML
100 INJECTION, SOLUTION INTRAMUSCULAR
Qty: 1 ML | Refills: 0 | Status: SHIPPED | OUTPATIENT
Start: 2022-09-26 | End: 2023-03-21

## 2022-09-26 NOTE — TELEPHONE ENCOUNTER
Caller: Moreno Aleman    Relationship: Self    Best call back number: [phone]     Requested Prescriptions:   Requested Prescriptions     Pending Prescriptions Disp Refills   • Testosterone Cypionate (DEPOTESTOTERONE CYPIONATE) 200 MG/ML injection 1 mL 0     Sig: Inject 0.5 mL into the appropriate muscle as directed by prescriber Every 14 (Fourteen) Days.   • Insulin Degludec (Tresiba FlexTouch) 200 UNIT/ML solution pen-injector pen injection       Sig: Inject 22 Units under the skin into the appropriate area as directed Daily.        Pharmacy where request should be sent: [id number] [place] [facility] [address] AT [facility] [phone] [facility] [phone]      Additional details provided by patient: COMPLETELY OUT    Does the patient have less than a 3 day supply:  [x] Yes  [] No    Huber Nickerson   09/26/22 09:19 EDT

## 2022-10-01 ENCOUNTER — HOSPITAL ENCOUNTER (EMERGENCY)
Facility: HOSPITAL | Age: 57
Discharge: HOME | End: 2022-10-01
Attending: EMERGENCY MEDICINE | Admitting: EMERGENCY MEDICINE

## 2022-10-01 ENCOUNTER — APPOINTMENT (OUTPATIENT)
Dept: CT IMAGING | Facility: HOSPITAL | Age: 57
End: 2022-10-01

## 2022-10-01 VITALS
SYSTOLIC BLOOD PRESSURE: 179 MMHG | WEIGHT: 194 LBS | HEIGHT: 68 IN | DIASTOLIC BLOOD PRESSURE: 94 MMHG | HEART RATE: 88 BPM | BODY MASS INDEX: 29.4 KG/M2 | RESPIRATION RATE: 20 BRPM | OXYGEN SATURATION: 99 % | TEMPERATURE: 98.3 F

## 2022-10-01 DIAGNOSIS — R73.9 HYPERGLYCEMIA: ICD-10-CM

## 2022-10-01 DIAGNOSIS — R10.9 RIGHT FLANK PAIN: Primary | ICD-10-CM

## 2022-10-01 LAB
ACETONE BLD QL: NEGATIVE
ALBUMIN SERPL-MCNC: 4.3 G/DL (ref 3.5–5.2)
ALBUMIN/GLOB SERPL: 2 G/DL
ALP SERPL-CCNC: 79 U/L (ref 39–117)
ALT SERPL W P-5'-P-CCNC: 9 U/L (ref 1–41)
ANION GAP SERPL CALCULATED.3IONS-SCNC: 10.1 MMOL/L (ref 5–15)
AST SERPL-CCNC: 11 U/L (ref 1–40)
BASOPHILS # BLD AUTO: 0.04 10*3/MM3 (ref 0–0.2)
BASOPHILS NFR BLD AUTO: 0.6 % (ref 0–1.5)
BILIRUB SERPL-MCNC: 0.5 MG/DL (ref 0–1.2)
BILIRUB UR QL STRIP: NEGATIVE
BUN SERPL-MCNC: 8 MG/DL (ref 6–20)
BUN/CREAT SERPL: 9.4 (ref 7–25)
CALCIUM SPEC-SCNC: 9.2 MG/DL (ref 8.6–10.5)
CHLORIDE SERPL-SCNC: 97 MMOL/L (ref 98–107)
CLARITY UR: CLEAR
CO2 SERPL-SCNC: 24.9 MMOL/L (ref 22–29)
COLOR UR: YELLOW
CREAT SERPL-MCNC: 0.85 MG/DL (ref 0.76–1.27)
DEPRECATED RDW RBC AUTO: 37 FL (ref 37–54)
EGFRCR SERPLBLD CKD-EPI 2021: 102.6 ML/MIN/1.73
EOSINOPHIL # BLD AUTO: 0.03 10*3/MM3 (ref 0–0.4)
EOSINOPHIL NFR BLD AUTO: 0.5 % (ref 0.3–6.2)
ERYTHROCYTE [DISTWIDTH] IN BLOOD BY AUTOMATED COUNT: 12.3 % (ref 12.3–15.4)
GLOBULIN UR ELPH-MCNC: 2.2 GM/DL
GLUCOSE BLDC GLUCOMTR-MCNC: 155 MG/DL (ref 70–99)
GLUCOSE BLDC GLUCOMTR-MCNC: 239 MG/DL (ref 70–99)
GLUCOSE SERPL-MCNC: 605 MG/DL (ref 65–99)
GLUCOSE UR STRIP-MCNC: ABNORMAL MG/DL
HCT VFR BLD AUTO: 38.6 % (ref 37.5–51)
HGB BLD-MCNC: 14.2 G/DL (ref 13–17.7)
HGB UR QL STRIP.AUTO: NEGATIVE
IMM GRANULOCYTES # BLD AUTO: 0.02 10*3/MM3 (ref 0–0.05)
IMM GRANULOCYTES NFR BLD AUTO: 0.3 % (ref 0–0.5)
KETONES UR QL STRIP: NEGATIVE
LEUKOCYTE ESTERASE UR QL STRIP.AUTO: NEGATIVE
LYMPHOCYTES # BLD AUTO: 1.45 10*3/MM3 (ref 0.7–3.1)
LYMPHOCYTES NFR BLD AUTO: 23.5 % (ref 19.6–45.3)
MCH RBC QN AUTO: 30.7 PG (ref 26.6–33)
MCHC RBC AUTO-ENTMCNC: 36.8 G/DL (ref 31.5–35.7)
MCV RBC AUTO: 83.5 FL (ref 79–97)
MONOCYTES # BLD AUTO: 0.45 10*3/MM3 (ref 0.1–0.9)
MONOCYTES NFR BLD AUTO: 7.3 % (ref 5–12)
NEUTROPHILS NFR BLD AUTO: 4.17 10*3/MM3 (ref 1.7–7)
NEUTROPHILS NFR BLD AUTO: 67.8 % (ref 42.7–76)
NITRITE UR QL STRIP: NEGATIVE
NRBC BLD AUTO-RTO: 0 /100 WBC (ref 0–0.2)
PH UR STRIP.AUTO: 6 [PH] (ref 5–8)
PLATELET # BLD AUTO: 157 10*3/MM3 (ref 140–450)
PMV BLD AUTO: 9.9 FL (ref 6–12)
POTASSIUM SERPL-SCNC: 4.3 MMOL/L (ref 3.5–5.2)
PROT SERPL-MCNC: 6.5 G/DL (ref 6–8.5)
PROT UR QL STRIP: NEGATIVE
RBC # BLD AUTO: 4.62 10*6/MM3 (ref 4.14–5.8)
SODIUM SERPL-SCNC: 132 MMOL/L (ref 136–145)
SP GR UR STRIP: >1.03 (ref 1–1.03)
UROBILINOGEN UR QL STRIP: ABNORMAL
WBC NRBC COR # BLD: 6.16 10*3/MM3 (ref 3.4–10.8)

## 2022-10-01 PROCEDURE — 82009 KETONE BODYS QUAL: CPT | Performed by: NURSE PRACTITIONER

## 2022-10-01 PROCEDURE — 63710000001 INSULIN REGULAR HUMAN PER 5 UNITS: Performed by: NURSE PRACTITIONER

## 2022-10-01 PROCEDURE — 81003 URINALYSIS AUTO W/O SCOPE: CPT | Performed by: EMERGENCY MEDICINE

## 2022-10-01 PROCEDURE — 99283 EMERGENCY DEPT VISIT LOW MDM: CPT

## 2022-10-01 PROCEDURE — 82962 GLUCOSE BLOOD TEST: CPT

## 2022-10-01 PROCEDURE — 96374 THER/PROPH/DIAG INJ IV PUSH: CPT

## 2022-10-01 PROCEDURE — 74176 CT ABD & PELVIS W/O CONTRAST: CPT

## 2022-10-01 PROCEDURE — 96375 TX/PRO/DX INJ NEW DRUG ADDON: CPT

## 2022-10-01 PROCEDURE — 80053 COMPREHEN METABOLIC PANEL: CPT | Performed by: EMERGENCY MEDICINE

## 2022-10-01 PROCEDURE — 36415 COLL VENOUS BLD VENIPUNCTURE: CPT

## 2022-10-01 PROCEDURE — 63710000001 ONDANSETRON ODT 4 MG TABLET DISPERSIBLE: Performed by: NURSE PRACTITIONER

## 2022-10-01 PROCEDURE — 25010000002 HYDROMORPHONE 1 MG/ML SOLUTION: Performed by: EMERGENCY MEDICINE

## 2022-10-01 PROCEDURE — 85025 COMPLETE CBC W/AUTO DIFF WBC: CPT | Performed by: EMERGENCY MEDICINE

## 2022-10-01 RX ORDER — SODIUM CHLORIDE 0.9 % (FLUSH) 0.9 %
10 SYRINGE (ML) INJECTION AS NEEDED
Status: DISCONTINUED | OUTPATIENT
Start: 2022-10-01 | End: 2022-10-01 | Stop reason: HOSPADM

## 2022-10-01 RX ORDER — ONDANSETRON 4 MG/1
4 TABLET, ORALLY DISINTEGRATING ORAL ONCE
Status: COMPLETED | OUTPATIENT
Start: 2022-10-01 | End: 2022-10-01

## 2022-10-01 RX ORDER — HYDROCODONE BITARTRATE AND ACETAMINOPHEN 5; 325 MG/1; MG/1
1 TABLET ORAL EVERY 6 HOURS PRN
Status: DISCONTINUED | OUTPATIENT
Start: 2022-10-01 | End: 2022-10-01 | Stop reason: HOSPADM

## 2022-10-01 RX ORDER — IBUPROFEN 800 MG/1
800 TABLET ORAL EVERY 6 HOURS PRN
Qty: 30 TABLET | Refills: 0 | Status: SHIPPED | OUTPATIENT
Start: 2022-10-01

## 2022-10-01 RX ORDER — ONDANSETRON 2 MG/ML
4 INJECTION INTRAMUSCULAR; INTRAVENOUS ONCE
Status: DISCONTINUED | OUTPATIENT
Start: 2022-10-01 | End: 2022-10-01

## 2022-10-01 RX ADMIN — ONDANSETRON 4 MG: 4 TABLET, ORALLY DISINTEGRATING ORAL at 12:47

## 2022-10-01 RX ADMIN — SODIUM CHLORIDE 1000 ML: 9 INJECTION, SOLUTION INTRAVENOUS at 14:03

## 2022-10-01 RX ADMIN — HYDROMORPHONE HYDROCHLORIDE 1 MG: 1 INJECTION, SOLUTION INTRAMUSCULAR; INTRAVENOUS; SUBCUTANEOUS at 14:14

## 2022-10-01 RX ADMIN — HYDROCODONE BITARTRATE AND ACETAMINOPHEN 1 TABLET: 5; 325 TABLET ORAL at 12:47

## 2022-10-01 RX ADMIN — INSULIN HUMAN 10 UNITS: 100 INJECTION, SOLUTION PARENTERAL at 14:37

## 2022-10-01 NOTE — ED PROVIDER NOTES
Subjective   History of Present Illness  Pt reports right side back/flank pain that radiates to his RLQ x 1 month but worsening in past few days. Denies injury. He is concerned about previous spinal fusion and is worried something has shifted in the area. Denies bowel/bladder incontinence, denies numbness.     History provided by:  Patient  Flank Pain  Pain location:  R flank  Pain quality: aching and sharp    Pain radiates to:  RLQ  Pain severity:  Severe  Onset quality:  Sudden  Duration:  4 weeks  Timing:  Constant  Progression:  Worsening  Chronicity:  New  Context: not alcohol use, not awakening from sleep, not diet changes, not eating, not laxative use, not medication withdrawal, not previous surgeries, not recent illness, not recent sexual activity, not recent travel, not sick contacts, not suspicious food intake and not trauma    Relieved by:  Nothing  Worsened by:  Nothing  Ineffective treatments:  NSAIDs and acetaminophen  Associated symptoms: no anorexia, no belching, no chest pain, no chills, no constipation, no cough, no diarrhea, no dysuria, no fatigue, no fever, no flatus, no hematemesis, no hematochezia, no hematuria, no melena, no nausea, no shortness of breath, no sore throat and no vomiting        Review of Systems   Constitutional: Negative for chills, fatigue and fever.   HENT: Negative for congestion, ear pain and sore throat.    Eyes: Negative for pain.   Respiratory: Negative for cough, chest tightness and shortness of breath.    Cardiovascular: Negative for chest pain.   Gastrointestinal: Positive for abdominal pain. Negative for anorexia, constipation, diarrhea, flatus, hematemesis, hematochezia, melena, nausea and vomiting.   Endocrine: Positive for polyphagia and polyuria.   Genitourinary: Positive for flank pain. Negative for dysuria and hematuria.   Musculoskeletal: Positive for back pain. Negative for joint swelling.   Skin: Negative for pallor.   Neurological: Negative for seizures  and headaches.   All other systems reviewed and are negative.      Past Medical History:   Diagnosis Date   • CAD (coronary artery disease)    • Chest pain    • Diabetes (HCC)    • Hyperlipidemia    • Hypertension        Allergies   Allergen Reactions   • Fentanyl Angioedema and Swelling   • Cefdinir Itching   • Nifedipine Hives and Itching       Past Surgical History:   Procedure Laterality Date   • CARDIAC CATHETERIZATION      showed significant coronary artery disease of the LAD with calcification.    • CHOLECYSTECTOMY     • CIRCUMCISION     • CORONARY ANGIOPLASTY WITH STENT PLACEMENT     • INGUINAL HERNIA REPAIR      x2   • NOSE SURGERY     • AK RT/LT HEART CATHETERS N/A 03/22/2016    Procedure: Percutaneous Coronary Intervention - Rota/stent LAD;  Surgeon: Marek Emery MD;  Location:  [facility];  Service: Cardiovascular   • ROTATOR CUFF REPAIR Left    • SHOULDER ARTHROSCOPY Right     x2   • SHOULDER ARTHROSCOPY WITH SUBACROMIAL DECOMPRESSION Left 4/11/2022    Procedure: left SHOULDER ARTHROSCOPY SUBACROMIAL DECOMPRESSION WITH ROTATOR CUFF DEBRIDEMENT;  Surgeon: Kam Dick MD;  Location[facility];  Service: Orthopedics;  Laterality: Left;   • SPINAL FUSION         Family History   Problem Relation Age of Onset   • Hypertension Mother    • Diabetes Mother    • Heart attack Father    • Heart disease Father    • Heart failure Father    • Diabetes Father        Social History     Socioeconomic History   • Marital status:    Tobacco Use   • Smoking status: Never Smoker   • Smokeless tobacco: Never Used   Vaping Use   • Vaping Use: Never used   Substance and Sexual Activity   • Alcohol use: Yes     Comment: OCCASIONAL   • Drug use: Never   • Sexual activity: Defer           Objective   Physical Exam  Vitals and nursing note reviewed.   Constitutional:       General: He is not in acute distress.     Appearance: Normal appearance. He is not toxic-appearing.   HENT:      Head:  Normocephalic and atraumatic.      Mouth/Throat:      Mouth: Mucous membranes are moist.   Eyes:      General: No scleral icterus.  Cardiovascular:      Rate and Rhythm: Normal rate and regular rhythm.      Pulses: Normal pulses.      Heart sounds: Normal heart sounds.   Pulmonary:      Effort: Pulmonary effort is normal. No respiratory distress.      Breath sounds: Normal breath sounds.   Abdominal:      General: Abdomen is flat.      Palpations: Abdomen is soft.      Tenderness: There is no abdominal tenderness. There is right CVA tenderness.   Musculoskeletal:         General: Normal range of motion.      Cervical back: Normal range of motion and neck supple.   Skin:     General: Skin is warm and dry.   Neurological:      Mental Status: He is alert and oriented to person, place, and time. Mental status is at baseline.         Procedures           ED Course                                           MDM  Number of Diagnoses or Management Options  Hyperglycemia: new and requires workup  Right flank pain: new and requires workup  Diagnosis management comments: Pt reports he has been out of his insulin x 3 days. Just picked it up from the pharmacy this morning but hasn't taken a dose yet. Discussed checking and monitoring blood sugar at home and taking medication as prescribed. Also discussed follow up with PCP for possible further imaging of back in the form of MRI.    The patient is resting comfortably and feels better, is alert and in no distress. Repeat examination is unremarkable and benign; in particular, there's no discomfort at McBurney's point and there is no pulsatile mass. The history, exam, diagnostic testing, and current condition does not suggest acute appendicitis, bowel obstruction, acute cholecystitis, bowel perforation, major gastrointestinal bleeding, severe diverticulitis, abdominal aortic aneurysm, mesenteric ischemia, volvulus, sepsis, or other significant pathology that warrants further testing,  continued ED treatment, admission, for surgical evaluation at this point. The vital signs have been stable. The patient does not have uncontrollable pain, intractable vomiting, or other significant symptoms. The patient's condition is stable and appropriate for discharge from the emergency department.  The patient´s symptoms are consistent with musculoskeletal back pain. The patient is now resting comfortably, feels better, is alert, talkative, interactive and in no distress. The repeat examination is unremarkable and benign. The patient is neurologically intact and is ambulatory in the ED. The patient has no fever, no bowel or bladder incontinence, no saddle anesthesia, and is otherwise alert and well appearing. The history, physical exam, and diagnostics (if any) do not suggest the presence of acute spinal epidural abscess, acute spinal epidural bleed, cauda equina syndrome, abdominal aortic aneurysm, aortic dissection or other process requiring further testing, treatment or consultation in the emergency department. The vital signs have been stable. The patient's condition is stable and appropriate for discharge. The patient will pursue further outpatient evaluation with the primary care physician or other designated for consulting position as indicated in the discharge instructions.       Amount and/or Complexity of Data Reviewed  Clinical lab tests: reviewed  Tests in the radiology section of CPT®: reviewed    Risk of Complications, Morbidity, and/or Mortality  Presenting problems: moderate  Diagnostic procedures: moderate  Management options: low    Patient Progress  Patient progress: stable      Final diagnoses:   Right flank pain   Hyperglycemia       ED Disposition  ED Disposition     ED Disposition   Discharge    Condition   Stable    Comment   --             Ollie Moreland, APRN  [address] [facility] [place] [address]  [place] [address]  [phone]    In 3 days           Medication List      New Prescriptions     ibuprofen 800 MG tablet  Commonly known as: ADVIL,MOTRIN  Take 1 tablet by mouth Every 6 (Six) Hours As Needed for Mild Pain.        Changed    clopidogrel 75 MG tablet  Commonly known as: PLAVIX  Take 1 tablet by mouth Daily.  What changed: additional instructions           Where to Get Your Medications      These medications were sent to [id number] - [place], [place] - [address] AT [facility] [phone]  - [phone]   [address] [place] [address]    Phone: [phone]   · ibuprofen 800 MG tablet          Davy Lindsay, APRN  10/01/22 1794

## 2022-10-01 NOTE — DISCHARGE INSTRUCTIONS
Take your insulin as prescribed. Follow up with your primary for possible further imaging/treatment of your flank pain.

## 2022-10-05 ENCOUNTER — HOSPITAL ENCOUNTER (OUTPATIENT)
Dept: MRI IMAGING | Facility: HOSPITAL | Age: 57
Discharge: HOME | End: 2022-10-05
Admitting: PHYSICIAN ASSISTANT

## 2022-10-05 DIAGNOSIS — M54.12 CERVICAL RADICULOPATHY: ICD-10-CM

## 2022-10-05 PROCEDURE — 72141 MRI NECK SPINE W/O DYE: CPT

## 2022-10-17 DIAGNOSIS — F51.01 PRIMARY INSOMNIA: ICD-10-CM

## 2022-10-17 RX ORDER — ZOLPIDEM TARTRATE 10 MG/1
10 TABLET ORAL NIGHTLY PRN
Qty: 30 TABLET | Refills: 2 | OUTPATIENT
Start: 2022-10-17 | End: 2023-02-07

## 2022-10-17 NOTE — TELEPHONE ENCOUNTER
Caller: kim campbell    Relationship: Emergency Contact    Best call back number:[phone]    Requested Prescriptions:   Requested Prescriptions     Pending Prescriptions Disp Refills   • zolpidem (AMBIEN) 10 MG tablet 30 tablet 2     Sig: Take 1 tablet by mouth At Night As Needed for Sleep.        Pharmacy where request should be sent: [id number] - [place], [place] - [address] AT [facility] [phone] [facility] [phone] FX     Additional details provided by patient: PATIENT IS OUT OF MEDICATION    Does the patient have less than a 3 day supply:  [x] Yes  [] No    Huber Lundberg Rep   10/17/22 10:18 EDT

## 2022-10-31 DIAGNOSIS — E11.65 TYPE 2 DIABETES MELLITUS WITH HYPERGLYCEMIA, WITH LONG-TERM CURRENT USE OF INSULIN: ICD-10-CM

## 2022-10-31 DIAGNOSIS — Z79.4 TYPE 2 DIABETES MELLITUS WITH HYPERGLYCEMIA, WITH LONG-TERM CURRENT USE OF INSULIN: ICD-10-CM

## 2022-10-31 RX ORDER — BLOOD-GLUCOSE METER
1 KIT MISCELLANEOUS DAILY
Qty: 50 EACH | Refills: 4 | Status: SHIPPED | OUTPATIENT
Start: 2022-10-31 | End: 2022-11-14

## 2022-10-31 RX ORDER — INSULIN DEGLUDEC 200 U/ML
22 INJECTION, SOLUTION SUBCUTANEOUS DAILY
Qty: 6 ML | Refills: 1 | Status: SHIPPED | OUTPATIENT
Start: 2022-10-31 | End: 2022-11-09 | Stop reason: SDUPTHER

## 2022-10-31 NOTE — TELEPHONE ENCOUNTER
Caller: Moreno Aleman    Relationship: Self    Best call back number: [phone]    Requested Prescriptions:   Requested Prescriptions     Pending Prescriptions Disp Refills   • Insulin Degludec (Tresiba FlexTouch) 200 UNIT/ML solution pen-injector pen injection 6 mL 1     Sig: Inject 22 Units under the skin into the appropriate area as directed Daily.   • FREESTYLE LITE test strip       Sig: Use as instructed        Pharmacy where request should be sent: [id number] - [place], [place] - [address] AT [facility] [phone] [facility] [phone] FX     Additional details provided by patient: PATIENT IS OUT OF BOTH OF THESE     Does the patient have less than a 3 day supply:  [x] Yes  [] No    Huber Hui Rep   10/31/22 12:13 EDT

## 2022-11-09 ENCOUNTER — OFFICE VISIT (OUTPATIENT)
Dept: FAMILY MEDICINE CLINIC | Facility: CLINIC | Age: 57
End: 2022-11-09

## 2022-11-09 VITALS
TEMPERATURE: 97.6 F | DIASTOLIC BLOOD PRESSURE: 76 MMHG | WEIGHT: 203.2 LBS | HEIGHT: 68 IN | HEART RATE: 88 BPM | SYSTOLIC BLOOD PRESSURE: 142 MMHG | OXYGEN SATURATION: 96 % | BODY MASS INDEX: 30.8 KG/M2

## 2022-11-09 DIAGNOSIS — I10 HYPERTENSION, ESSENTIAL: ICD-10-CM

## 2022-11-09 DIAGNOSIS — F51.01 PRIMARY INSOMNIA: ICD-10-CM

## 2022-11-09 DIAGNOSIS — E78.2 HYPERLIPEMIA, MIXED: ICD-10-CM

## 2022-11-09 DIAGNOSIS — E29.1 HYPOGONADISM IN MALE: ICD-10-CM

## 2022-11-09 DIAGNOSIS — Z23 NEED FOR INFLUENZA VACCINATION: ICD-10-CM

## 2022-11-09 DIAGNOSIS — Z79.4 TYPE 2 DIABETES MELLITUS WITH HYPERGLYCEMIA, WITH LONG-TERM CURRENT USE OF INSULIN: Primary | ICD-10-CM

## 2022-11-09 DIAGNOSIS — E11.65 TYPE 2 DIABETES MELLITUS WITH HYPERGLYCEMIA, WITH LONG-TERM CURRENT USE OF INSULIN: Primary | ICD-10-CM

## 2022-11-09 LAB
EXPIRATION DATE: NORMAL
HBA1C MFR BLD: 8.8 %
Lab: NORMAL

## 2022-11-09 PROCEDURE — 90686 IIV4 VACC NO PRSV 0.5 ML IM: CPT | Performed by: NURSE PRACTITIONER

## 2022-11-09 PROCEDURE — 83036 HEMOGLOBIN GLYCOSYLATED A1C: CPT | Performed by: NURSE PRACTITIONER

## 2022-11-09 PROCEDURE — 3052F HG A1C>EQUAL 8.0%<EQUAL 9.0%: CPT | Performed by: NURSE PRACTITIONER

## 2022-11-09 PROCEDURE — 90471 IMMUNIZATION ADMIN: CPT | Performed by: NURSE PRACTITIONER

## 2022-11-09 PROCEDURE — 99214 OFFICE O/P EST MOD 30 MIN: CPT | Performed by: NURSE PRACTITIONER

## 2022-11-09 RX ORDER — INSULIN DEGLUDEC 200 U/ML
22 INJECTION, SOLUTION SUBCUTANEOUS DAILY
Qty: 6 ML | Refills: 1 | Status: SHIPPED | OUTPATIENT
Start: 2022-11-09

## 2022-11-09 RX ORDER — AMITRIPTYLINE HYDROCHLORIDE 50 MG/1
50 TABLET, FILM COATED ORAL NIGHTLY
Qty: 90 TABLET | Refills: 1 | Status: SHIPPED | OUTPATIENT
Start: 2022-11-09 | End: 2023-03-15 | Stop reason: SDUPTHER

## 2022-11-09 NOTE — PROGRESS NOTES
"Chief Complaint  Diabetes (3 month follow up )    Subjective         Moreno Aleman presents to [facility]  Presents to the office today for 3-month follow-up regarding his diabetes and insomnia.  Patient states that he was out of insulin for approximately 2 weeks.  He was seen in the emergency department where his blood sugar was 600.  He does state that he does have insulin at this time.  We did check an A1c in the office which was 8.8%.  I did explain to the patient that he needed to be consistent with his insulin dosage.  I did discuss increasing his insulin 2 units every 3 days until his fasting glucoses are 130 or below.  Patient also states that he would like to change his sleep medicine from Ambien to amitriptyline.  He states that he is only getting about 2 hours of sleep each night due to tossing and turning.  He also states that he stopped taking the Farxiga due to it causing a yeast infection.  I did explain that we would obtain his lab work to further evaluate his labs and adjust medications accordingly.  Blood pressure is 142/76.  He denies any chest pain shortness breath palpitations this time.    Diabetes         Objective     Vitals:    11/09/22 0733   BP: 142/76   BP Location: Right arm   Patient Position: Sitting   Cuff Size: Adult   Pulse: 88   Temp: 97.6 °F (36.4 °C)   TempSrc: Temporal   SpO2: 96%   Weight: 92.2 kg (203 lb 3.2 oz)   Height: 172.7 cm (68\")      Body mass index is 30.9 kg/m².    BMI is >= 30 and <35. (Class 1 Obesity). The following options were offered after discussion;: nutrition counseling/recommendations        Physical Exam  Constitutional:       Appearance: Normal appearance.   Cardiovascular:      Rate and Rhythm: Normal rate and regular rhythm.      Pulses: Normal pulses.      Heart sounds: Normal heart sounds, S1 normal and S2 normal. No murmur heard.  Pulmonary:      Effort: Pulmonary effort is normal. No respiratory distress.      " Breath sounds: Normal breath sounds.   Skin:     General: Skin is warm and dry.   Neurological:      Mental Status: He is alert and oriented to person, place, and time.   Psychiatric:         Attention and Perception: Attention normal.         Mood and Affect: Mood normal.         Behavior: Behavior normal.          Result Review :   The following data was reviewed by: NINFA Walsh on 11/09/2022:      Procedures    Assessment and Plan   Diagnoses and all orders for this visit:    1. Type 2 diabetes mellitus with hyperglycemia, with long-term current use of insulin (HCC) (Primary)  -     POC Glycosylated Hemoglobin (Hb A1C)  -     CBC & Differential; Future  -     Comprehensive Metabolic Panel; Future  -     Hemoglobin A1c; Future  -     MicroAlbumin, Urine, Random - Urine, Clean Catch; Future  -     Insulin Degludec (Tresiba FlexTouch) 200 UNIT/ML solution pen-injector pen injection; Inject 22 Units under the skin into the appropriate area as directed Daily.  Dispense: 6 mL; Refill: 1    2. Primary insomnia  -     CBC & Differential; Future  -     Comprehensive Metabolic Panel; Future  -     amitriptyline (ELAVIL) 50 MG tablet; Take 1 tablet by mouth Every Night.  Dispense: 90 tablet; Refill: 1    3. Hyperlipemia, mixed  -     CBC & Differential; Future  -     Comprehensive Metabolic Panel; Future  -     Lipid Panel; Future    4. Hypertension, essential  -     CBC & Differential; Future  -     Comprehensive Metabolic Panel; Future    5. Hypogonadism in male  -     Testosterone; Future    6. Need for influenza vaccination  -     FluLaval/Fluarix/Fluzone >6 Months          Follow Up   Return in about 3 months (around 2/9/2023) for Recheck.  Patient was given instructions and counseling regarding his condition or for health maintenance advice. Please see specific information pulled into the AVS if appropriate.

## 2022-11-10 DIAGNOSIS — F51.01 PRIMARY INSOMNIA: ICD-10-CM

## 2022-11-10 RX ORDER — TRAZODONE HYDROCHLORIDE 100 MG/1
100 TABLET ORAL NIGHTLY
Qty: 90 TABLET | Refills: 1 | OUTPATIENT
Start: 2022-11-10

## 2022-11-14 RX ORDER — BLOOD-GLUCOSE METER
KIT MISCELLANEOUS
Qty: 100 EACH | Refills: 1 | Status: SHIPPED | OUTPATIENT
Start: 2022-11-14

## 2023-02-04 DIAGNOSIS — E11.65 TYPE 2 DIABETES MELLITUS WITH HYPERGLYCEMIA, WITHOUT LONG-TERM CURRENT USE OF INSULIN: ICD-10-CM

## 2023-02-06 RX ORDER — DAPAGLIFLOZIN 10 MG/1
TABLET, FILM COATED ORAL
Qty: 90 TABLET | Refills: 1 | Status: SHIPPED | OUTPATIENT
Start: 2023-02-06

## 2023-02-07 ENCOUNTER — HOSPITAL ENCOUNTER (EMERGENCY)
Facility: HOSPITAL | Age: 58
Discharge: HOME | End: 2023-02-07
Attending: EMERGENCY MEDICINE | Admitting: EMERGENCY MEDICINE
Payer: MEDICARE

## 2023-02-07 ENCOUNTER — APPOINTMENT (OUTPATIENT)
Dept: GENERAL RADIOLOGY | Facility: HOSPITAL | Age: 58
End: 2023-02-07
Payer: MEDICARE

## 2023-02-07 VITALS
TEMPERATURE: 98.8 F | DIASTOLIC BLOOD PRESSURE: 87 MMHG | SYSTOLIC BLOOD PRESSURE: 161 MMHG | BODY MASS INDEX: 30.24 KG/M2 | RESPIRATION RATE: 29 BRPM | HEART RATE: 97 BPM | WEIGHT: 192.68 LBS | OXYGEN SATURATION: 91 % | HEIGHT: 67 IN

## 2023-02-07 DIAGNOSIS — J18.9 COMMUNITY ACQUIRED PNEUMONIA OF RIGHT UPPER LOBE OF LUNG: Primary | ICD-10-CM

## 2023-02-07 LAB
ALBUMIN SERPL-MCNC: 3.8 G/DL (ref 3.5–5.2)
ALBUMIN/GLOB SERPL: 1.2 G/DL
ALP SERPL-CCNC: 109 U/L (ref 39–117)
ALT SERPL W P-5'-P-CCNC: 6 U/L (ref 1–41)
ANION GAP SERPL CALCULATED.3IONS-SCNC: 12.8 MMOL/L (ref 5–15)
AST SERPL-CCNC: 11 U/L (ref 1–40)
BASOPHILS # BLD AUTO: 0.05 10*3/MM3 (ref 0–0.2)
BASOPHILS NFR BLD AUTO: 0.3 % (ref 0–1.5)
BILIRUB SERPL-MCNC: 0.7 MG/DL (ref 0–1.2)
BUN SERPL-MCNC: 17 MG/DL (ref 6–20)
BUN/CREAT SERPL: 21.3 (ref 7–25)
CALCIUM SPEC-SCNC: 9.3 MG/DL (ref 8.6–10.5)
CHLORIDE SERPL-SCNC: 93 MMOL/L (ref 98–107)
CO2 SERPL-SCNC: 23.2 MMOL/L (ref 22–29)
CREAT SERPL-MCNC: 0.8 MG/DL (ref 0.76–1.27)
DEPRECATED RDW RBC AUTO: 41.4 FL (ref 37–54)
EGFRCR SERPLBLD CKD-EPI 2021: 103.9 ML/MIN/1.73
EOSINOPHIL # BLD AUTO: 0.02 10*3/MM3 (ref 0–0.4)
EOSINOPHIL NFR BLD AUTO: 0.1 % (ref 0.3–6.2)
ERYTHROCYTE [DISTWIDTH] IN BLOOD BY AUTOMATED COUNT: 13.3 % (ref 12.3–15.4)
GLOBULIN UR ELPH-MCNC: 3.3 GM/DL
GLUCOSE SERPL-MCNC: 360 MG/DL (ref 65–99)
HCT VFR BLD AUTO: 36.8 % (ref 37.5–51)
HGB BLD-MCNC: 13.4 G/DL (ref 13–17.7)
IMM GRANULOCYTES # BLD AUTO: 0.19 10*3/MM3 (ref 0–0.05)
IMM GRANULOCYTES NFR BLD AUTO: 1.2 % (ref 0–0.5)
LYMPHOCYTES # BLD AUTO: 1.57 10*3/MM3 (ref 0.7–3.1)
LYMPHOCYTES NFR BLD AUTO: 9.8 % (ref 19.6–45.3)
MCH RBC QN AUTO: 31 PG (ref 26.6–33)
MCHC RBC AUTO-ENTMCNC: 36.4 G/DL (ref 31.5–35.7)
MCV RBC AUTO: 85.2 FL (ref 79–97)
MONOCYTES # BLD AUTO: 1.28 10*3/MM3 (ref 0.1–0.9)
MONOCYTES NFR BLD AUTO: 8 % (ref 5–12)
NEUTROPHILS NFR BLD AUTO: 12.98 10*3/MM3 (ref 1.7–7)
NEUTROPHILS NFR BLD AUTO: 80.6 % (ref 42.7–76)
NRBC BLD AUTO-RTO: 0 /100 WBC (ref 0–0.2)
PLATELET # BLD AUTO: 177 10*3/MM3 (ref 140–450)
PMV BLD AUTO: 8.8 FL (ref 6–12)
POTASSIUM SERPL-SCNC: 3.9 MMOL/L (ref 3.5–5.2)
PROT SERPL-MCNC: 7.1 G/DL (ref 6–8.5)
RBC # BLD AUTO: 4.32 10*6/MM3 (ref 4.14–5.8)
SODIUM SERPL-SCNC: 129 MMOL/L (ref 136–145)
WBC NRBC COR # BLD: 16.09 10*3/MM3 (ref 3.4–10.8)

## 2023-02-07 PROCEDURE — 85025 COMPLETE CBC W/AUTO DIFF WBC: CPT | Performed by: NURSE PRACTITIONER

## 2023-02-07 PROCEDURE — 71046 X-RAY EXAM CHEST 2 VIEWS: CPT

## 2023-02-07 PROCEDURE — 36415 COLL VENOUS BLD VENIPUNCTURE: CPT

## 2023-02-07 PROCEDURE — 25010000002 METHYLPREDNISOLONE PER 125 MG: Performed by: NURSE PRACTITIONER

## 2023-02-07 PROCEDURE — 94799 UNLISTED PULMONARY SVC/PX: CPT

## 2023-02-07 PROCEDURE — 94640 AIRWAY INHALATION TREATMENT: CPT

## 2023-02-07 PROCEDURE — 80053 COMPREHEN METABOLIC PANEL: CPT | Performed by: NURSE PRACTITIONER

## 2023-02-07 PROCEDURE — 96374 THER/PROPH/DIAG INJ IV PUSH: CPT

## 2023-02-07 PROCEDURE — 99283 EMERGENCY DEPT VISIT LOW MDM: CPT

## 2023-02-07 RX ORDER — METHYLPREDNISOLONE SODIUM SUCCINATE 125 MG/2ML
125 INJECTION, POWDER, LYOPHILIZED, FOR SOLUTION INTRAMUSCULAR; INTRAVENOUS ONCE
Status: COMPLETED | OUTPATIENT
Start: 2023-02-07 | End: 2023-02-07

## 2023-02-07 RX ORDER — HYDROCODONE BITARTRATE AND ACETAMINOPHEN 7.5; 325 MG/1; MG/1
1 TABLET ORAL ONCE
Status: COMPLETED | OUTPATIENT
Start: 2023-02-07 | End: 2023-02-07

## 2023-02-07 RX ORDER — AMOXICILLIN 500 MG/1
1000 CAPSULE ORAL ONCE
Status: COMPLETED | OUTPATIENT
Start: 2023-02-07 | End: 2023-02-07

## 2023-02-07 RX ORDER — PREDNISONE 20 MG/1
40 TABLET ORAL DAILY
Qty: 8 TABLET | Refills: 0 | Status: SHIPPED | OUTPATIENT
Start: 2023-02-07 | End: 2023-02-11

## 2023-02-07 RX ORDER — ALBUTEROL SULFATE 90 UG/1
2 AEROSOL, METERED RESPIRATORY (INHALATION) EVERY 4 HOURS PRN
Qty: 18 G | Refills: 0 | Status: SHIPPED | OUTPATIENT
Start: 2023-02-07

## 2023-02-07 RX ORDER — AMOXICILLIN 500 MG/1
1000 CAPSULE ORAL 3 TIMES DAILY
Qty: 30 CAPSULE | Refills: 0 | Status: SHIPPED | OUTPATIENT
Start: 2023-02-07 | End: 2023-02-13

## 2023-02-07 RX ORDER — IPRATROPIUM BROMIDE AND ALBUTEROL SULFATE 2.5; .5 MG/3ML; MG/3ML
3 SOLUTION RESPIRATORY (INHALATION) CONTINUOUS
Status: DISCONTINUED | OUTPATIENT
Start: 2023-02-07 | End: 2023-02-07 | Stop reason: HOSPADM

## 2023-02-07 RX ADMIN — METHYLPREDNISOLONE SODIUM SUCCINATE 125 MG: 125 INJECTION, POWDER, FOR SOLUTION INTRAMUSCULAR; INTRAVENOUS at 09:38

## 2023-02-07 RX ADMIN — AMOXICILLIN 1000 MG: 500 CAPSULE ORAL at 11:03

## 2023-02-07 RX ADMIN — IPRATROPIUM BROMIDE AND ALBUTEROL SULFATE 3 ML: .5; 2.5 SOLUTION RESPIRATORY (INHALATION) at 09:49

## 2023-02-07 RX ADMIN — HYDROCODONE BITARTRATE AND ACETAMINOPHEN 1 TABLET: 7.5; 325 TABLET ORAL at 09:31

## 2023-02-07 NOTE — ED PROVIDER NOTES
"Time: 9:25 AM EST  Date of encounter:  2/7/2023  Independent Historian/Clinical History and Information was obtained by:   Patient  Chief Complaint: \"I got pneumonia.\"     History is limited by: N/A    History of Present Illness:  Patient is a 57 y.o. year old male who presents to the emergency department for evaluation of \"I got pneumonia.\" Pt complains of cough, SOA x 5 days. Complains of right anterior rib pain with cough. States 101.5 fever at home this morning.       Patient Care Team  Primary Care Provider: Ollie Moreland APRN    Past Medical History:     Allergies   Allergen Reactions   • Fentanyl Angioedema and Swelling   • Cefdinir Itching   • Nifedipine Hives and Itching     Past Medical History:   Diagnosis Date   • CAD (coronary artery disease)    • Chest pain    • Diabetes (HCC)    • Hyperlipidemia    • Hypertension      Past Surgical History:   Procedure Laterality Date   • CARDIAC CATHETERIZATION      showed significant coronary artery disease of the LAD with calcification.    • CHOLECYSTECTOMY     • CIRCUMCISION     • CORONARY ANGIOPLASTY WITH STENT PLACEMENT     • INGUINAL HERNIA REPAIR      x2   • NOSE SURGERY     • VA RT/LT HEART CATHETERS N/A 03/22/2016    Procedure: Percutaneous Coronary Intervention - Rota/stent LAD;  Surgeon: Marek Emery MD;  Location: [facility] INVASIVE LOCATION;  Service: Cardiovascular   • ROTATOR CUFF REPAIR Left    • SHOULDER ARTHROSCOPY Right     x2   • SHOULDER ARTHROSCOPY WITH SUBACROMIAL DECOMPRESSION Left 4/11/2022    Procedure: left SHOULDER ARTHROSCOPY SUBACROMIAL DECOMPRESSION WITH ROTATOR CUFF DEBRIDEMENT;  Surgeon: Kam Dick MD;  Location: [facility];  Service: Orthopedics;  Laterality: Left;   • SPINAL FUSION       Family History   Problem Relation Age of Onset   • Hypertension Mother    • Diabetes Mother    • Heart attack Father    • Heart disease Father    • Heart failure Father    • Diabetes Father        Home Medications:  Prior to Admission " medications    Medication Sig Start Date End Date Taking? Authorizing Provider   amitriptyline (ELAVIL) 50 MG tablet Take 1 tablet by mouth Every Night. 11/9/22   Ollie Moreland APRN   Blood Glucose Monitoring Suppl (FreeStyle Lite) w/Device kit See Admin Instructions. 11/8/21   Jody Bejarano MD   cloNIDine (CATAPRES) 0.1 MG tablet Take 0.1 mg by mouth.    Jody Bejarano MD   clopidogrel (PLAVIX) 75 MG tablet Take 1 tablet by mouth Daily.  Patient taking differently: Take 1 tablet by mouth Daily. Last dose 04/03/22 as directed by Dr. Dick 4/5/22   Ran Pan MD   Farxiga 10 MG tablet TAKE 1 TABLET BY MOUTH DAILY 2/6/23   Ollie Moreland APRN   FREESTYLE LITE test strip USE TO TEST BLOOD SUGAR ONCE A DAY 11/14/22   Ollie Moreland APRN   Glucosamine 500 MG capsule Glucosamine    Jody Bejarano MD   ibuprofen (ADVIL,MOTRIN) 800 MG tablet Take 1 tablet by mouth Every 6 (Six) Hours As Needed for Mild Pain. 10/1/22   Davy Lindsay APRN   Insulin Degludec (Tresiba FlexTouch) 200 UNIT/ML solution pen-injector pen injection Inject 22 Units under the skin into the appropriate area as directed Daily. 11/9/22   Ollie Moreland APRN   Insulin Pen Needle (NovoFine Plus Pen Needle) 32G X 4 MM misc 22 Units Daily. 2/8/22   Ollie Moreland APRN   ketorolac (TORADOL) 10 MG tablet Take 1 tablet by mouth Every 6 (Six) Hours As Needed for Moderate Pain. 9/4/22   Damon Aguilar PA-C   lidocaine (XYLOCAINE) 5 % ointment lidocaine 5 % topical ointment   APPLY nickel size amount BY TOPICAL ROUTE 3 to 4 TIMES DAILY AS NEEDED to the left shoulder   3 RF given 7/12/2022    Jody Bejarano MD   lisinopril (PRINIVIL,ZESTRIL) 30 MG tablet TAKE 1 TABLET BY MOUTH DAILY 8/8/22   Ollie Moreland APRN   metFORMIN (GLUCOPHAGE) 1000 MG tablet TAKE 1 TABLET BY MOUTH TWICE DAILY WITH MEALS 2/6/23   Ollie Moreland APRN   ondansetron ODT (ZOFRAN-ODT) 4 MG disintegrating tablet Place 1 tablet on the tongue  "Every 6 (Six) Hours As Needed for Nausea or Vomiting. 11/12/21   Mari Christopher APRN   orphenadrine (NORFLEX) 100 MG 12 hr tablet Take 1 tablet by mouth 2 (Two) Times a Day. 9/4/22   Damon Aguilar PA-C   rosuvastatin (CRESTOR) 10 MG tablet Take 1 tablet by mouth Daily. 8/10/22   Ran Pan MD   sildenafil (Viagra) 100 MG tablet Take 1 tablet by mouth Daily As Needed for Erectile Dysfunction. 8/3/21   Ollie Moreland APRN   Syringe 21G X 1\" 3 ML misc 100 mg Every 14 (Fourteen) Days. 12/1/21   Ollie Moreland APRN   Testosterone Cypionate (DEPOTESTOTERONE CYPIONATE) 200 MG/ML injection Inject 0.5 mL into the appropriate muscle as directed by prescriber Every 14 (Fourteen) Days. 9/26/22   Ollie Moreland APRN   zolpidem (AMBIEN) 10 MG tablet Take 1 tablet by mouth At Night As Needed for Sleep. 10/17/22   Ollie Moreland APRN        Social History:   Social History     Tobacco Use   • Smoking status: Never   • Smokeless tobacco: Never   Vaping Use   • Vaping Use: Never used   Substance Use Topics   • Alcohol use: Yes     Comment: OCCASIONAL   • Drug use: Never         Review of Systems:  Review of Systems   Constitutional: Positive for fever.   Respiratory: Positive for cough and shortness of breath.         Physical Exam:  /87   Pulse 97   Temp 98.8 °F (37.1 °C) (Oral)   Resp (!) 29   Ht 170.2 cm (67\")   Wt 87.4 kg (192 lb 10.9 oz)   SpO2 91%   BMI 30.18 kg/m²     Physical Exam  Vitals and nursing note reviewed.   Constitutional:       General: He is not in acute distress.     Appearance: Normal appearance. He is not toxic-appearing.   HENT:      Head: Normocephalic and atraumatic.      Nose: Nose normal.      Mouth/Throat:      Mouth: Mucous membranes are moist.   Eyes:      Conjunctiva/sclera: Conjunctivae normal.   Cardiovascular:      Rate and Rhythm: Normal rate and regular rhythm.      Pulses: Normal pulses.      Heart sounds: Normal heart sounds.   Pulmonary:      Effort: Pulmonary " effort is normal.      Breath sounds: Rhonchi present.   Abdominal:      General: Bowel sounds are normal.      Palpations: Abdomen is soft.      Tenderness: There is no abdominal tenderness.   Musculoskeletal:         General: Normal range of motion.      Cervical back: Normal range of motion.   Skin:     General: Skin is warm and dry.   Neurological:      General: No focal deficit present.      Mental Status: He is alert and oriented to person, place, and time.   Psychiatric:         Mood and Affect: Mood normal.         Behavior: Behavior normal.         Thought Content: Thought content normal.         Judgment: Judgment normal.                  Procedures:  Procedures      Medical Decision Making:      Comorbidities that affect care:    None    External Notes reviewed:    None      The following orders were placed and all results were independently analyzed by me:  Orders Placed This Encounter   Procedures   • XR Chest 2 View   • Comprehensive Metabolic Panel   • CBC Auto Differential   • CBC & Differential       Medications Given in the Emergency Department:  Medications   HYDROcodone-acetaminophen (NORCO) 7.5-325 MG per tablet 1 tablet (1 tablet Oral Given 2/7/23 0931)   methylPREDNISolone sodium succinate (SOLU-Medrol) injection 125 mg (125 mg Intravenous Given 2/7/23 0938)   amoxicillin (AMOXIL) capsule 1,000 mg (1,000 mg Oral Given 2/7/23 1103)        ED Course:  Discussed ED findings with pt, plan for discharge, and return precautions. Pt verbalized understanding and agrees with plan.        Labs:    Lab Results (last 24 hours)     Procedure Component Value Units Date/Time    CBC & Differential [[id number]]  (Abnormal) Collected: 02/07/23 0936    Specimen: Blood Updated: 02/07/23 0944    Narrative:      The following orders were created for panel order CBC & Differential.  Procedure                               Abnormality         Status                     ---------                                -----------         ------                     CBC Auto Differential[[id number]]        Abnormal            Final result                 Please view results for these tests on the individual orders.    Comprehensive Metabolic Panel [[id number]]  (Abnormal) Collected: 02/07/23 0936    Specimen: Blood Updated: 02/07/23 1005     Glucose 360 mg/dL      BUN 17 mg/dL      Creatinine 0.80 mg/dL      Sodium 129 mmol/L      Potassium 3.9 mmol/L      Comment: Slight hemolysis detected by analyzer. Results may be affected.        Chloride 93 mmol/L      CO2 23.2 mmol/L      Calcium 9.3 mg/dL      Total Protein 7.1 g/dL      Albumin 3.8 g/dL      ALT (SGPT) 6 U/L      AST (SGOT) 11 U/L      Alkaline Phosphatase 109 U/L      Total Bilirubin 0.7 mg/dL      Globulin 3.3 gm/dL      A/G Ratio 1.2 g/dL      BUN/Creatinine Ratio 21.3     Anion Gap 12.8 mmol/L      eGFR 103.9 mL/min/1.73     Narrative:      GFR Normal >60  Chronic Kidney Disease <60  Kidney Failure <15      CBC Auto Differential [[id number]]  (Abnormal) Collected: 02/07/23 0936    Specimen: Blood Updated: 02/07/23 0944     WBC 16.09 10*3/mm3      RBC 4.32 10*6/mm3      Hemoglobin 13.4 g/dL      Hematocrit 36.8 %      MCV 85.2 fL      MCH 31.0 pg      MCHC 36.4 g/dL      RDW 13.3 %      RDW-SD 41.4 fl      MPV 8.8 fL      Platelets 177 10*3/mm3      Neutrophil % 80.6 %      Lymphocyte % 9.8 %      Monocyte % 8.0 %      Eosinophil % 0.1 %      Basophil % 0.3 %      Immature Grans % 1.2 %      Neutrophils, Absolute 12.98 10*3/mm3      Lymphocytes, Absolute 1.57 10*3/mm3      Monocytes, Absolute 1.28 10*3/mm3      Eosinophils, Absolute 0.02 10*3/mm3      Basophils, Absolute 0.05 10*3/mm3      Immature Grans, Absolute 0.19 10*3/mm3      nRBC 0.0 /100 WBC            Imaging:    XR Chest 2 View    Result Date: 2/7/2023  PROCEDURE: XR CHEST 2 VW  COMPARISON: [facility], CR, CHEST AP/PA 1 VIEW, 3/15/2021, 21:55.  INDICATIONS: cough, SOA, fever, right lugn pain   FINDINGS:  The lungs are adequately expanded.  In airspace consolidation is present within the right upper lobe.  The pulmonary vasculature appears within normal limits. The cardiac and mediastinal contours are within normal limits. The osseous structures appear intact.        Right upper lobe pneumonia.  Given the masslike appearance, radiographic follow-up to resolution recommended to exclude underlying true mass.     MAGDI LOO MD       Electronically Signed and Approved By: MAGDI LOO MD on 2/07/2023 at 9:36                 Differential Diagnosis and Discussion:    Cough: Differential diagnosis includes but is not limited to pneumonia, acute bronchitis, upper respiratory infection, ACE inhibitor use, allergic reaction, epiglottitis, seasonal allergies, chemical irritants, exercise-induced asthma, viral syndrome.    All labs were reviewed and analyzed by me.  All X-rays were independently reviewed by me.    MDM         Patient Care Considerations:    none      Consultants/Shared Management Plan:    None    Social Determinants of Health:    Patient is independent, reliable, and has access to care.       Disposition and Care Coordination:    Discharged: I considered escalation of care by admitting this patient to the hospital, however the patient has improved and is suitable and stable for discharge.        Final diagnoses:   Community acquired pneumonia of right upper lobe of lung        ED Disposition     ED Disposition   Discharge    Condition   Stable    Comment   --             This medical record created using voice recognition software.           Kala Combs, APRN  02/07/23 9052

## 2023-02-07 NOTE — DISCHARGE INSTRUCTIONS
Avoid smoking  Take medications as directed  See  your PCP for follow up and have repeat chest x-ray within one month to check for resolution of pneumonia.  Return to ED for worsening symptoms.

## 2023-02-13 ENCOUNTER — OFFICE VISIT (OUTPATIENT)
Dept: FAMILY MEDICINE CLINIC | Facility: CLINIC | Age: 58
End: 2023-02-13
Payer: MEDICARE

## 2023-02-13 VITALS
HEIGHT: 67 IN | BODY MASS INDEX: 28.66 KG/M2 | WEIGHT: 182.6 LBS | HEART RATE: 107 BPM | OXYGEN SATURATION: 97 % | DIASTOLIC BLOOD PRESSURE: 80 MMHG | SYSTOLIC BLOOD PRESSURE: 126 MMHG | TEMPERATURE: 97.8 F

## 2023-02-13 DIAGNOSIS — I10 HYPERTENSION, ESSENTIAL: ICD-10-CM

## 2023-02-13 DIAGNOSIS — F51.01 PRIMARY INSOMNIA: ICD-10-CM

## 2023-02-13 DIAGNOSIS — E78.2 HYPERLIPEMIA, MIXED: ICD-10-CM

## 2023-02-13 DIAGNOSIS — E11.65 TYPE 2 DIABETES MELLITUS WITH HYPERGLYCEMIA, WITHOUT LONG-TERM CURRENT USE OF INSULIN: Primary | ICD-10-CM

## 2023-02-13 LAB
EXPIRATION DATE: ABNORMAL
HBA1C MFR BLD: 10.9 %
Lab: ABNORMAL

## 2023-02-13 PROCEDURE — 99214 OFFICE O/P EST MOD 30 MIN: CPT | Performed by: NURSE PRACTITIONER

## 2023-02-13 PROCEDURE — 3046F HEMOGLOBIN A1C LEVEL >9.0%: CPT | Performed by: NURSE PRACTITIONER

## 2023-02-13 PROCEDURE — 83036 HEMOGLOBIN GLYCOSYLATED A1C: CPT | Performed by: NURSE PRACTITIONER

## 2023-02-13 RX ORDER — IPRATROPIUM BROMIDE AND ALBUTEROL SULFATE 2.5; .5 MG/3ML; MG/3ML
3 SOLUTION RESPIRATORY (INHALATION)
COMMUNITY
Start: 2023-02-05 | End: 2024-02-01

## 2023-02-13 RX ORDER — OXYCODONE HYDROCHLORIDE AND ACETAMINOPHEN 5; 325 MG/1; MG/1
1 TABLET ORAL 3 TIMES DAILY
COMMUNITY
Start: 2023-01-12

## 2023-02-13 NOTE — PROGRESS NOTES
"Chief Complaint  Diabetes (3 month follow up)    Subjective         Moreno Aleman presents to [facility]  Presents to the office today for 3-month follow-up regarding his diabetes.  Patient does state that he was recently in the hospital and diagnosed with pneumonia.  He was placed on steroids as well as antibiotics.  Patient also states that he has been out of town due to the death of a child.  Patient's glucose levels have been elevated due to the stress and the steroids.  He is currently still taking his insulin and oral medications as prescribed.  A1c is currently 10.9%.  I discussed the need of getting better control of this.  Patient verbalizes understanding.  He denies needing refills on his medicines at this time.  Blood pressure is 126/80.  He denies any chest pain shortness of breath palpitations at this time           Objective     Vitals:    02/13/23 0756   BP: 126/80   BP Location: Right arm   Patient Position: Sitting   Cuff Size: Adult   Pulse: 107   Temp: 97.8 °F (36.6 °C)   TempSrc: Temporal   SpO2: 97%   Weight: 82.8 kg (182 lb 9.6 oz)   Height: 170.2 cm (67\")      Body mass index is 28.6 kg/m².    BMI is >= 25 and <30. (Overweight) The following options were offered after discussion;: nutrition counseling/recommendations        Physical Exam  Vitals reviewed.   Constitutional:       Appearance: Normal appearance.   Cardiovascular:      Rate and Rhythm: Normal rate and regular rhythm.      Pulses: Normal pulses.      Heart sounds: Normal heart sounds, S1 normal and S2 normal. No murmur heard.  Pulmonary:      Effort: Pulmonary effort is normal. No respiratory distress.      Breath sounds: Normal breath sounds.   Skin:     General: Skin is warm and dry.   Neurological:      Mental Status: He is alert and oriented to person, place, and time.   Psychiatric:         Attention and Perception: Attention normal.         Mood and Affect: Mood normal.         Behavior: " Behavior normal.          Result Review :   The following data was reviewed by: NINFA Walsh on 02/13/2023:      Procedures    Assessment and Plan   Diagnoses and all orders for this visit:    1. Type 2 diabetes mellitus with hyperglycemia, without long-term current use of insulin (HCC) (Primary)  -     POC Glycosylated Hemoglobin (Hb A1C)  -     CBC (No Diff); Future  -     Comprehensive Metabolic Panel; Future  -     TSH+Free T4; Future  -     Hemoglobin A1c; Future    2. Primary insomnia    3. Hypertension, essential  -     CBC (No Diff); Future  -     Comprehensive Metabolic Panel; Future    4. Hyperlipemia, mixed  -     CBC (No Diff); Future  -     Comprehensive Metabolic Panel; Future  -     Lipid Panel; Future          Follow Up   Return in about 3 months (around 5/13/2023) for Recheck.  Patient was given instructions and counseling regarding his condition or for health maintenance advice. Please see specific information pulled into the AVS if appropriate.

## 2023-03-15 DIAGNOSIS — F51.01 PRIMARY INSOMNIA: ICD-10-CM

## 2023-03-15 RX ORDER — AMITRIPTYLINE HYDROCHLORIDE 50 MG/1
50 TABLET, FILM COATED ORAL NIGHTLY
Qty: 90 TABLET | Refills: 1 | Status: SHIPPED | OUTPATIENT
Start: 2023-03-15

## 2023-03-21 ENCOUNTER — APPOINTMENT (OUTPATIENT)
Dept: CT IMAGING | Facility: HOSPITAL | Age: 58
End: 2023-03-21
Payer: MEDICARE

## 2023-03-21 ENCOUNTER — HOSPITAL ENCOUNTER (EMERGENCY)
Facility: HOSPITAL | Age: 58
Discharge: HOME | End: 2023-03-21
Attending: EMERGENCY MEDICINE | Admitting: EMERGENCY MEDICINE
Payer: MEDICARE

## 2023-03-21 VITALS
HEART RATE: 101 BPM | RESPIRATION RATE: 20 BRPM | DIASTOLIC BLOOD PRESSURE: 99 MMHG | SYSTOLIC BLOOD PRESSURE: 163 MMHG | TEMPERATURE: 97.7 F | HEIGHT: 67 IN | WEIGHT: 187.39 LBS | OXYGEN SATURATION: 100 % | BODY MASS INDEX: 29.41 KG/M2

## 2023-03-21 DIAGNOSIS — R51.9 NONINTRACTABLE HEADACHE, UNSPECIFIED CHRONICITY PATTERN, UNSPECIFIED HEADACHE TYPE: Primary | ICD-10-CM

## 2023-03-21 LAB
ALBUMIN SERPL-MCNC: 5 G/DL (ref 3.5–5.2)
ALBUMIN/GLOB SERPL: 1.8 G/DL
ALP SERPL-CCNC: 119 U/L (ref 39–117)
ALT SERPL W P-5'-P-CCNC: 8 U/L (ref 1–41)
ANION GAP SERPL CALCULATED.3IONS-SCNC: 15.6 MMOL/L (ref 5–15)
AST SERPL-CCNC: 9 U/L (ref 1–40)
BASOPHILS # BLD AUTO: 0.08 10*3/MM3 (ref 0–0.2)
BASOPHILS NFR BLD AUTO: 0.7 % (ref 0–1.5)
BILIRUB SERPL-MCNC: 0.5 MG/DL (ref 0–1.2)
BUN SERPL-MCNC: 12 MG/DL (ref 6–20)
BUN/CREAT SERPL: 14.6 (ref 7–25)
CALCIUM SPEC-SCNC: 10.5 MG/DL (ref 8.6–10.5)
CHLORIDE SERPL-SCNC: 93 MMOL/L (ref 98–107)
CO2 SERPL-SCNC: 25.4 MMOL/L (ref 22–29)
CREAT SERPL-MCNC: 0.82 MG/DL (ref 0.76–1.27)
DEPRECATED RDW RBC AUTO: 39.3 FL (ref 37–54)
EGFRCR SERPLBLD CKD-EPI 2021: 103.1 ML/MIN/1.73
EOSINOPHIL # BLD AUTO: 0.07 10*3/MM3 (ref 0–0.4)
EOSINOPHIL NFR BLD AUTO: 0.6 % (ref 0.3–6.2)
ERYTHROCYTE [DISTWIDTH] IN BLOOD BY AUTOMATED COUNT: 13.1 % (ref 12.3–15.4)
GLOBULIN UR ELPH-MCNC: 2.8 GM/DL
GLUCOSE BLDC GLUCOMTR-MCNC: 390 MG/DL (ref 70–99)
GLUCOSE SERPL-MCNC: 401 MG/DL (ref 65–99)
HCT VFR BLD AUTO: 46.7 % (ref 37.5–51)
HGB BLD-MCNC: 17.1 G/DL (ref 13–17.7)
IMM GRANULOCYTES # BLD AUTO: 0.04 10*3/MM3 (ref 0–0.05)
IMM GRANULOCYTES NFR BLD AUTO: 0.4 % (ref 0–0.5)
LYMPHOCYTES # BLD AUTO: 3.28 10*3/MM3 (ref 0.7–3.1)
LYMPHOCYTES NFR BLD AUTO: 30.3 % (ref 19.6–45.3)
MCH RBC QN AUTO: 30.4 PG (ref 26.6–33)
MCHC RBC AUTO-ENTMCNC: 36.6 G/DL (ref 31.5–35.7)
MCV RBC AUTO: 82.9 FL (ref 79–97)
MONOCYTES # BLD AUTO: 0.77 10*3/MM3 (ref 0.1–0.9)
MONOCYTES NFR BLD AUTO: 7.1 % (ref 5–12)
NEUTROPHILS NFR BLD AUTO: 6.6 10*3/MM3 (ref 1.7–7)
NEUTROPHILS NFR BLD AUTO: 60.9 % (ref 42.7–76)
NRBC BLD AUTO-RTO: 0 /100 WBC (ref 0–0.2)
PLATELET # BLD AUTO: 175 10*3/MM3 (ref 140–450)
PMV BLD AUTO: 9.4 FL (ref 6–12)
POTASSIUM SERPL-SCNC: 4 MMOL/L (ref 3.5–5.2)
PROT SERPL-MCNC: 7.8 G/DL (ref 6–8.5)
RBC # BLD AUTO: 5.63 10*6/MM3 (ref 4.14–5.8)
SODIUM SERPL-SCNC: 134 MMOL/L (ref 136–145)
WBC NRBC COR # BLD: 10.84 10*3/MM3 (ref 3.4–10.8)

## 2023-03-21 PROCEDURE — 96375 TX/PRO/DX INJ NEW DRUG ADDON: CPT

## 2023-03-21 PROCEDURE — 70450 CT HEAD/BRAIN W/O DYE: CPT

## 2023-03-21 PROCEDURE — 25010000002 KETOROLAC TROMETHAMINE PER 15 MG: Performed by: EMERGENCY MEDICINE

## 2023-03-21 PROCEDURE — 96374 THER/PROPH/DIAG INJ IV PUSH: CPT

## 2023-03-21 PROCEDURE — 36415 COLL VENOUS BLD VENIPUNCTURE: CPT

## 2023-03-21 PROCEDURE — 82962 GLUCOSE BLOOD TEST: CPT

## 2023-03-21 PROCEDURE — 85025 COMPLETE CBC W/AUTO DIFF WBC: CPT

## 2023-03-21 PROCEDURE — 99284 EMERGENCY DEPT VISIT MOD MDM: CPT

## 2023-03-21 PROCEDURE — 25010000002 HYDRALAZINE PER 20 MG

## 2023-03-21 PROCEDURE — 80053 COMPREHEN METABOLIC PANEL: CPT

## 2023-03-21 RX ORDER — KETOROLAC TROMETHAMINE 30 MG/ML
30 INJECTION, SOLUTION INTRAMUSCULAR; INTRAVENOUS ONCE
Status: DISCONTINUED | OUTPATIENT
Start: 2023-03-21 | End: 2023-03-21

## 2023-03-21 RX ORDER — DIPHENHYDRAMINE HYDROCHLORIDE 50 MG/ML
25 INJECTION INTRAMUSCULAR; INTRAVENOUS ONCE
Status: DISCONTINUED | OUTPATIENT
Start: 2023-03-21 | End: 2023-03-22 | Stop reason: HOSPADM

## 2023-03-21 RX ORDER — KETOROLAC TROMETHAMINE 30 MG/ML
30 INJECTION, SOLUTION INTRAMUSCULAR; INTRAVENOUS ONCE
Status: COMPLETED | OUTPATIENT
Start: 2023-03-21 | End: 2023-03-21

## 2023-03-21 RX ORDER — METOCLOPRAMIDE HYDROCHLORIDE 5 MG/ML
10 INJECTION INTRAMUSCULAR; INTRAVENOUS ONCE
Status: DISCONTINUED | OUTPATIENT
Start: 2023-03-21 | End: 2023-03-22 | Stop reason: HOSPADM

## 2023-03-21 RX ORDER — BUTALBITAL, ACETAMINOPHEN AND CAFFEINE 300; 40; 50 MG/1; MG/1; MG/1
1 CAPSULE ORAL EVERY 4 HOURS PRN
Status: DISCONTINUED | OUTPATIENT
Start: 2023-03-21 | End: 2023-03-22 | Stop reason: HOSPADM

## 2023-03-21 RX ORDER — ACETAMINOPHEN 325 MG/1
650 TABLET ORAL ONCE
Status: COMPLETED | OUTPATIENT
Start: 2023-03-21 | End: 2023-03-21

## 2023-03-21 RX ORDER — BUTALBITAL, ACETAMINOPHEN AND CAFFEINE 50; 325; 40 MG/1; MG/1; MG/1
1 TABLET ORAL EVERY 6 HOURS PRN
Qty: 12 TABLET | Refills: 0 | Status: SHIPPED | OUTPATIENT
Start: 2023-03-21

## 2023-03-21 RX ORDER — HYDRALAZINE HYDROCHLORIDE 20 MG/ML
10 INJECTION INTRAMUSCULAR; INTRAVENOUS ONCE
Status: COMPLETED | OUTPATIENT
Start: 2023-03-21 | End: 2023-03-21

## 2023-03-21 RX ORDER — KETOROLAC TROMETHAMINE 10 MG/1
10 TABLET, FILM COATED ORAL EVERY 6 HOURS PRN
Qty: 15 TABLET | Refills: 0 | Status: SHIPPED | OUTPATIENT
Start: 2023-03-21

## 2023-03-21 RX ORDER — LORAZEPAM 1 MG/1
1 TABLET ORAL EVERY 8 HOURS PRN
Qty: 10 TABLET | Refills: 0 | Status: SHIPPED | OUTPATIENT
Start: 2023-03-21 | End: 2023-03-21

## 2023-03-21 RX ADMIN — HYDRALAZINE HYDROCHLORIDE 10 MG: 20 INJECTION, SOLUTION INTRAMUSCULAR; INTRAVENOUS at 17:48

## 2023-03-21 RX ADMIN — SODIUM CHLORIDE 1000 ML: 9 INJECTION, SOLUTION INTRAVENOUS at 21:53

## 2023-03-21 RX ADMIN — ACETAMINOPHEN 650 MG: 325 TABLET ORAL at 21:56

## 2023-03-21 RX ADMIN — KETOROLAC TROMETHAMINE 30 MG: 30 INJECTION, SOLUTION INTRAMUSCULAR; INTRAVENOUS at 21:54

## 2023-03-21 RX ADMIN — BUTALBITAL, ACETAMINOPHEN AND CAFFEINE 1 CAPSULE: 300; 40; 50 CAPSULE ORAL at 21:56

## 2023-04-10 RX ORDER — BUSPIRONE HYDROCHLORIDE 10 MG/1
10 TABLET ORAL 3 TIMES DAILY PRN
Qty: 90 TABLET | Refills: 2 | Status: SHIPPED | OUTPATIENT
Start: 2023-04-10

## 2023-04-12 RX ORDER — CLOPIDOGREL BISULFATE 75 MG/1
75 TABLET ORAL DAILY
Qty: 30 TABLET | Refills: 11 | OUTPATIENT
Start: 2023-04-12

## 2023-04-21 ENCOUNTER — OFFICE VISIT (OUTPATIENT)
Dept: ORTHOPEDIC SURGERY | Facility: CLINIC | Age: 58
End: 2023-04-21
Payer: MEDICARE

## 2023-04-21 VITALS — WEIGHT: 200 LBS | HEART RATE: 84 BPM | OXYGEN SATURATION: 97 % | BODY MASS INDEX: 31.39 KG/M2 | HEIGHT: 67 IN

## 2023-04-21 DIAGNOSIS — Z47.89 AFTERCARE FOLLOWING SURGERY OF THE MUSCULOSKELETAL SYSTEM: ICD-10-CM

## 2023-04-21 DIAGNOSIS — M54.12 CERVICAL RADICULOPATHY: Primary | ICD-10-CM

## 2023-04-21 RX ORDER — LORAZEPAM 1 MG/1
1 TABLET ORAL EVERY 8 HOURS PRN
COMMUNITY
Start: 2023-03-28

## 2023-04-21 RX ORDER — CYCLOBENZAPRINE HCL 10 MG
TABLET ORAL
COMMUNITY
Start: 2023-04-12

## 2023-04-21 RX ORDER — DIAZEPAM 10 MG/1
TABLET ORAL
Qty: 1 TABLET | Refills: 0 | Status: SHIPPED | OUTPATIENT
Start: 2023-04-21

## 2023-04-21 RX ADMIN — LIDOCAINE HYDROCHLORIDE 5 ML: 10 INJECTION, SOLUTION EPIDURAL; INFILTRATION; INTRACAUDAL; PERINEURAL at 10:22

## 2023-04-21 RX ADMIN — METHYLPREDNISOLONE ACETATE 80 MG: 80 INJECTION, SUSPENSION INTRA-ARTICULAR; INTRALESIONAL; INTRAMUSCULAR; SOFT TISSUE at 10:22

## 2023-04-21 NOTE — PROGRESS NOTES
"Chief Complaint  Pain of the Left Shoulder     Subjective      Moreno Aleman presents to [facility] for evaluation of the left shoulder. He has a history of left shoulder arthroscopic subacromial decompression, bursectomy with rotator cuff debridement, 4/11/2022. He reports shoulder pain that radiates up to his neck and down his arm. He denies injury or trauma.     Allergies   Allergen Reactions   • Fentanyl Angioedema and Swelling   • Cefdinir Itching   • Nifedipine Hives and Itching        Social History     Socioeconomic History   • Marital status:    Tobacco Use   • Smoking status: Never   • Smokeless tobacco: Never   Vaping Use   • Vaping Use: Never used   Substance and Sexual Activity   • Alcohol use: Yes     Comment: OCCASIONAL   • Drug use: Never   • Sexual activity: Defer        Review of Systems     Objective   Vital Signs:   Pulse 84   Ht 170.2 cm (67\")   Wt 90.7 kg (200 lb)   SpO2 97%   BMI 31.32 kg/m²       Physical Exam  Constitutional:       Appearance: Normal appearance. The patient is well-developed and normal weight.   HENT:      Head: Normocephalic.      Right Ear: Hearing and external ear normal.      Left Ear: Hearing and external ear normal.      Nose: Nose normal.   Eyes:      Conjunctiva/sclera: Conjunctivae normal.   Cardiovascular:      Rate and Rhythm: Normal rate.   Pulmonary:      Effort: Pulmonary effort is normal.      Breath sounds: No wheezing or rales.   Abdominal:      Palpations: Abdomen is soft.      Tenderness: There is no abdominal tenderness.   Musculoskeletal:      Cervical back: Normal range of motion.   Skin:     Findings: No rash.   Neurological:      Mental Status: The patient is alert and oriented to person, place, and time.   Psychiatric:         Mood and Affect: Mood and affect normal.         Judgment: Judgment normal.       Ortho Exam      Left shoulder- Tender to the anterior left shoulder. Forward elevation 110 degrees. " Abduction 90. External Rotation 80. Internal rotation 60. 4+ supraspinatus strength. 5/5 infraspinatus and subscapularis. Well healed scope scars. Neurovascularly intact. Tender to the left paracervical region. Pain with cervical ROM. Cervical ROM limited.     Left shoulder  Date/Time: 4/21/2023 10:22 AM  Consent given by: patient  Site marked: site marked  Timeout: Immediately prior to procedure a time out was called to verify the correct patient, procedure, equipment, support staff and site/side marked as required   Supporting Documentation  Indications: pain   Procedure Details  Location: shoulder (Left shoulder) -   Needle gauge: 21G.  Medications administered: 5 mL lidocaine PF 1% 1 %; 80 mg methylPREDNISolone acetate 80 MG/ML  Patient tolerance: patient tolerated the procedure well with no immediate complications          Imaging Results (Most Recent)     None           Result Review :       XR Shoulder 2+ View Left    Result Date: 4/12/2023  Narrative: INDICATION: Left shoulder pain x 1 day EXAMINATION/TECHNIQUE: X-RAY - LEFT XR Shoulder Min 2 Views. 3 views. COMPARISON: None. ____________________________________________ FINDINGS: SOFT TISSUES: No soft tissue swelling or gas.  No radiopaque foreign body. BONES/JOINTS: No acute fracture or subluxation. Normal alignment.  Preservation of the joint spaces. No sclerotic or destructive changes observed.    Impression: No acute osseous injury. Electronically Signed: Kd Stanley MD 2023/04/12 at 14:51 CDT Reading Location ID and State: [id number] / [place] Tel [phone], Service support  [phone], Fax [phone]             Assessment and Plan     Diagnoses and all orders for this visit:    1. Cervical radiculopathy (Primary)    2. Aftercare following surgery of left shoulder arthroscopic subacromial decompression, bursectomy, rotator cuff debridement, 4/11/2022        Discussed the treatment plan with the patient.  Plan for MRI of the left shoulder to evaluate  the rotator cuff and MRI of the c-spine to evaluate for radiculopathy. The patient expressed understanding and wished to proceed. Discussed the risks and benefits of a left shoulder steroid injection. The patient expressed understanding and wished to proceed. He tolerated the injection well.     Call or return if worsening symptoms.    Follow Up     MRI results      Patient was given instructions and counseling regarding his condition or for health maintenance advice. Please see specific information pulled into the AVS if appropriate.     Scribed for Kam Dick MD by Leila Oscar.  04/21/23   09:59 EDT    I have personally performed the services described in this document as scribed by the above individual and it is both accurate and complete. Kam Dick MD 04/23/23

## 2023-04-23 RX ORDER — METHYLPREDNISOLONE ACETATE 80 MG/ML
80 INJECTION, SUSPENSION INTRA-ARTICULAR; INTRALESIONAL; INTRAMUSCULAR; SOFT TISSUE
Status: COMPLETED | OUTPATIENT
Start: 2023-04-21 | End: 2023-04-21

## 2023-04-23 RX ORDER — LIDOCAINE HYDROCHLORIDE 10 MG/ML
5 INJECTION, SOLUTION EPIDURAL; INFILTRATION; INTRACAUDAL; PERINEURAL
Status: COMPLETED | OUTPATIENT
Start: 2023-04-21 | End: 2023-04-21

## 2023-04-24 ENCOUNTER — APPOINTMENT (OUTPATIENT)
Dept: ULTRASOUND IMAGING | Facility: HOSPITAL | Age: 58
End: 2023-04-24
Payer: MEDICARE

## 2023-04-24 ENCOUNTER — HOSPITAL ENCOUNTER (EMERGENCY)
Facility: HOSPITAL | Age: 58
Discharge: HOME | End: 2023-04-24
Attending: EMERGENCY MEDICINE | Admitting: EMERGENCY MEDICINE
Payer: MEDICARE

## 2023-04-24 VITALS
BODY MASS INDEX: 29.24 KG/M2 | WEIGHT: 192.9 LBS | RESPIRATION RATE: 18 BRPM | HEIGHT: 68 IN | HEART RATE: 95 BPM | TEMPERATURE: 98.9 F | DIASTOLIC BLOOD PRESSURE: 102 MMHG | OXYGEN SATURATION: 95 % | SYSTOLIC BLOOD PRESSURE: 167 MMHG

## 2023-04-24 DIAGNOSIS — N50.3 EPIDIDYMAL CYST: ICD-10-CM

## 2023-04-24 DIAGNOSIS — N50.812 PAIN IN LEFT TESTICLE: Primary | ICD-10-CM

## 2023-04-24 LAB
BILIRUB UR QL STRIP: NEGATIVE
C TRACH RRNA CVX QL NAA+PROBE: NOT DETECTED
CLARITY UR: CLEAR
COLOR UR: YELLOW
GLUCOSE UR STRIP-MCNC: ABNORMAL MG/DL
HGB UR QL STRIP.AUTO: NEGATIVE
KETONES UR QL STRIP: ABNORMAL
LEUKOCYTE ESTERASE UR QL STRIP.AUTO: NEGATIVE
N GONORRHOEA RRNA SPEC QL NAA+PROBE: NOT DETECTED
NITRITE UR QL STRIP: NEGATIVE
PH UR STRIP.AUTO: 6 [PH] (ref 5–8)
PROT UR QL STRIP: NEGATIVE
SP GR UR STRIP: >1.03 (ref 1–1.03)
UROBILINOGEN UR QL STRIP: ABNORMAL

## 2023-04-24 PROCEDURE — 99283 EMERGENCY DEPT VISIT LOW MDM: CPT

## 2023-04-24 PROCEDURE — 87591 N.GONORRHOEAE DNA AMP PROB: CPT

## 2023-04-24 PROCEDURE — 76870 US EXAM SCROTUM: CPT

## 2023-04-24 PROCEDURE — 87491 CHLMYD TRACH DNA AMP PROBE: CPT

## 2023-04-24 PROCEDURE — 81003 URINALYSIS AUTO W/O SCOPE: CPT

## 2023-04-24 RX ORDER — OXYCODONE AND ACETAMINOPHEN 10; 325 MG/1; MG/1
1 TABLET ORAL ONCE
Status: COMPLETED | OUTPATIENT
Start: 2023-04-24 | End: 2023-04-24

## 2023-04-24 RX ORDER — OXYCODONE AND ACETAMINOPHEN 10; 325 MG/1; MG/1
1 TABLET ORAL EVERY 6 HOURS PRN
Qty: 12 TABLET | Refills: 0 | Status: SHIPPED | OUTPATIENT
Start: 2023-04-24

## 2023-04-24 RX ADMIN — OXYCODONE HYDROCHLORIDE AND ACETAMINOPHEN 1 TABLET: 10; 325 TABLET ORAL at 13:19

## 2023-04-24 NOTE — ED PROVIDER NOTES
Time: 12:18 PM EDT  Date of encounter:  4/24/2023  Independent Historian/Clinical History and Information was obtained by: Patient and Chart  Chief Complaint: chronic pain episode  History is limited by: N/A  Room number: 08/08     History of Present Illness:  HPI  Patient is a 57 y.o. year old male who presents to the Emergency Department via private car for evaluation of chronic pain episode. Patient has no one at bedside on initial evaluation. Patient has a medical history of coronary artery disease (CAD), hyperlipidemia (HLD), hypertension (HTN) and type 2 diabetes mellitus (T2DM). Patient has a surgical history of cholecystectomy and coronary artery disease (CAD) with stent placement. Patient has a social history of current alcohol user.    Patient is experiencing symptoms of chronic pain in the neck and back with testicular pain and bleeding that started approximately less than 24 hours ago. Patient states they are in moderate pain and rates their pain level 8 out of 10 at rest and with movement or activity. Patient states no modifying factors. Patient denies all other symptoms. All other systems reviews are negative.    Patient has not tried anything for symptom relief.    Patient Care Team  Primary Care Provider: Ollie Moreland APRN    Past Medical History:  Allergies   Allergen Reactions   • Fentanyl Angioedema and Swelling   • Cefdinir Itching   • Nifedipine Hives and Itching     Past Medical History:   Diagnosis Date   • CAD (coronary artery disease)    • Chest pain    • Diabetes    • Hyperlipidemia    • Hypertension      Past Surgical History:   Procedure Laterality Date   • CARDIAC CATHETERIZATION      showed significant coronary artery disease of the LAD with calcification.    • CHOLECYSTECTOMY     • CIRCUMCISION     • CORONARY ANGIOPLASTY WITH STENT PLACEMENT     • INGUINAL HERNIA REPAIR      x2   • NOSE SURGERY     • ID RT/LT HEART CATHETERS N/A 03/22/2016    Procedure: Percutaneous Coronary  Intervention - Rota/stent LAD;  Surgeon: Marek Emery MD;  Location:  [facility];  Service: Cardiovascular   • ROTATOR CUFF REPAIR Left    • SHOULDER ARTHROSCOPY Right     x2   • SHOULDER ARTHROSCOPY WITH SUBACROMIAL DECOMPRESSION Left 4/11/2022    Procedure: left SHOULDER ARTHROSCOPY SUBACROMIAL DECOMPRESSION WITH ROTATOR CUFF DEBRIDEMENT;  Surgeon: Kam Dick MD;  Location: [facility];  Service: Orthopedics;  Laterality: Left;   • SPINAL FUSION       Family History   Problem Relation Age of Onset   • Hypertension Mother    • Diabetes Mother    • Heart attack Father    • Heart disease Father    • Heart failure Father    • Diabetes Father        Home Medications:  Prior to Admission medications    Medication Sig Start Date End Date Taking? Authorizing Provider   albuterol sulfate  (90 Base) MCG/ACT inhaler Inhale 2 puffs Every 4 (Four) Hours As Needed for Wheezing. 2/7/23   Kala Combs APRN   amitriptyline (ELAVIL) 50 MG tablet Take 1 tablet by mouth Every Night. 3/15/23   Ollie Moreland APRN   Blood Glucose Monitoring Suppl (FreeStyle Lite) w/Device kit See Admin Instructions. 11/8/21   Jody Bejarano MD   busPIRone (BUSPAR) 10 MG tablet Take 1 tablet by mouth 3 (Three) Times a Day As Needed (anxiety). 4/10/23   Ollie Moreland APRN   cloNIDine (CATAPRES) 0.1 MG tablet Take 1 tablet by mouth.    Jody Bejarano MD   clopidogrel (PLAVIX) 75 MG tablet Take 1 tablet by mouth Daily. 4/5/22   Ran aPn MD   cyclobenzaprine (FLEXERIL) 10 MG tablet  4/12/23   Jody Bejarano MD   diazePAM (Valium) 10 MG tablet 1 tablet by mouth one hour prior to MRI 4/21/23   Kam Dick MD   Farxiga 10 MG tablet TAKE 1 TABLET BY MOUTH DAILY 2/6/23   Ollie Moreland APRN   FREESTYLE LITE test strip USE TO TEST BLOOD SUGAR ONCE A DAY 11/14/22   Ollie Moreland APRN   Glucosamine 500 MG capsule Glucosamine    Jody Bejarano MD   ibuprofen (ADVIL,MOTRIN) 800 MG  "tablet Take 1 tablet by mouth Every 6 (Six) Hours As Needed for Mild Pain. 10/1/22   Davy Lindsay APRN   Insulin Degludec (Tresiba FlexTouch) 200 UNIT/ML solution pen-injector pen injection Inject 22 Units under the skin into the appropriate area as directed Daily. 11/9/22   Ollie Moreland APRN   Insulin Pen Needle (NovoFine Plus Pen Needle) 32G X 4 MM misc 22 Units Daily. 2/8/22   Ollie Moreland APRN   ipratropium-albuterol (DUO-NEB) 0.5-2.5 mg/3 ml nebulizer Inhale 3 mL. 2/5/23 2/1/24  Jody Bejarano MD   lisinopril (PRINIVIL,ZESTRIL) 30 MG tablet TAKE 1 TABLET BY MOUTH DAILY 8/8/22   Ollie Moreland APRN   LORazepam (ATIVAN) 1 MG tablet Take 1 tablet by mouth Every 8 (Eight) Hours As Needed. for anxiety 3/28/23   Jody Bejarano MD   metFORMIN (GLUCOPHAGE) 1000 MG tablet TAKE 1 TABLET BY MOUTH TWICE DAILY WITH MEALS 2/6/23   Ollie Moreland APRN   ondansetron ODT (ZOFRAN-ODT) 4 MG disintegrating tablet Place 1 tablet on the tongue Every 6 (Six) Hours As Needed for Nausea or Vomiting. 11/12/21   Mari Christopher APRN   oxyCODONE-acetaminophen (PERCOCET) 5-325 MG per tablet Take 1 tablet by mouth 3 (Three) Times a Day. 1/12/23   Jody Bejarano MD   rosuvastatin (CRESTOR) 10 MG tablet Take 1 tablet by mouth Daily. 8/10/22   Ran Pan MD   Syringe 21G X 1\" 3 ML misc 100 mg Every 14 (Fourteen) Days. 12/1/21   Ollie Moreland APRN        Social History:  Social History     Tobacco Use   • Smoking status: Never   • Smokeless tobacco: Never   Vaping Use   • Vaping Use: Never used   Substance Use Topics   • Alcohol use: Yes     Comment: OCCASIONAL   • Drug use: Never       Review of Systems:   Review of Systems   Constitutional: Negative.    HENT: Negative.    Eyes: Negative.    Respiratory: Negative.    Cardiovascular: Negative.    Gastrointestinal: Negative.    Endocrine: Negative.    Genitourinary: Positive for testicular pain (with bleeding).   Musculoskeletal: Positive for back pain " "and neck pain.   Skin: Negative.    Allergic/Immunologic: Negative.    Neurological: Negative.    Hematological: Negative.    Psychiatric/Behavioral: Negative.    All other systems reviewed and are negative.         Physical Exam:   BP (!) 167/102   Pulse 95   Temp 98.9 °F (37.2 °C) (Oral)   Resp 18   Ht 172.7 cm (68\")   Wt 87.5 kg (192 lb 14.4 oz)   SpO2 95%   BMI 29.33 kg/m²     Physical Exam  Vitals and nursing note reviewed.   Constitutional:       General: He is not in acute distress.     Appearance: Normal appearance. He is not toxic-appearing.   HENT:      Head: Normocephalic and atraumatic.      Jaw: There is normal jaw occlusion.   Eyes:      General: Lids are normal.      Extraocular Movements: Extraocular movements intact.      Conjunctiva/sclera: Conjunctivae normal.      Pupils: Pupils are equal, round, and reactive to light.   Cardiovascular:      Rate and Rhythm: Normal rate and regular rhythm.      Pulses: Normal pulses.      Heart sounds: Normal heart sounds.   Pulmonary:      Effort: Pulmonary effort is normal. No respiratory distress.      Breath sounds: Normal breath sounds. No wheezing or rhonchi.   Abdominal:      General: Abdomen is flat.      Palpations: Abdomen is soft.      Tenderness: There is no abdominal tenderness. There is no guarding or rebound.      Hernia: There is no hernia in the left inguinal area or right inguinal area.   Genitourinary:     Pubic Area: No rash.       Penis: Normal. No erythema, tenderness, discharge or swelling.       Testes: Normal.         Right: Mass, tenderness or swelling not present.         Left: Mass, tenderness or swelling not present.      Epididymis:      Right: Normal. No mass or tenderness.      Left: Normal. No mass or tenderness.      Comments: No signs of bleeding or dry blood present on the penis.  Musculoskeletal:         General: Normal range of motion.      Cervical back: Normal range of motion and neck supple.      Right lower leg: No " edema.      Left lower leg: No edema.   Skin:     General: Skin is warm and dry.   Neurological:      Mental Status: He is alert and oriented to person, place, and time. Mental status is at baseline.   Psychiatric:         Mood and Affect: Mood normal.                Procedures:  Procedures    Medical Decision Making:    Comorbidities that affect care:     coronary artery disease (CAD), hyperlipidemia (HLD), hypertension (HTN) and type 2 diabetes mellitus (T2DM)    External Notes reviewed:    Previous ED Note: Emergency department visit for acute shoulder    The following orders were placed and all results were independently analyzed by me:  Orders Placed This Encounter   Procedures   • Chlamydia trachomatis, Neisseria gonorrhoeae, PCR - Urine, Urine, Random Void   • US Scrotum & Testicles   • Urinalysis With Microscopic If Indicated (No Culture) - Urine, Clean Catch       Medications Given in the Emergency Department:  Medications   oxyCODONE-acetaminophen (PERCOCET)  MG per tablet 1 tablet (1 tablet Oral Given 4/24/23 1319)       ED Course:       Labs:  Lab Results (last 24 hours)     Procedure Component Value Units Date/Time    Chlamydia trachomatis, Neisseria gonorrhoeae, PCR - Urine, Urine, Random Void [[id number]]  (Normal) Collected: 04/24/23 1118    Specimen: Urine, Random Void Updated: 04/24/23 1314     Chlamydia DNA by PCR Not Detected     Neisseria gonorrhoeae by PCR Not Detected    Urinalysis With Microscopic If Indicated (No Culture) - Urine, Clean Catch [[id number]]  (Abnormal) Collected: 04/24/23 1118    Specimen: Urine, Clean Catch Updated: 04/24/23 1136     Color, UA Yellow     Appearance, UA Clear     pH, UA 6.0     Specific Gravity, UA >1.030     Glucose, UA >=1000 mg/dL (3+)     Ketones, UA Trace     Bilirubin, UA Negative     Blood, UA Negative     Protein, UA Negative     Leuk Esterase, UA Negative     Nitrite, UA Negative     Urobilinogen, UA 1.0 E.U./dL    Narrative:      Urine  microscopic not indicated.           Imaging:  US Scrotum & Testicles    Result Date: 4/24/2023  PROCEDURE: US SCROTUM AND TESTICLES  COMPARISON: None  INDICATIONS: pain  TECHNIQUE: The scrotum and testicles were evaluated with gray scale and color duplex Doppler sonography.   FINDINGS:  The substance of either testis is uniformly echoic.  Vascular flow in the testis may be slightly increased, but appear symmetrical.  The heads of either epididymis are enlarged and heterogeneously echoic.  Hypoechoic lesion in the head of the epididymis on the right measures 1.4 cm x 1.3 cm x 0.5 cm.  No increased vascular flow is seen in either epididymis.  Small bilateral hydroceles are evident.  A small varicocele is seen on the left.       Testicular ultrasound, as above.     ZORAN NICOLAS MD       Electronically Signed and Approved By: ZORAN NICOLAS MD on 4/24/2023 at 13:51               Differential Diagnosis and Discussion:    Back Pain: The patient presents with back pain. My differential diagnosis includes but is not limited to acute spinal epidural abscess, acute spinal epidural bleed, cauda equina syndrome, abdominal aortic aneurysm, aortic dissection, kidney stone, pyelonephritis, musculoskeletal back pain, spinal fracture, and osteoarthritis.   Neck Pain: The patient presents with neck pain. My differential diagnosis includes but is not limited to acute spinal epidural abscess, acute spinal epidural bleed, meningitis, musculoskeletal neck pain, spinal fracture, and osteoarthritis.   Testicular Pain: Differential diagnosis includes but is not limited to epididymitis, orchitis, testicular torsion, testicular tumor, testicular trauma, hydrocele, varicocele, spermatocele, prostatitis, scrotal cellulitis, and urolithiasis.    All labs were reviewed and interpreted by me.  Ultrasound impression was interpreted by me.     MDM  Number of Diagnoses or Management Options  Epididymal cyst  Pain in left testicle  Diagnosis  management comments: In summary this is a 57-year-old male who presents to the emergency department for evaluation of testicular pain and bloody ejaculation.  He reports his problems been going on for quite some time and also endorses erectile dysfunction.  On physical examination no obvious signs of external trauma.  Ultrasound of the bilateral testicles was unremarkable except for left epididymal cyst.  Laboratory evaluation was performed and is also unremarkable.  He has been referred to urology for further treatment.  Very strict return to ER and follow-up instructions have been provided to the patient.             Patient Care Considerations:    CONSULT: I considered consulting Urology, however This can be an outpatient    Consultants/Shared Management Plan:    None    Social Determinants of Health:    Patient is independent, reliable, and has access to care.     Disposition and Care Coordination:    Discharged: The patient is suitable and stable for discharge with no need for consideration of observation or admission.    I have explained the patient´s condition, diagnoses and treatment plan based on the information available to me at this time. I have answered questions and addressed any concerns. The patient has a good  understanding of the patient´s diagnosis, condition, and treatment plan as can be expected at this point. The vital signs have been stable. The patient´s condition is stable and appropriate for discharge from the emergency department.      The patient will pursue further outpatient evaluation with the primary care physician or other designated or consulting physician as outlined in the discharge instructions. They are agreeable to this plan of care and follow-up instructions have been explained in detail. The patient has received these instructions in written format and have expressed an understanding of the discharge instructions. The patient is aware that any significant change in condition  or worsening of symptoms should prompt an immediate return to this or the closest emergency department or call to 911.  I have explained discharge medications and the need for follow up with the patient/caretakers. This was also printed in the discharge instructions. Patient was discharged with the following medications and follow up:      Medication List      Changed    * oxyCODONE-acetaminophen 5-325 MG per tablet  Commonly known as: PERCOCET  What changed: Another medication with the same name was added. Make sure you understand how and when to take each.     * oxyCODONE-acetaminophen  MG per tablet  Commonly known as: PERCOCET  Take 1 tablet by mouth Every 6 (Six) Hours As Needed for Moderate Pain.  What changed: You were already taking a medication with the same name, and this prescription was added. Make sure you understand how and when to take each.         * This list has 2 medication(s) that are the same as other medications prescribed for you. Read the directions carefully, and ask your doctor or other care provider to review them with you.               Where to Get Your Medications      These medications were sent to [id number] - [place], [place] - [address] AT [facility] - [phone]  - [phone] FX  [address] [facility] [address]    Phone: [phone]   · oxyCODONE-acetaminophen  MG per tablet      Elizabeth Chambers MD  [address] [facility] [id number] [phone]    Call          Final diagnoses:   Pain in left testicle   Epididymal cyst        ED Disposition     ED Disposition   Discharge    Condition   Stable    Comment   --             This medical record created using voice recognition software.    Documentation assistance provided by Leila Green acting a scribe for Hugo Day MD. Information recorded by the scribe was verified and validated at my direction.       Leila Green  04/24/23 9040        Leila Green  04/24/23 1508       Shay Arias MD  04/24/23 8820

## 2023-04-24 NOTE — ED TRIAGE NOTES
Chronic L sided neck pain, back pain and reports acute L testicle pain since this am and reports bloody ejaculation.

## 2023-04-26 ENCOUNTER — TELEPHONE (OUTPATIENT)
Dept: UROLOGY | Facility: CLINIC | Age: 58
End: 2023-04-26

## 2023-04-26 NOTE — TELEPHONE ENCOUNTER
Hub staff attempted to follow warm transfer process and was unsuccessful     Caller: GERARDO ELDRIDGE     Relationship to patient: SELF    Best call back number: [phone]    Patient is needing: PT IS ESTABLISHED WITH DR. NEGRO. PT WAS SEEN IN EMERGENCY ROOM FOR TESTICLE PAIN. UNABLE TO REACH CLINICAL TEAM TO ADVISE ON SCHEDULING TIMEFRAME.

## 2023-04-27 RX ORDER — LISINOPRIL 30 MG/1
30 TABLET ORAL DAILY
Qty: 30 TABLET | OUTPATIENT
Start: 2023-04-27

## 2023-05-04 ENCOUNTER — OFFICE VISIT (OUTPATIENT)
Dept: UROLOGY | Facility: CLINIC | Age: 58
End: 2023-05-04
Payer: MEDICARE

## 2023-05-04 VITALS — RESPIRATION RATE: 12 BRPM | WEIGHT: 193.2 LBS | BODY MASS INDEX: 29.28 KG/M2 | HEIGHT: 68 IN

## 2023-05-04 DIAGNOSIS — N52.9 ERECTILE DYSFUNCTION, UNSPECIFIED ERECTILE DYSFUNCTION TYPE: ICD-10-CM

## 2023-05-04 DIAGNOSIS — N50.819 PAIN IN TESTICLE, UNSPECIFIED LATERALITY: Primary | ICD-10-CM

## 2023-05-04 DIAGNOSIS — R36.1 HEMATOSPERMIA: ICD-10-CM

## 2023-05-04 LAB
BILIRUB BLD-MCNC: NEGATIVE MG/DL
CLARITY, POC: CLEAR
COLOR UR: YELLOW
EXPIRATION DATE: ABNORMAL
GLUCOSE UR STRIP-MCNC: ABNORMAL MG/DL
KETONES UR QL: NEGATIVE
LEUKOCYTE EST, POC: NEGATIVE
Lab: ABNORMAL
NITRITE UR-MCNC: NEGATIVE MG/ML
PH UR: 5 [PH] (ref 5–8)
PROT UR STRIP-MCNC: NEGATIVE MG/DL
RBC # UR STRIP: NEGATIVE /UL
SP GR UR: 1.01 (ref 1–1.03)
UROBILINOGEN UR QL: NORMAL

## 2023-05-04 RX ORDER — TADALAFIL 20 MG/1
20 TABLET ORAL AS NEEDED
Qty: 20 TABLET | Refills: 1 | Status: SHIPPED | OUTPATIENT
Start: 2023-05-04

## 2023-05-04 RX ORDER — DOXYCYCLINE HYCLATE 100 MG/1
100 CAPSULE ORAL 2 TIMES DAILY
Qty: 90 CAPSULE | Refills: 0 | Status: SHIPPED | OUTPATIENT
Start: 2023-05-04 | End: 2023-06-18

## 2023-05-04 RX ORDER — LACTOBACILLUS ACIDOPHILUS 20B CELL
1 CAPSULE ORAL DAILY
Qty: 45 CAPSULE | Refills: 0 | Status: SHIPPED | OUTPATIENT
Start: 2023-05-04

## 2023-05-04 NOTE — PROGRESS NOTES
Chief Complaint: Testicle Pain (Pt here for follow up on ED visit. Pt still having pain.)    Subjective         History of Present Illness  Moreno Aleman is a 57 y.o. male presents to [facility] to be seen for testicular pain.    The patient is a     He states that he is with blood in the semen for the last 10 years off and on.     He was seen in the ED on 4/23/23 with c/o testicular pain.     U/s from ED visit showed Heads of either epididymis are enlarged and heterogeneously echoic.  Hypoechoic lesion in the head of the epididymis on the right measuring 1.4 cm x 1.3 cm x 0.5 cm no increased vascular flow is seen in either epididymis.  Small bilateral hydroceles.  Small varicocele on the left.    Patient has been on tamsulosin before with no improvement in urinary symptoms.    Last A1c performed in November was 8.8 however recent blood glucose readings consistently over 300 since February.    Nocturia x 2     He states that he is drinking coke daily 5-6, 12 oz cans.     Patient has a stent to LAD medically managed with Plavix statin and beta-blocker.    He is also on oxycodone per pain management for his chronic shoulder and back pain.     He has been on Cialis and states he took Cialis and Viagra together which helped.     He states that he would like to try trimix.           Objective     Past Medical History:   Diagnosis Date   • CAD (coronary artery disease)    • Chest pain    • Diabetes    • Hyperlipidemia    • Hypertension        Past Surgical History:   Procedure Laterality Date   • CARDIAC CATHETERIZATION      showed significant coronary artery disease of the LAD with calcification.    • CHOLECYSTECTOMY     • CIRCUMCISION     • CORONARY ANGIOPLASTY WITH STENT PLACEMENT     • INGUINAL HERNIA REPAIR      x2   • NOSE SURGERY     • MT RT/LT HEART CATHETERS N/A 03/22/2016    Procedure: Percutaneous Coronary Intervention - Rota/stent LAD;  Surgeon: Marek Emery MD;  Location: [facility]  INVASIVE LOCATION;  Service: Cardiovascular   • ROTATOR CUFF REPAIR Left    • SHOULDER ARTHROSCOPY Right     x2   • SHOULDER ARTHROSCOPY WITH SUBACROMIAL DECOMPRESSION Left 4/11/2022    Procedure: left SHOULDER ARTHROSCOPY SUBACROMIAL DECOMPRESSION WITH ROTATOR CUFF DEBRIDEMENT;  Surgeon: Kam Dick MD;  [place] [facility] [place] [facility]  Service: Orthopedics;  Laterality: Left;   • SPINAL FUSION           Current Outpatient Medications:   •  amitriptyline (ELAVIL) 50 MG tablet, Take 1 tablet by mouth Every Night., Disp: 90 tablet, Rfl: 1  •  Blood Glucose Monitoring Suppl (FreeStyle Lite) w/Device kit, See Admin Instructions., Disp: , Rfl:   •  busPIRone (BUSPAR) 10 MG tablet, Take 1 tablet by mouth 3 (Three) Times a Day As Needed (anxiety)., Disp: 90 tablet, Rfl: 2  •  cloNIDine (CATAPRES) 0.1 MG tablet, Take 1 tablet by mouth., Disp: , Rfl:   •  clopidogrel (PLAVIX) 75 MG tablet, Take 1 tablet by mouth Daily., Disp: 30 tablet, Rfl: 11  •  diazePAM (Valium) 10 MG tablet, 1 tablet by mouth one hour prior to MRI, Disp: 1 tablet, Rfl: 0  •  Farxiga 10 MG tablet, TAKE 1 TABLET BY MOUTH DAILY, Disp: 90 tablet, Rfl: 1  •  FREESTYLE LITE test strip, USE TO TEST BLOOD SUGAR ONCE A DAY, Disp: 100 each, Rfl: 1  •  Glucosamine 500 MG capsule, Glucosamine, Disp: , Rfl:   •  ibuprofen (ADVIL,MOTRIN) 800 MG tablet, Take 1 tablet by mouth Every 6 (Six) Hours As Needed for Mild Pain., Disp: 30 tablet, Rfl: 0  •  Insulin Degludec (Tresiba FlexTouch) 200 UNIT/ML solution pen-injector pen injection, Inject 22 Units under the skin into the appropriate area as directed Daily., Disp: 6 mL, Rfl: 1  •  Insulin Pen Needle (NovoFine Plus Pen Needle) 32G X 4 MM misc, 22 Units Daily., Disp: 100 each, Rfl: 5  •  lisinopril (PRINIVIL,ZESTRIL) 30 MG tablet, TAKE 1 TABLET BY MOUTH DAILY, Disp: 90 tablet, Rfl: 1  •  LORazepam (ATIVAN) 1 MG tablet, Take 1 tablet by mouth Every 8 (Eight) Hours As Needed. for anxiety, Disp: , Rfl:   •  metFORMIN  "(GLUCOPHAGE) 1000 MG tablet, TAKE 1 TABLET BY MOUTH TWICE DAILY WITH MEALS, Disp: 180 tablet, Rfl: 1  •  ondansetron ODT (ZOFRAN-ODT) 4 MG disintegrating tablet, Place 1 tablet on the tongue Every 6 (Six) Hours As Needed for Nausea or Vomiting., Disp: 15 tablet, Rfl: 0  •  oxyCODONE-acetaminophen (PERCOCET) 5-325 MG per tablet, Take 1 tablet by mouth 3 (Three) Times a Day., Disp: , Rfl:   •  rosuvastatin (CRESTOR) 10 MG tablet, Take 1 tablet by mouth Daily., Disp: 90 tablet, Rfl: 3  •  Syringe 21G X 1\" 3 ML misc, 100 mg Every 14 (Fourteen) Days., Disp: 20 each, Rfl: 2  •  doxycycline (VIBRAMYCIN) 100 MG capsule, Take 1 capsule by mouth 2 (Two) Times a Day for 45 days., Disp: 90 capsule, Rfl: 0  •  Lactobacillus (Florajen Acidophilus) capsule, Take 1 capsule by mouth Daily., Disp: 45 capsule, Rfl: 0  •  tadalafil (Cialis) 20 MG tablet, Take 1 tablet by mouth As Needed for Erectile Dysfunction., Disp: 20 tablet, Rfl: 1    Allergies   Allergen Reactions   • Fentanyl Angioedema and Swelling   • Cefdinir Itching   • Nifedipine Hives and Itching        Family History   Problem Relation Age of Onset   • Hypertension Mother    • Diabetes Mother    • Heart attack Father    • Heart disease Father    • Heart failure Father    • Diabetes Father        Social History     Socioeconomic History   • Marital status:    Tobacco Use   • Smoking status: Never   • Smokeless tobacco: Never   Vaping Use   • Vaping Use: Never used   Substance and Sexual Activity   • Alcohol use: Yes     Comment: OCCASIONAL   • Drug use: Never   • Sexual activity: Defer       Vital Signs:   Resp 12   Ht 171.5 cm (67.5\")   Wt 87.6 kg (193 lb 3.2 oz)   BMI 29.81 kg/m²      Physical Exam     Result Review :   The following data was reviewed by: NINFA Ac on 05/04/2023:  Results for orders placed or performed in visit on 05/04/23   POC Urinalysis Dipstick, Automated    Specimen: Urine   Result Value Ref Range    Color Yellow Yellow, " Straw, Dark Yellow, Марина    Clarity, UA Clear Clear    Specific Gravity  1.010 1.005 - 1.030    pH, Urine 5.0 5.0 - 8.0    Leukocytes Negative Negative    Nitrite, UA Negative Negative    Protein, POC Negative Negative mg/dL    Glucose, UA >=1000 mg/dL (3+) (A) Negative mg/dL    Ketones, UA Negative Negative    Urobilinogen, UA Normal Normal, 0.2 E.U./dL    Bilirubin Negative Negative    Blood, UA Negative Negative    Lot Number [id number]     Expiration Date 2,024/6       PSA        8/9/2022    08:31   PSA   PSA 0.502           Procedures        Assessment and Plan    Diagnoses and all orders for this visit:    1. Pain in testicle, unspecified laterality (Primary)  -     POC Urinalysis Dipstick, Automated    2. Hematospermia  -     MRI Prostate With & Without Contrast; Future  -     doxycycline (VIBRAMYCIN) 100 MG capsule; Take 1 capsule by mouth 2 (Two) Times a Day for 45 days.  Dispense: 90 capsule; Refill: 0  -     Lactobacillus (Florajen Acidophilus) capsule; Take 1 capsule by mouth Daily.  Dispense: 45 capsule; Refill: 0    3. Erectile dysfunction, unspecified erectile dysfunction type  -     tadalafil (Cialis) 20 MG tablet; Take 1 tablet by mouth As Needed for Erectile Dysfunction.  Dispense: 20 tablet; Refill: 1      Given persistent hematospermia I would like to get him set up for an MRI of his prostate just to determine any underlying etiology that may be causing this recurrent issue.    We will also treat the patient with 45 days of antibiotics for possible recurrent prostatitis.    We we will follow-up with him in 8 weeks or sooner if needed.    Given ED we will order cialis to see if this will help his ED and bring him back for trimix teaching.      I spent 15 minutes caring for Moreno on this date of service. This time includes time spent by me in the following activities:reviewing tests, obtaining and/or reviewing a separately obtained history, performing a medically appropriate examination and/or  evaluation , counseling and educating the patient/family/caregiver, ordering medications, tests, or procedures, and documenting information in the medical record  Follow Up   Return in about 8 weeks (around 6/29/2023) for f/u MRI, antibiotics, trimix teaching .  Patient was given instructions and counseling regarding his condition or for health maintenance advice. Please see specific information pulled into the AVS if appropriate.         This document has been electronically signed by NINFA Ac  May 4, 2023 10:26 EDT

## 2023-05-16 ENCOUNTER — TELEPHONE (OUTPATIENT)
Dept: FAMILY MEDICINE CLINIC | Facility: CLINIC | Age: 58
End: 2023-05-16

## 2023-05-16 NOTE — TELEPHONE ENCOUNTER
LVMTCB    Patient missed their appointment scheduled on 5/16/2023 with Ollie Moreland.    Would patient like to be rescheduled?    No show letter sent to patient either via [employer] or mail.     HUB TO SHARE

## 2023-05-23 ENCOUNTER — HOSPITAL ENCOUNTER (OUTPATIENT)
Dept: MRI IMAGING | Facility: HOSPITAL | Age: 58
Discharge: HOME | End: 2023-05-23
Payer: MEDICARE

## 2023-05-23 DIAGNOSIS — M54.12 CERVICAL RADICULOPATHY: ICD-10-CM

## 2023-05-23 DIAGNOSIS — Z47.89 AFTERCARE FOLLOWING SURGERY OF THE MUSCULOSKELETAL SYSTEM: ICD-10-CM

## 2023-05-23 PROCEDURE — 72141 MRI NECK SPINE W/O DYE: CPT

## 2023-05-23 PROCEDURE — 73221 MRI JOINT UPR EXTREM W/O DYE: CPT

## 2023-05-25 ENCOUNTER — OFFICE VISIT (OUTPATIENT)
Dept: ORTHOPEDIC SURGERY | Facility: CLINIC | Age: 58
End: 2023-05-25

## 2023-05-25 VITALS — BODY MASS INDEX: 29.1 KG/M2 | HEART RATE: 85 BPM | HEIGHT: 68 IN | WEIGHT: 192 LBS | OXYGEN SATURATION: 97 %

## 2023-05-25 DIAGNOSIS — M54.12 CERVICAL RADICULOPATHY: Primary | ICD-10-CM

## 2023-05-25 DIAGNOSIS — Z47.89 AFTERCARE FOLLOWING SURGERY OF THE MUSCULOSKELETAL SYSTEM: ICD-10-CM

## 2023-05-25 NOTE — PROGRESS NOTES
"Chief Complaint  Follow-up of the Left Shoulder     Subjective      Moreno Aleman presents to [facility] for follow up evaluation of the left shoulder. The patient recently had an MRI of his shoulder and cervical spine and is here today for those results. To review, He has a history of left shoulder arthroscopic subacromial decompression, bursectomy with rotator cuff debridement, 4/11/2022. He reports shoulder pain that radiates up to his neck and down his arm. He denies injury or trauma.     Allergies   Allergen Reactions   • Fentanyl Angioedema and Swelling   • Cefdinir Itching   • Nifedipine Hives and Itching        Social History     Socioeconomic History   • Marital status:    Tobacco Use   • Smoking status: Never   • Smokeless tobacco: Never   Vaping Use   • Vaping Use: Never used   Substance and Sexual Activity   • Alcohol use: Yes     Comment: OCCASIONAL   • Drug use: Never   • Sexual activity: Defer        Review of Systems     Objective   Vital Signs:   Pulse 85   Ht 171.5 cm (67.5\")   Wt 87.1 kg (192 lb)   SpO2 97%   BMI 29.63 kg/m²       Physical Exam  Constitutional:       Appearance: Normal appearance. The patient is well-developed and normal weight.   HENT:      Head: Normocephalic.      Right Ear: Hearing and external ear normal.      Left Ear: Hearing and external ear normal.      Nose: Nose normal.   Eyes:      Conjunctiva/sclera: Conjunctivae normal.   Cardiovascular:      Rate and Rhythm: Normal rate.   Pulmonary:      Effort: Pulmonary effort is normal.      Breath sounds: No wheezing or rales.   Abdominal:      Palpations: Abdomen is soft.      Tenderness: There is no abdominal tenderness.   Musculoskeletal:      Cervical back: Normal range of motion.   Skin:     Findings: No rash.   Neurological:      Mental Status: The patient is alert and oriented to person, place, and time.   Psychiatric:         Mood and Affect: Mood and affect normal.         " Judgment: Judgment normal.       Ortho Exam      Left shoulder- Tender to the anterior left shoulder. Forward elevation 110 degrees. Abduction 90. External Rotation 80. Internal rotation 60. 4+ supraspinatus strength. 5/5 infraspinatus and subscapularis. Well healed scope scars. Neurovascularly intact. Tender to the left paracervical region. Pain with cervical ROM. Cervical ROM limited.     Procedures      Imaging Results (Most Recent)     None           Result Review :       MRI Cervical Spine Without Contrast    Result Date: 5/24/2023  Narrative: PROCEDURE: MRI CERVICAL SPINE WO CONTRAST  COMPARISON: [facility], MR, MRI CERVICAL SPINE WO CONTRAST, 10/05/2022, 18:33.  INDICATIONS: cervical radiculopathy  TECHNIQUE: A variety of imaging planes and parameters were utilized for visualization of suspected pathology.   FINDINGS:  CRANIOCERVICAL AREA: Normal foramen magnum with no Chiari malformation.  PARASPINAL AREA: Normal with no visible mass.  BONES: No fracture, pars defect, or osseous lesion.  CORD: Normal caliber, contour, and signal intensity.   CERVICAL DISC LEVELS: C2-C3: No significant disc/facet abnormality, spinal stenosis, or foraminal stenosis.  C3-C4: There is mild symmetric disc bulging with bilateral facet uncovertebral spurring.  There is resultant mild bilateral neural foraminal narrowing. C4-C5: There is diffuse symmetric disc bulging with effacement of the ventral thecal sac.  Disc bulging combines with facet hypertrophic change and uncovertebral spurring resulting in mild bilateral neural foraminal narrowing. C5-C6: There is diffuse symmetric disc bulging with effacement of the ventral thecal sac.  Disc bulging combines with facet hypertrophic change and uncovertebral spurring resulting in mild bilateral neural foraminal narrowing. C6-C7: There is a left paracentral disc bulge/protrusion with resultant effacement of the ventral thecal sac and slight deformity of the ventral cervical  cord.  Uncovertebral and facet degenerative change combined with the disc bulging resulting in severe left neural foraminal narrowing, similar to the previous exam.  There is moderate right neural foraminal narrowing.  There is mild spinal canal stenosis. C7-T1: No significant disc/facet abnormality, spinal stenosis, or foraminal stenosis.       Impression:  Stable multilevel degenerative disc disease.  The most abnormal level is again C6-C7 where there is a left paracentral focal protrusion resulting in mass effect of the cervical cord at this level, narrowing of the spinal canal and left greater than right neural foraminal narrowing.   GLENNY PARRA MD       Electronically Signed and Approved By: GLENNY PARRA MD on 5/24/2023 at 13:59             MRI Shoulder Left Without Contrast    Result Date: 5/24/2023  Narrative: PROCEDURE: MRI SHOULDER LEFT WO CONTRAST  COMPARISON: [facility], MR, MRI SHOULDER LEFT WO CONTRAST, 3/16/2022, 17:20.  INDICATIONS: Left shoulder pain      TECHNIQUE: A variety of imaging planes and parameters were utilized for visualization of suspected pathology.  Images were performed without contrast.   FINDINGS:  Rotator cuff tendinosis is present with intermediate signal changes and thickening.  Postsurgical changes are seen related to previous rotator cuff repair along the subscapularis tendon.  No focal tear identified.  No significant bursitis present.  Mild acromioclavicular and glenohumeral osteoarthritic changes are present.  The labrum appears intact.  No focal tear identified.  Mild degeneration is present.  No joint effusion identified.  The biceps tendon appears intact.  No significant surrounding fluid.. No abnormal focal bone marrow signal is seen.  No significant soft tissue thickening identified within the axillary recess or the rotator cuff interval.  The cortical margins are intact. The volume of the rotator cuff musculature is within normal limits. The surrounding  soft tissues are unremarkable.      Impression:   1. Postsurgical changes related to previous rotator cuff repair.  No rotator cuff tear identified.  Rotator cuff tendinosis is present with intermediate signal changes and thickening. 2. Mild acromioclavicular and glenohumeral osteoarthritis. 3. Mild labral degeneration with no evidence of a focal tear.      MAGDI LOO MD       Electronically Signed and Approved By: MAGDI LOO MD on 5/24/2023 at 11:00                      Assessment and Plan     Diagnoses and all orders for this visit:    1. Cervical radiculopathy (Primary)    2. Aftercare following surgery of left shoulder arthroscopic subacromial decompression, bursectomy, rotator cuff debridement, 4/11/2022        Discussed the treatment plan with the patient.  I reviewed his MRI with him today. Plan for a referral to Dr. Rose to evaluate his cervical spine. The patient expressed understanding and wished to proceed.     Call or return if worsening symptoms.    Follow Up     PRN      Patient was given instructions and counseling regarding his condition or for health maintenance advice. Please see specific information pulled into the AVS if appropriate.     Scribed for Kam Dick MD by Leila Oscar.  05/25/23   08:01 EDT    I have personally performed the services described in this document as scribed by the above individual and it is both accurate and complete. Kam Dick MD 06/02/23

## 2023-06-14 NOTE — PROGRESS NOTES
"Chief Complaint  Neck Pain (Neck pain that radiates into left shoulder)    Subjective          Moreno Aleman who is a 57 y.o. year old male who presents to [facility] for evaluation of cervical spine.      The patient complains of pain located in the cervical spine.  Patients states the pain has been present for 1 year.  The pain came on gradually.  The pain scale level is 5.  The pain does radiate. Dermatomes are located on left Cervical at: to the shoulder..  Will occasionally have numbness into the arm and digits 3-5 of the left hand. This is rare. The neck pain is constant and described as sharp and aching.  The pain is worse at no particular time of day. Patient states head turning and jarring activities makes the pain worse.  Patient states rest makes the pain better.    Associated Symptoms Include: Denies numbness and tingling  Conservative Interventions Include: Epidural Steroids that were not very effective. and Pain Medications that were somewhat effective. Cervical TPIs were not very effective. He had cervical RFA around 5 months ago providing modest benefit.    Was this the result of an injury or accident? : No    History of Previous Spinal Surgery?: Yes.  Lumbar, Date L5/S1 2001 Dr. Shields and [facility] in [place], [place]    Nicotine use: non-smoker    BMI: Body mass index is 28.29 kg/m².      Review of Systems   Musculoskeletal:  Positive for myalgias, neck pain and neck stiffness.   All other systems reviewed and are negative.     Objective   Vital Signs:   BP (!) 179/105 (BP Location: Left arm, Patient Position: Sitting, Cuff Size: Adult) Comment: REPEAT 166/96  Pulse 105   Ht 171.5 cm (67.5\")   Wt 83.1 kg (183 lb 4.8 oz)   BMI 28.29 kg/m²       Physical Exam  Vitals reviewed.   Constitutional:       Appearance: Normal appearance.   Musculoskeletal:      Right shoulder: No tenderness. Normal range of motion.      Left shoulder: No tenderness. Normal range " of motion.      Cervical back: Tenderness present. Pain with movement present. Decreased range of motion.   Neurological:      Mental Status: He is alert and oriented to person, place, and time.      Motor: Motor strength is normal.      Gait: Gait is intact.      Deep Tendon Reflexes:      Reflex Scores:       Tricep reflexes are 1+ on the right side and 1+ on the left side.       Bicep reflexes are 1+ on the right side and 1+ on the left side.       Brachioradialis reflexes are 1+ on the right side and 1+ on the left side.     Neurologic Exam     Mental Status   Oriented to person, place, and time.   Level of consciousness: alert    Motor Exam   Muscle bulk: normal  Overall muscle tone: normal    Strength   Strength 5/5 throughout.     Sensory Exam   Light touch normal.     Gait, Coordination, and Reflexes     Gait  Gait: normal    Reflexes   Right brachioradialis: 1+  Left brachioradialis: 1+  Right biceps: 1+  Left biceps: 1+  Right triceps: 1+  Left triceps: 1+  Right Claros: absent  Left Claros: absent     Result Review :       Data reviewed : Radiologic studies MRI Cervical Spine on 5/23/23 at [facility] personally reviewed. Multilevel spondylosis with a focal left paracentral disc protrusion at C6/7, resulting in minimal spinal canal stenosis and moderately severe left, moderate right foraminal stenosis. No cord signal change.        MRI Left Shoulder  IMPRESSION:                 1. Postsurgical changes related to previous rotator cuff repair.  No rotator cuff tear identified.    Rotator cuff tendinosis is present with intermediate signal changes and thickening.  2. Mild acromioclavicular and glenohumeral osteoarthritis.  3. Mild labral degeneration with no evidence of a focal tear.     Assessment and Plan    Diagnoses and all orders for this visit:    1. Cervical spondylosis without myelopathy (Primary)  -     Ambulatory Referral to Physical Therapy Evaluate and treat; Heat, Electrotherapy; Moist heat; Tens  (Home); Cross Fiber; Stretching (Traction), ROM, Strengthening    2. Cervical radiculopathy    Pt presenting for evaluation of chronic left sided neck pain with occasional numbness into the left arm. We reviewed his MRI Cervical Spine, demonstrating mild spondylosis most significant at C6/7. There is no high grade spinal canal stenosis. There is foraminal narrowing at this level. We discussed that surgery would not improve neck pain. Should his radicular symptoms progress there may be consideration for cervical fusion.     Will refer to physical therapy for traction, range of motion, E-stim, massage, and strengthening. He will continue with [facility] for interventional management.     He is aware to call should his pain progress into the left arm.     Follow up as needed.         Follow Up   Return if symptoms worsen or fail to improve.  Patient was given instructions and counseling regarding his condition or for health maintenance advice.

## 2023-06-15 ENCOUNTER — OFFICE VISIT (OUTPATIENT)
Dept: NEUROSURGERY | Facility: CLINIC | Age: 58
End: 2023-06-15
Payer: MEDICARE

## 2023-06-15 VITALS
WEIGHT: 183.3 LBS | DIASTOLIC BLOOD PRESSURE: 105 MMHG | SYSTOLIC BLOOD PRESSURE: 179 MMHG | HEIGHT: 68 IN | BODY MASS INDEX: 27.78 KG/M2 | HEART RATE: 105 BPM

## 2023-06-15 DIAGNOSIS — M47.812 CERVICAL SPONDYLOSIS WITHOUT MYELOPATHY: Primary | ICD-10-CM

## 2023-06-15 DIAGNOSIS — M54.12 CERVICAL RADICULOPATHY: ICD-10-CM

## 2023-07-10 ENCOUNTER — TELEPHONE (OUTPATIENT)
Dept: NEUROSURGERY | Facility: CLINIC | Age: 58
End: 2023-07-10

## 2023-07-10 NOTE — TELEPHONE ENCOUNTER
Caller: kim campbell    Relationship: Emergency Contact    Best call back number: [phone]    What specialty or service is being requested: PHYSICAL THERAPY    What is the provider, practice or medical service name: [facility]    What is the office location: [place]    What is the office phone number: [phone]    Any additional details: PATIENT'S WIFE STATES THEY HAVE CONTACTED [facility] AND HAVE BEEN ADVISED NO REFERRAL WAS RECEIVED. PLEASE CALL PATIENT IF/WHEN REFERRAL IS SENT.

## 2023-07-20 ENCOUNTER — APPOINTMENT (OUTPATIENT)
Dept: GENERAL RADIOLOGY | Facility: HOSPITAL | Age: 58
End: 2023-07-20
Payer: MEDICARE

## 2023-07-20 ENCOUNTER — HOSPITAL ENCOUNTER (EMERGENCY)
Facility: HOSPITAL | Age: 58
Discharge: HOME | End: 2023-07-20
Attending: EMERGENCY MEDICINE | Admitting: EMERGENCY MEDICINE
Payer: MEDICARE

## 2023-07-20 VITALS
SYSTOLIC BLOOD PRESSURE: 186 MMHG | WEIGHT: 191.36 LBS | HEART RATE: 91 BPM | HEIGHT: 68 IN | RESPIRATION RATE: 20 BRPM | OXYGEN SATURATION: 98 % | TEMPERATURE: 98.4 F | BODY MASS INDEX: 29 KG/M2 | DIASTOLIC BLOOD PRESSURE: 91 MMHG

## 2023-07-20 DIAGNOSIS — S40.021A CONTUSION OF RIGHT UPPER ARM, INITIAL ENCOUNTER: Primary | ICD-10-CM

## 2023-07-20 PROCEDURE — 73060 X-RAY EXAM OF HUMERUS: CPT

## 2023-07-20 PROCEDURE — 99283 EMERGENCY DEPT VISIT LOW MDM: CPT

## 2023-07-20 PROCEDURE — 73080 X-RAY EXAM OF ELBOW: CPT

## 2023-07-21 NOTE — DISCHARGE INSTRUCTIONS
Rest, ice, and elevate.  Gentle range of motion stretches several times a day for comfort.  Take your pain medicines at home as prescribed.  You may use the Ace wrap for comfort and swelling.  Follow-up with NINFA Thompson next week for reevaluation and further treatment as necessary.  Return to the emergency department for any acutely developing redness, any increase in swelling, any inability to flex or extend your elbow or any new or worse concerns.

## 2023-07-21 NOTE — ED PROVIDER NOTES
Subjective   History of Present Illness  The patient presents to the emergency department complaining of bruising and tenderness to his right upper arm to the underneath part.  He states that he was attempting to hold down a canopy today that got caught up in some wind and states that since then he has had pain and tenderness to the area.  He is able to flex and extend the shoulder and the elbow without any difficulties.  There is no obvious warmth or redness associated.  He denies any other injury.  He denies any previous injury.    History provided by:  Patient   used: No      Review of Systems   Constitutional:  Negative for chills and fever.   HENT:  Negative for congestion, ear pain and sore throat.    Eyes:  Negative for pain.   Respiratory:  Negative for cough, chest tightness and shortness of breath.    Cardiovascular:  Negative for chest pain.   Gastrointestinal:  Negative for abdominal pain, diarrhea, nausea and vomiting.   Genitourinary:  Negative for flank pain and hematuria.   Musculoskeletal:  Positive for myalgias. Negative for back pain, joint swelling, neck pain and neck stiffness.   Skin:  Positive for color change. Negative for pallor, rash and wound.   Neurological:  Negative for seizures and headaches.   All other systems reviewed and are negative.    Past Medical History:   Diagnosis Date    Arthritis     Back problem     CAD (coronary artery disease)     Chest pain     Claustrophobia     Diabetes     Headache     Hyperlipidemia     Hypertension     Pneumonia        Allergies   Allergen Reactions    Fentanyl Angioedema and Swelling    Cefdinir Itching    Nifedipine Hives and Itching       Past Surgical History:   Procedure Laterality Date    CARDIAC CATHETERIZATION      showed significant coronary artery disease of the LAD with calcification.     CHOLECYSTECTOMY      CIRCUMCISION      CORONARY ANGIOPLASTY WITH STENT PLACEMENT      INGUINAL HERNIA REPAIR      x2    NOSE  SURGERY      [place] RT/LT HEART CATHETERS N/A 03/22/2016    Procedure: Percutaneous Coronary Intervention - Rota/stent LAD;  Surgeon: Marek Emery MD;  Location:  [facility];  Service: Cardiovascular    ROTATOR CUFF REPAIR Left     SHOULDER ARTHROSCOPY Right     x2    SHOULDER ARTHROSCOPY WITH SUBACROMIAL DECOMPRESSION Left 4/11/2022    Procedure: left SHOULDER ARTHROSCOPY SUBACROMIAL DECOMPRESSION WITH ROTATOR CUFF DEBRIDEMENT;  Surgeon: Kam Dick MD;  Location: [facility];  Service: Orthopedics;  Laterality: Left;    SPINAL FUSION         Family History   Problem Relation Age of Onset    Hypertension Mother     Diabetes Mother     Lung cancer Mother     Stroke Mother     Heart attack Father     Heart disease Father     Heart failure Father     Diabetes Father        Social History     Socioeconomic History    Marital status:    Tobacco Use    Smoking status: Never    Smokeless tobacco: Never   Vaping Use    Vaping Use: Never used   Substance and Sexual Activity    Alcohol use: Yes     Comment: OCCASIONAL    Drug use: Never    Sexual activity: Defer           Objective   Physical Exam  Constitutional:       Appearance: Normal appearance. He is not ill-appearing.   HENT:      Head: Normocephalic and atraumatic.   Eyes:      Conjunctiva/sclera: Conjunctivae normal.      Pupils: Pupils are equal, round, and reactive to light.   Pulmonary:      Effort: Pulmonary effort is normal.   Abdominal:      General: There is no distension.   Musculoskeletal:         General: Tenderness and signs of injury present. No swelling or deformity.        Arms:       Cervical back: Normal range of motion and neck supple. No rigidity or tenderness.   Lymphadenopathy:      Cervical: No cervical adenopathy.   Skin:     General: Skin is warm.      Capillary Refill: Capillary refill takes less than 2 seconds.      Coloration: Skin is not cyanotic.      Findings: Bruising present. No erythema, lesion or rash.    Neurological:      General: No focal deficit present.      Mental Status: He is alert and oriented to person, place, and time. Mental status is at baseline.   Psychiatric:         Attention and Perception: Attention and perception normal.         Mood and Affect: Mood normal.         Behavior: Behavior normal.       Procedures           ED Course                                           Medical Decision Making  Problems Addressed:  Contusion of right upper arm, initial encounter: complicated acute illness or injury    Amount and/or Complexity of Data Reviewed  Radiology: ordered.        Final diagnoses:   Contusion of right upper arm, initial encounter       ED Disposition  ED Disposition       ED Disposition   Discharge    Condition   Stable    Comment   --               Ollie Moreland, NINFA  [address] [address]  [phone]    Call   FOR FOLLOW UP         Medication List        Changed      busPIRone 10 MG tablet  Commonly known as: BUSPAR  Take 1 tablet by mouth 3 (Three) Times a Day As Needed (anxiety).  What changed: additional instructions                 Justine Silverio APRN  07/21/23 [id number]

## 2023-07-25 ENCOUNTER — OFFICE VISIT (OUTPATIENT)
Dept: UROLOGY | Facility: CLINIC | Age: 58
End: 2023-07-25
Payer: MEDICARE

## 2023-07-25 VITALS — RESPIRATION RATE: 12 BRPM | HEIGHT: 68 IN | BODY MASS INDEX: 28.95 KG/M2 | WEIGHT: 191 LBS

## 2023-07-25 DIAGNOSIS — N52.9 ERECTILE DYSFUNCTION, UNSPECIFIED ERECTILE DYSFUNCTION TYPE: Primary | ICD-10-CM

## 2023-07-25 PROBLEM — E66.3 OVERWEIGHT: Status: ACTIVE | Noted: 2023-05-12

## 2023-07-25 PROBLEM — M47.812 CERVICAL SPONDYLOSIS: Status: ACTIVE | Noted: 2023-04-21

## 2023-07-25 PROBLEM — M79.18 MYOFASCIAL PAIN: Status: ACTIVE | Noted: 2023-07-13

## 2023-07-25 PROCEDURE — 1160F RVW MEDS BY RX/DR IN RCRD: CPT | Performed by: NURSE PRACTITIONER

## 2023-07-25 PROCEDURE — 54235 NJX CORPORA CAVERNOSA RX AGT: CPT | Performed by: NURSE PRACTITIONER

## 2023-07-25 PROCEDURE — 99213 OFFICE O/P EST LOW 20 MIN: CPT | Performed by: NURSE PRACTITIONER

## 2023-07-25 PROCEDURE — 1159F MED LIST DOCD IN RCRD: CPT | Performed by: NURSE PRACTITIONER

## 2023-07-25 RX ORDER — DICLOFENAC SODIUM 75 MG/1
TABLET, DELAYED RELEASE ORAL
COMMUNITY
Start: 2023-07-13

## 2023-07-25 RX ORDER — LIDOCAINE 50 MG/G
OINTMENT TOPICAL
COMMUNITY

## 2023-07-25 RX ORDER — AMITRIPTYLINE HYDROCHLORIDE 50 MG/1
1 TABLET, FILM COATED ORAL NIGHTLY
COMMUNITY

## 2023-07-25 RX ORDER — CYCLOBENZAPRINE HCL 10 MG
TABLET ORAL
COMMUNITY
Start: 2023-07-13

## 2023-07-25 RX ORDER — SULFAMETHOXAZOLE AND TRIMETHOPRIM 800; 160 MG/1; MG/1
TABLET ORAL
COMMUNITY
Start: 2023-07-24

## 2023-07-25 NOTE — PROGRESS NOTES
Chief Complaint: Follow-up (Pt here for follow up.  Pt to learn trimix teaching.  Pt did not have MRI Prostate./)    Subjective         History of Present Illness  Moreno Aleman is a 57 y.o. male presents to [facility] to be seen for trimix teaching.       Patient presents for follow-up of erectile dysfunction, trial of Trimix, and injection teaching. Denies changes since last visit.      Patient demonstrated ability to successfully perform intracavernosal self injection x 1.   Provided trial dose up to 0.2 for which patient obtained greater than 50% erection.      He also has failed to have his MRI performed x2 now for evaluation of his hematospermia.       Previous:    He states that he is with blood in the semen for the last 10 years off and on.     He was seen in the ED on 4/23/23 with c/o testicular pain.     U/s from ED visit showed Heads of either epididymis are enlarged and heterogeneously echoic.  Hypoechoic lesion in the head of the epididymis on the right measuring 1.4 cm x 1.3 cm x 0.5 cm no increased vascular flow is seen in either epididymis.  Small bilateral hydroceles.  Small varicocele on the left.    Patient has been on tamsulosin before with no improvement in urinary symptoms.    Last A1c performed in November was 8.8 however recent blood glucose readings consistently over 300 since February.    Nocturia x 2     He states that he is drinking coke daily 5-6, 12 oz cans.     Patient has a stent to LAD medically managed with Plavix statin and beta-blocker.    He is also on oxycodone per pain management for his chronic shoulder and back pain.     He has been on Cialis and states he took Cialis and Viagra together which helped.     He states that he would like to try trimix.           Objective     Past Medical History:   Diagnosis Date    Arthritis     Back problem     CAD (coronary artery disease)     Chest pain     Claustrophobia     Diabetes     Headache      Hyperlipidemia     Hypertension     Pneumonia        Past Surgical History:   Procedure Laterality Date    CARDIAC CATHETERIZATION      showed significant coronary artery disease of the LAD with calcification.     CHOLECYSTECTOMY      CIRCUMCISION      CORONARY ANGIOPLASTY WITH STENT PLACEMENT      INGUINAL HERNIA REPAIR      x2    NOSE SURGERY      [place] RT/LT HEART CATHETERS N/A 03/22/2016    Procedure: Percutaneous Coronary Intervention - Rota/stent LAD;  Surgeon: Marek Emery MD;  Location:  [facility];  Service: Cardiovascular    ROTATOR CUFF REPAIR Left     SHOULDER ARTHROSCOPY Right     x2    SHOULDER ARTHROSCOPY WITH SUBACROMIAL DECOMPRESSION Left 4/11/2022    Procedure: left SHOULDER ARTHROSCOPY SUBACROMIAL DECOMPRESSION WITH ROTATOR CUFF DEBRIDEMENT;  Surgeon: Kam Dick MD;  Location:  [facility];  Service: Orthopedics;  Laterality: Left;    SPINAL FUSION           Current Outpatient Medications:     amitriptyline (ELAVIL) 50 MG tablet, Take 1 tablet by mouth Every Night., Disp: , Rfl:     Blood Glucose Monitoring Suppl (FreeStyle Lite) w/Device kit, See Admin Instructions., Disp: , Rfl:     busPIRone (BUSPAR) 10 MG tablet, Take 1 tablet by mouth 3 (Three) Times a Day As Needed (anxiety). (Patient taking differently: Take 1 tablet by mouth 3 (Three) Times a Day As Needed (anxiety). Pt takes 1 tab daily), Disp: 90 tablet, Rfl: 2    cloNIDine (CATAPRES) 0.1 MG tablet, Take 1 tablet by mouth., Disp: , Rfl:     cyclobenzaprine (FLEXERIL) 10 MG tablet, Take 1 tablet every day by oral route at bedtime for 30 days., Disp: , Rfl:     diclofenac (VOLTAREN) 75 MG EC tablet, Take 1 tablet twice a day by oral route as needed for 30 days., Disp: , Rfl:     Farxiga 10 MG tablet, TAKE 1 TABLET BY MOUTH DAILY, Disp: 90 tablet, Rfl: 1    FREESTYLE LITE test strip, USE TO TEST BLOOD SUGAR ONCE A DAY, Disp: 100 each, Rfl: 1    Glucosamine 500 MG capsule, Glucosamine, Disp: , Rfl:     ibuprofen  "(ADVIL,MOTRIN) 800 MG tablet, Take 1 tablet by mouth Every 6 (Six) Hours As Needed for Mild Pain., Disp: 30 tablet, Rfl: 0    Insulin Degludec (Tresiba FlexTouch) 200 UNIT/ML solution pen-injector pen injection, Inject 22 Units under the skin into the appropriate area as directed Daily., Disp: 6 mL, Rfl: 1    Insulin Pen Needle (NovoFine Plus Pen Needle) 32G X 4 MM misc, 22 Units Daily., Disp: 100 each, Rfl: 5    Lactobacillus (Florajen Acidophilus) capsule, Take 1 capsule by mouth Daily., Disp: 45 capsule, Rfl: 0    lidocaine (XYLOCAINE) 5 % ointment, APPLY NICKEL SIZE AMOUNT TO THE LEFT SHOULDER THREE TO FOUR TIMES DAILY AS NEEDED, Disp: , Rfl:     metFORMIN (GLUCOPHAGE) 1000 MG tablet, TAKE 1 TABLET BY MOUTH TWICE DAILY WITH MEALS, Disp: 180 tablet, Rfl: 1    methylPREDNISolone (MEDROL) 4 MG dose pack, Take as directed on package instructions., Disp: 21 tablet, Rfl: 0    oxyCODONE-acetaminophen (PERCOCET) 5-325 MG per tablet, Take 1 tablet by mouth 3 (Three) Times a Day., Disp: , Rfl:     rosuvastatin (CRESTOR) 10 MG tablet, Take 1 tablet by mouth Daily., Disp: 90 tablet, Rfl: 3    sulfamethoxazole-trimethoprim (BACTRIM DS,SEPTRA DS) 800-160 MG per tablet, , Disp: , Rfl:     Syringe 21G X 1\" 3 ML misc, 100 mg Every 14 (Fourteen) Days., Disp: 20 each, Rfl: 2    tadalafil (Cialis) 20 MG tablet, Take 1 tablet by mouth As Needed for Erectile Dysfunction., Disp: 20 tablet, Rfl: 1    Allergies   Allergen Reactions    Fentanyl Angioedema and Swelling    Cefdinir Itching    Nifedipine Hives and Itching        Family History   Problem Relation Age of Onset    Hypertension Mother     Diabetes Mother     Lung cancer Mother     Stroke Mother     Heart attack Father     Heart disease Father     Heart failure Father     Diabetes Father        Social History     Socioeconomic History    Marital status:    Tobacco Use    Smoking status: Never    Smokeless tobacco: Never   Vaping Use    Vaping Use: Never used   Substance " "and Sexual Activity    Alcohol use: Yes     Comment: OCCASIONAL    Drug use: Never    Sexual activity: Defer       Vital Signs:   Resp 12   Ht 171.5 cm (67.5\")   Wt 86.6 kg (191 lb)   BMI 29.47 kg/m²      Physical Exam     Result Review :   The following data was reviewed by: NINFA Ac on 05/04/2023:  Results for orders placed or performed in visit on 05/04/23   POC Urinalysis Dipstick, Automated    Specimen: Urine   Result Value Ref Range    Color Yellow Yellow, Straw, Dark Yellow, Марина    Clarity, UA Clear Clear    Specific Gravity  1.010 1.005 - 1.030    pH, Urine 5.0 5.0 - 8.0    Leukocytes Negative Negative    Nitrite, UA Negative Negative    Protein, POC Negative Negative mg/dL    Glucose, UA >=1000 mg/dL (3+) (A) Negative mg/dL    Ketones, UA Negative Negative    Urobilinogen, UA Normal Normal, 0.2 E.U./dL    Bilirubin Negative Negative    Blood, UA Negative Negative    Lot Number [id number]     Expiration Date 2,024/6       PSA          8/9/2022    08:31   PSA   PSA 0.502          Procedures        Assessment and Plan    Diagnoses and all orders for this visit:    1. Erectile dysfunction, unspecified erectile dysfunction type [N52.9 (ICD-10-CM)] (Primary)      Instructions provided on how to titrate dose up appropriately and home instructions provided.   Faxed order for Tri-mix medication Patient to follow up in 3 months or earlier as needed      We will forego his MRI for now.    I spent 20 minutes caring for Moreno on this date of service. This time includes time spent by me in the following activities:reviewing tests, obtaining and/or reviewing a separately obtained history, performing a medically appropriate examination and/or evaluation , counseling and educating the patient/family/caregiver, ordering medications, tests, or procedures, and documenting information in the medical record  Follow Up   Return in about 3 months (around 10/25/2023) for f/u ED/ MRI .  Patient was given instructions " and counseling regarding his condition or for health maintenance advice. Please see specific information pulled into the AVS if appropriate.         This document has been electronically signed by NINFA Ac  July 25, 2023 10:32 EDT

## 2023-07-28 DIAGNOSIS — N52.9 ERECTILE DYSFUNCTION, UNSPECIFIED ERECTILE DYSFUNCTION TYPE: ICD-10-CM

## 2023-07-31 RX ORDER — TADALAFIL 20 MG/1
20 TABLET ORAL AS NEEDED
Qty: 20 TABLET | Refills: 1 | Status: SHIPPED | OUTPATIENT
Start: 2023-07-31

## 2023-08-13 ENCOUNTER — APPOINTMENT (OUTPATIENT)
Dept: GENERAL RADIOLOGY | Facility: HOSPITAL | Age: 58
End: 2023-08-13
Payer: MEDICARE

## 2023-08-13 ENCOUNTER — HOSPITAL ENCOUNTER (EMERGENCY)
Facility: HOSPITAL | Age: 58
Discharge: HOME | End: 2023-08-13
Attending: EMERGENCY MEDICINE | Admitting: EMERGENCY MEDICINE
Payer: MEDICARE

## 2023-08-13 VITALS
HEART RATE: 81 BPM | RESPIRATION RATE: 16 BRPM | DIASTOLIC BLOOD PRESSURE: 96 MMHG | TEMPERATURE: 98.4 F | SYSTOLIC BLOOD PRESSURE: 175 MMHG | OXYGEN SATURATION: 99 %

## 2023-08-13 DIAGNOSIS — M54.2 CHRONIC CERVICAL PAIN: Primary | ICD-10-CM

## 2023-08-13 DIAGNOSIS — G89.29 CHRONIC CERVICAL PAIN: Primary | ICD-10-CM

## 2023-08-13 PROCEDURE — 99283 EMERGENCY DEPT VISIT LOW MDM: CPT

## 2023-08-13 PROCEDURE — 96372 THER/PROPH/DIAG INJ SC/IM: CPT

## 2023-08-13 PROCEDURE — 25010000002 METHYLPREDNISOLONE PER 80 MG: Performed by: EMERGENCY MEDICINE

## 2023-08-13 PROCEDURE — 72050 X-RAY EXAM NECK SPINE 4/5VWS: CPT

## 2023-08-13 PROCEDURE — 25010000002 KETOROLAC TROMETHAMINE PER 15 MG: Performed by: EMERGENCY MEDICINE

## 2023-08-13 RX ORDER — OXYCODONE AND ACETAMINOPHEN 10; 325 MG/1; MG/1
1 TABLET ORAL ONCE
Status: COMPLETED | OUTPATIENT
Start: 2023-08-13 | End: 2023-08-13

## 2023-08-13 RX ORDER — METHYLPREDNISOLONE ACETATE 80 MG/ML
60 INJECTION, SUSPENSION INTRA-ARTICULAR; INTRALESIONAL; INTRAMUSCULAR; SOFT TISSUE ONCE
Status: COMPLETED | OUTPATIENT
Start: 2023-08-13 | End: 2023-08-13

## 2023-08-13 RX ORDER — ORPHENADRINE CITRATE 100 MG/1
100 TABLET, EXTENDED RELEASE ORAL 2 TIMES DAILY
Qty: 30 TABLET | Refills: 0 | Status: SHIPPED | OUTPATIENT
Start: 2023-08-13

## 2023-08-13 RX ORDER — DIFLUNISAL 500 MG/1
500 TABLET, FILM COATED ORAL 2 TIMES DAILY
Qty: 30 TABLET | Refills: 0 | Status: SHIPPED | OUTPATIENT
Start: 2023-08-13

## 2023-08-13 RX ORDER — KETOROLAC TROMETHAMINE 30 MG/ML
30 INJECTION, SOLUTION INTRAMUSCULAR; INTRAVENOUS ONCE
Status: COMPLETED | OUTPATIENT
Start: 2023-08-13 | End: 2023-08-13

## 2023-08-13 RX ADMIN — KETOROLAC TROMETHAMINE 30 MG: 30 INJECTION, SOLUTION INTRAMUSCULAR; INTRAVENOUS at 19:43

## 2023-08-13 RX ADMIN — METHYLPREDNISOLONE ACETATE 60 MG: 80 INJECTION, SUSPENSION INTRA-ARTICULAR; INTRALESIONAL; INTRAMUSCULAR; SOFT TISSUE at 20:54

## 2023-08-13 RX ADMIN — OXYCODONE HYDROCHLORIDE AND ACETAMINOPHEN 1 TABLET: 10; 325 TABLET ORAL at 20:25

## 2023-08-13 NOTE — ED PROVIDER NOTES
Time: 7:21 PM EDT  Date of encounter:  8/13/2023  Independent Historian/Clinical History and Information was obtained by:   Patient    History is limited by: N/A    Chief Complaint: NECK PAIN      History of Present Illness:      The patient presents to the emergency department complaining of worsening cervical neck pain.  He states that he has chronic neck pain and is in pain management.  The patient has had 2 MRIs once in May 2023 and once in October 2022 both of which showed degenerative changes worsening around C6 and C7 with some disc protrusion.  He states that he is not having any numbness or tingling.  He denies any new falls or traumas.  He states that this is just gradually been getting worse over the last several weeks.  He states that he has seen Dr. Rose and the plan was to go to physical therapy but they have not set that up for him yet.  He states that he is out of his Percocet because he has been taking more than prescribed due to his increased pain and does not follow-up with his pain management until Tuesday.  He is neurovascular intact.        Patient Care Team  Primary Care Provider: Ollie Moreland APRN    Past Medical History:     Allergies   Allergen Reactions    Fentanyl Angioedema and Swelling    Cefdinir Itching    Nifedipine Hives and Itching     Past Medical History:   Diagnosis Date    Arthritis     Back problem     CAD (coronary artery disease)     Chest pain     Claustrophobia     Diabetes     Headache     Hyperlipidemia     Hypertension     Pneumonia      Past Surgical History:   Procedure Laterality Date    CARDIAC CATHETERIZATION      showed significant coronary artery disease of the LAD with calcification.     CHOLECYSTECTOMY      CIRCUMCISION      CORONARY ANGIOPLASTY WITH STENT PLACEMENT      INGUINAL HERNIA REPAIR      x2    NOSE SURGERY      IN RT/LT HEART CATHETERS N/A 03/22/2016    Procedure: Percutaneous Coronary Intervention - Rota/stent LAD;  Surgeon: Marek Emery MD;   Location:  [facility];  Service: Cardiovascular    ROTATOR CUFF REPAIR Left     SHOULDER ARTHROSCOPY Right     x2    SHOULDER ARTHROSCOPY WITH SUBACROMIAL DECOMPRESSION Left 4/11/2022    Procedure: left SHOULDER ARTHROSCOPY SUBACROMIAL DECOMPRESSION WITH ROTATOR CUFF DEBRIDEMENT;  Surgeon: Kam Dick MD;  Location: [facility];  Service: Orthopedics;  Laterality: Left;    SPINAL FUSION       Family History   Problem Relation Age of Onset    Hypertension Mother     Diabetes Mother     Lung cancer Mother     Stroke Mother     Heart attack Father     Heart disease Father     Heart failure Father     Diabetes Father        Home Medications:  Prior to Admission medications    Medication Sig Start Date End Date Taking? Authorizing Provider   amitriptyline (ELAVIL) 50 MG tablet Take 1 tablet by mouth Every Night.    Jody Bejarano MD   Blood Glucose Monitoring Suppl (FreeStyle Lite) w/Device kit See Admin Instructions. 11/8/21   Jody Bejarano MD   busPIRone (BUSPAR) 10 MG tablet Take 1 tablet by mouth 3 (Three) Times a Day As Needed (anxiety).  Patient taking differently: Take 1 tablet by mouth 3 (Three) Times a Day As Needed (anxiety). Pt takes 1 tab daily 4/10/23   Ollie Moreland APRN   cloNIDine (CATAPRES) 0.1 MG tablet Take 1 tablet by mouth.    Jody Bejarano MD   cyclobenzaprine (FLEXERIL) 10 MG tablet Take 1 tablet every day by oral route at bedtime for 30 days. 7/13/23   Jody Bejarano MD   diclofenac (VOLTAREN) 75 MG EC tablet Take 1 tablet twice a day by oral route as needed for 30 days. 7/13/23   Jody Bejarano MD   Farxiga 10 MG tablet TAKE 1 TABLET BY MOUTH DAILY 2/6/23   Ollie Moreland APRN   FREESTYLE LITE test strip USE TO TEST BLOOD SUGAR ONCE A DAY 11/14/22   Ollie Moreland APRN   Glucosamine 500 MG capsule Glucosamine    Jody Bejarano MD   ibuprofen (ADVIL,MOTRIN) 800 MG tablet Take 1 tablet by mouth Every 6 (Six) Hours As Needed for  "Mild Pain. 10/1/22   Davy Lindsay APRN   Insulin Degludec (Tresiba FlexTouch) 200 UNIT/ML solution pen-injector pen injection Inject 22 Units under the skin into the appropriate area as directed Daily. 11/9/22   Ollie Moreland APRN   Insulin Pen Needle (NovoFine Plus Pen Needle) 32G X 4 MM misc 22 Units Daily. 2/8/22   Ollie Moreland APRN   Lactobacillus (Florajen Acidophilus) capsule Take 1 capsule by mouth Daily. 5/4/23   Dulce Miguel APRN   lidocaine (XYLOCAINE) 5 % ointment APPLY NICKEL SIZE AMOUNT TO THE LEFT SHOULDER THREE TO FOUR TIMES DAILY AS NEEDED    Jody Bejarano MD   metFORMIN (GLUCOPHAGE) 1000 MG tablet TAKE 1 TABLET BY MOUTH TWICE DAILY WITH MEALS 2/6/23   Ollie Moreland APRN   methylPREDNISolone (MEDROL) 4 MG dose pack Take as directed on package instructions. 6/9/23   Dulce Dalal APRN   oxyCODONE-acetaminophen (PERCOCET) 5-325 MG per tablet Take 1 tablet by mouth 3 (Three) Times a Day. 1/12/23   Jody Bejarano MD   rosuvastatin (CRESTOR) 10 MG tablet Take 1 tablet by mouth Daily. 8/10/22   Ran Pan MD   sulfamethoxazole-trimethoprim (BACTRIM DS,SEPTRA DS) 800-160 MG per tablet  7/24/23   ProviderJody MD   Syringe 21G X 1\" 3 ML misc 100 mg Every 14 (Fourteen) Days. 12/1/21   Ollie Moreland APRN   tadalafil (CIALIS) 20 MG tablet TAKE 1 TABLET BY MOUTH AS NEEDED FOR ERECTILE DYSFUNCTION 7/31/23   Dulce Miguel APRN        Social History:   Social History     Tobacco Use    Smoking status: Never    Smokeless tobacco: Never   Vaping Use    Vaping Use: Never used   Substance Use Topics    Alcohol use: Yes     Comment: OCCASIONAL    Drug use: Never         Review of Systems:  Review of Systems   Constitutional:  Negative for chills and fever.   HENT:  Negative for congestion, ear pain and sore throat.    Eyes:  Negative for pain.   Respiratory:  Negative for cough, chest tightness and shortness of breath.    Cardiovascular:  Negative for chest " pain.   Gastrointestinal:  Negative for abdominal pain, diarrhea, nausea and vomiting.   Genitourinary:  Negative for flank pain and hematuria.   Musculoskeletal:  Positive for neck pain and neck stiffness. Negative for back pain and joint swelling.   Skin:  Negative for pallor and rash.   Neurological:  Negative for seizures and headaches.   All other systems reviewed and are negative.     Physical Exam:  BP (!) 161/107 (Patient Position: Sitting)   Pulse 88   Temp 98.4 øF (36.9 øC)   Resp 18   SpO2 99%     Physical Exam  Vitals and nursing note reviewed.   Constitutional:       General: He is not in acute distress.     Appearance: Normal appearance. He is not ill-appearing or toxic-appearing.   HENT:      Head: Normocephalic and atraumatic.   Eyes:      General: No scleral icterus.     Conjunctiva/sclera: Conjunctivae normal.      Pupils: Pupils are equal, round, and reactive to light.   Cardiovascular:      Rate and Rhythm: Normal rate and regular rhythm.      Pulses: Normal pulses.   Pulmonary:      Effort: Pulmonary effort is normal. No respiratory distress.   Abdominal:      General: Abdomen is flat. There is no distension.   Musculoskeletal:         General: Normal range of motion.      Cervical back: Normal range of motion and neck supple. Tenderness (BILATERAL, WORSE ON THE LEFT) present. No rigidity.   Lymphadenopathy:      Cervical: No cervical adenopathy.   Skin:     General: Skin is warm and dry.      Capillary Refill: Capillary refill takes less than 2 seconds.      Findings: No rash.   Neurological:      General: No focal deficit present.      Mental Status: He is alert and oriented to person, place, and time. Mental status is at baseline.   Psychiatric:         Mood and Affect: Mood normal.         Behavior: Behavior normal.                Procedures:  Procedures      Medical Decision Making:      Comorbidities that affect care:    Back pain, neck pain, arthritis, claustrophobia, pneumonia,  Coronary Artery Disease, Diabetes, Hypertension    External Notes reviewed:    Previous Radiological Studies: Patient's 2 previous cervical spine MRIs.      The following orders were placed and all results were independently analyzed by me:  Orders Placed This Encounter   Procedures    XR Spine Cervical Complete 4 or 5 View    Vital Signs Recheck       Medications Given in the Emergency Department:  Medications   ketorolac (TORADOL) injection 30 mg (30 mg Intramuscular Given 8/13/23 1943)   oxyCODONE-acetaminophen (PERCOCET)  MG per tablet 1 tablet (1 tablet Oral Given 8/13/23 2025)   methylPREDNISolone acetate (DEPO-medrol) injection 60 mg (60 mg Intramuscular Given 8/13/23 2054)        ED Course:    ED Course as of 08/13/23 2110   Sun Aug 13, 2023   1922 TECHNIQUE: A variety of imaging planes and parameters were utilized for visualization of suspected pathology.    FINDINGS:CRANIOCERVICAL AREA:         Normal foramen magnum with no Chiari malformation.  PARASPINAL AREA:  Normal with no visible mass. BONES:  NO fracture, pars defect, or osseous lesion. CORD: Normal caliber, contour, and signal intensity.    CERVICAL DISC LEVELS:  C2-C3:  No significant disc/facet abnormality, spinal stenosis, or foraminal stenosis.  C3-C4:  There is mild symmetric disc bulging with bilateral facet uncovertebral spurring.  There is   resultant mild bilateral neural foraminal narrowing.  C4-C5:  There is diffuse symmetric disc bulging with effacement of the ventral thecal sac.  Disc   bulging combines with facet hypertrophic change and uncovertebral spurring resulting in mild   bilateral neural foraminal narrowing.  C5-C6:  There is diffuse symmetric disc bulging with effacement of the ventral thecal sac.  Disc   bulging combines with facet hypertrophic change and uncovertebral spurring resulting in mild   bilateral neural foraminal narrowing.  C6-C7:  There is a left paracentral disc bulge/protrusion with resultant effacement  of the ventral   thecal sac and slight deformity of the ventral cervical cord.  Uncovertebral and facet degenerative   change combined with the disc bulging resulting in severe left neural foraminal narrowing, similar   to the previous exam.  There is moderate right neural foraminal narrowing.  There is mild spinal   canal stenosis.  C7-T1:  No significant disc/facet abnormality, spinal stenosis, or foraminal stenosis.       IMPRESSION: Stable multilevel degenerative disc disease. The most abnormal level is again C6-C7 where there is a left paracentral focal protrusion resulting in mass effect of the cervical cord at this level, narrowing of the spinal canal and left greater than right neural foraminal narrowing.  GLENNY PARRA MD         Electronically Signed and Approved By: GLENNY PARRA MD on 5/24/2023 at 13:59 [TC]   1948 IMPRESSION:                 1. Mild cervical spondylosis throughout the cervical spine. These findings contribute to central   canal narrowing and neural foraminal narrowing at multiple levels.  The findings have mildly   progressed.  2. There is a small left of center posterior disc protrusion/extrusion at the C6/7 level measuring   3 mm in AP dimension.  This finding contributes to mild impression on the left ventral thecal sac   and mild flattening of the left ventral cervical cord.  This finding has mildly progressed.  3. Mild degenerative uncovertebral/facet joint changes throughout the cervical spine. These   findings contribute to neural foraminal narrowing at multiple levels.  The findings have mildly   progressed.            BLANCHE MAYNARD MD         Electronically Signed and Approved By: BLANCHE MAYNARD MD on 10/06/2022 at 1:11      [TC]      ED Course User Index  [TC] Justine Silverio APRN       Labs:    Lab Results (last 24 hours)       ** No results found for the last 24 hours. **             Imaging:    XR Spine Cervical Complete 4 or 5 View    Result Date: 8/13/2023  PROCEDURE: XR  SPINE CERVICAL COMPLETE 4 OR 5 VW  COMPARISON: [facility], CR, XR SPINE CERVICAL COMPLETE 4 OR 5 VW, 4/16/2022, 13:20.  INDICATIONS: NECK PAIN. H/O BULGING DISKS  FINDINGS:  No acute fracture is identified.  The C1-2 articulation appears congruent.  The alignment is anatomic.  The vertebral body heights appear normal.  There are mild discogenic changes at C5-6.  There is mild scattered facet arthropathy and uncovertebral hypertrophy.  There is bony neural foramina stenosis on the left at C3-4.  The prevertebral soft tissues are unremarkable.        1. No acute osseous process identified.  2. Multilevel degenerative changes as described above.     BLANCHE STARK MD       Electronically Signed and Approved By: BLANCHE STARK MD on 8/13/2023 at 20:09                Differential Diagnosis and Discussion:    Neck Pain: The patient presents with neck pain. My differential diagnosis includes but is not limited to acute spinal epidural abscess, acute spinal epidural bleed, meningitis, musculoskeletal neck pain, spinal fracture, and osteoarthritis.     All X-rays impressions were independently interpreted by me.    MDM  Number of Diagnoses or Management Options  Chronic cervical pain: established and worsening     Amount and/or Complexity of Data Reviewed  Tests in the radiology section of CPTr: reviewed    Risk of Complications, Morbidity, and/or Mortality  Presenting problems: low  Diagnostic procedures: low  Management options: low    Patient Progress  Patient progress: stable         Patient Care Considerations:    NARCOTICS: I considered prescribing opiate pain medication as an outpatient, however the patient is in pain management with [facility].      Consultants/Shared Management Plan:    None    Social Determinants of Health:    Patient is independent, reliable, and has access to care.       Disposition and Care Coordination:    Discharged: The patient is suitable and stable for  discharge with no need for consideration of observation or admission.    I have explained the patient's condition, diagnoses and treatment plan based on the information available to me at this time. I have answered questions and addressed any concerns. The patient has a good  understanding of the patient's diagnosis, condition, and treatment plan as can be expected at this point. The vital signs have been stable. The patient's condition is stable and appropriate for discharge from the emergency department.      The patient will pursue further outpatient evaluation with the primary care physician or other designated or consulting physician as outlined in the discharge instructions. They are agreeable to this plan of care and follow-up instructions have been explained in detail. The patient has received these instructions in written format and have expressed an understanding of the discharge instructions. The patient is aware that any significant change in condition or worsening of symptoms should prompt an immediate return to this or the closest emergency department or call to 911.  I have explained discharge medications and the need for follow up with the patient/caretakers. This was also printed in the discharge instructions. Patient was discharged with the following medications and follow up:      Medication List        New Prescriptions      diflunisal 500 MG tablet tablet  Commonly known as: DOLOBID  Take 1 tablet by mouth 2 (Two) Times a Day.     orphenadrine 100 MG 12 hr tablet  Commonly known as: NORFLEX  Take 1 tablet by mouth 2 (Two) Times a Day.            Changed      busPIRone 10 MG tablet  Commonly known as: BUSPAR  Take 1 tablet by mouth 3 (Three) Times a Day As Needed (anxiety).  What changed: additional instructions            Stop      cyclobenzaprine 10 MG tablet  Commonly known as: FLEXERIL     diclofenac 75 MG EC tablet  Commonly known as: VOLTAREN     methylPREDNISolone 4 MG dose pack  Commonly  known as: MEDROL               Where to Get Your Medications        These medications were sent to [id number] - [place], [place] - [address] AT [facility] [phone]  - [phone] FX  [address] [place] [address]      Phone: [phone]   diflunisal 500 MG tablet tablet  orphenadrine 100 MG 12 hr tablet      Hank Rose MD  [address]  [facility] [address]  [phone]    Call   FOR FOLLOW UP    [facility]  [address]  [place] [address]  [phone]    AS SCHEDULED TUESDAY, FOR FOLLOW UP    Ollie Moreland, APRN  [address]  [facility] [address]  [phone]      As needed, FOR FOLLOW UP       Final diagnoses:   Chronic cervical pain        ED Disposition       ED Disposition   Discharge    Condition   Stable    Comment   --               This medical record created using voice recognition software.             Justine Silverio, APRN  08/13/23 2110

## 2023-08-14 NOTE — DISCHARGE INSTRUCTIONS
Rest, drink plenty of fluids.  Ice to area with gentle range of motion stretches several times a day.  Stop the previously prescribed Flexeril and diclofenac and take the Dolobid and the Norflex as prescribed today instead.  You may take 975 mg of Tylenol every 4 hours until you get your Percocet refilled on Tuesday.  Follow-up with your pain management Tuesday as scheduled.  Follow-up with Dr. Rose as needed.  Return to the emergency department immediately for any acutely developing neurological symptoms, any numbness or tingling, or any new or worse concerns.

## 2023-08-18 DIAGNOSIS — E11.65 TYPE 2 DIABETES MELLITUS WITH HYPERGLYCEMIA, WITHOUT LONG-TERM CURRENT USE OF INSULIN: ICD-10-CM

## 2023-08-18 RX ORDER — DAPAGLIFLOZIN 10 MG/1
TABLET, FILM COATED ORAL
Qty: 90 TABLET | Refills: 1 | Status: SHIPPED | OUTPATIENT
Start: 2023-08-18

## 2023-08-21 ENCOUNTER — TELEPHONE (OUTPATIENT)
Dept: UROLOGY | Facility: CLINIC | Age: 58
End: 2023-08-21
Payer: MEDICARE

## 2023-08-21 NOTE — TELEPHONE ENCOUNTER
LMOM for pt to return our call.  I want to know if pt is wanting us to schedule his MRI?  Pt missed last couple appts at [place].

## 2023-08-29 ENCOUNTER — TELEPHONE (OUTPATIENT)
Dept: CARDIOLOGY | Facility: CLINIC | Age: 58
End: 2023-08-29
Payer: MEDICARE

## 2023-08-29 NOTE — TELEPHONE ENCOUNTER
[facility] received a fax that requires your attention. The document has been indexed to the patient's chart for your review.      Reason for sending: EXTERNAL MEDICAL RECORD NOTIFICATION     Documents Description: PHYS ORD-[facility] CARDIAC CLEARANCE REQ-8.29.23    Name of Sender: [facility]     Date Indexed: 8.29.23

## 2023-09-05 RX ORDER — AMITRIPTYLINE HYDROCHLORIDE 50 MG/1
TABLET, FILM COATED ORAL NIGHTLY
Qty: 90 TABLET | Refills: 1 | Status: SHIPPED | OUTPATIENT
Start: 2023-09-05

## 2023-09-11 ENCOUNTER — HOSPITAL ENCOUNTER (EMERGENCY)
Facility: HOSPITAL | Age: 58
Discharge: HOME | End: 2023-09-11
Attending: EMERGENCY MEDICINE | Admitting: EMERGENCY MEDICINE
Payer: MEDICARE

## 2023-09-11 VITALS
SYSTOLIC BLOOD PRESSURE: 173 MMHG | WEIGHT: 189.82 LBS | HEIGHT: 67 IN | TEMPERATURE: 98.2 F | BODY MASS INDEX: 29.79 KG/M2 | OXYGEN SATURATION: 100 % | DIASTOLIC BLOOD PRESSURE: 89 MMHG | RESPIRATION RATE: 18 BRPM | HEART RATE: 80 BPM

## 2023-09-11 DIAGNOSIS — M54.12 CERVICAL RADICULOPATHY: Primary | ICD-10-CM

## 2023-09-11 PROCEDURE — 25010000002 KETOROLAC TROMETHAMINE PER 15 MG: Performed by: NURSE PRACTITIONER

## 2023-09-11 PROCEDURE — 96372 THER/PROPH/DIAG INJ SC/IM: CPT

## 2023-09-11 PROCEDURE — 25010000002 METHYLPREDNISOLONE PER 125 MG: Performed by: NURSE PRACTITIONER

## 2023-09-11 PROCEDURE — 99283 EMERGENCY DEPT VISIT LOW MDM: CPT

## 2023-09-11 RX ORDER — KETOROLAC TROMETHAMINE 30 MG/ML
60 INJECTION, SOLUTION INTRAMUSCULAR; INTRAVENOUS ONCE
Status: COMPLETED | OUTPATIENT
Start: 2023-09-11 | End: 2023-09-11

## 2023-09-11 RX ORDER — CYCLOBENZAPRINE HCL 10 MG
10 TABLET ORAL 3 TIMES DAILY PRN
Qty: 20 TABLET | Refills: 0 | Status: SHIPPED | OUTPATIENT
Start: 2023-09-11

## 2023-09-11 RX ORDER — LIDOCAINE 50 MG/G
1 PATCH TOPICAL EVERY 24 HOURS
Qty: 15 PATCH | Refills: 0 | Status: SHIPPED | OUTPATIENT
Start: 2023-09-11

## 2023-09-11 RX ORDER — CYCLOBENZAPRINE HCL 10 MG
10 TABLET ORAL ONCE
Status: COMPLETED | OUTPATIENT
Start: 2023-09-11 | End: 2023-09-11

## 2023-09-11 RX ORDER — METHYLPREDNISOLONE SODIUM SUCCINATE 125 MG/2ML
80 INJECTION, POWDER, LYOPHILIZED, FOR SOLUTION INTRAMUSCULAR; INTRAVENOUS ONCE
Status: COMPLETED | OUTPATIENT
Start: 2023-09-11 | End: 2023-09-11

## 2023-09-11 RX ORDER — PREDNISONE 20 MG/1
60 TABLET ORAL DAILY
Qty: 15 TABLET | Refills: 0 | Status: SHIPPED | OUTPATIENT
Start: 2023-09-11 | End: 2023-09-16

## 2023-09-11 RX ORDER — KETOROLAC TROMETHAMINE 10 MG/1
10 TABLET, FILM COATED ORAL EVERY 6 HOURS PRN
Qty: 20 TABLET | Refills: 0 | Status: SHIPPED | OUTPATIENT
Start: 2023-09-11 | End: 2023-09-25

## 2023-09-11 RX ADMIN — METHYLPREDNISOLONE SODIUM SUCCINATE 80 MG: 125 INJECTION INTRAMUSCULAR; INTRAVENOUS at 19:54

## 2023-09-11 RX ADMIN — KETOROLAC TROMETHAMINE 60 MG: 30 INJECTION, SOLUTION INTRAMUSCULAR at 19:56

## 2023-09-11 RX ADMIN — CYCLOBENZAPRINE 10 MG: 10 TABLET, FILM COATED ORAL at 19:54

## 2023-09-11 NOTE — ED PROVIDER NOTES
Time: 5:16 PM EDT  Date of encounter:  9/11/2023  Independent Historian/Clinical History and Information was obtained by:   Patient    History is limited by: N/A    Chief Complaint   Patient presents with    Neck Pain     Patient presents with neck pain, states that he has a history of multiple herniated disks in his neck, but it Is bothering him worse today.           History of Present Illness:  Patient is a 57 y.o. year old male who presents to the emergency department for evaluation of  neck pain. Started 2-3 months ago. Diagnosed with bulging discs with MRI at that time. Pain seems to be progressively worsening. More on the right side and radiates up. Denies numbness/tingling. Taking pain pill and ibuprofen without relief. (Anna Lundberg PA-C provider in triage 5:17 PM EDT )   Denies any new injury and states occasionally this will flareup on him and be too severe.  He is supposed to be going to physical therapy but he has not started yet.  HPI    Patient Care Team  Primary Care Provider: Steffany Gruber APRN    Past Medical History:     Allergies   Allergen Reactions    Fentanyl Angioedema and Swelling    Cefdinir Itching    Nifedipine Hives and Itching     Past Medical History:   Diagnosis Date    Arthritis     Back problem     CAD (coronary artery disease)     Chest pain     Claustrophobia     Diabetes     Headache     Hyperlipidemia     Hypertension     Pneumonia      Past Surgical History:   Procedure Laterality Date    CARDIAC CATHETERIZATION      showed significant coronary artery disease of the LAD with calcification.     CHOLECYSTECTOMY      CIRCUMCISION      CORONARY ANGIOPLASTY WITH STENT PLACEMENT      INGUINAL HERNIA REPAIR      x2    NOSE SURGERY      [place] RT/LT HEART CATHETERS N/A 03/22/2016    Procedure: Percutaneous Coronary Intervention - Rota/stent LAD;  Surgeon: Marek Emery MD;  Location: [facility] INVASIVE LOCATION;  Service: Cardiovascular    ROTATOR CUFF REPAIR Left     SHOULDER  ARTHROSCOPY Right     x2    SHOULDER ARTHROSCOPY WITH SUBACROMIAL DECOMPRESSION Left 4/11/2022    Procedure: left SHOULDER ARTHROSCOPY SUBACROMIAL DECOMPRESSION WITH ROTATOR CUFF DEBRIDEMENT;  Surgeon: Kam Dick MD;  Location: [facility] OR [facility] [place];  Service: Orthopedics;  Laterality: Left;    SPINAL FUSION       Family History   Problem Relation Age of Onset    Hypertension Mother     Diabetes Mother     Lung cancer Mother     Stroke Mother     Heart attack Father     Heart disease Father     Heart failure Father     Diabetes Father        Home Medications:  Prior to Admission medications    Medication Sig Start Date End Date Taking? Authorizing Provider   amitriptyline (ELAVIL) 50 MG tablet TAKE 1 TABLET BY MOUTH EVERY NIGHT 9/5/23   Ollie Moreland APRN   Blood Glucose Monitoring Suppl (FreeStyle Lite) w/Device kit See Admin Instructions. 11/8/21   Jody Bejarano MD   busPIRone (BUSPAR) 10 MG tablet Take 1 tablet by mouth 3 (Three) Times a Day As Needed (anxiety).  Patient taking differently: Take 1 tablet by mouth 3 (Three) Times a Day As Needed (anxiety). Pt takes 1 tab daily 4/10/23   Ollie Moreland APRN   cloNIDine (CATAPRES) 0.1 MG tablet Take 1 tablet by mouth.    Jody Bejarano MD   diflunisal (DOLOBID) 500 MG tablet tablet Take 1 tablet by mouth 2 (Two) Times a Day. 8/13/23   Justine Silverio APRN   Farxiga 10 MG tablet TAKE 1 TABLET BY MOUTH DAILY 8/18/23   Ollie Moreland APRN   FREESTYLE LITE test strip USE TO TEST BLOOD SUGAR ONCE A DAY 11/14/22   Ollie Moreland APRN   Glucosamine 500 MG capsule Glucosamine    ProviderJody MD   ibuprofen (ADVIL,MOTRIN) 800 MG tablet Take 1 tablet by mouth Every 6 (Six) Hours As Needed for Mild Pain. 10/1/22   Davy Lindsay APRN   Insulin Degludec (Tresiba FlexTouch) 200 UNIT/ML solution pen-injector pen injection Inject 22 Units under the skin into the appropriate area as directed Daily. 11/9/22   Ollie Moreland APRN   Insulin Pen Needle  "(NovoFine Plus Pen Needle) 32G X 4 MM misc 22 Units Daily. 2/8/22   Ollie Moreland APRN   Lactobacillus (Florajen Acidophilus) capsule Take 1 capsule by mouth Daily. 5/4/23   Dulce Miguel APRN   lidocaine (XYLOCAINE) 5 % ointment APPLY NICKEL SIZE AMOUNT TO THE LEFT SHOULDER THREE TO FOUR TIMES DAILY AS NEEDED    Jody Bejarano MD   metFORMIN (GLUCOPHAGE) 1000 MG tablet TAKE 1 TABLET BY MOUTH TWICE DAILY WITH MEALS 2/6/23   Ollie Moreland APRN   orphenadrine (NORFLEX) 100 MG 12 hr tablet Take 1 tablet by mouth 2 (Two) Times a Day. 8/13/23   Justine Silverio APRN   oxyCODONE-acetaminophen (PERCOCET) 5-325 MG per tablet Take 1 tablet by mouth 3 (Three) Times a Day. 1/12/23   Jody Bejarano MD   rosuvastatin (CRESTOR) 10 MG tablet Take 1 tablet by mouth Daily. 8/10/22   Ran Pan MD   sulfamethoxazole-trimethoprim (BACTRIM DS,SEPTRA DS) 800-160 MG per tablet  7/24/23   ProviderJody MD   Syringe 21G X 1\" 3 ML misc 100 mg Every 14 (Fourteen) Days. 12/1/21   Ollie Moreland APRN   tadalafil (CIALIS) 20 MG tablet TAKE 1 TABLET BY MOUTH AS NEEDED FOR ERECTILE DYSFUNCTION 7/31/23   Dulce Miguel APRN        Social History:   Social History     Tobacco Use    Smoking status: Never    Smokeless tobacco: Never   Vaping Use    Vaping Use: Never used   Substance Use Topics    Alcohol use: Yes     Comment: OCCASIONAL    Drug use: Never         Review of Systems:  Review of Systems   Constitutional:  Negative for chills and fever.   HENT:  Negative for congestion, ear pain and sore throat.    Eyes:  Negative for pain.   Respiratory:  Negative for cough, chest tightness and shortness of breath.    Cardiovascular:  Negative for chest pain.   Gastrointestinal:  Negative for abdominal pain, diarrhea, nausea and vomiting.   Genitourinary:  Negative for flank pain and hematuria.   Musculoskeletal:  Positive for neck pain. Negative for joint swelling.   Skin:  Negative for pallor.   Neurological:  " "Negative for seizures, weakness, numbness and headaches.   Hematological: Negative.    Psychiatric/Behavioral: Negative.     All other systems reviewed and are negative.     Physical Exam:  /89 (BP Location: Right arm, Patient Position: Sitting)   Pulse 80   Temp 98.2 °F (36.8 °C) (Oral)   Resp 18   Ht 170.2 cm (67\")   Wt 86.1 kg (189 lb 13.1 oz)   SpO2 100%   BMI 29.73 kg/m²         Physical Exam  Vitals and nursing note reviewed.   Constitutional:       General: He is not in acute distress.     Appearance: Normal appearance. He is not toxic-appearing.   HENT:      Head: Normocephalic and atraumatic.      Nose: Nose normal.      Mouth/Throat:      Mouth: Mucous membranes are moist.   Eyes:      General: No scleral icterus.     Conjunctiva/sclera: Conjunctivae normal.      Pupils: Pupils are equal, round, and reactive to light.   Cardiovascular:      Rate and Rhythm: Normal rate and regular rhythm.      Pulses: Normal pulses.      Heart sounds: Normal heart sounds.   Pulmonary:      Effort: Pulmonary effort is normal. No respiratory distress.      Breath sounds: Normal breath sounds.   Abdominal:      General: There is no distension.      Tenderness: There is no abdominal tenderness.   Musculoskeletal:         General: Tenderness (Soft tissue bilateral trapezius) present. Normal range of motion.      Cervical back: Normal range of motion and neck supple. Tenderness (Low C-spine tenderness and bilateral soft tissues into the trapezius) present.   Lymphadenopathy:      Cervical: No cervical adenopathy.   Skin:     General: Skin is warm and dry.      Coloration: Skin is not cyanotic.   Neurological:      Mental Status: He is alert and oriented to person, place, and time. Mental status is at baseline.      Sensory: No sensory deficit.      Motor: No weakness.   Psychiatric:         Attention and Perception: Attention and perception normal.         Mood and Affect: Mood normal.         Behavior: Behavior " normal.              Procedures:  Procedures      Medical Decision Making:      Comorbidities that affect care:    Coronary Artery Disease, Diabetes, Hypertension    External Notes reviewed:    Previous Clinic Note: Patient most recently had MRI of the brain ordered for chronic daily headache on September 5 after follow-up with PCP      The following orders were placed and all results were independently analyzed by me:  No orders of the defined types were placed in this encounter.      Medications Given in the Emergency Department:  Medications   ketorolac (TORADOL) injection 60 mg (60 mg Intramuscular Given 9/11/23 1956)   methylPREDNISolone sodium succinate (SOLU-Medrol) injection 80 mg (80 mg Intramuscular Given 9/11/23 1954)   cyclobenzaprine (FLEXERIL) tablet 10 mg (10 mg Oral Given 9/11/23 1954)        ED Course:    The patient was initially evaluated in the triage area where orders were placed. The patient was later dispositioned by NINFA Perdomo.      The patient was advised to stay for completion of workup which includes but is not limited to communication of labs and radiological results, reassessment and plan. The patient was advised that leaving prior to disposition by a provider could result in critical findings that are not communicated to the patient.          Labs:    Lab Results (last 24 hours)       ** No results found for the last 24 hours. **             Imaging:    No Radiology Exams Resulted Within Past 24 Hours      Differential Diagnosis and Discussion:      Neck Pain: The patient presents with neck pain. My differential diagnosis includes but is not limited to acute spinal epidural abscess, acute spinal epidural bleed, meningitis, musculoskeletal neck pain, spinal fracture, and osteoarthritis.         MDM  Number of Diagnoses or Management Options  Diagnosis management comments: I have explained the patient´s condition, diagnoses and treatment plan based on the information available to  me at this time. I have answered questions and addressed any concerns. The patient has a good  understanding of the patient´s diagnosis, condition, and treatment plan as can be expected at this point. The vital signs have been stable. The patient´s condition is stable and appropriate for discharge from the emergency department.      The patient will pursue further outpatient evaluation with the primary care physician or other designated or consulting physician as outlined in the discharge instructions. They are agreeable to this plan of care and follow-up instructions have been explained in detail. The patient has received these instructions in written format and have expressed an understanding of the discharge instructions. The patient is aware that any significant change in condition or worsening of symptoms should prompt an immediate return to this or the closest emergency department or call to 911.        Amount and/or Complexity of Data Reviewed  Tests in the medicine section of CPT®: ordered and reviewed    Risk of Complications, Morbidity, and/or Mortality  Presenting problems: low  Management options: low    Patient Progress  Patient progress: stable         Patient Care Considerations:    NARCOTICS: I considered prescribing opiate pain medication as an outpatient, however this is a chronic pain issue and patient is already being managed by another doctor.  We will give nonnarcotic medications as well as muscle relaxers and steroids to help with symptoms and he can follow-up for further evaluation and management      Consultants/Shared Management Plan:    None    Social Determinants of Health:    Patient has presented with family members who are responsible, reliable and will ensure follow up care.      Disposition and Care Coordination:    Discharged: The patient is suitable and stable for discharge with no need for consideration of observation or admission.    I have explained the patient´s condition,  diagnoses and treatment plan based on the information available to me at this time. I have answered questions and addressed any concerns. The patient has a good  understanding of the patient´s diagnosis, condition, and treatment plan as can be expected at this point. The vital signs have been stable. The patient´s condition is stable and appropriate for discharge from the emergency department.      The patient will pursue further outpatient evaluation with the primary care physician or other designated or consulting physician as outlined in the discharge instructions. They are agreeable to this plan of care and follow-up instructions have been explained in detail. The patient has received these instructions in written format and have expressed an understanding of the discharge instructions. The patient is aware that any significant change in condition or worsening of symptoms should prompt an immediate return to this or the closest emergency department or call to 911.  I have explained discharge medications and the need for follow up with the patient/caretakers. This was also printed in the discharge instructions. Patient was discharged with the following medications and follow up:      Medication List      No changes were made to your prescriptions during this visit.      No follow-up provider specified.     Final diagnoses:   None        ED Disposition       None            This medical record created using voice recognition software.             Sherice Melgoza APRN  09/11/23 2002

## 2023-09-12 NOTE — DISCHARGE INSTRUCTIONS
Rest.  Ice or moist heat.  Alternate between the 2 for comfort.    Take medications as prescribed for symptomatic treatment.    Follow-up with your PCP.    Return for new or worsening symptoms

## 2023-09-19 ENCOUNTER — TELEPHONE (OUTPATIENT)
Dept: CARDIOLOGY | Facility: CLINIC | Age: 58
End: 2023-09-19
Payer: MEDICARE

## 2023-09-19 NOTE — TELEPHONE ENCOUNTER
[facility] received a fax that requires your attention. The document has been indexed to the patient’s chart for your review.      Reason for sending: EXTERNAL MEDICAL RECORD NOTIFICATION     Documents Description: PHYS ORD-[facility] CARDIAC CLEARANCE REQ-9.19.23    Name of Sender: [facility]     Date Indexed: 9.19.23

## 2023-09-22 PROBLEM — M75.50 BURSITIS OF SHOULDER: Status: RESOLVED | Noted: 2018-12-06 | Resolved: 2023-09-22

## 2023-09-22 PROBLEM — Z98.61 CAD S/P PERCUTANEOUS CORONARY ANGIOPLASTY: Status: ACTIVE | Noted: 2023-09-22

## 2023-09-22 PROBLEM — Z95.5 HISTORY OF CORONARY ANGIOPLASTY WITH INSERTION OF STENT: Status: RESOLVED | Noted: 2021-08-29 | Resolved: 2023-09-22

## 2023-09-22 PROBLEM — M75.22 BICEPS TENDONITIS ON LEFT: Status: RESOLVED | Noted: 2022-03-22 | Resolved: 2023-09-22

## 2023-09-22 PROBLEM — I25.10 CAD S/P PERCUTANEOUS CORONARY ANGIOPLASTY: Status: ACTIVE | Noted: 2023-09-22

## 2023-09-25 ENCOUNTER — LAB (OUTPATIENT)
Dept: LAB | Facility: HOSPITAL | Age: 58
End: 2023-09-25
Payer: MEDICARE

## 2023-09-25 ENCOUNTER — OFFICE VISIT (OUTPATIENT)
Dept: CARDIOLOGY | Facility: CLINIC | Age: 58
End: 2023-09-25

## 2023-09-25 VITALS
HEIGHT: 67 IN | SYSTOLIC BLOOD PRESSURE: 141 MMHG | BODY MASS INDEX: 29.35 KG/M2 | HEART RATE: 86 BPM | WEIGHT: 187 LBS | DIASTOLIC BLOOD PRESSURE: 90 MMHG

## 2023-09-25 DIAGNOSIS — Z98.61 CAD S/P PERCUTANEOUS CORONARY ANGIOPLASTY: Primary | ICD-10-CM

## 2023-09-25 DIAGNOSIS — I10 HYPERTENSION, ESSENTIAL: ICD-10-CM

## 2023-09-25 DIAGNOSIS — I25.10 CAD S/P PERCUTANEOUS CORONARY ANGIOPLASTY: Primary | ICD-10-CM

## 2023-09-25 DIAGNOSIS — E78.2 HYPERLIPEMIA, MIXED: ICD-10-CM

## 2023-09-25 LAB
CHOLEST SERPL-MCNC: 166 MG/DL (ref 0–200)
HDLC SERPL-MCNC: 40 MG/DL (ref 40–60)
LDLC SERPL CALC-MCNC: 86 MG/DL (ref 0–100)
LDLC/HDLC SERPL: 1.96 {RATIO}
TRIGL SERPL-MCNC: 238 MG/DL (ref 0–150)
VLDLC SERPL-MCNC: 40 MG/DL (ref 5–40)

## 2023-09-25 PROCEDURE — 36415 COLL VENOUS BLD VENIPUNCTURE: CPT

## 2023-09-25 PROCEDURE — 1160F RVW MEDS BY RX/DR IN RCRD: CPT | Performed by: NURSE PRACTITIONER

## 2023-09-25 PROCEDURE — 80061 LIPID PANEL: CPT

## 2023-09-25 PROCEDURE — 3077F SYST BP >= 140 MM HG: CPT | Performed by: NURSE PRACTITIONER

## 2023-09-25 PROCEDURE — 93000 ELECTROCARDIOGRAM COMPLETE: CPT | Performed by: NURSE PRACTITIONER

## 2023-09-25 PROCEDURE — 99214 OFFICE O/P EST MOD 30 MIN: CPT | Performed by: NURSE PRACTITIONER

## 2023-09-25 PROCEDURE — 3080F DIAST BP >= 90 MM HG: CPT | Performed by: NURSE PRACTITIONER

## 2023-09-25 PROCEDURE — 1159F MED LIST DOCD IN RCRD: CPT | Performed by: NURSE PRACTITIONER

## 2023-09-25 RX ORDER — CLOPIDOGREL BISULFATE 75 MG/1
75 TABLET ORAL DAILY
COMMUNITY

## 2023-09-25 RX ORDER — AMLODIPINE BESYLATE 5 MG/1
5 TABLET ORAL DAILY
Qty: 30 TABLET | Refills: 11 | COMMUNITY
Start: 2023-08-24 | End: 2023-09-25

## 2023-09-25 RX ORDER — LISINOPRIL 30 MG/1
30 TABLET ORAL
COMMUNITY
End: 2023-09-25

## 2023-09-25 RX ORDER — ASPIRIN 81 MG/1
81 TABLET ORAL DAILY
COMMUNITY

## 2023-09-25 RX ORDER — DICLOFENAC SODIUM 75 MG/1
1 TABLET, DELAYED RELEASE ORAL EVERY 12 HOURS SCHEDULED
COMMUNITY
Start: 2023-09-14

## 2023-09-25 RX ORDER — AMLODIPINE AND VALSARTAN 5; 320 MG/1; MG/1
1 TABLET ORAL DAILY
Qty: 90 TABLET | Refills: 1 | Status: SHIPPED | OUTPATIENT
Start: 2023-09-25

## 2023-09-25 NOTE — PROGRESS NOTES
Chief Complaint  Coronary Artery Disease    Subjective            History of Present Illness  Moreno Aleman is a 57-year-old white/ male patient who presents to the office today for follow-up.  He has CAD with prior stenting, hypertension, and hyperlipidemia.  He is compliant with medications. He reports over the last few months has been experiencing stable angina with exertion that goes away with rest, pains last 5-10 minutes at a time. He has occasional lightheadedness with exertion as well, typically brief in nature and resolves on its own.  He denies any palpitations or edema. He has not been checking blood pressure at home.     PMH  Past Medical History:   Diagnosis Date    Arthritis     Biceps tendonitis on left 03/22/2022    Bursitis of shoulder 12/06/2018    CAD (coronary artery disease)     Claustrophobia     Diabetes     Hyperlipidemia     Hypertension     Pneumonia          ALLERGY  Allergies   Allergen Reactions    Fentanyl Angioedema and Swelling    Cefdinir Itching    Nifedipine Hives and Itching          SURGICALHX  Past Surgical History:   Procedure Laterality Date    CARDIAC CATHETERIZATION      showed significant coronary artery disease of the LAD with calcification.     CHOLECYSTECTOMY      CIRCUMCISION      CORONARY ANGIOPLASTY WITH STENT PLACEMENT      INGUINAL HERNIA REPAIR      x2    NOSE SURGERY      [place] RT/LT HEART CATHETERS N/A 03/22/2016    Procedure: Percutaneous Coronary Intervention - Rota/stent LAD;  Surgeon: Marek Emery MD;  Location: [facility] CATH INVASIVE LOCATION;  Service: Cardiovascular    ROTATOR CUFF REPAIR Left     SHOULDER ARTHROSCOPY Right     x2    SHOULDER ARTHROSCOPY WITH SUBACROMIAL DECOMPRESSION Left 4/11/2022    Procedure: left SHOULDER ARTHROSCOPY SUBACROMIAL DECOMPRESSION WITH ROTATOR CUFF DEBRIDEMENT;  Surgeon: Kam Dick MD;  [place] [facility] [place] [facility] [place]  Service: Orthopedics;  Laterality: Left;    SPINAL FUSION            SOC  Social History      Socioeconomic History    Marital status:    Tobacco Use    Smoking status: Never    Smokeless tobacco: Never   Vaping Use    Vaping Use: Never used   Substance and Sexual Activity    Alcohol use: Yes     Comment: OCCASIONAL    Drug use: Never    Sexual activity: Defer         FAMHX  Family History   Problem Relation Age of Onset    Hypertension Mother     Diabetes Mother     Lung cancer Mother     Stroke Mother     Heart attack Father     Heart disease Father     Heart failure Father     Diabetes Father           MEDSIGONLY  Current Outpatient Medications on File Prior to Visit   Medication Sig    amitriptyline (ELAVIL) 50 MG tablet TAKE 1 TABLET BY MOUTH EVERY NIGHT    Blood Glucose Monitoring Suppl (FreeStyle Lite) w/Device kit See Admin Instructions.    clopidogrel (PLAVIX) 75 MG tablet Take 1 tablet by mouth Daily.    cyclobenzaprine (FLEXERIL) 10 MG tablet Take 1 tablet by mouth 3 (Three) Times a Day As Needed for Muscle Spasms.    diclofenac (VOLTAREN) 75 MG EC tablet Take 1 tablet by mouth Every 12 (Twelve) Hours.    Farxiga 10 MG tablet TAKE 1 TABLET BY MOUTH DAILY    FREESTYLE LITE test strip USE TO TEST BLOOD SUGAR ONCE A DAY    ibuprofen (ADVIL,MOTRIN) 800 MG tablet Take 1 tablet by mouth Every 6 (Six) Hours As Needed for Mild Pain.    Insulin Degludec (Tresiba FlexTouch) 200 UNIT/ML solution pen-injector pen injection Inject 22 Units under the skin into the appropriate area as directed Daily.    Insulin Pen Needle (NovoFine Plus Pen Needle) 32G X 4 MM misc 22 Units Daily.    Lactobacillus (Florajen Acidophilus) capsule Take 1 capsule by mouth Daily.    lidocaine (LIDODERM) 5 % Place 1 patch on the skin as directed by provider Daily. Remove after 12 hours.    metFORMIN (GLUCOPHAGE) 1000 MG tablet TAKE 1 TABLET BY MOUTH TWICE DAILY WITH MEALS    orphenadrine (NORFLEX) 100 MG 12 hr tablet Take 1 tablet by mouth 2 (Two) Times a Day.    oxyCODONE-acetaminophen (PERCOCET) 5-325 MG per tablet Take 1  "tablet by mouth 3 (Three) Times a Day.    rosuvastatin (CRESTOR) 10 MG tablet Take 1 tablet by mouth Daily.    Syringe 21G X 1\" 3 ML misc 100 mg Every 14 (Fourteen) Days.    tadalafil (CIALIS) 20 MG tablet TAKE 1 TABLET BY MOUTH AS NEEDED FOR ERECTILE DYSFUNCTION    [DISCONTINUED] amLODIPine (NORVASC) 5 MG tablet Take 1 tablet by mouth Daily.    [DISCONTINUED] lisinopril (PRINIVIL,ZESTRIL) 30 MG tablet Take 1 tablet by mouth.    aspirin 81 MG EC tablet Take 1 tablet by mouth Daily.    [DISCONTINUED] busPIRone (BUSPAR) 10 MG tablet Take 1 tablet by mouth 3 (Three) Times a Day As Needed (anxiety). (Patient not taking: Reported on 9/25/2023)    [DISCONTINUED] cloNIDine (CATAPRES) 0.1 MG tablet Take 1 tablet by mouth. (Patient not taking: Reported on 9/25/2023)    [DISCONTINUED] diflunisal (DOLOBID) 500 MG tablet tablet Take 1 tablet by mouth 2 (Two) Times a Day. (Patient not taking: Reported on 9/25/2023)    [DISCONTINUED] Glucosamine 500 MG capsule Glucosamine (Patient not taking: Reported on 9/25/2023)    [DISCONTINUED] ketorolac (TORADOL) 10 MG tablet Take 1 tablet by mouth Every 6 (Six) Hours As Needed for Moderate Pain. (Patient not taking: Reported on 9/25/2023)    [DISCONTINUED] sulfamethoxazole-trimethoprim (BACTRIM DS,SEPTRA DS) 800-160 MG per tablet  (Patient not taking: Reported on 9/25/2023)     No current facility-administered medications on file prior to visit.       Objective   /90   Pulse 86   Ht 170.2 cm (67\")   Wt 84.8 kg (187 lb)   BMI 29.29 kg/m²       Physical Exam  HENT:      Head: Normocephalic.   Neck:      Vascular: No carotid bruit.   Cardiovascular:      Rate and Rhythm: Normal rate and regular rhythm.      Pulses: Normal pulses.      Heart sounds: Normal heart sounds. No murmur heard.  Pulmonary:      Effort: Pulmonary effort is normal.      Breath sounds: Normal breath sounds.   Musculoskeletal:      Cervical back: Neck supple.      Right lower leg: No edema.      Left lower leg: " No edema.   Skin:     General: Skin is dry.   Neurological:      Mental Status: He is alert and oriented to person, place, and time.   Psychiatric:         Behavior: Behavior normal.     ECG 12 Lead    Date/Time: 9/25/2023 8:27 AM  Performed by: Lisa Reece APRN  Authorized by: Lisa Reece APRN   Comparison: compared with previous ECG from 11/12/2021  Similar to previous ECG  Rhythm: sinus rhythm  Rate: normal  BPM: 83  Conduction: conduction normal  ST Segments: ST segments normal  QRS axis: normal  Other findings: T wave abnormality    Clinical impression: abnormal EKG      Result Review :   The following data was reviewed by: NINFA Plaza on 09/25/2023:  proBNP   Date Value Ref Range Status   11/12/2021 819.7 0.0 - 900.0 pg/mL Final     CMP          8/25/2023    21:39   CMP   Glucose 348       BUN 13       Creatinine 0.9       EGFR    Sodium 135       Potassium 4.0       Chloride 97       Calcium 9.8       Albumin 4.8       Total Bilirubin 0.85       Alkaline Phosphatase 70       AST (SGOT) 12       ALT (SGPT) 10       BUN/Creatinine Ratio    Anion Gap 13             3/21/2023    17:29   CBC w/Diff   WBC 10.84    RBC 5.63    Hemoglobin 17.1    Hematocrit 46.7    MCV 82.9    MCH 30.4    MCHC 36.6    RDW 13.1    Platelets 175    Neutrophil Rel % 60.9    Immature Granulocyte Rel % 0.4    Lymphocyte Rel % 30.3    Monocyte Rel % 7.1    Eosinophil Rel % 0.6    Basophil Rel % 0.7       Lab Results   Component Value Date    TSH 1.640 09/28/2021      No results found for: FREET4   No results found for: DDIMERQUANT  Magnesium   Date Value Ref Range Status   03/15/2021 1.72 1.60 - 2.30 mg/dL Final      No results found for: DIGOXIN   Lab Results   Component Value Date    TROPONINT <0.01 03/15/2021           Lipid Panel          8/25/2023    21:39   Lipid Panel   Total Cholesterol 242       Triglycerides 483       HDL Cholesterol 45       LDL Cholesterol  Unable to calc due to high Trig  Direct  LDL ordered        125              Assessment and Plan    Diagnoses and all orders for this visit:    1. CAD S/P percutaneous coronary angioplasty (Primary)  He reports some stable anginal symptoms, continue Plavix 75 mg daily.  Obtain CTA coronary to rule out significant atherosclerosis.  He had stress test in 2021 that was negative for ischemia.  -     CT Angiogram Coronary; Future    2. Hypertension, essential  His blood pressure is elevated in office today, stop amlodipine and lisinopril.  Replaced with amlodipine-valsartan 5-320 mg daily. Check blood pressure twice a day for the next two weeks, blood pressure log provided for patient.  Will review log once available to me and will make any necessary medication adjustments needed at that time.  -     Blood Pressure Device    3. Hyperlipemia, mixed  Last lipid panel was 8/25/2023, he admits that he was not fasting that day.  We talked about elevated triglyceride level.  He is willing to have lab redrawn while fasting.  For now continue rosuvastatin 10 mg daily.  -     Lipid Panel; Future    Other orders  -     amLODIPine-valsartan (EXFORGE) 5-320 MG per tablet; Take 1 tablet by mouth Daily.  Dispense: 90 tablet; Refill: 1          Follow Up   Return in about 6 months (around 3/25/2024) for Follow up with Dr Pan.    Patient was given instructions and counseling regarding his condition or for health maintenance advice. Please see specific information pulled into the AVS if appropriate.     Moreno GONZALEZ Love  reports that he has never smoked. He has never used smokeless tobacco.         Lisa Reece, NINFA  09/25/23  08:32 EDT    Dictated Utilizing Dragon Dictation

## 2023-09-26 ENCOUNTER — TELEPHONE (OUTPATIENT)
Dept: CARDIOLOGY | Facility: CLINIC | Age: 58
End: 2023-09-26
Payer: MEDICARE

## 2023-09-26 DIAGNOSIS — E78.2 HYPERLIPEMIA, MIXED: Primary | ICD-10-CM

## 2023-09-26 NOTE — TELEPHONE ENCOUNTER
SW patient regarding results and recommendations. Patient verbalized understanding and appreciation.  Lab orders have been placed.

## 2023-09-26 NOTE — TELEPHONE ENCOUNTER
----- Message from NINFA Tesfaye sent at 9/26/2023 12:51 PM EDT -----  Notify pt triglycerides are improved but still elevated, total cholesterol and LDL are good, would recommend continue rosuvastatin and add omega 3 fatty acid 1000 mg daily to lower triglycerides. Repeat fasting lipid and hepatic function panel in 3 months

## 2023-10-05 DIAGNOSIS — N52.9 ERECTILE DYSFUNCTION, UNSPECIFIED ERECTILE DYSFUNCTION TYPE: ICD-10-CM

## 2023-10-07 DIAGNOSIS — Z79.4 TYPE 2 DIABETES MELLITUS WITH HYPERGLYCEMIA, WITH LONG-TERM CURRENT USE OF INSULIN: ICD-10-CM

## 2023-10-07 DIAGNOSIS — E11.65 TYPE 2 DIABETES MELLITUS WITH HYPERGLYCEMIA, WITH LONG-TERM CURRENT USE OF INSULIN: ICD-10-CM

## 2023-10-09 RX ORDER — INSULIN DEGLUDEC 200 U/ML
INJECTION, SOLUTION SUBCUTANEOUS
Qty: 6 ML | Refills: 1 | Status: SHIPPED | OUTPATIENT
Start: 2023-10-09

## 2023-10-09 RX ORDER — TADALAFIL 20 MG/1
20 TABLET ORAL AS NEEDED
Qty: 90 TABLET | Refills: 3 | Status: SHIPPED | OUTPATIENT
Start: 2023-10-09

## 2023-10-09 NOTE — PROGRESS NOTES
"Chief Complaint  Pain of the Left Shoulder     Subjective      Moreno Aleman presents to [facility] for The patient is S/P left shoulder arthroscopic subacromial decompression, mini open rotator cuff repair, 4/26/2021. He recently had an new MRI and is here today for those results. He has had more pain in the shoulder. He has returned to work.     Allergies   Allergen Reactions   • Cefdinir Itching   • Fentanyl Angioedema   • Nifedipine Hives and Itching        Social History     Socioeconomic History   • Marital status:    Tobacco Use   • Smoking status: Never Smoker   • Smokeless tobacco: Never Used   Vaping Use   • Vaping Use: Never used   Substance and Sexual Activity   • Alcohol use: Yes     Comment: OCCASIONAL   • Drug use: Never   • Sexual activity: Defer        Review of Systems     Objective   Vital Signs:   Ht 172.7 cm (68\")   Wt 90.7 kg (200 lb)   BMI 30.41 kg/m²       Physical Exam  Constitutional:       Appearance: Normal appearance. The patient is well-developed and normal weight.   HENT:      Head: Normocephalic.      Right Ear: Hearing and external ear normal.      Left Ear: Hearing and external ear normal.      Nose: Nose normal.   Eyes:      Conjunctiva/sclera: Conjunctivae normal.   Cardiovascular:      Rate and Rhythm: Normal rate.   Pulmonary:      Effort: Pulmonary effort is normal.      Breath sounds: No wheezing or rales.   Abdominal:      Palpations: Abdomen is soft.      Tenderness: There is no abdominal tenderness.   Musculoskeletal:      Cervical back: Normal range of motion.   Skin:     Findings: No rash.   Neurological:      Mental Status: The patient is alert and oriented to person, place, and time.   Psychiatric:         Mood and Affect: Mood and affect normal.         Judgment: Judgment normal.       Ortho Exam      Left shoulder- Tender to anterior lateral shoulder. Forward elevation 160. Abduction 110. External Rotation 65. Internal rotation " 1. \"Have you been to the ER, urgent care clinic since your last visit? Hospitalized since your last visit? \" No    2. \"Have you seen or consulted any other health care providers outside of the [address] [place] [address] since your last visit? \" No     3. For patients aged 43-73: Has the patient had a colonoscopy / FIT/ Cologuard? Yes - no Care Gap present    PPD Placement note  Lizz Coleman, 63 y.o. male is here today for placement of PPD test  Reason for PPD test: [facility] care  Pt taken PPD test before: yes  Verified in allergy area and with patient that they are not allergic to the products PPD is made of (Phenol or Tween). Yes  Is patient taking any oral or IV steroid medication now or have they taken it in the last month? no  Has the patient ever received the BCG vaccine?: no  Has the patient been in recent contact with anyone known or suspected of having active TB disease?: no       O: Alert and oriented in NAD. P:  PPD placed on 10/10/2023 at 11:30 am.  Patient advised to return for reading within 48-72 hours.       PPD 0.1 ml given in left FA   Lot # [id number]  Exp date 10/01/2026 45. Pain with shoulder ROM. 4+ supraspinatus strength. 5/5 infraspinatus  And subscapularis. Well healed scars to left shoulder.     Procedures        Imaging Results (Most Recent)     None           Result Review :       MRI Shoulder Left Without Contrast    Result Date: 3/17/2022  Narrative: PROCEDURE: MRI SHOULDER LEFT WO CONTRAST  COMPARISON: [facility], CR, XR SHOULDER 2+ VW LEFT, 2/20/2022, 11:54.  [facility], MR, MRI SHOULDER LEFT WO CONTRAST, 7/30/2021, 15:40.  INDICATIONS: LEFT SHOULDER PAIN      TECHNIQUE: A variety of imaging planes and parameters were utilized for visualization of suspected pathology.  Images were performed without contrast.   FINDINGS:  No fracture or malalignment is seen.  Marrow signal appears normal.  Mild acromioclavicular osteoarthritis is noted.  A rotator cuff tear repair has been performed.  There is a partial thickness articular surface tear of the supraspinatus tendon which extends very near to the bursal surface.  The tear appears larger in the interval.  A tiny partial thickness articular surface tear of the infra spinatus tendon appears stable in the interval.  The rotator cuff otherwise appears unremarkable.  No muscle body atrophy is seen.  The biceps long head tendon and its attachment to the superior labrum are intact.  Mild intermediate signal in the biceps tendon is consistent with tendinopathy.  There is a questionable small tear of the anterosuperior labrum, only seen on the axial gradient echo images.  The labrum otherwise appears unremarkable.  Cartilage in the joint appears intact.  No joint effusion or loose body is seen.        Impression:   1. Previous rotator cuff repair 2. New near full-thickness articular surface tear of the supraspinatus tendon 3. Tiny articular surface tear/fraying of the infra spinatus tendon, unchanged 4. Mild biceps tendinopathy 5. Questionable small tear of the anterosuperior labrum      Devon Garg M.D.        Electronically Signed and Approved By: Devon Garg M.D. on 3/17/2022 at 14:17                      Assessment and Plan     DX: Rotator cuff tear. S/P left shoulder arthroscopic subacromial decompression, mini open rotator cuff repair. Biceps tendonitis     Discussed the treatment options with the patient, operative vs non-operative. Discussed the risks and benefits of a left shoulder arthroscopic rotator cuff revision with biceps tenodesis. The patient expressed understanding and wished to proceed.     Discussed surgery., Risks/benefits discussed with patient including, but not limited to: infection, bleeding, neurovascular damage, malunion, nonunion, aesthetic deformity, need for further surgery, and death., Discussed with patient the implant type being used during surgery and patient understands and desires to proceed., Surgery pamphlet given. and Call or return if worsening symptoms.    Follow Up     2 weeks postoperatively.        Patient was given instructions and counseling regarding his condition or for health maintenance advice. Please see specific information pulled into the AVS if appropriate.     Scribed for Kam Dick MD by Leila Oscar.  03/22/22   15:08 EDT    I have personally performed the services described in this document as scribed by the above individual and it is both accurate and complete. Kam Dick MD 03/22/22

## 2023-10-13 ENCOUNTER — HOSPITAL ENCOUNTER (EMERGENCY)
Facility: HOSPITAL | Age: 58
Discharge: HOME | End: 2023-10-13
Attending: EMERGENCY MEDICINE
Payer: MEDICARE

## 2023-10-13 ENCOUNTER — APPOINTMENT (OUTPATIENT)
Dept: GENERAL RADIOLOGY | Facility: HOSPITAL | Age: 58
End: 2023-10-13
Payer: MEDICARE

## 2023-10-13 ENCOUNTER — APPOINTMENT (OUTPATIENT)
Dept: CT IMAGING | Facility: HOSPITAL | Age: 58
End: 2023-10-13
Payer: MEDICARE

## 2023-10-13 VITALS
RESPIRATION RATE: 18 BRPM | OXYGEN SATURATION: 97 % | TEMPERATURE: 98.4 F | SYSTOLIC BLOOD PRESSURE: 159 MMHG | DIASTOLIC BLOOD PRESSURE: 88 MMHG | BODY MASS INDEX: 28.37 KG/M2 | WEIGHT: 187.17 LBS | HEIGHT: 68 IN | HEART RATE: 87 BPM
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
## 2023-10-13 DIAGNOSIS — S70.11XA CONTUSION OF RIGHT THIGH, INITIAL ENCOUNTER: ICD-10-CM

## 2023-10-13 DIAGNOSIS — S20.311A ABRASION OF RIGHT CHEST WALL, INITIAL ENCOUNTER: ICD-10-CM

## 2023-10-13 PROCEDURE — 72125 CT NECK SPINE W/O DYE: CPT

## 2023-10-13 PROCEDURE — 99284 EMERGENCY DEPT VISIT MOD MDM: CPT

## 2023-10-13 PROCEDURE — 73552 X-RAY EXAM OF FEMUR 2/>: CPT

## 2023-10-13 PROCEDURE — 70450 CT HEAD/BRAIN W/O DYE: CPT

## 2023-10-13 PROCEDURE — 71101 X-RAY EXAM UNILAT RIBS/CHEST: CPT

## 2023-10-13 RX ORDER — HYDROCODONE BITARTRATE AND ACETAMINOPHEN 5; 325 MG/1; MG/1
1 TABLET ORAL EVERY 8 HOURS PRN
Qty: 6 TABLET | Refills: 0 | Status: SHIPPED | OUTPATIENT
Start: 2023-10-13

## 2023-10-13 RX ORDER — HYDROCODONE BITARTRATE AND ACETAMINOPHEN 5; 325 MG/1; MG/1
1 TABLET ORAL ONCE
Status: COMPLETED | OUTPATIENT
Start: 2023-10-13 | End: 2023-10-13

## 2023-10-13 RX ADMIN — HYDROCODONE BITARTRATE AND ACETAMINOPHEN 1 TABLET: 5; 325 TABLET ORAL at 17:03

## 2023-10-13 NOTE — DISCHARGE INSTRUCTIONS
Follow-up with your primary care provider as needed in 3 days for persistent or worsening symptoms.  There was no evidence of fracture today on your x-rays or CT scans.  Of note, unfortunately your pharmacy called and you did receive 90 hydrocodone at the end of November so they will not refill any further pain medications as you should not be out of this medication yet.

## 2023-10-13 NOTE — ED PROVIDER NOTES
Time: 4:54 PM EDT  Date of encounter:  10/13/2023  Independent Historian/Clinical History and Information was obtained by:   Patient    History is limited by: N/A    Chief Complaint: fall, multiple abrasions       History of Present Illness:  Patient is a 57 y.o. year old male who presents to the emergency department for evaluation of fall, multiple abrasions to the right side of his body.  Patient states he fell approximately 5 feet and landed on some wooden objects.  Denies headache neck pain or back pain.  Complains of primarily abrasion to right thigh, chest wall abrasion just outside nipple. Pain is described as burning to the skin and feels there is no deeper wound.  Also has abrasion to right forearm.  Patient denies any bony tenderness or decreased range of motion.    HPI    Patient Care Team  Primary Care Provider: Steffany Gruber APRN    Past Medical History:     Allergies   Allergen Reactions    Fentanyl Angioedema and Swelling    Cefdinir Itching    Nifedipine Hives and Itching     Past Medical History:   Diagnosis Date    Arthritis     Biceps tendonitis on left 03/22/2022    Bursitis of shoulder 12/06/2018    CAD (coronary artery disease)     Claustrophobia     Diabetes     Hyperlipidemia     Hypertension     Pneumonia      Past Surgical History:   Procedure Laterality Date    CARDIAC CATHETERIZATION      showed significant coronary artery disease of the LAD with calcification.     CHOLECYSTECTOMY      CIRCUMCISION      CORONARY ANGIOPLASTY WITH STENT PLACEMENT      INGUINAL HERNIA REPAIR      x2    NOSE SURGERY      NH RT/LT HEART CATHETERS N/A 03/22/2016    Procedure: Percutaneous Coronary Intervention - Rota/stent LAD;  Surgeon: Marek Emery MD;  Location: [facility] INVASIVE LOCATION;  Service: Cardiovascular    ROTATOR CUFF REPAIR Left     SHOULDER ARTHROSCOPY Right     x2    SHOULDER ARTHROSCOPY WITH SUBACROMIAL DECOMPRESSION Left 4/11/2022    Procedure: left SHOULDER ARTHROSCOPY SUBACROMIAL  DECOMPRESSION WITH ROTATOR CUFF DEBRIDEMENT;  Surgeon: Kam Dick MD;  Location: [facility];  Service: Orthopedics;  Laterality: Left;    SPINAL FUSION       Family History   Problem Relation Age of Onset    Hypertension Mother     Diabetes Mother     Lung cancer Mother     Stroke Mother     Heart attack Father     Heart disease Father     Heart failure Father     Diabetes Father        Home Medications:  Prior to Admission medications    Medication Sig Start Date End Date Taking? Authorizing Provider   amitriptyline (ELAVIL) 50 MG tablet TAKE 1 TABLET BY MOUTH EVERY NIGHT 9/5/23   Ollie Moreland APRN   amLODIPine-valsartan (EXFORGE) 5-320 MG per tablet Take 1 tablet by mouth Daily. 9/25/23   Lisa Reece APRN   aspirin 81 MG EC tablet Take 1 tablet by mouth Daily.    ProviderJody MD   Blood Glucose Monitoring Suppl (FreeStyle Lite) w/Device kit See Admin Instructions. 11/8/21   Jody Bejarano MD   clopidogrel (PLAVIX) 75 MG tablet Take 1 tablet by mouth Daily.    Jody Bejarano MD   cyclobenzaprine (FLEXERIL) 10 MG tablet Take 1 tablet by mouth 3 (Three) Times a Day As Needed for Muscle Spasms. 9/11/23   Sherice Melgoza APRN   diclofenac (VOLTAREN) 75 MG EC tablet Take 1 tablet by mouth Every 12 (Twelve) Hours. 9/14/23   Jody Bejarano MD   Farxiga 10 MG tablet TAKE 1 TABLET BY MOUTH DAILY 8/18/23   Ollie Moreland APRN   FREESTYLE LITE test strip USE TO TEST BLOOD SUGAR ONCE A DAY 11/14/22   Ollie Moreland APRN   ibuprofen (ADVIL,MOTRIN) 800 MG tablet Take 1 tablet by mouth Every 6 (Six) Hours As Needed for Mild Pain. 10/1/22   Davy Lindsay APRN   Insulin Pen Needle (NovoFine Plus Pen Needle) 32G X 4 MM misc 22 Units Daily. 2/8/22   Ollie Moreland APRN   Lactobacillus (Florajen Acidophilus) capsule Take 1 capsule by mouth Daily. 5/4/23   Dulce Miguel APRN   lidocaine (LIDODERM) 5 % Place 1 patch on the skin as directed by provider Daily. Remove after 12  "hours. 9/11/23   Sherice Melgoza APRN   metFORMIN (GLUCOPHAGE) 1000 MG tablet TAKE 1 TABLET BY MOUTH TWICE DAILY WITH MEALS 2/6/23   Ollie Moreland APRN   orphenadrine (NORFLEX) 100 MG 12 hr tablet Take 1 tablet by mouth 2 (Two) Times a Day. 8/13/23   Justine Silverio APRN   oxyCODONE-acetaminophen (PERCOCET) 5-325 MG per tablet Take 1 tablet by mouth 3 (Three) Times a Day. 1/12/23   Provider, MD Jody   rosuvastatin (CRESTOR) 10 MG tablet Take 1 tablet by mouth Daily. 8/10/22   Ran Pan MD   Syringe 21G X 1\" 3 ML misc 100 mg Every 14 (Fourteen) Days. 12/1/21   Ollie Moreland APRN   tadalafil (CIALIS) 20 MG tablet TAKE 1 TABLET BY MOUTH AS NEEDED FOR ERECTILE DYSFUNCTION 10/9/23   Dulce Miguel APRN   Tresiba FlexTouch 200 UNIT/ML solution pen-injector pen injection ADMINISTER 22 UNITS UNDER THE SKIN DAILY AS DIRECTED 10/9/23   Ollie Moreland APRN        Social History:   Social History     Tobacco Use    Smoking status: Never    Smokeless tobacco: Never   Vaping Use    Vaping Use: Never used   Substance Use Topics    Alcohol use: Yes     Comment: OCCASIONAL    Drug use: Never         Review of Systems:  Review of Systems   Constitutional:  Negative for chills and fever.   HENT:  Negative for congestion, rhinorrhea and sore throat.    Eyes:  Negative for photophobia.   Respiratory:  Negative for apnea, cough, chest tightness and shortness of breath.    Cardiovascular:  Negative for chest pain and palpitations.   Gastrointestinal:  Negative for abdominal pain, diarrhea, nausea and vomiting.   Endocrine: Negative.    Genitourinary:  Negative for difficulty urinating and dysuria.   Musculoskeletal:  Positive for myalgias. Negative for back pain and joint swelling.   Skin:  Negative for color change and wound.   Allergic/Immunologic: Negative.    Neurological:  Negative for seizures and headaches.   Psychiatric/Behavioral: Negative.     All other systems reviewed and are negative.       Physical " "Exam:  /88   Pulse 87   Temp 98.4 øF (36.9 øC) (Oral)   Resp 18   Ht 171.5 cm (67.5\")   Wt 84.9 kg (187 lb 2.7 oz)   SpO2 97%   BMI 28.88 kg/mý     Physical Exam  Vitals and nursing note reviewed.   Constitutional:       General: He is awake.      Appearance: Normal appearance.   HENT:      Head: Normocephalic and atraumatic.      Nose: Nose normal.      Mouth/Throat:      Mouth: Mucous membranes are moist.   Eyes:      Extraocular Movements: Extraocular movements intact.      Pupils: Pupils are equal, round, and reactive to light.   Cardiovascular:      Rate and Rhythm: Normal rate and regular rhythm.      Heart sounds: Normal heart sounds.   Pulmonary:      Effort: Pulmonary effort is normal. No respiratory distress.      Breath sounds: Normal breath sounds. No wheezing, rhonchi or rales.   Abdominal:      General: Bowel sounds are normal.      Palpations: Abdomen is soft.      Tenderness: There is no abdominal tenderness. There is no guarding or rebound.      Comments: No rigidity   Musculoskeletal:         General: No tenderness. Normal range of motion.      Cervical back: Normal range of motion and neck supple.   Skin:     General: Skin is warm and dry.      Coloration: Skin is not jaundiced.      Comments: Linear abrasion to the right chest wall with no laceration.  Abrasion to the right mid to distal anterior lateral thigh.  Superficial abrasion to the right vulvar mid to distal forearm.   Neurological:      General: No focal deficit present.      Mental Status: He is alert. Mental status is at baseline.   Psychiatric:         Mood and Affect: Mood normal.                  Procedures:  Procedures      Medical Decision Making:      Comorbidities that affect care:    Diabetes, hyperlipidemia, hypertension, CAD    External Notes reviewed:    Previous Clinic Note: Office visit 9/26/2023 for left shoulder pain, long-term drug therapy      The following orders were placed and all results were " independently analyzed by me:  Orders Placed This Encounter   Procedures    CT Head Without Contrast    XR Ribs Right With PA Chest    XR Femur 2 View Right    CT Cervical Spine Without Contrast       Medications Given in the Emergency Department:  Medications   HYDROcodone-acetaminophen (NORCO) 5-325 MG per tablet 1 tablet (1 tablet Oral Given 10/13/23 1703)        ED Course:    ED Course as of 10/13/23 1714   Fri Oct 13, 2023   1654 Patient does note that neither Toradol or tramadol ever worked for him. [RP]   1708 [facility] called.  Patient reportedly received 90 hydrocodone 7.52 weeks ago so is not due for any refills. [RP]      ED Course User Index  [RP] Jose Bone MD       Labs:    Lab Results (last 24 hours)       ** No results found for the last 24 hours. **             Imaging:    XR Femur 2 View Right    Result Date: 10/13/2023  PROCEDURE: XR FEMUR 2 VW RIGHT  COMPARISON: None  INDICATIONS: RIGHT ANTERIOR MID UPPER LEG PAIN S/P FALL FROM LADDER  FINDINGS:   No fractures or acute osseous abnormalities are identified in the right femur.  Mild degenerative changes are present in the right knee and right hip.  IMPRESSION: No acute osseous abnormalities are identified in the right femur.   ROB RIZO MD       Electronically Signed and Approved By: ROB RIZO MD on 10/13/2023 at 15:39             XR Ribs Right With PA Chest    Result Date: 10/13/2023  PROCEDURE: XR RIBS RIGHT W PA CHEST  COMPARISON: [facility], , XR CHEST 2 VW, 2/07/2023, 9:25.  INDICATIONS: RIGHT UPPER ANTERIOR RIB PAIN S/P FALL OFF LADDER  FINDINGS:   The lungs are well-expanded. The heart and pulmonary vasculature are within normal limits. No pleural effusions are identified. There are no active appearing infiltrates.  No displaced right rib fractures are identified.  No evidence of pneumothorax.  IMPRESSION: No active disease.  ROB RIZO MD       Electronically Signed and Approved By: ROB PETIT  MD SALLIE on 10/13/2023 at 15:36             CT Cervical Spine Without Contrast    Result Date: 10/13/2023  PROCEDURE: CT CERVICAL SPINE WO CONTRAST  COMPARISON: [facility], CT, CT CERVICAL SPINE WO CONTRAST, 11/12/2021, 14:28.  INDICATIONS: FALL WITH INJURY  PROTOCOL:   Standard imaging protocol performed    RADIATION:   DLP: 583.8 mGy*cm   MA and/or KV was adjusted to minimize radiation dose.     TECHNIQUE: After obtaining the patient's consent, multi-planar CT images were created without contrast material.   FINDINGS:  No acute displaced fracture or traumatic spondylolisthesis.  Nonspecific straightening of the cervical spine which may relate to patient positioning and/or cervical collar.  At lineal axial and craniocervical alignment maintained.  Negative for dens fracture.  Minimal degenerative disc disease and facet arthropathy without critical central spinal canal narrowing or neural foraminal narrowing.  Lung apices clear.  Mild right maxillary sinus mucosal thickening.  Other soft tissues of the neck are grossly unremarkable.  Carotid atherosclerotic disease noted.  Aortic atherosclerotic disease noted at the arch.        No acute displaced fracture or traumatic malalignment of the cervical spine.     DMOENICO CAREY MD       Electronically Signed and Approved By: DOMENICO CAREY MD on 10/13/2023 at 15:33             CT Head Without Contrast    Result Date: 10/13/2023  PROCEDURE: CT HEAD WO CONTRAST  COMPARISON:  [facility], CT, CT HEAD WO CONTRAST, 3/21/2023, 19:16. INDICATIONS: FALL WITH INJURY, TAKES PLAVIX/HEAD AND NECK PAIN  PROTOCOL:   Standard imaging protocol performed    RADIATION:   DLP: 1143 mGy*cm   MA and/or KV was adjusted to minimize radiation dose.     TECHNIQUE: After obtaining the patient's consent, CT images were obtained without non-ionic intravenous contrast material.  FINDINGS:  Negative for large territorial loss of gray-white differentiation, acute  intracranial hemorrhage, large mass lesion, midline shift or hydrocephalus.  Mild global parenchymal volume loss.  No extra-axial fluid collection.  Bilateral carotid siphon and distal vertebral artery calcifications.  Scalp soft tissues are unremarkable.  Sinuses and mastoid air cells clear.  Negative for depressed skull fracture.        No acute intracranial abnormality by CT.  Chronic mild volume loss similar to previous exam.     DOMENICO CAREY MD       Electronically Signed and Approved By: DOMENICO CAREY MD on 10/13/2023 at 15:29                Differential Diagnosis and Discussion:    Extremity Pain: Differential diagnosis includes but is not limited to soft tissue sprain, tendonitis, tendon injury, dislocation, fracture, deep vein thrombosis, arterial insufficiency, osteoarthritis, bursitis, and ligamentous damage.    All X-rays impressions were independently interpreted by me.  CT scan radiology impression was interpreted by me.    [facility]           Patient Care Considerations:    LABS: I considered ordering labs, however this is a purely musculoskeletal complaint today with no recent systemic illness.  No abdominal/flank pain.      Consultants/Shared Management Plan:    None    Social Determinants of Health:    Patient is independent, reliable, and has access to care.       Disposition and Care Coordination:    Discharged: The patient is suitable and stable for discharge with no need for consideration of observation or admission.    I have explained the patient's condition, diagnoses and treatment plan based on the information available to me at this time. I have answered questions and addressed any concerns. The patient has a good  understanding of the patient's diagnosis, condition, and treatment plan as can be expected at this point. The vital signs have been stable. The patient's condition is stable and appropriate for discharge from the emergency department.      The patient will pursue further  outpatient evaluation with the primary care physician or other designated or consulting physician as outlined in the discharge instructions. They are agreeable to this plan of care and follow-up instructions have been explained in detail. The patient has received these instructions in written format and have expressed an understanding of the discharge instructions. The patient is aware that any significant change in condition or worsening of symptoms should prompt an immediate return to this or the closest emergency department or call to 911.    Final diagnoses:   Fall, initial encounter   Contusion of right thigh, initial encounter   Abrasion of right chest wall, initial encounter        ED Disposition       ED Disposition   Discharge    Condition   Stable    Comment   --               This medical record created using voice recognition software.             Jose Bone MD  10/13/23 [id number]       Jose Bone MD  10/13/23 [id number]

## 2023-10-17 ENCOUNTER — TELEPHONE (OUTPATIENT)
Dept: CARDIOLOGY | Facility: CLINIC | Age: 58
End: 2023-10-17

## 2023-10-17 NOTE — TELEPHONE ENCOUNTER
[facility] received a fax that requires your attention. The document has been indexed to the patient’s chart for your review.      Reason for sending: EXTERNAL MEDICAL RECORD NOTIFICATION     Documents Description: PHYS ORD-[facility] SPEC CARDIAC CLEARANCE REQ-10.17.23    Name of Sender: [facility]     Date Indexed: 10.17.23

## 2023-11-01 ENCOUNTER — TELEPHONE (OUTPATIENT)
Dept: UROLOGY | Facility: CLINIC | Age: 58
End: 2023-11-01
Payer: MEDICARE

## 2023-11-01 NOTE — TELEPHONE ENCOUNTER
Called pt and LMOM that he has an appointment coming up with [facility] in a couple of weeks and he is still needing to have his MRI that was ordered at his last appointment performed. Instructed that he can call the office to get this scheduled and provided call back number.

## 2023-11-16 ENCOUNTER — OFFICE VISIT (OUTPATIENT)
Dept: UROLOGY | Facility: CLINIC | Age: 58
End: 2023-11-16
Payer: MEDICARE

## 2023-11-16 VITALS
SYSTOLIC BLOOD PRESSURE: 138 MMHG | WEIGHT: 190.2 LBS | DIASTOLIC BLOOD PRESSURE: 79 MMHG | HEART RATE: 82 BPM | BODY MASS INDEX: 28.17 KG/M2 | RESPIRATION RATE: 12 BRPM | HEIGHT: 69 IN

## 2023-11-16 DIAGNOSIS — N52.9 ERECTILE DYSFUNCTION, UNSPECIFIED ERECTILE DYSFUNCTION TYPE: ICD-10-CM

## 2023-11-16 DIAGNOSIS — R36.1 HEMATOSPERMIA: Primary | ICD-10-CM

## 2023-11-16 RX ORDER — HYDROCODONE BITARTRATE AND ACETAMINOPHEN 7.5; 325 MG/1; MG/1
1 TABLET ORAL
COMMUNITY

## 2023-11-16 RX ORDER — NALOXONE HYDROCHLORIDE 4 MG/.1ML
1 SPRAY NASAL
COMMUNITY
Start: 2023-11-12

## 2023-11-16 RX ORDER — METHOCARBAMOL 500 MG/1
TABLET, FILM COATED ORAL
COMMUNITY
Start: 2023-11-13

## 2023-11-16 RX ORDER — LISINOPRIL 30 MG/1
30 TABLET ORAL
COMMUNITY
Start: 2023-10-23

## 2023-11-16 NOTE — PROGRESS NOTES
"Chief Complaint: Erectile Dysfunction (Pt here for follow up.  Pt did get sample of Trimix in the office.  Pt stated meds did work. Since then Meño was called in.  Pt states medication does not work well.  Pt has not had MRI.)    Subjective         History of Present Illness  Moreno Aleman is a 57 y.o. male presents to [facility] to be seen for trimix teaching.    At last visit we started him on trimix.     The patient never got a prescription for this.     He states the trimix worked very well.     He was given tadalafil 20mg unsure why as he was using trimix but he states this is not working for him.     He would like a prescription for trimix.    He still is with some blood in the semen states \"pure blood\" last week.       Previous:  Patient presents for follow-up of erectile dysfunction, trial of Trimix, and injection teaching. Denies changes since last visit.      Patient demonstrated ability to successfully perform intracavernosal self injection x 1.   Provided trial dose up to 0.2 for which patient obtained greater than 50% erection.      He also has failed to have his MRI performed x2 now for evaluation of his hematospermia.     He states that he is with blood in the semen for the last 10 years off and on.     He was seen in the ED on 4/23/23 with c/o testicular pain.     U/s from ED visit showed Heads of either epididymis are enlarged and heterogeneously echoic.  Hypoechoic lesion in the head of the epididymis on the right measuring 1.4 cm x 1.3 cm x 0.5 cm no increased vascular flow is seen in either epididymis.  Small bilateral hydroceles.  Small varicocele on the left.    Patient has been on tamsulosin before with no improvement in urinary symptoms.    Last A1c performed in November was 8.8 however recent blood glucose readings consistently over 300 since February.    Nocturia x 2     He states that he is drinking coke daily 5-6, 12 oz cans.     Patient has a stent to LAD " medically managed with Plavix statin and beta-blocker.    He is also on oxycodone per pain management for his chronic shoulder and back pain.     He has been on Cialis and states he took Cialis and Viagra together which helped.     He states that he would like to try trimix.           Objective     Past Medical History:   Diagnosis Date    Arthritis     Biceps tendonitis on left 03/22/2022    Bursitis of shoulder 12/06/2018    CAD (coronary artery disease)     Claustrophobia     Diabetes     Hyperlipidemia     Hypertension     Pneumonia        Past Surgical History:   Procedure Laterality Date    CARDIAC CATHETERIZATION      showed significant coronary artery disease of the LAD with calcification.     CHOLECYSTECTOMY      CIRCUMCISION      CORONARY ANGIOPLASTY WITH STENT PLACEMENT      INGUINAL HERNIA REPAIR      x2    NOSE SURGERY      NM RT/LT HEART CATHETERS N/A 03/22/2016    Procedure: Percutaneous Coronary Intervention - Rota/stent LAD;  Surgeon: Marek Emery MD;  Location: [facility] CATH INVASIVE LOCATION;  Service: Cardiovascular    ROTATOR CUFF REPAIR Left     SHOULDER ARTHROSCOPY Right     x2    SHOULDER ARTHROSCOPY WITH SUBACROMIAL DECOMPRESSION Left 4/11/2022    Procedure: left SHOULDER ARTHROSCOPY SUBACROMIAL DECOMPRESSION WITH ROTATOR CUFF DEBRIDEMENT;  Surgeon: Kam Dick MD;  [place] [facility] [place] [facility];  Service: Orthopedics;  Laterality: Left;    SPINAL FUSION           Current Outpatient Medications:     amitriptyline (ELAVIL) 50 MG tablet, TAKE 1 TABLET BY MOUTH EVERY NIGHT, Disp: 90 tablet, Rfl: 1    amLODIPine-valsartan (EXFORGE) 5-320 MG per tablet, Take 1 tablet by mouth Daily., Disp: 90 tablet, Rfl: 1    Blood Glucose Monitoring Suppl (FreeStyle Lite) w/Device kit, See Admin Instructions., Disp: , Rfl:     clopidogrel (PLAVIX) 75 MG tablet, Take 1 tablet by mouth Daily., Disp: , Rfl:     cyclobenzaprine (FLEXERIL) 10 MG tablet, Take 1 tablet by mouth 3 (Three) Times a Day As Needed for  "Muscle Spasms., Disp: 20 tablet, Rfl: 0    diclofenac (VOLTAREN) 75 MG EC tablet, Take 1 tablet by mouth Every 12 (Twelve) Hours., Disp: , Rfl:     Farxiga 10 MG tablet, TAKE 1 TABLET BY MOUTH DAILY, Disp: 90 tablet, Rfl: 1    FREESTYLE LITE test strip, USE TO TEST BLOOD SUGAR ONCE A DAY, Disp: 100 each, Rfl: 1    HYDROcodone-acetaminophen (NORCO) 7.5-325 MG per tablet, Take 1 tablet by mouth., Disp: , Rfl:     ibuprofen (ADVIL,MOTRIN) 800 MG tablet, Take 1 tablet by mouth Every 6 (Six) Hours As Needed for Mild Pain., Disp: 30 tablet, Rfl: 0    Insulin Pen Needle (NovoFine Plus Pen Needle) 32G X 4 MM misc, 22 Units Daily., Disp: 100 each, Rfl: 5    Lactobacillus (Florajen Acidophilus) capsule, Take 1 capsule by mouth Daily., Disp: 45 capsule, Rfl: 0    lisinopril (PRINIVIL,ZESTRIL) 30 MG tablet, Take 1 tablet by mouth., Disp: , Rfl:     metFORMIN (GLUCOPHAGE) 1000 MG tablet, TAKE 1 TABLET BY MOUTH TWICE DAILY WITH MEALS, Disp: 180 tablet, Rfl: 1    methocarbamol (ROBAXIN) 500 MG tablet, TAKE 2 TABLETS BY MOUTH TWICE DAILY FOR 15 DOSES, Disp: , Rfl:     naloxone (NARCAN) 4 MG/0.1ML nasal spray, 1 spray into the nostril(s) as directed by provider., Disp: , Rfl:     orphenadrine (NORFLEX) 100 MG 12 hr tablet, Take 1 tablet by mouth 2 (Two) Times a Day., Disp: 30 tablet, Rfl: 0    rosuvastatin (CRESTOR) 10 MG tablet, Take 1 tablet by mouth Daily., Disp: 90 tablet, Rfl: 3    Syringe 21G X 1\" 3 ML misc, 100 mg Every 14 (Fourteen) Days., Disp: 20 each, Rfl: 2    tadalafil (CIALIS) 20 MG tablet, TAKE 1 TABLET BY MOUTH AS NEEDED FOR ERECTILE DYSFUNCTION, Disp: 90 tablet, Rfl: 3    Tresiba FlexTouch 200 UNIT/ML solution pen-injector pen injection, ADMINISTER 22 UNITS UNDER THE SKIN DAILY AS DIRECTED, Disp: 6 mL, Rfl: 1    Allergies   Allergen Reactions    Fentanyl Angioedema and Swelling    Cefdinir Itching    Nifedipine Hives and Itching        Family History   Problem Relation Age of Onset    Hypertension Mother     " "Diabetes Mother     Lung cancer Mother     Stroke Mother     Heart attack Father     Heart disease Father     Heart failure Father     Diabetes Father        Social History     Socioeconomic History    Marital status:    Tobacco Use    Smoking status: Never     Passive exposure: Past    Smokeless tobacco: Never   Vaping Use    Vaping Use: Never used   Substance and Sexual Activity    Alcohol use: Yes     Comment: OCCASIONAL    Drug use: Never    Sexual activity: Defer       Vital Signs:   /79 (BP Location: Left arm, Patient Position: Sitting)   Pulse 82   Resp 12   Ht 175.3 cm (69\")   Wt 86.3 kg (190 lb 3.2 oz)   BMI 28.09 kg/m²      Physical Exam     Result Review :   The following data was reviewed by: NINFA Ac on 05/04/2023:  Results for orders placed or performed in visit on 09/25/23   Lipid Panel    Specimen: Blood   Result Value Ref Range    Total Cholesterol 166 0 - 200 mg/dL    Triglycerides 238 (H) 0 - 150 mg/dL    HDL Cholesterol 40 40 - 60 mg/dL    LDL Cholesterol  86 0 - 100 mg/dL    VLDL Cholesterol 40 5 - 40 mg/dL    LDL/HDL Ratio 1.96       PSA          8/25/2023    21:39   PSA   PSA 0.39          Details          This result is from an external source.                 Procedures        Assessment and Plan    Diagnoses and all orders for this visit:    1. Hematospermia (Primary)    2. Erectile dysfunction, unspecified erectile dysfunction type      We will fx script of trimix today.     We will schedule MRI today through [facility] given persistent hematospermia    I spent 10 minutes caring for Moreno on this date of service. This time includes time spent by me in the following activities:reviewing tests, obtaining and/or reviewing a separately obtained history, performing a medically appropriate examination and/or evaluation , counseling and educating the patient/family/caregiver, ordering medications, tests, or procedures, and documenting information in the medical " record  Follow Up   Return in about 3 months (around 2/16/2024) for Follow-up hematospermia/ED.  Patient was given instructions and counseling regarding his condition or for health maintenance advice. Please see specific information pulled into the AVS if appropriate.         This document has been electronically signed by NINFA Ac  November 16, 2023 09:18 EST

## 2023-11-27 ENCOUNTER — TELEPHONE (OUTPATIENT)
Dept: CARDIOLOGY | Facility: CLINIC | Age: 58
End: 2023-11-27
Payer: MEDICARE

## 2023-11-27 NOTE — TELEPHONE ENCOUNTER
The [facility] received a fax that requires your attention. The document has been indexed to the patient’s chart for your review.      Reason for sending: EXTERNAL MEDICAL RECORD NOTIFICATION     Documents Description: PHYS ORD-[facility] SPEC CARDIAC CLEARANCE REQ-11.27.23    Name of Sender: [facility]     Date Indexed: 11.27.23

## 2023-11-30 ENCOUNTER — TELEPHONE (OUTPATIENT)
Dept: CARDIOLOGY | Facility: CLINIC | Age: 58
End: 2023-11-30
Payer: MEDICARE

## 2023-11-30 NOTE — TELEPHONE ENCOUNTER
REQUEST FOR CARDIAC CLEARANCE    Caller name: Moreno Aleman     Phone Number: [phone]    Surgeon's name: UNKNOWN - AT [facility] IN [place]    Type of planned surgery: COLONOSCOPY    Date of planned surgery: NOT SCHEDULED YET    Type of anesthesia: LIGHT SEDATION    Have you been experiencing chest pain or shortness of breath? NO    Is your doctor requesting for you to stop any of your medications prior to your surgery? YES - PATIENT HAD STATED THAT OUR OFFICE STATED FOR PATIENT TO BE OFF OF PLAVIX FOR 1 WEEK PRIOR TO THE PROCEDURE.    Where should we fax the clearance to? [facility] - GENERAL SURGERY DEPARTMENT    ADDITIONAL NOTES: PATIENT CALLING IN STATING THAT HE WAS TOLD THAT HE NEEDS TO HAVE SOME TYPE OF TEST DONE PRIOR TO GETTING COLONOSCOPY. PLEASE CONTACT PATIENT TO ADVISE.

## 2023-12-11 ENCOUNTER — APPOINTMENT (OUTPATIENT)
Dept: GENERAL RADIOLOGY | Facility: HOSPITAL | Age: 58
DRG: 236 | End: 2023-12-11
Payer: MEDICARE

## 2023-12-11 ENCOUNTER — HOSPITAL ENCOUNTER (OUTPATIENT)
Facility: HOSPITAL | Age: 58
Discharge: OTHER INSTITUTION | DRG: 236 | End: 2023-12-12
Attending: EMERGENCY MEDICINE | Admitting: FAMILY MEDICINE
Payer: MEDICARE

## 2023-12-11 DIAGNOSIS — R07.9 CHEST PAIN, UNSPECIFIED TYPE: Primary | ICD-10-CM

## 2023-12-11 DIAGNOSIS — I20.0 UNSTABLE ANGINA: ICD-10-CM

## 2023-12-11 LAB
ALBUMIN SERPL-MCNC: 4.5 G/DL (ref 3.5–5.2)
ALBUMIN/GLOB SERPL: 2 G/DL
ALP SERPL-CCNC: 66 U/L (ref 39–117)
ALT SERPL W P-5'-P-CCNC: 7 U/L (ref 1–41)
ANION GAP SERPL CALCULATED.3IONS-SCNC: 10.7 MMOL/L (ref 5–15)
AST SERPL-CCNC: 13 U/L (ref 1–40)
BASOPHILS # BLD AUTO: 0.03 10*3/MM3 (ref 0–0.2)
BASOPHILS NFR BLD AUTO: 0.4 % (ref 0–1.5)
BILIRUB SERPL-MCNC: 0.4 MG/DL (ref 0–1.2)
BUN SERPL-MCNC: 8 MG/DL (ref 6–20)
BUN/CREAT SERPL: 11 (ref 7–25)
CALCIUM SPEC-SCNC: 8.9 MG/DL (ref 8.6–10.5)
CHLORIDE SERPL-SCNC: 105 MMOL/L (ref 98–107)
CO2 SERPL-SCNC: 24.3 MMOL/L (ref 22–29)
CREAT SERPL-MCNC: 0.73 MG/DL (ref 0.76–1.27)
DEPRECATED RDW RBC AUTO: 38.2 FL (ref 37–54)
EGFRCR SERPLBLD CKD-EPI 2021: 106.8 ML/MIN/1.73
EOSINOPHIL # BLD AUTO: 0.07 10*3/MM3 (ref 0–0.4)
EOSINOPHIL NFR BLD AUTO: 0.9 % (ref 0.3–6.2)
ERYTHROCYTE [DISTWIDTH] IN BLOOD BY AUTOMATED COUNT: 12.3 % (ref 12.3–15.4)
GEN 5 2HR TROPONIN T REFLEX: 9 NG/L
GLOBULIN UR ELPH-MCNC: 2.2 GM/DL
GLUCOSE SERPL-MCNC: 202 MG/DL (ref 65–99)
HCT VFR BLD AUTO: 39.1 % (ref 37.5–51)
HGB BLD-MCNC: 13.7 G/DL (ref 13–17.7)
HOLD SPECIMEN: NORMAL
HOLD SPECIMEN: NORMAL
IMM GRANULOCYTES # BLD AUTO: 0.03 10*3/MM3 (ref 0–0.05)
IMM GRANULOCYTES NFR BLD AUTO: 0.4 % (ref 0–0.5)
LIPASE SERPL-CCNC: 25 U/L (ref 13–60)
LYMPHOCYTES # BLD AUTO: 2.5 10*3/MM3 (ref 0.7–3.1)
LYMPHOCYTES NFR BLD AUTO: 33.9 % (ref 19.6–45.3)
MAGNESIUM SERPL-MCNC: 1.8 MG/DL (ref 1.6–2.6)
MCH RBC QN AUTO: 30 PG (ref 26.6–33)
MCHC RBC AUTO-ENTMCNC: 35 G/DL (ref 31.5–35.7)
MCV RBC AUTO: 85.6 FL (ref 79–97)
MONOCYTES # BLD AUTO: 0.46 10*3/MM3 (ref 0.1–0.9)
MONOCYTES NFR BLD AUTO: 6.2 % (ref 5–12)
NEUTROPHILS NFR BLD AUTO: 4.29 10*3/MM3 (ref 1.7–7)
NEUTROPHILS NFR BLD AUTO: 58.2 % (ref 42.7–76)
NRBC BLD AUTO-RTO: 0 /100 WBC (ref 0–0.2)
NT-PROBNP SERPL-MCNC: 607.9 PG/ML (ref 0–900)
PLATELET # BLD AUTO: 153 10*3/MM3 (ref 140–450)
PMV BLD AUTO: 9.5 FL (ref 6–12)
POTASSIUM SERPL-SCNC: 3.7 MMOL/L (ref 3.5–5.2)
PROT SERPL-MCNC: 6.7 G/DL (ref 6–8.5)
RBC # BLD AUTO: 4.57 10*6/MM3 (ref 4.14–5.8)
SODIUM SERPL-SCNC: 140 MMOL/L (ref 136–145)
TROPONIN T DELTA: 1 NG/L
TROPONIN T SERPL HS-MCNC: 8 NG/L
WBC NRBC COR # BLD AUTO: 7.38 10*3/MM3 (ref 3.4–10.8)
WHOLE BLOOD HOLD COAG: NORMAL
WHOLE BLOOD HOLD SPECIMEN: NORMAL

## 2023-12-11 PROCEDURE — 71045 X-RAY EXAM CHEST 1 VIEW: CPT

## 2023-12-11 PROCEDURE — 99284 EMERGENCY DEPT VISIT MOD MDM: CPT

## 2023-12-11 PROCEDURE — 93010 ELECTROCARDIOGRAM REPORT: CPT | Performed by: INTERNAL MEDICINE

## 2023-12-11 PROCEDURE — 80053 COMPREHEN METABOLIC PANEL: CPT

## 2023-12-11 PROCEDURE — 93005 ELECTROCARDIOGRAM TRACING: CPT | Performed by: EMERGENCY MEDICINE

## 2023-12-11 PROCEDURE — G0378 HOSPITAL OBSERVATION PER HR: HCPCS

## 2023-12-11 PROCEDURE — 84484 ASSAY OF TROPONIN QUANT: CPT | Performed by: EMERGENCY MEDICINE

## 2023-12-11 PROCEDURE — 83735 ASSAY OF MAGNESIUM: CPT

## 2023-12-11 PROCEDURE — 84484 ASSAY OF TROPONIN QUANT: CPT

## 2023-12-11 PROCEDURE — 93005 ELECTROCARDIOGRAM TRACING: CPT

## 2023-12-11 PROCEDURE — 83036 HEMOGLOBIN GLYCOSYLATED A1C: CPT | Performed by: FAMILY MEDICINE

## 2023-12-11 PROCEDURE — 85025 COMPLETE CBC W/AUTO DIFF WBC: CPT

## 2023-12-11 PROCEDURE — 36415 COLL VENOUS BLD VENIPUNCTURE: CPT

## 2023-12-11 PROCEDURE — 83690 ASSAY OF LIPASE: CPT

## 2023-12-11 PROCEDURE — 80061 LIPID PANEL: CPT | Performed by: FAMILY MEDICINE

## 2023-12-11 PROCEDURE — 83880 ASSAY OF NATRIURETIC PEPTIDE: CPT

## 2023-12-11 RX ORDER — AMOXICILLIN 250 MG
2 CAPSULE ORAL 2 TIMES DAILY
Status: DISCONTINUED | OUTPATIENT
Start: 2023-12-11 | End: 2023-12-12 | Stop reason: HOSPADM

## 2023-12-11 RX ORDER — SODIUM CHLORIDE 0.9 % (FLUSH) 0.9 %
10 SYRINGE (ML) INJECTION AS NEEDED
Status: DISCONTINUED | OUTPATIENT
Start: 2023-12-11 | End: 2023-12-12 | Stop reason: HOSPADM

## 2023-12-11 RX ORDER — BISACODYL 5 MG/1
5 TABLET, DELAYED RELEASE ORAL DAILY PRN
Status: DISCONTINUED | OUTPATIENT
Start: 2023-12-11 | End: 2023-12-12 | Stop reason: HOSPADM

## 2023-12-11 RX ORDER — ASPIRIN 81 MG/1
324 TABLET, CHEWABLE ORAL ONCE
Status: COMPLETED | OUTPATIENT
Start: 2023-12-11 | End: 2023-12-11

## 2023-12-11 RX ORDER — HYDROCODONE BITARTRATE AND ACETAMINOPHEN 7.5; 325 MG/1; MG/1
1 TABLET ORAL ONCE
Status: COMPLETED | OUTPATIENT
Start: 2023-12-11 | End: 2023-12-11

## 2023-12-11 RX ORDER — NITROGLYCERIN 0.4 MG/1
0.4 TABLET SUBLINGUAL
Status: DISCONTINUED | OUTPATIENT
Start: 2023-12-11 | End: 2023-12-12 | Stop reason: HOSPADM

## 2023-12-11 RX ORDER — SODIUM CHLORIDE 9 MG/ML
40 INJECTION, SOLUTION INTRAVENOUS AS NEEDED
Status: DISCONTINUED | OUTPATIENT
Start: 2023-12-11 | End: 2023-12-12 | Stop reason: HOSPADM

## 2023-12-11 RX ORDER — POLYETHYLENE GLYCOL 3350 17 G/17G
17 POWDER, FOR SOLUTION ORAL DAILY PRN
Status: DISCONTINUED | OUTPATIENT
Start: 2023-12-11 | End: 2023-12-12 | Stop reason: HOSPADM

## 2023-12-11 RX ORDER — SODIUM CHLORIDE 0.9 % (FLUSH) 0.9 %
10 SYRINGE (ML) INJECTION EVERY 12 HOURS SCHEDULED
Status: DISCONTINUED | OUTPATIENT
Start: 2023-12-11 | End: 2023-12-12 | Stop reason: HOSPADM

## 2023-12-11 RX ORDER — LISINOPRIL 20 MG/1
20 TABLET ORAL 2 TIMES DAILY
COMMUNITY
End: 2023-12-19 | Stop reason: HOSPADM

## 2023-12-11 RX ORDER — BISACODYL 10 MG
10 SUPPOSITORY, RECTAL RECTAL DAILY PRN
Status: DISCONTINUED | OUTPATIENT
Start: 2023-12-11 | End: 2023-12-12 | Stop reason: HOSPADM

## 2023-12-11 RX ORDER — AMLODIPINE BESYLATE 5 MG/1
5 TABLET ORAL DAILY
COMMUNITY
Start: 2023-11-24 | End: 2023-12-19 | Stop reason: HOSPADM

## 2023-12-11 RX ADMIN — ASPIRIN 324 MG: 81 TABLET, CHEWABLE ORAL at 21:00

## 2023-12-11 RX ADMIN — HYDROCODONE BITARTRATE AND ACETAMINOPHEN 1 TABLET: 7.5; 325 TABLET ORAL at 21:00

## 2023-12-11 RX ADMIN — Medication 10 ML: at 23:22

## 2023-12-11 NOTE — ED PROVIDER NOTES
Time: 6:50 PM EST  Date of encounter:  12/11/2023  Independent Historian/Clinical History and Information was obtained by:   Patient    History is limited by: N/A    Chief Complaint   Patient presents with    Chest Pain         History of Present Illness:  Patient is a 57 y.o. year old male who presents to the emergency department for evaluation of chest pain that began 8 hours ago.  Patient states he is not having the pain now but when it came on earlier it was radiating to his left arm.  He stated the pain lasted roughly 30 minutes. Was walking to his mailbox at onset.  Patient states the pain is worse with exertion.  Patient states he had a stent placed 10 years ago and is taking Plavix daily.  Patient states his cardiologist is Dr. Pan.     Patient Care Team  Primary Care Provider: Steffany Gruber APRN    Past Medical History:     Allergies   Allergen Reactions    Fentanyl Angioedema and Swelling    Cefdinir Itching    Nifedipine Hives and Itching     Past Medical History:   Diagnosis Date    Arthritis     Biceps tendonitis on left 03/22/2022    Bursitis of shoulder 12/06/2018    CAD (coronary artery disease)     Claustrophobia     Diabetes     Hyperlipidemia     Hypertension     Pneumonia      Past Surgical History:   Procedure Laterality Date    CARDIAC CATHETERIZATION      showed significant coronary artery disease of the LAD with calcification.     CHOLECYSTECTOMY      CIRCUMCISION      CORONARY ANGIOPLASTY WITH STENT PLACEMENT      INGUINAL HERNIA REPAIR      x2    NOSE SURGERY      MT RT/LT HEART CATHETERS N/A 03/22/2016    Procedure: Percutaneous Coronary Intervention - Rota/stent LAD;  Surgeon: Marek Emery MD;  Location: [facility] INVASIVE LOCATION;  Service: Cardiovascular    ROTATOR CUFF REPAIR Left     SHOULDER ARTHROSCOPY Right     x2    SHOULDER ARTHROSCOPY WITH SUBACROMIAL DECOMPRESSION Left 4/11/2022    Procedure: left SHOULDER ARTHROSCOPY SUBACROMIAL DECOMPRESSION WITH ROTATOR CUFF  DEBRIDEMENT;  Surgeon: Kam Dick MD;  Location: [facility] [place];  Service: Orthopedics;  Laterality: Left;    SPINAL FUSION       Family History   Problem Relation Age of Onset    Hypertension Mother     Diabetes Mother     Lung cancer Mother     Stroke Mother     Heart attack Father     Heart disease Father     Heart failure Father     Diabetes Father        Home Medications:  Prior to Admission medications    Medication Sig Start Date End Date Taking? Authorizing Provider   amitriptyline (ELAVIL) 50 MG tablet TAKE 1 TABLET BY MOUTH EVERY NIGHT 9/5/23   Ollie Moreland APRN   amLODIPine-valsartan (EXFORGE) 5-320 MG per tablet Take 1 tablet by mouth Daily. 9/25/23   Lisa Reece APRN   Blood Glucose Monitoring Suppl (FreeStyle Lite) w/Device kit See Admin Instructions. 11/8/21   Jody Bejarano MD   clopidogrel (PLAVIX) 75 MG tablet Take 1 tablet by mouth Daily.    Jody Bejarano MD   cyclobenzaprine (FLEXERIL) 10 MG tablet Take 1 tablet by mouth 3 (Three) Times a Day As Needed for Muscle Spasms. 9/11/23   Sherice Melgoza APRN   diclofenac (VOLTAREN) 75 MG EC tablet Take 1 tablet by mouth Every 12 (Twelve) Hours. 9/14/23   Jody Bejarano MD   Farxiga 10 MG tablet TAKE 1 TABLET BY MOUTH DAILY 8/18/23   Ollie Moreland APRN   FREESTYLE LITE test strip USE TO TEST BLOOD SUGAR ONCE A DAY 11/14/22   Ollie Moreland APRN   HYDROcodone-acetaminophen (NORCO) 7.5-325 MG per tablet Take 1 tablet by mouth.    Jody Bejarano MD   ibuprofen (ADVIL,MOTRIN) 800 MG tablet Take 1 tablet by mouth Every 6 (Six) Hours As Needed for Mild Pain. 10/1/22   Davy Lindsay APRN   Insulin Pen Needle (NovoFine Plus Pen Needle) 32G X 4 MM misc 22 Units Daily. 2/8/22   Ollie Moreland APRN   Lactobacillus (Florajen Acidophilus) capsule Take 1 capsule by mouth Daily. 5/4/23   Dulce Miguel APRN   lisinopril (PRINIVIL,ZESTRIL) 30 MG tablet Take 1 tablet by mouth. 10/23/23   Jody Bejarano MD  "  metFORMIN (GLUCOPHAGE) 1000 MG tablet TAKE 1 TABLET BY MOUTH TWICE DAILY WITH MEALS 2/6/23   Ollie Moreland APRN   methocarbamol (ROBAXIN) 500 MG tablet TAKE 2 TABLETS BY MOUTH TWICE DAILY FOR 15 DOSES 11/13/23   ProviderJody MD   naloxone (NARCAN) 4 MG/0.1ML nasal spray 1 spray into the nostril(s) as directed by provider. 11/12/23   Jody Bejarano MD   orphenadrine (NORFLEX) 100 MG 12 hr tablet Take 1 tablet by mouth 2 (Two) Times a Day. 8/13/23   Justine Silverio APRN   rosuvastatin (CRESTOR) 10 MG tablet Take 1 tablet by mouth Daily. 8/10/22   Ran Pan MD   Syringe 21G X 1\" 3 ML misc 100 mg Every 14 (Fourteen) Days. 12/1/21   Ollie Moreland APRN   tadalafil (CIALIS) 20 MG tablet TAKE 1 TABLET BY MOUTH AS NEEDED FOR ERECTILE DYSFUNCTION 10/9/23   Dulce Miguel APRN   Tresiba FlexTouch 200 UNIT/ML solution pen-injector pen injection ADMINISTER 22 UNITS UNDER THE SKIN DAILY AS DIRECTED 10/9/23   Ollie Moreland APRN        Social History:   Social History     Tobacco Use    Smoking status: Never     Passive exposure: Past    Smokeless tobacco: Never   Vaping Use    Vaping Use: Never used   Substance Use Topics    Alcohol use: Yes     Comment: OCCASIONAL    Drug use: Never         Review of Systems:  Review of Systems   Constitutional:  Negative for chills and fever.   HENT:  Negative for congestion, rhinorrhea and sore throat.    Eyes:  Negative for pain and visual disturbance.   Respiratory:  Negative for apnea, cough, chest tightness and shortness of breath.    Cardiovascular:  Positive for chest pain. Negative for palpitations.   Gastrointestinal:  Negative for abdominal pain, diarrhea and vomiting.   Genitourinary:  Negative for difficulty urinating and dysuria.   Musculoskeletal:  Negative for joint swelling and myalgias.   Skin:  Negative for color change.   Neurological:  Negative for seizures and headaches.   Psychiatric/Behavioral: Negative.     All other systems reviewed and " "are negative.       Physical Exam:  BP (!) 190/101 (BP Location: Right arm, Patient Position: Lying)   Pulse 76   Temp 97 °F (36.1 °C)   Resp 15   Ht 167.6 cm (66\")   Wt 86.2 kg (190 lb)   SpO2 100%   BMI 30.67 kg/m²         Physical Exam  Vitals and nursing note reviewed.   Constitutional:       General: He is not in acute distress.     Appearance: Normal appearance. He is not toxic-appearing.   HENT:      Head: Normocephalic and atraumatic.      Jaw: There is normal jaw occlusion.   Eyes:      General: Lids are normal.      Extraocular Movements: Extraocular movements intact.      Conjunctiva/sclera: Conjunctivae normal.      Pupils: Pupils are equal, round, and reactive to light.   Cardiovascular:      Rate and Rhythm: Normal rate and regular rhythm.      Pulses: Normal pulses.      Heart sounds: Normal heart sounds.   Pulmonary:      Effort: Pulmonary effort is normal. No respiratory distress.      Breath sounds: Normal breath sounds. No wheezing or rhonchi.   Abdominal:      General: Abdomen is flat. There is no distension.      Palpations: Abdomen is soft.      Tenderness: There is no abdominal tenderness. There is no guarding or rebound.   Musculoskeletal:         General: Normal range of motion.      Cervical back: Normal range of motion and neck supple.      Right lower leg: No edema.      Left lower leg: No edema.   Skin:     General: Skin is warm and dry.      Coloration: Skin is not cyanotic.   Neurological:      Mental Status: He is alert and oriented to person, place, and time. Mental status is at baseline.   Psychiatric:         Attention and Perception: Attention and perception normal.         Mood and Affect: Mood normal.                      Procedures:  Procedures      Medical Decision Making:      Comorbidities that affect care:    Coronary artery disease, hyperlipidemia    External Notes reviewed:          The following orders were placed and all results were independently analyzed by " me:  Orders Placed This Encounter   Procedures    XR Chest 1 View    Letcher Draw    High Sensitivity Troponin T    Comprehensive Metabolic Panel    Lipase    BNP    Magnesium    CBC Auto Differential    High Sensitivity Troponin T 2Hr    NPO Diet NPO Type: Strict NPO    Undress & Gown    Continuous Pulse Oximetry    Inpatient Hospitalist Consult    Oxygen Therapy- Nasal Cannula; Titrate 1-6 LPM Per SpO2; 90 - 95%    ECG 12 Lead ED Triage Standing Order; Chest Pain    ECG 12 Lead ED Triage Standing Order; Chest Pain    Insert Peripheral IV    Initiate Observation Status    CBC & Differential    Green Top (Gel)    Lavender Top    Gold Top - SST    Light Blue Top       Medications Given in the Emergency Department:  Medications   sodium chloride 0.9 % flush 10 mL (has no administration in time range)   aspirin chewable tablet 324 mg (324 mg Oral Given 12/11/23 2100)   HYDROcodone-acetaminophen (NORCO) 7.5-325 MG per tablet 1 tablet (1 tablet Oral Given 12/11/23 2100)        ED Course:    The patient was initially evaluated in the triage area where orders were placed. The patient was later dispositioned by Pepito Moore PA-C.      The patient was advised to stay for completion of workup which includes but is not limited to communication of labs and radiological results, reassessment and plan. The patient was advised that leaving prior to disposition by a provider could result in critical findings that are not communicated to the patient.          Labs:    Lab Results (last 24 hours)       Procedure Component Value Units Date/Time    High Sensitivity Troponin T [[id number]]  (Normal) Collected: 12/11/23 1913    Specimen: Blood from Arm, Right Updated: 12/11/23 1955     HS Troponin T 8 ng/L     Narrative:      High Sensitive Troponin T Reference Range:  <14.0 ng/L- Negative Female for AMI  <22.0 ng/L- Negative Male for AMI  >=14 - Abnormal Female indicating possible myocardial injury.  >=22 - Abnormal Male indicating  possible myocardial injury.   Clinicians would have to utilize clinical acumen, EKG, Troponin, and serial changes to determine if it is an Acute Myocardial Infarction or myocardial injury due to an underlying chronic condition.         CBC & Differential [[id number]]  (Normal) Collected: 12/11/23 1913    Specimen: Blood from Arm, Right Updated: 12/11/23 1937    Narrative:      The following orders were created for panel order CBC & Differential.  Procedure                               Abnormality         Status                     ---------                               -----------         ------                     CBC Auto Differential[[id number]]        Normal              Final result                 Please view results for these tests on the individual orders.    Comprehensive Metabolic Panel [[id number]]  (Abnormal) Collected: 12/11/23 1913    Specimen: Blood from Arm, Right Updated: 12/11/23 1955     Glucose 202 mg/dL      BUN 8 mg/dL      Creatinine 0.73 mg/dL      Sodium 140 mmol/L      Potassium 3.7 mmol/L      Chloride 105 mmol/L      CO2 24.3 mmol/L      Calcium 8.9 mg/dL      Total Protein 6.7 g/dL      Albumin 4.5 g/dL      ALT (SGPT) 7 U/L      AST (SGOT) 13 U/L      Alkaline Phosphatase 66 U/L      Total Bilirubin 0.4 mg/dL      Globulin 2.2 gm/dL      A/G Ratio 2.0 g/dL      BUN/Creatinine Ratio 11.0     Anion Gap 10.7 mmol/L      eGFR 106.8 mL/min/1.73     Narrative:      GFR Normal >60  Chronic Kidney Disease <60  Kidney Failure <15      Lipase [[id number]]  (Normal) Collected: 12/11/23 1913    Specimen: Blood from Arm, Right Updated: 12/11/23 1955     Lipase 25 U/L     BNP [[id number]]  (Normal) Collected: 12/11/23 1913    Specimen: Blood from Arm, Right Updated: 12/11/23 1950     proBNP 607.9 pg/mL     Narrative:      This assay is used as an aid in the diagnosis of individuals suspected of having heart failure. It can be used as an aid in the diagnosis of acute decompensated heart failure (ADHF) in  patients presenting with signs and symptoms of ADHF to the emergency department (ED). In addition, NT-proBNP of <300 pg/mL indicates ADHF is not likely.    Age Range Result Interpretation  NT-proBNP Concentration (pg/mL:      <50             Positive            >450                   Gray                 300-450                    Negative             <300    50-75           Positive            >900                  Gray                300-900                  Negative            <300      >75             Positive            >1800                  Gray                300-1800                  Negative            <300    Magnesium [[id number]]  (Normal) Collected: 12/11/23 1913    Specimen: Blood from Arm, Right Updated: 12/11/23 1955     Magnesium 1.8 mg/dL     CBC Auto Differential [[id number]]  (Normal) Collected: 12/11/23 1913    Specimen: Blood from Arm, Right Updated: 12/11/23 1937     WBC 7.38 10*3/mm3      RBC 4.57 10*6/mm3      Hemoglobin 13.7 g/dL      Hematocrit 39.1 %      MCV 85.6 fL      MCH 30.0 pg      MCHC 35.0 g/dL      RDW 12.3 %      RDW-SD 38.2 fl      MPV 9.5 fL      Platelets 153 10*3/mm3      Neutrophil % 58.2 %      Lymphocyte % 33.9 %      Monocyte % 6.2 %      Eosinophil % 0.9 %      Basophil % 0.4 %      Immature Grans % 0.4 %      Neutrophils, Absolute 4.29 10*3/mm3      Lymphocytes, Absolute 2.50 10*3/mm3      Monocytes, Absolute 0.46 10*3/mm3      Eosinophils, Absolute 0.07 10*3/mm3      Basophils, Absolute 0.03 10*3/mm3      Immature Grans, Absolute 0.03 10*3/mm3      nRBC 0.0 /100 WBC              Imaging:    XR Chest 1 View    Result Date: 12/11/2023  PROCEDURE: XR CHEST 1 VW  COMPARISON: [facility], CR, XR CHEST 1 VW, 11/12/2021, 7:52.  [facility], CR, XR CHEST 2 VW, 2/07/2023, 9:25.  [facility], CR, XR RIBS RIGHT W PA CHEST, 10/13/2023, 15:21.  INDICATIONS: CHEST PAIN  FINDINGS:   The lungs are well-expanded. The heart and pulmonary  vasculature are within normal limits. No pleural effusions are identified. There are no active appearing infiltrates.  No evidence of pneumothorax.  IMPRESSION: No active disease.  ROB RIZO MD       Electronically Signed and Approved By: ROB RIZO MD on 12/11/2023 at 19:47                Differential Diagnosis and Discussion:      Chest Pain:  Based on the patient's signs and symptoms, I considered aortic dissection, myocardial infaction, pulmonary embolism, cardiac tamponade, pericarditis, pneumothorax, musculoskeletal chest pain and other differential diagnosis as an etiology of the patient's chest pain.     All labs were reviewed and interpreted by me.  All X-rays impressions were independently interpreted by me.  EKG was interpreted by supervising attending.    MDM     Troponin within normal limits.  Patient is resting comfortably at this time and oxygen saturation is 100% on room air.  Patient's heart score is 4 and given his cardiac history we will admit the patient for observation overnight.          Patient Care Considerations:          Consultants/Shared Management Plan:    Hospitalist: I have discussed the case with Dr. Washington who agrees to accept the patient for admission.    Social Determinants of Health:    Patient is independent, reliable, and has access to care.       Disposition and Care Coordination:    Admit:   Through independent evaluation of the patient's history, physical, and imperical data, the patient meets criteria for observation/admission to the hospital.        Final diagnoses:   Chest pain, unspecified type        ED Disposition       ED Disposition   Decision to Admit    Condition   --    Comment   Level of Care: Telemetry [5]   Diagnosis: Chest pain [[id number]]   Admitting Physician: PERRY WASHINGTON [[id number]]                 This medical record created using voice recognition software.             Pepito Moore PA-C  12/11/23 [id number]

## 2023-12-12 ENCOUNTER — TELEPHONE (OUTPATIENT)
Dept: CARDIAC SURGERY | Facility: CLINIC | Age: 58
End: 2023-12-12
Payer: MEDICARE

## 2023-12-12 ENCOUNTER — HOSPITAL ENCOUNTER (INPATIENT)
Facility: HOSPITAL | Age: 58
LOS: 7 days | Discharge: HOME HEALTH | DRG: 236 | End: 2023-12-19
Attending: THORACIC SURGERY (CARDIOTHORACIC VASCULAR SURGERY) | Admitting: THORACIC SURGERY (CARDIOTHORACIC VASCULAR SURGERY)
Payer: MEDICARE

## 2023-12-12 VITALS
BODY MASS INDEX: 30.54 KG/M2 | DIASTOLIC BLOOD PRESSURE: 87 MMHG | TEMPERATURE: 99 F | HEIGHT: 66 IN | SYSTOLIC BLOOD PRESSURE: 169 MMHG | WEIGHT: 190.04 LBS | RESPIRATION RATE: 18 BRPM | OXYGEN SATURATION: 96 % | HEART RATE: 76 BPM

## 2023-12-12 DIAGNOSIS — I25.118 CORONARY ARTERY DISEASE OF NATIVE HEART WITH STABLE ANGINA PECTORIS, UNSPECIFIED VESSEL OR LESION TYPE: Primary | ICD-10-CM

## 2023-12-12 DIAGNOSIS — Z95.1 S/P CABG (CORONARY ARTERY BYPASS GRAFT): ICD-10-CM

## 2023-12-12 DIAGNOSIS — R93.1 ABNORMAL FINDINGS ON DIAGNOSTIC IMAGING OF HEART AND CORONARY CIRCULATION: ICD-10-CM

## 2023-12-12 PROBLEM — I20.0 UNSTABLE ANGINA: Status: ACTIVE | Noted: 2023-12-11

## 2023-12-12 LAB
BILIRUB UR QL STRIP: NEGATIVE
CHOLEST SERPL-MCNC: 145 MG/DL (ref 0–200)
CLARITY UR: CLEAR
COLOR UR: YELLOW
GLUCOSE BLDC GLUCOMTR-MCNC: 104 MG/DL (ref 70–99)
GLUCOSE BLDC GLUCOMTR-MCNC: 202 MG/DL (ref 70–130)
GLUCOSE BLDC GLUCOMTR-MCNC: 240 MG/DL (ref 70–99)
GLUCOSE BLDC GLUCOMTR-MCNC: 255 MG/DL (ref 70–99)
GLUCOSE BLDC GLUCOMTR-MCNC: 90 MG/DL (ref 70–99)
GLUCOSE UR STRIP-MCNC: ABNORMAL MG/DL
HBA1C MFR BLD: 9.4 % (ref 4.8–5.6)
HDLC SERPL-MCNC: 47 MG/DL (ref 40–60)
HGB UR QL STRIP.AUTO: NEGATIVE
KETONES UR QL STRIP: NEGATIVE
LDLC SERPL CALC-MCNC: 75 MG/DL (ref 0–100)
LDLC/HDLC SERPL: 1.54 {RATIO}
LEUKOCYTE ESTERASE UR QL STRIP.AUTO: NEGATIVE
NITRITE UR QL STRIP: NEGATIVE
PH UR STRIP.AUTO: 7 [PH] (ref 5–8)
PROT UR QL STRIP: NEGATIVE
QT INTERVAL: 377 MS
QT INTERVAL: 398 MS
QTC INTERVAL: 429 MS
QTC INTERVAL: 435 MS
SARS-COV-2 RNA RESP QL NAA+PROBE: NOT DETECTED
SP GR UR STRIP: 1.02 (ref 1–1.03)
TRIGL SERPL-MCNC: 128 MG/DL (ref 0–150)
UROBILINOGEN UR QL STRIP: ABNORMAL
VLDLC SERPL-MCNC: 23 MG/DL (ref 5–40)

## 2023-12-12 PROCEDURE — 63710000001 INSULIN REGULAR HUMAN PER 5 UNITS: Performed by: FAMILY MEDICINE

## 2023-12-12 PROCEDURE — G0378 HOSPITAL OBSERVATION PER HR: HCPCS

## 2023-12-12 PROCEDURE — 25010000002 MEPERIDINE PER 100 MG: Performed by: INTERNAL MEDICINE

## 2023-12-12 PROCEDURE — 25010000002 KETOROLAC TROMETHAMINE PER 15 MG: Performed by: FAMILY MEDICINE

## 2023-12-12 PROCEDURE — 99214 OFFICE O/P EST MOD 30 MIN: CPT | Performed by: INTERNAL MEDICINE

## 2023-12-12 PROCEDURE — A9270 NON-COVERED ITEM OR SERVICE: HCPCS | Performed by: INTERNAL MEDICINE

## 2023-12-12 PROCEDURE — 63710000001 CARVEDILOL 3.125 MG TABLET: Performed by: INTERNAL MEDICINE

## 2023-12-12 PROCEDURE — 93458 L HRT ARTERY/VENTRICLE ANGIO: CPT | Performed by: INTERNAL MEDICINE

## 2023-12-12 PROCEDURE — 25010000002 HYDRALAZINE PER 20 MG: Performed by: THORACIC SURGERY (CARDIOTHORACIC VASCULAR SURGERY)

## 2023-12-12 PROCEDURE — 63710000001 INSULIN DETEMIR PER 5 UNITS: Performed by: INTERNAL MEDICINE

## 2023-12-12 PROCEDURE — 25010000002 MIDAZOLAM PER 1MG: Performed by: INTERNAL MEDICINE

## 2023-12-12 PROCEDURE — 81003 URINALYSIS AUTO W/O SCOPE: CPT | Performed by: NURSE PRACTITIONER

## 2023-12-12 PROCEDURE — 82948 REAGENT STRIP/BLOOD GLUCOSE: CPT

## 2023-12-12 PROCEDURE — 87635 SARS-COV-2 COVID-19 AMP PRB: CPT | Performed by: NURSE PRACTITIONER

## 2023-12-12 PROCEDURE — 63710000001 HYDROCODONE-ACETAMINOPHEN 7.5-325 MG TABLET: Performed by: INTERNAL MEDICINE

## 2023-12-12 PROCEDURE — 25510000001 IOPAMIDOL PER 1 ML: Performed by: INTERNAL MEDICINE

## 2023-12-12 PROCEDURE — 25010000002 HEPARIN (PORCINE) PER 1000 UNITS: Performed by: INTERNAL MEDICINE

## 2023-12-12 PROCEDURE — C1894 INTRO/SHEATH, NON-LASER: HCPCS | Performed by: INTERNAL MEDICINE

## 2023-12-12 PROCEDURE — 96375 TX/PRO/DX INJ NEW DRUG ADDON: CPT

## 2023-12-12 PROCEDURE — 25010000002 LABETALOL 5 MG/ML SOLUTION: Performed by: INTERNAL MEDICINE

## 2023-12-12 PROCEDURE — 99235 HOSP IP/OBS SAME DATE MOD 70: CPT | Performed by: FAMILY MEDICINE

## 2023-12-12 PROCEDURE — 96374 THER/PROPH/DIAG INJ IV PUSH: CPT

## 2023-12-12 PROCEDURE — 63710000001 INSULIN DETEMIR PER 5 UNITS: Performed by: FAMILY MEDICINE

## 2023-12-12 PROCEDURE — C1769 GUIDE WIRE: HCPCS | Performed by: INTERNAL MEDICINE

## 2023-12-12 PROCEDURE — 63710000001 INSULIN REGULAR HUMAN PER 5 UNITS: Performed by: INTERNAL MEDICINE

## 2023-12-12 PROCEDURE — 25010000002 LABETALOL 5 MG/ML SOLUTION: Performed by: FAMILY MEDICINE

## 2023-12-12 RX ORDER — CLOPIDOGREL BISULFATE 75 MG/1
75 TABLET ORAL DAILY
Status: DISCONTINUED | OUTPATIENT
Start: 2023-12-12 | End: 2023-12-12

## 2023-12-12 RX ORDER — POLYETHYLENE GLYCOL 3350 17 G/17G
17 POWDER, FOR SOLUTION ORAL DAILY PRN
Status: DISCONTINUED | OUTPATIENT
Start: 2023-12-12 | End: 2023-12-14

## 2023-12-12 RX ORDER — SODIUM CHLORIDE 9 MG/ML
40 INJECTION, SOLUTION INTRAVENOUS AS NEEDED
Status: CANCELLED | OUTPATIENT
Start: 2023-12-12

## 2023-12-12 RX ORDER — AMITRIPTYLINE HYDROCHLORIDE 50 MG/1
50 TABLET, FILM COATED ORAL NIGHTLY
Status: DISCONTINUED | OUTPATIENT
Start: 2023-12-12 | End: 2023-12-12 | Stop reason: HOSPADM

## 2023-12-12 RX ORDER — SODIUM CHLORIDE 0.9 % (FLUSH) 0.9 %
10 SYRINGE (ML) INJECTION AS NEEDED
Status: CANCELLED | OUTPATIENT
Start: 2023-12-12

## 2023-12-12 RX ORDER — IBUPROFEN 600 MG/1
1 TABLET ORAL
Status: DISCONTINUED | OUTPATIENT
Start: 2023-12-12 | End: 2023-12-12 | Stop reason: HOSPADM

## 2023-12-12 RX ORDER — MIDAZOLAM HYDROCHLORIDE 2 MG/2ML
INJECTION, SOLUTION INTRAMUSCULAR; INTRAVENOUS
Status: DISCONTINUED | OUTPATIENT
Start: 2023-12-12 | End: 2023-12-12 | Stop reason: HOSPADM

## 2023-12-12 RX ORDER — CARVEDILOL 3.12 MG/1
3.12 TABLET ORAL 2 TIMES DAILY WITH MEALS
Status: DISCONTINUED | OUTPATIENT
Start: 2023-12-12 | End: 2023-12-12 | Stop reason: HOSPADM

## 2023-12-12 RX ORDER — HYDROCODONE BITARTRATE AND ACETAMINOPHEN 7.5; 325 MG/1; MG/1
1 TABLET ORAL EVERY 8 HOURS PRN
Status: DISCONTINUED | OUTPATIENT
Start: 2023-12-12 | End: 2023-12-12 | Stop reason: HOSPADM

## 2023-12-12 RX ORDER — DEXTROSE MONOHYDRATE 25 G/50ML
25 INJECTION, SOLUTION INTRAVENOUS
Status: DISCONTINUED | OUTPATIENT
Start: 2023-12-12 | End: 2023-12-13

## 2023-12-12 RX ORDER — SODIUM CHLORIDE 9 MG/ML
100 INJECTION, SOLUTION INTRAVENOUS CONTINUOUS
Status: CANCELLED | OUTPATIENT
Start: 2023-12-12

## 2023-12-12 RX ORDER — LIDOCAINE HYDROCHLORIDE 20 MG/ML
INJECTION, SOLUTION INFILTRATION; PERINEURAL
Status: DISCONTINUED | OUTPATIENT
Start: 2023-12-12 | End: 2023-12-12 | Stop reason: HOSPADM

## 2023-12-12 RX ORDER — SODIUM CHLORIDE 9 MG/ML
40 INJECTION, SOLUTION INTRAVENOUS AS NEEDED
Status: DISCONTINUED | OUTPATIENT
Start: 2023-12-12 | End: 2023-12-14

## 2023-12-12 RX ORDER — BISACODYL 5 MG/1
5 TABLET, DELAYED RELEASE ORAL DAILY PRN
Status: DISCONTINUED | OUTPATIENT
Start: 2023-12-12 | End: 2023-12-14

## 2023-12-12 RX ORDER — AMOXICILLIN 250 MG
2 CAPSULE ORAL 2 TIMES DAILY
Status: DISCONTINUED | OUTPATIENT
Start: 2023-12-12 | End: 2023-12-14

## 2023-12-12 RX ORDER — AMITRIPTYLINE HYDROCHLORIDE 50 MG/1
50 TABLET, FILM COATED ORAL NIGHTLY
Status: DISCONTINUED | OUTPATIENT
Start: 2023-12-12 | End: 2023-12-14

## 2023-12-12 RX ORDER — LISINOPRIL 20 MG/1
20 TABLET ORAL 2 TIMES DAILY
Status: DISCONTINUED | OUTPATIENT
Start: 2023-12-12 | End: 2023-12-12 | Stop reason: HOSPADM

## 2023-12-12 RX ORDER — ROSUVASTATIN CALCIUM 10 MG/1
10 TABLET, COATED ORAL NIGHTLY
Status: DISCONTINUED | OUTPATIENT
Start: 2023-12-12 | End: 2023-12-14

## 2023-12-12 RX ORDER — SODIUM CHLORIDE 0.9 % (FLUSH) 0.9 %
10 SYRINGE (ML) INJECTION EVERY 12 HOURS SCHEDULED
Status: DISCONTINUED | OUTPATIENT
Start: 2023-12-12 | End: 2023-12-14

## 2023-12-12 RX ORDER — LISINOPRIL 20 MG/1
20 TABLET ORAL
Status: DISCONTINUED | OUTPATIENT
Start: 2023-12-13 | End: 2023-12-14

## 2023-12-12 RX ORDER — VERAPAMIL HYDROCHLORIDE 2.5 MG/ML
INJECTION, SOLUTION INTRAVENOUS
Status: DISCONTINUED | OUTPATIENT
Start: 2023-12-12 | End: 2023-12-12 | Stop reason: HOSPADM

## 2023-12-12 RX ORDER — DEXTROSE MONOHYDRATE 25 G/50ML
25 INJECTION, SOLUTION INTRAVENOUS
Status: DISCONTINUED | OUTPATIENT
Start: 2023-12-12 | End: 2023-12-12 | Stop reason: HOSPADM

## 2023-12-12 RX ORDER — SODIUM CHLORIDE 0.9 % (FLUSH) 0.9 %
10 SYRINGE (ML) INJECTION EVERY 12 HOURS SCHEDULED
Status: CANCELLED | OUTPATIENT
Start: 2023-12-12

## 2023-12-12 RX ORDER — METHOCARBAMOL 500 MG/1
500 TABLET, FILM COATED ORAL 4 TIMES DAILY
Status: DISCONTINUED | OUTPATIENT
Start: 2023-12-12 | End: 2023-12-12

## 2023-12-12 RX ORDER — NITROGLYCERIN 0.4 MG/1
0.4 TABLET SUBLINGUAL
Status: DISCONTINUED | OUTPATIENT
Start: 2023-12-12 | End: 2023-12-14

## 2023-12-12 RX ORDER — METHOCARBAMOL 500 MG/1
500 TABLET, FILM COATED ORAL EVERY 6 HOURS PRN
Status: DISCONTINUED | OUTPATIENT
Start: 2023-12-12 | End: 2023-12-14

## 2023-12-12 RX ORDER — ROSUVASTATIN CALCIUM 5 MG/1
10 TABLET, COATED ORAL NIGHTLY
Status: DISCONTINUED | OUTPATIENT
Start: 2023-12-12 | End: 2023-12-12 | Stop reason: HOSPADM

## 2023-12-12 RX ORDER — HYDRALAZINE HYDROCHLORIDE 20 MG/ML
10 INJECTION INTRAMUSCULAR; INTRAVENOUS EVERY 6 HOURS PRN
Status: DISCONTINUED | OUTPATIENT
Start: 2023-12-12 | End: 2023-12-14

## 2023-12-12 RX ORDER — AMLODIPINE BESYLATE 5 MG/1
5 TABLET ORAL
Status: DISCONTINUED | OUTPATIENT
Start: 2023-12-13 | End: 2023-12-13

## 2023-12-12 RX ORDER — BISACODYL 10 MG
10 SUPPOSITORY, RECTAL RECTAL DAILY PRN
Status: DISCONTINUED | OUTPATIENT
Start: 2023-12-12 | End: 2023-12-14

## 2023-12-12 RX ORDER — HYDROCODONE BITARTRATE AND ACETAMINOPHEN 7.5; 325 MG/1; MG/1
1 TABLET ORAL EVERY 6 HOURS PRN
Status: DISCONTINUED | OUTPATIENT
Start: 2023-12-12 | End: 2023-12-14

## 2023-12-12 RX ORDER — LABETALOL HYDROCHLORIDE 5 MG/ML
10 INJECTION, SOLUTION INTRAVENOUS EVERY 4 HOURS PRN
Status: DISCONTINUED | OUTPATIENT
Start: 2023-12-12 | End: 2023-12-12 | Stop reason: HOSPADM

## 2023-12-12 RX ORDER — INSULIN LISPRO 100 [IU]/ML
2-7 INJECTION, SOLUTION INTRAVENOUS; SUBCUTANEOUS
Status: DISCONTINUED | OUTPATIENT
Start: 2023-12-12 | End: 2023-12-13

## 2023-12-12 RX ORDER — CYCLOBENZAPRINE HCL 10 MG
10 TABLET ORAL 3 TIMES DAILY PRN
Status: DISCONTINUED | OUTPATIENT
Start: 2023-12-12 | End: 2023-12-14

## 2023-12-12 RX ORDER — AMLODIPINE BESYLATE 5 MG/1
5 TABLET ORAL DAILY
Status: DISCONTINUED | OUTPATIENT
Start: 2023-12-12 | End: 2023-12-12 | Stop reason: HOSPADM

## 2023-12-12 RX ORDER — SODIUM CHLORIDE 0.9 % (FLUSH) 0.9 %
10 SYRINGE (ML) INJECTION AS NEEDED
Status: DISCONTINUED | OUTPATIENT
Start: 2023-12-12 | End: 2023-12-14

## 2023-12-12 RX ORDER — HEPARIN SODIUM 1000 [USP'U]/ML
INJECTION, SOLUTION INTRAVENOUS; SUBCUTANEOUS
Status: DISCONTINUED | OUTPATIENT
Start: 2023-12-12 | End: 2023-12-12 | Stop reason: HOSPADM

## 2023-12-12 RX ORDER — IBUPROFEN 600 MG/1
1 TABLET ORAL
Status: DISCONTINUED | OUTPATIENT
Start: 2023-12-12 | End: 2023-12-13

## 2023-12-12 RX ORDER — MEPERIDINE HYDROCHLORIDE 25 MG/ML
INJECTION INTRAMUSCULAR; INTRAVENOUS; SUBCUTANEOUS
Status: DISCONTINUED | OUTPATIENT
Start: 2023-12-12 | End: 2023-12-12 | Stop reason: HOSPADM

## 2023-12-12 RX ORDER — NICOTINE POLACRILEX 4 MG
15 LOZENGE BUCCAL
Status: DISCONTINUED | OUTPATIENT
Start: 2023-12-12 | End: 2023-12-12 | Stop reason: HOSPADM

## 2023-12-12 RX ORDER — CLOPIDOGREL BISULFATE 75 MG/1
75 TABLET ORAL DAILY
Status: DISCONTINUED | OUTPATIENT
Start: 2023-12-13 | End: 2023-12-13

## 2023-12-12 RX ORDER — NICOTINE POLACRILEX 4 MG
15 LOZENGE BUCCAL
Status: DISCONTINUED | OUTPATIENT
Start: 2023-12-12 | End: 2023-12-13

## 2023-12-12 RX ORDER — ACETAMINOPHEN 325 MG/1
650 TABLET ORAL EVERY 4 HOURS PRN
Status: DISCONTINUED | OUTPATIENT
Start: 2023-12-12 | End: 2023-12-12 | Stop reason: HOSPADM

## 2023-12-12 RX ORDER — KETOROLAC TROMETHAMINE 30 MG/ML
15 INJECTION, SOLUTION INTRAMUSCULAR; INTRAVENOUS ONCE
Status: COMPLETED | OUTPATIENT
Start: 2023-12-12 | End: 2023-12-12

## 2023-12-12 RX ADMIN — CARVEDILOL 3.12 MG: 3.12 TABLET, FILM COATED ORAL at 11:07

## 2023-12-12 RX ADMIN — INSULIN HUMAN 6 UNITS: 100 INJECTION, SOLUTION PARENTERAL at 17:09

## 2023-12-12 RX ADMIN — INSULIN DETEMIR 20 UNITS: 100 INJECTION, SOLUTION SUBCUTANEOUS at 01:56

## 2023-12-12 RX ADMIN — Medication 10 ML: at 23:30

## 2023-12-12 RX ADMIN — HYDROCODONE BITARTRATE AND ACETAMINOPHEN 1 TABLET: 7.5; 325 TABLET ORAL at 17:09

## 2023-12-12 RX ADMIN — HYDRALAZINE HYDROCHLORIDE 10 MG: 20 INJECTION, SOLUTION INTRAMUSCULAR; INTRAVENOUS at 22:36

## 2023-12-12 RX ADMIN — HYDROCODONE BITARTRATE AND ACETAMINOPHEN 1 TABLET: 7.5; 325 TABLET ORAL at 22:46

## 2023-12-12 RX ADMIN — HYDROCODONE BITARTRATE AND ACETAMINOPHEN 1 TABLET: 7.5; 325 TABLET ORAL at 10:13

## 2023-12-12 RX ADMIN — LABETALOL HYDROCHLORIDE 10 MG: 5 INJECTION, SOLUTION INTRAVENOUS at 05:47

## 2023-12-12 RX ADMIN — INSULIN HUMAN 4 UNITS: 100 INJECTION, SOLUTION PARENTERAL at 02:05

## 2023-12-12 RX ADMIN — CARVEDILOL 3.12 MG: 3.12 TABLET, FILM COATED ORAL at 17:09

## 2023-12-12 RX ADMIN — KETOROLAC TROMETHAMINE 15 MG: 30 INJECTION, SOLUTION INTRAMUSCULAR; INTRAVENOUS at 01:14

## 2023-12-12 RX ADMIN — AMLODIPINE BESYLATE 5 MG: 5 TABLET ORAL at 11:07

## 2023-12-12 RX ADMIN — CLOPIDOGREL BISULFATE 75 MG: 75 TABLET ORAL at 11:07

## 2023-12-12 RX ADMIN — LISINOPRIL 20 MG: 20 TABLET ORAL at 11:07

## 2023-12-12 RX ADMIN — ROSUVASTATIN CALCIUM 10 MG: 5 TABLET, FILM COATED ORAL at 01:57

## 2023-12-12 RX ADMIN — AMITRIPTYLINE HYDROCHLORIDE 50 MG: 50 TABLET, FILM COATED ORAL at 01:57

## 2023-12-12 NOTE — CONSULTS
Cardiology Consult Note  [facility] 5TH FLOOR SURGICAL TELEMETRY UNIT          Patient Identification:  Moreno Aleman      [id number]  58 y.o.        male  1965       Date of Consultation: 12/12    Reason for Consultation: Chest pain    PCP: Steffany Gruber APRN  Primary cardiologist: Dr. Carson    History of Present Illness:     58-year-old white male.  He has coronary artery disease and has had PCI previously he thinks about 8 years ago.  He has hypertension and mixed hyperlipidemia.  He has been in his usual state of health but reports having chest tightness intermittently over about the past year.  It is increased in frequency and severity over the past few months.  It is worse with exertion and last 10 to 15 minutes at a time and improves with rest.  He reports compliance with his normal medications.  He is a non-smoker.    Past History:  Past Medical History:   Diagnosis Date    Arthritis     Biceps tendonitis on left 03/22/2022    Bursitis of shoulder 12/06/2018    CAD (coronary artery disease)     Claustrophobia     Diabetes     Hyperlipidemia     Hypertension     Pneumonia      Past Surgical History:   Procedure Laterality Date    CARDIAC CATHETERIZATION      showed significant coronary artery disease of the LAD with calcification.     CHOLECYSTECTOMY      CIRCUMCISION      CORONARY ANGIOPLASTY WITH STENT PLACEMENT      INGUINAL HERNIA REPAIR      x2    NOSE SURGERY      AK RT/LT HEART CATHETERS N/A 03/22/2016    Procedure: Percutaneous Coronary Intervention - Rota/stent LAD;  Surgeon: Marek Emery MD;  Location: [facility] INVASIVE LOCATION;  Service: Cardiovascular    ROTATOR CUFF REPAIR Left     SHOULDER ARTHROSCOPY Right     x2    SHOULDER ARTHROSCOPY WITH SUBACROMIAL DECOMPRESSION Left 4/11/2022    Procedure: left SHOULDER ARTHROSCOPY SUBACROMIAL DECOMPRESSION WITH ROTATOR CUFF DEBRIDEMENT;  Surgeon: Kam Dick MD;  Location: [facility] OR [facility];  Service: Orthopedics;   Laterality: Left;    SPINAL FUSION       Allergies   Allergen Reactions    Fentanyl Angioedema and Swelling    Cefdinir Itching    Nifedipine Hives and Itching     Social History     Socioeconomic History    Marital status:    Tobacco Use    Smoking status: Never     Passive exposure: Past    Smokeless tobacco: Never   Vaping Use    Vaping Use: Never used   Substance and Sexual Activity    Alcohol use: Yes     Comment: OCCASIONAL    Drug use: Never    Sexual activity: Defer     Family History   Problem Relation Age of Onset    Hypertension Mother     Diabetes Mother     Lung cancer Mother     Stroke Mother     Heart attack Father     Heart disease Father     Heart failure Father     Diabetes Father      Medications:  Medications Prior to Admission   Medication Sig Dispense Refill Last Dose    amitriptyline (ELAVIL) 50 MG tablet TAKE 1 TABLET BY MOUTH EVERY NIGHT (Patient taking differently: Take 1 tablet by mouth Every Night.) 90 tablet 1 12/10/2023    amLODIPine (NORVASC) 5 MG tablet Take 1 tablet by mouth Daily.   12/10/2023    clopidogrel (PLAVIX) 75 MG tablet Take 1 tablet by mouth Daily.   12/10/2023    cyclobenzaprine (FLEXERIL) 10 MG tablet Take 1 tablet by mouth 3 (Three) Times a Day As Needed for Muscle Spasms. (Patient taking differently: Take 1 tablet by mouth Daily As Needed for Muscle Spasms.) 20 tablet 0 Past Week    diclofenac (VOLTAREN) 75 MG EC tablet Take 1 tablet by mouth 2 (Two) Times a Day As Needed.   Past Week    Farxiga 10 MG tablet TAKE 1 TABLET BY MOUTH DAILY (Patient taking differently: Take 10 mg by mouth Daily.) 90 tablet 1 12/10/2023    HYDROcodone-acetaminophen (NORCO) 7.5-325 MG per tablet Take 1 tablet by mouth Every 8 (Eight) Hours As Needed for Mild Pain, Moderate Pain or Severe Pain.   12/11/2023    Insulin Degludec (TRESIBA FLEXTOUCH) 200 UNIT/ML solution pen-injector pen injection Inject 30 Units under the skin into the appropriate area as directed Daily.   12/10/2023     "lisinopril (PRINIVIL,ZESTRIL) 20 MG tablet Take 1 tablet by mouth 2 (Two) Times a Day.   12/10/2023    metFORMIN (GLUCOPHAGE) 1000 MG tablet TAKE 1 TABLET BY MOUTH TWICE DAILY WITH MEALS (Patient taking differently: Take 1 tablet by mouth 2 (Two) Times a Day.) 180 tablet 1 12/10/2023    methocarbamol (ROBAXIN) 500 MG tablet Take 1 tablet by mouth 2 (Two) Times a Day As Needed for Muscle Spasms.   Past Week    rosuvastatin (CRESTOR) 10 MG tablet Take 1 tablet by mouth Daily. (Patient taking differently: Take 1 tablet by mouth Every Night.) 90 tablet 3 12/10/2023    tadalafil (CIALIS) 20 MG tablet TAKE 1 TABLET BY MOUTH AS NEEDED FOR ERECTILE DYSFUNCTION 90 tablet 3 Past Month     Current medications:  amitriptyline, 50 mg, Oral, Nightly  amLODIPine, 5 mg, Oral, Daily  clopidogrel, 75 mg, Oral, Daily  insulin detemir, 20 Units, Subcutaneous, Nightly  insulin regular, 2-9 Units, Subcutaneous, Q6H  lisinopril, 20 mg, Oral, BID  rosuvastatin, 10 mg, Oral, Nightly  senna-docusate sodium, 2 tablet, Oral, BID  sodium chloride, 10 mL, Intravenous, Q12H      Current IV drips:       Review of Systems   Constitutional: Negative.   HENT: Negative.     Eyes: Negative.    Cardiovascular:  Positive for chest pain and dyspnea on exertion.   Respiratory:  Positive for shortness of breath.    Endocrine: Negative.    Hematologic/Lymphatic: Negative.    Skin: Negative.    Musculoskeletal: Negative.    Gastrointestinal: Negative.    Genitourinary: Negative.    Neurological: Negative.    Psychiatric/Behavioral: Negative.           Physical exam:    /86 (BP Location: Right arm, Patient Position: Lying)   Pulse 65   Temp 98.5 °F (36.9 °C) (Oral)   Resp 18   Ht 167.6 cm (66\")   Wt 86.2 kg (190 lb 0.6 oz)   SpO2 99%   BMI 30.67 kg/m²  Body mass index is 30.67 kg/m².    SpO2  Min: 97 %  Max: 100 %    General Appearance:   well developed  well nourished  HENT:   oropharynx moist  lips not cyanotic  Neck:  thyroid not " "enlarged  supple  Respiratory:  no respiratory distress  normal breath sounds  no rales  Cardiovascular:  no jugular venous distention  regular rhythm  apical impulse normal  S1 normal, S2 normal  no S3, no S4   no murmur  no rub, no thrill  carotid pulses normal; no bruit  pedal pulses normal  lower extremity edema: none    Gastrointestinal:   bowel sounds normal  non-tender  no hepatomegaly, no splenomegaly  Musculoskeletal:  no clubbing of fingers.   normocephalic, head atraumatic  Skin:   warm, dry  Neuro/Psychiatric:  judgement and insight appropriate  normal mood and affect    Cardiographics:     ECG  (personally reviewed) sinus rhythm, rate 70, nonspecific ST changes   Telemetry:  (personally reviewed) sinus rhythm   ECHO: Pending   CATH:     CARDIOLITE:      Lab Review:       Results from last 7 days   Lab Units 12/11/23 1913   WBC 10*3/mm3 7.38   HEMOGLOBIN g/dL 13.7   HEMATOCRIT % 39.1            Results from last 7 days   Lab Units 12/11/23 1913   SODIUM mmol/L 140   BUN mg/dL 8   CREATININE mg/dL 0.73*   GLUCOSE mg/dL 202*      Estimated Creatinine Clearance: 115 mL/min (A) (by C-G formula based on SCr of 0.73 mg/dL (L)).     Results from last 7 days   Lab Units 12/11/23  2133   CHOLESTEROL mg/dL 145   HDL CHOL mg/dL 47             Lab Results   Component Value Date    TSH 1.640 09/28/2021        Lab Results   Component Value Date    HGBA1C 12.8 (H) 08/25/2023           No results found for: \"DIGOXIN\"   No components found for: \"DDIMERQUAN\"     Imaging:   XR Chest 1 View    Result Date: 12/11/2023  PROCEDURE: XR CHEST 1 VW  COMPARISON: [facility], CR, XR CHEST 1 VW, 11/12/2021, 7:52.  [facility], CR, XR CHEST 2 VW, 2/07/2023, 9:25.  [facility], CR, XR RIBS RIGHT W PA CHEST, 10/13/2023, 15:21.  INDICATIONS: CHEST PAIN  FINDINGS:   The lungs are well-expanded. The heart and pulmonary vasculature are within normal limits. No pleural effusions are identified. There " are no active appearing infiltrates.  No evidence of pneumothorax.  IMPRESSION: No active disease.  ROB RIZO MD       Electronically Signed and Approved By: ROB RIZO MD on 12/11/2023 at 19:47                The ASCVD Risk score (Desiree DK, et al., 2019) failed to calculate for the following reasons:    The patient has a prior MI or stroke diagnosis      Assessment:      Chest pain    Hypertension, essential    Hyperlipemia, mixed    Diabetes mellitus, type II    CAD S/P percutaneous coronary angioplasty      Initial cardiac assessment: 58-year-old white male with known coronary artery disease presents with relatively typical chest tightness/angina which is worsening in frequency and severity over the past several months.  He reports it is similar to his previous angina.  He is on typical medical therapy for secondary prevention.  His troponin is normal      Recommendations:  1.  Based on the patient's high risk and known coronary artery disease I recommend coronary angiography to evaluate for significant obstructive disease and possible need for PCI.  The risk and benefits were discussed with the patient he is agreeable to proceed.  2.  Add beta-blocker  3.  Further recommendations will depend on his angiographic findings                Vinay Salas MD  12/12/2023    08:59 EST

## 2023-12-12 NOTE — DISCHARGE SUMMARY
[facility]         HOSPITALIST  DISCHARGE SUMMARY    Patient Name: Moreno Aleman  : 1965  MRN: [id number]    Date of Admission: 2023  Date of Discharge:  23  Primary Care Physician: Steffany Gruber APRN    Consultants:  -Cardiology: Dr. Jimbo Salas, Dr. Mario Welch    Hospital Problems:  Chest pain  Multivessel CAD requiring CABG  Essential hypertension  Hyperlipidemia  Type 2 diabetes mellitus    Hospital Course     Hospital Course:  Moreno Aleman is a 58 y.o. male with CAD who has had PCI in the past, essential hypertension and mixed hyperlipidemia that presented with complaints of chest tightness.  Frequency and severity has been increasing over the past couple of months prior to presenting to the ED.  Noted discomfort would be worse with exertion and last 10 to 15 minutes at a time and would improve with rest.  Patient noted to be hypertensive on evaluation in the ED.  Troponin and proBNP within normal limits.  CXR negative for any acute findings.  Hospitalist service contacted for further evaluation management.  Cardiology consulted and patient taken for cardiac catheterization on 2023.  Patient was found to have multivessel disease and CABG was recommended.  Patient accepted for transfer by Dr. Cruz at [facility].    Day of Discharge     Vital Signs:  Temp:  [97 °F (36.1 °C)-98.5 °F (36.9 °C)] 98 °F (36.7 °C)  Heart Rate:  [65-83] 66  Resp:  [15-20] 18  BP: (171-211)/() 186/90  Flow (L/min):  [2] 2  Physical Exam:   Gen: No acute distress, Conversant, Pleasant, lying bed  Resp: CTAB, No w/r/r, No respiratory distress appreciated  Card: RRR, No m/r/g  Abd: Soft, Nontender, Nondistended, + bowel sounds    Discharge Details     Current Facility-Administered Medications:     [MAR Hold] amitriptyline (ELAVIL) tablet 50 mg, 50 mg, Oral, Nightly, Binu Henderson MD, 50 mg at 23 0157    amLODIPine (NORVASC) tablet 5 mg, 5 mg, Oral,  Daily, Binu Henedrson MD, 5 mg at 12/12/23 1107    sennosides-docusate (PERICOLACE) 8.6-50 MG per tablet 2 tablet, 2 tablet, Oral, BID **AND** polyethylene glycol (MIRALAX) packet 17 g, 17 g, Oral, Daily PRN **AND** bisacodyl (DULCOLAX) EC tablet 5 mg, 5 mg, Oral, Daily PRN **AND** bisacodyl (DULCOLAX) suppository 10 mg, 10 mg, Rectal, Daily PRN, Binu Henderson MD    carvedilol (COREG) tablet 3.125 mg, 3.125 mg, Oral, BID With Meals, CAROLYNN Salas MD, 3.125 mg at 12/12/23 1107    [MAR Hold] clopidogrel (PLAVIX) tablet 75 mg, 75 mg, Oral, Daily, Binu Henderson MD, 75 mg at 12/12/23 1107    [MAR Hold] dextrose (D50W) (25 g/50 mL) IV injection 25 g, 25 g, Intravenous, Q15 Min PRN, Binu Henderson MD    [MAR Hold] dextrose (GLUTOSE) oral gel 15 g, 15 g, Oral, Q15 Min PRN, Binu Henderson MD    [MAR Hold] glucagon (GLUCAGEN) injection 1 mg, 1 mg, Intramuscular, Q15 Min PRN, Binu Henderson MD    [MAR Hold] HYDROcodone-acetaminophen (NORCO) 7.5-325 MG per tablet 1 tablet, 1 tablet, Oral, Q8H PRN, Nicolás Haji MD, 1 tablet at 12/12/23 1013    [MAR Hold] insulin detemir (LEVEMIR) injection 20 Units, 20 Units, Subcutaneous, Nightly, Binu Henderson MD, 20 Units at 12/12/23 0156    [MAR Hold] insulin regular (humuLIN R,novoLIN R) injection 2-9 Units, 2-9 Units, Subcutaneous, Q6H, Binu Henderson MD, 4 Units at 12/12/23 0205    labetalol (NORMODYNE,TRANDATE) injection 10 mg, 10 mg, Intravenous, Q4H PRN, Binu Henderson MD, 10 mg at 12/12/23 0547    lisinopril (PRINIVIL,ZESTRIL) tablet 20 mg, 20 mg, Oral, BID, Binu Henderson MD, 20 mg at 12/12/23 1107    nitroglycerin (NITROSTAT) SL tablet 0.4 mg, 0.4 mg, Sublingual, Q5 Min PRN, Binu Henderson MD    [MAR Hold] rosuvastatin (CRESTOR) tablet 10 mg, 10 mg, Oral, Nightly, Binu Henderson MD, 10 mg at 12/12/23 0157    [MAR Hold] sodium chloride 0.9 % flush 10 mL, 10 mL, Intravenous, PRN, Shayan Stoner MD    sodium chloride 0.9 % flush 10 mL, 10 mL,  Intravenous, Q12H, Binu Henderson MD, 10 mL at 12/11/23 2322    sodium chloride 0.9 % flush 10 mL, 10 mL, Intravenous, PRN, Binu Henderson MD    sodium chloride 0.9 % infusion 40 mL, 40 mL, Intravenous, PRN, Binu Henderson MD    Home Medications      Instructions Start Date   amitriptyline 50 MG tablet  Commonly known as: ELAVIL  What changed: how much to take   Oral, Nightly      cyclobenzaprine 10 MG tablet  Commonly known as: FLEXERIL  What changed: when to take this   10 mg, Oral, 3 Times Daily PRN      Farxiga 10 MG tablet  Generic drug: dapagliflozin Propanediol  What changed: how much to take   TAKE 1 TABLET BY MOUTH DAILY      metFORMIN 1000 MG tablet  Commonly known as: GLUCOPHAGE  What changed: when to take this   TAKE 1 TABLET BY MOUTH TWICE DAILY WITH MEALS      rosuvastatin 10 MG tablet  Commonly known as: CRESTOR  What changed: when to take this   10 mg, Oral, Daily             Continue These Medications        Instructions Start Date   amLODIPine 5 MG tablet  Commonly known as: NORVASC   5 mg, Oral, Daily      clopidogrel 75 MG tablet  Commonly known as: PLAVIX   75 mg, Oral, Daily      diclofenac 75 MG EC tablet  Commonly known as: VOLTAREN   1 tablet, Oral, 2 Times Daily PRN      HYDROcodone-acetaminophen 7.5-325 MG per tablet  Commonly known as: NORCO   1 tablet, Oral, Every 8 Hours PRN      Insulin Degludec 200 UNIT/ML solution pen-injector pen injection  Commonly known as: TRESIBA FLEXTOUCH   30 Units, Subcutaneous, Daily      lisinopril 20 MG tablet  Commonly known as: PRINIVIL,ZESTRIL   20 mg, Oral, 2 Times Daily      methocarbamol 500 MG tablet  Commonly known as: ROBAXIN   Take 1 tablet by mouth 2 (Two) Times a Day As Needed for Muscle Spasms.      tadalafil 20 MG tablet  Commonly known as: CIALIS   20 mg, Oral, As Needed               Allergies   Allergen Reactions    Fentanyl Angioedema and Swelling    Cefdinir Itching    Nifedipine Hives and Itching       Discharge  Disposition:      Diet:  Hospital:  Diet Order   Procedures    NPO Diet NPO Type: Strict NPO       Discharge Activity:       CODE STATUS:  Code Status and Medical Interventions:   Ordered at: 12/12/23 0725     Code Status (Patient has no pulse and is not breathing):    CPR (Attempt to Resuscitate)     Medical Interventions (Patient has pulse or is breathing):    Full Support       Future Appointments   Date Time Provider Department Center   12/14/2023  3:00 PM [facility] MRI 1 [facility] MR BR None   12/29/2023  9:45 AM Dulce Miguel APRN [facility] [place]   5/2/2024 10:15 AM Ran Pan MD [facility] [place]       Pertinent  and/or Most Recent Results     PROCEDURES:   -Cardiac catheterization (12/12/2023)    RADIOLOGY:   XR Chest 1 View [[id number]] Edgardo as Reviewed   Order Status: Completed Collected: 12/11/23 1946    Updated: 12/11/23 1949   Narrative:     PROCEDURE: XR CHEST 1 VW     COMPARISON: [facility], CR, XR CHEST 1 VW, 11/12/2021, 7:52.  [facility], CR, XR CHEST 2 VW, 2/07/2023, 9:25.  [facility], CR, XR RIBS RIGHT W PA  CHEST, 10/13/2023, 15:21.     INDICATIONS: CHEST PAIN     FINDINGS:     The lungs are well-expanded. The heart and pulmonary vasculature are within normal limits. No  pleural effusions are identified. There are no active appearing infiltrates.  No evidence of  pneumothorax.     IMPRESSION: No active disease.     ROB RIZO MD        Electronically Signed and Approved By: ROB RIZO MD on 12/11/2023 at 19:47         LAB RESULTS:      Lab 12/11/23 1913   WBC 7.38   HEMOGLOBIN 13.7   HEMATOCRIT 39.1   PLATELETS 153   NEUTROS ABS 4.29   IMMATURE GRANS (ABS) 0.03   LYMPHS ABS 2.50   MONOS ABS 0.46   EOS ABS 0.07   MCV 85.6         Lab 12/11/23 1913   SODIUM 140   POTASSIUM 3.7   CHLORIDE 105   CO2 24.3   ANION GAP 10.7   BUN 8   CREATININE 0.73*   EGFR 106.8   GLUCOSE 202*   CALCIUM 8.9   MAGNESIUM 1.8   HEMOGLOBIN A1C 9.40*          Lab 12/11/23 1913   TOTAL PROTEIN 6.7   ALBUMIN 4.5   GLOBULIN 2.2   ALT (SGPT) 7   AST (SGOT) 13   BILIRUBIN 0.4   ALK PHOS 66   LIPASE 25         Lab 12/11/23 2133 12/11/23 1913   PROBNP  --  607.9   HSTROP T 9 8         Lab 12/11/23 2133   CHOLESTEROL 145   LDL CHOL 75   HDL CHOL 47   TRIGLYCERIDES 128             Brief Urine Lab Results  (Last result in the past 365 days)        Color   Clarity   Blood   Leuk Est   Nitrite   Protein   CREAT   Urine HCG        08/25/23 2139           7.3                 Microbiology Results (last 10 days)       ** No results found for the last 240 hours. **            Time spent on Discharge including face to face service:  35 minutes    Electronically signed by Nicolás Haji MD, 12/12/23, 3:13 PM EST.

## 2023-12-12 NOTE — H&P (VIEW-ONLY)
Cardiology Consult Note  [facility] 5TH FLOOR SURGICAL TELEMETRY UNIT          Patient Identification:  Moreno Aleman      [id number]  58 y.o.        male  1965       Date of Consultation: 12/12    Reason for Consultation: Chest pain    PCP: Steffany Gruber APRN  Primary cardiologist: Dr. Carson    History of Present Illness:     58-year-old white male.  He has coronary artery disease and has had PCI previously he thinks about 8 years ago.  He has hypertension and mixed hyperlipidemia.  He has been in his usual state of health but reports having chest tightness intermittently over about the past year.  It is increased in frequency and severity over the past few months.  It is worse with exertion and last 10 to 15 minutes at a time and improves with rest.  He reports compliance with his normal medications.  He is a non-smoker.    Past History:  Past Medical History:   Diagnosis Date    Arthritis     Biceps tendonitis on left 03/22/2022    Bursitis of shoulder 12/06/2018    CAD (coronary artery disease)     Claustrophobia     Diabetes     Hyperlipidemia     Hypertension     Pneumonia      Past Surgical History:   Procedure Laterality Date    CARDIAC CATHETERIZATION      showed significant coronary artery disease of the LAD with calcification.     CHOLECYSTECTOMY      CIRCUMCISION      CORONARY ANGIOPLASTY WITH STENT PLACEMENT      INGUINAL HERNIA REPAIR      x2    NOSE SURGERY      MS RT/LT HEART CATHETERS N/A 03/22/2016    Procedure: Percutaneous Coronary Intervention - Rota/stent LAD;  Surgeon: Marek Emery MD;  Location: [facility] INVASIVE LOCATION;  Service: Cardiovascular    ROTATOR CUFF REPAIR Left     SHOULDER ARTHROSCOPY Right     x2    SHOULDER ARTHROSCOPY WITH SUBACROMIAL DECOMPRESSION Left 4/11/2022    Procedure: left SHOULDER ARTHROSCOPY SUBACROMIAL DECOMPRESSION WITH ROTATOR CUFF DEBRIDEMENT;  Surgeon: Kam Dikc MD;  [place] [facility] [place] [facility]  Service: Orthopedics;   Laterality: Left;    SPINAL FUSION       Allergies   Allergen Reactions    Fentanyl Angioedema and Swelling    Cefdinir Itching    Nifedipine Hives and Itching     Social History     Socioeconomic History    Marital status:    Tobacco Use    Smoking status: Never     Passive exposure: Past    Smokeless tobacco: Never   Vaping Use    Vaping Use: Never used   Substance and Sexual Activity    Alcohol use: Yes     Comment: OCCASIONAL    Drug use: Never    Sexual activity: Defer     Family History   Problem Relation Age of Onset    Hypertension Mother     Diabetes Mother     Lung cancer Mother     Stroke Mother     Heart attack Father     Heart disease Father     Heart failure Father     Diabetes Father      Medications:  Medications Prior to Admission   Medication Sig Dispense Refill Last Dose    amitriptyline (ELAVIL) 50 MG tablet TAKE 1 TABLET BY MOUTH EVERY NIGHT (Patient taking differently: Take 1 tablet by mouth Every Night.) 90 tablet 1 12/10/2023    amLODIPine (NORVASC) 5 MG tablet Take 1 tablet by mouth Daily.   12/10/2023    clopidogrel (PLAVIX) 75 MG tablet Take 1 tablet by mouth Daily.   12/10/2023    cyclobenzaprine (FLEXERIL) 10 MG tablet Take 1 tablet by mouth 3 (Three) Times a Day As Needed for Muscle Spasms. (Patient taking differently: Take 1 tablet by mouth Daily As Needed for Muscle Spasms.) 20 tablet 0 Past Week    diclofenac (VOLTAREN) 75 MG EC tablet Take 1 tablet by mouth 2 (Two) Times a Day As Needed.   Past Week    Farxiga 10 MG tablet TAKE 1 TABLET BY MOUTH DAILY (Patient taking differently: Take 10 mg by mouth Daily.) 90 tablet 1 12/10/2023    HYDROcodone-acetaminophen (NORCO) 7.5-325 MG per tablet Take 1 tablet by mouth Every 8 (Eight) Hours As Needed for Mild Pain, Moderate Pain or Severe Pain.   12/11/2023    Insulin Degludec (TRESIBA FLEXTOUCH) 200 UNIT/ML solution pen-injector pen injection Inject 30 Units under the skin into the appropriate area as directed Daily.   12/10/2023     "lisinopril (PRINIVIL,ZESTRIL) 20 MG tablet Take 1 tablet by mouth 2 (Two) Times a Day.   12/10/2023    metFORMIN (GLUCOPHAGE) 1000 MG tablet TAKE 1 TABLET BY MOUTH TWICE DAILY WITH MEALS (Patient taking differently: Take 1 tablet by mouth 2 (Two) Times a Day.) 180 tablet 1 12/10/2023    methocarbamol (ROBAXIN) 500 MG tablet Take 1 tablet by mouth 2 (Two) Times a Day As Needed for Muscle Spasms.   Past Week    rosuvastatin (CRESTOR) 10 MG tablet Take 1 tablet by mouth Daily. (Patient taking differently: Take 1 tablet by mouth Every Night.) 90 tablet 3 12/10/2023    tadalafil (CIALIS) 20 MG tablet TAKE 1 TABLET BY MOUTH AS NEEDED FOR ERECTILE DYSFUNCTION 90 tablet 3 Past Month     Current medications:  amitriptyline, 50 mg, Oral, Nightly  amLODIPine, 5 mg, Oral, Daily  clopidogrel, 75 mg, Oral, Daily  insulin detemir, 20 Units, Subcutaneous, Nightly  insulin regular, 2-9 Units, Subcutaneous, Q6H  lisinopril, 20 mg, Oral, BID  rosuvastatin, 10 mg, Oral, Nightly  senna-docusate sodium, 2 tablet, Oral, BID  sodium chloride, 10 mL, Intravenous, Q12H      Current IV drips:       Review of Systems   Constitutional: Negative.   HENT: Negative.     Eyes: Negative.    Cardiovascular:  Positive for chest pain and dyspnea on exertion.   Respiratory:  Positive for shortness of breath.    Endocrine: Negative.    Hematologic/Lymphatic: Negative.    Skin: Negative.    Musculoskeletal: Negative.    Gastrointestinal: Negative.    Genitourinary: Negative.    Neurological: Negative.    Psychiatric/Behavioral: Negative.           Physical exam:    /86 (BP Location: Right arm, Patient Position: Lying)   Pulse 65   Temp 98.5 °F (36.9 °C) (Oral)   Resp 18   Ht 167.6 cm (66\")   Wt 86.2 kg (190 lb 0.6 oz)   SpO2 99%   BMI 30.67 kg/m²  Body mass index is 30.67 kg/m².    SpO2  Min: 97 %  Max: 100 %    General Appearance:   well developed  well nourished  HENT:   oropharynx moist  lips not cyanotic  Neck:  thyroid not " "enlarged  supple  Respiratory:  no respiratory distress  normal breath sounds  no rales  Cardiovascular:  no jugular venous distention  regular rhythm  apical impulse normal  S1 normal, S2 normal  no S3, no S4   no murmur  no rub, no thrill  carotid pulses normal; no bruit  pedal pulses normal  lower extremity edema: none    Gastrointestinal:   bowel sounds normal  non-tender  no hepatomegaly, no splenomegaly  Musculoskeletal:  no clubbing of fingers.   normocephalic, head atraumatic  Skin:   warm, dry  Neuro/Psychiatric:  judgement and insight appropriate  normal mood and affect    Cardiographics:     ECG  (personally reviewed) sinus rhythm, rate 70, nonspecific ST changes   Telemetry:  (personally reviewed) sinus rhythm   ECHO: Pending   CATH:     CARDIOLITE:      Lab Review:       Results from last 7 days   Lab Units 12/11/23 1913   WBC 10*3/mm3 7.38   HEMOGLOBIN g/dL 13.7   HEMATOCRIT % 39.1            Results from last 7 days   Lab Units 12/11/23 1913   SODIUM mmol/L 140   BUN mg/dL 8   CREATININE mg/dL 0.73*   GLUCOSE mg/dL 202*      Estimated Creatinine Clearance: 115 mL/min (A) (by C-G formula based on SCr of 0.73 mg/dL (L)).     Results from last 7 days   Lab Units 12/11/23  2133   CHOLESTEROL mg/dL 145   HDL CHOL mg/dL 47             Lab Results   Component Value Date    TSH 1.640 09/28/2021        Lab Results   Component Value Date    HGBA1C 12.8 (H) 08/25/2023           No results found for: \"DIGOXIN\"   No components found for: \"DDIMERQUAN\"     Imaging:   XR Chest 1 View    Result Date: 12/11/2023  PROCEDURE: XR CHEST 1 VW  COMPARISON: [facility], CR, XR CHEST 1 VW, 11/12/2021, 7:52.  [facility], CR, XR CHEST 2 VW, 2/07/2023, 9:25.  [facility], CR, XR RIBS RIGHT W PA CHEST, 10/13/2023, 15:21.  INDICATIONS: CHEST PAIN  FINDINGS:   The lungs are well-expanded. The heart and pulmonary vasculature are within normal limits. No pleural effusions are identified. There " are no active appearing infiltrates.  No evidence of pneumothorax.  IMPRESSION: No active disease.  ROB RIZO MD       Electronically Signed and Approved By: ROB RIZO MD on 12/11/2023 at 19:47                The ASCVD Risk score (Desiree DK, et al., 2019) failed to calculate for the following reasons:    The patient has a prior MI or stroke diagnosis      Assessment:      Chest pain    Hypertension, essential    Hyperlipemia, mixed    Diabetes mellitus, type II    CAD S/P percutaneous coronary angioplasty      Initial cardiac assessment: 58-year-old white male with known coronary artery disease presents with relatively typical chest tightness/angina which is worsening in frequency and severity over the past several months.  He reports it is similar to his previous angina.  He is on typical medical therapy for secondary prevention.  His troponin is normal      Recommendations:  1.  Based on the patient's high risk and known coronary artery disease I recommend coronary angiography to evaluate for significant obstructive disease and possible need for PCI.  The risk and benefits were discussed with the patient he is agreeable to proceed.  2.  Add beta-blocker  3.  Further recommendations will depend on his angiographic findings                Vinay Salas MD  12/12/2023    08:59 EST

## 2023-12-12 NOTE — H&P
[facility]   HISTORY AND PHYSICAL    Patient Name: Moreno Aleman  : 1965  MRN: [id number]  Primary Care Physician:  Steffany Gruber APRN  Date of admission: 2023    Subjective   Subjective     Chief Complaint: Chest pain    HPI:    Moreno Aleman is a 58 y.o. male with past medical history of diabetes, hypertension, hyperlipidemia, CAD status post stenting, and GERD presented to the ED with complaints of chest pain.  Patient states that around 12 PM yesterday he had sudden onset chest pain which she described as left-sided, pressure-like, 6 out of 10 at its worst, radiating to her shoulder, and associated with nausea.  Since the pain persisted and due to his known cardiac history patient came to the ED for further evaluation.  In the ED patient was hypertensive on arrival with remaining vitals being within normal limits.  Labs were all relatively unremarkable given her chronic conditions including a negative troponin and proBNP.  EKG showed sinus rhythm with LVH but no significant ST changes.  Chest x-ray was negative for any acute findings.  When asked he denied any recent fevers, chills, headaches, focal weakness, chest pain, palpitation, shortness of breath, cough, abdominal pain, nausea, vomiting, diarrhea, constipation, dysuria, hematuria, hematochezia, melena, or anxiety.  Patient was admitted for further evaluation and treatment.    Review of Systems   All systems were reviewed and negative except for: As per HPI    Personal History     Past Medical History:   Diagnosis Date    Arthritis     Biceps tendonitis on left 2022    Bursitis of shoulder 2018    CAD (coronary artery disease)     Claustrophobia     Diabetes     Hyperlipidemia     Hypertension     Pneumonia        Past Surgical History:   Procedure Laterality Date    CARDIAC CATHETERIZATION      showed significant coronary artery disease of the LAD with calcification.     CHOLECYSTECTOMY      CIRCUMCISION       CORONARY ANGIOPLASTY WITH STENT PLACEMENT      INGUINAL HERNIA REPAIR      x2    NOSE SURGERY      ID RT/LT HEART CATHETERS N/A 03/22/2016    Procedure: Percutaneous Coronary Intervention - Rota/stent LAD;  Surgeon: Marek Emery MD;  Location: [facility] INVASIVE LOCATION;  Service: Cardiovascular    ROTATOR CUFF REPAIR Left     SHOULDER ARTHROSCOPY Right     x2    SHOULDER ARTHROSCOPY WITH SUBACROMIAL DECOMPRESSION Left 4/11/2022    Procedure: left SHOULDER ARTHROSCOPY SUBACROMIAL DECOMPRESSION WITH ROTATOR CUFF DEBRIDEMENT;  Surgeon: Kam Dick MD;  Location: [facility] OR [facility];  Service: Orthopedics;  Laterality: Left;    SPINAL FUSION         Family History: family history includes Diabetes in his father and mother; Heart attack in his father; Heart disease in his father; Heart failure in his father; Hypertension in his mother; Lung cancer in his mother; Stroke in his mother. Otherwise pertinent FHx was reviewed and not pertinent to current issue.    Social History:  reports that he has never smoked. He has been exposed to tobacco smoke. He has never used smokeless tobacco. He reports current alcohol use. He reports that he does not use drugs.    Home Medications:  HYDROcodone-acetaminophen, Insulin Degludec, amLODIPine, amitriptyline, clopidogrel, cyclobenzaprine, dapagliflozin Propanediol, diclofenac, lisinopril, metFORMIN, methocarbamol, rosuvastatin, and tadalafil      Allergies:  Allergies   Allergen Reactions    Fentanyl Angioedema and Swelling    Cefdinir Itching    Nifedipine Hives and Itching       Objective   Objective     Vitals:   Temp:  [97 °F (36.1 °C)-98.5 °F (36.9 °C)] 98.5 °F (36.9 °C)  Heart Rate:  [67-83] 67  Resp:  [15-20] 18  BP: (174-211)/() 174/83  Physical Exam    Constitutional: Awake, alert   Eyes: PERRLA, sclerae anicteric, no conjunctival injection   HENT: NCAT, mucous membranes moist   Neck: Supple, no thyromegaly, no lymphadenopathy, trachea midline   Respiratory: Clear to  auscultation bilaterally, nonlabored respirations    Cardiovascular: RRR, no murmurs, rubs, or gallops, palpable pedal pulses bilaterally   Gastrointestinal: Positive bowel sounds, soft, nontender, nondistended   Musculoskeletal: No bilateral ankle edema, no clubbing or cyanosis to extremities   Psychiatric: Appropriate affect, cooperative   Neurologic: Oriented x 3, strength symmetric in all extremities, Cranial Nerves grossly intact to confrontation, speech clear   Skin: No rashes     Result Review    Result Review:  I have personally reviewed the results from the time of this admission to 12/12/2023 01:20 EST and agree with these findings:  [x]  Laboratory list / accordion  []  Microbiology  [x]  Radiology  [x]  EKG/Telemetry   []  Cardiology/Vascular   []  Pathology  []  Old records  []  Other:  Most notable findings include: EKG with no ST changes, chest x-ray negative, labs unremarkable including negative troponin      Assessment & Plan   Assessment / Plan     Brief Patient Summary:  Moreno Aleman is a 58 y.o. male with past medical history of diabetes, hypertension, hyperlipidemia, CAD status post stenting, and GERD presented to the ED with complaints of chest pain.    Active Hospital Problems:  Active Hospital Problems    Diagnosis     **Chest pain     CAD S/P percutaneous coronary angioplasty     Hypertension, essential     Hyperlipemia, mixed     Diabetes mellitus, type II      Plan:     Chest Pain  -Admit telemetry  -Symptoms intermittent  -Chest x-ray reviewed  -EKG reviewed  -Initial troponin negative, will trend  -XIAO  -A1c  -BNP  -Lipid panel  -Echocardiogram  -Stress test if warranted  -We will consult cardiology  -Supportive care    Diabetes  -Insulin sliding scale  -Levemir at bedtime  -A1C  -Titrate as needed    HTN  -Currently not well controlled  -PRN BP meds  -Resume home meds when available  -Titrate if needed    Hx CAD s/p Stenting    GI ppx  DVT ppx      DVT prophylaxis:  No DVT  prophylaxis order currently exists.    CODE STATUS: Full code     Admission Status:  I believe this patient meets observation status.      Electronically signed by Binu Henderson MD, 12/12/23, 1:20 AM EST.

## 2023-12-12 NOTE — PLAN OF CARE
Goal Outcome Evaluation:      Pt admitted to the floor from ED this shift. Bp 178/93 medicated with PRN Labetalol per MD order. Medicated x1 for pain, see MAR. NSR on monitor. Patient rested well in between care.

## 2023-12-12 NOTE — TELEPHONE ENCOUNTER
Called bed board spoke with Rita she will work on transferring pt from [facility] to [facility] under Dr. Cruz CAD CVI CVU or any tele to get him to [facility]

## 2023-12-12 NOTE — SIGNIFICANT NOTE
12/12/23 0900   Plan   Plan MARIANA RN met with patient and spouse at bedside.  Patient reports good support from family if needed.  Provides own IADL's.  Reports no financial needs.  Facesheet verified.  Patient plans to return home with spouse with no needs at this time.  Meds to Beds.  Will continue to follow for possible needs.

## 2023-12-13 ENCOUNTER — APPOINTMENT (OUTPATIENT)
Dept: GENERAL RADIOLOGY | Facility: HOSPITAL | Age: 58
DRG: 236 | End: 2023-12-13
Payer: MEDICARE

## 2023-12-13 ENCOUNTER — APPOINTMENT (OUTPATIENT)
Dept: CARDIOLOGY | Facility: HOSPITAL | Age: 58
DRG: 236 | End: 2023-12-13
Payer: MEDICARE

## 2023-12-13 PROBLEM — I25.10 CAD (CORONARY ARTERY DISEASE): Status: ACTIVE | Noted: 2023-12-13

## 2023-12-13 PROBLEM — R93.1 ABNORMAL FINDINGS ON DIAGNOSTIC IMAGING OF HEART AND CORONARY CIRCULATION: Status: ACTIVE | Noted: 2023-12-12

## 2023-12-13 PROBLEM — R51.9 HEADACHE: Status: ACTIVE | Noted: 2023-12-13

## 2023-12-13 LAB
ABO GROUP BLD: NORMAL
ALBUMIN SERPL-MCNC: 4 G/DL (ref 3.5–5.2)
ALBUMIN/GLOB SERPL: 2 G/DL
ALP SERPL-CCNC: 50 U/L (ref 39–117)
ALT SERPL W P-5'-P-CCNC: 9 U/L (ref 1–41)
ANION GAP SERPL CALCULATED.3IONS-SCNC: 11 MMOL/L (ref 5–15)
APTT PPP: 27.7 SECONDS (ref 22.7–35.4)
ARTERIAL PATENCY WRIST A: POSITIVE
AST SERPL-CCNC: 13 U/L (ref 1–40)
ATMOSPHERIC PRESS: 764.2 MMHG
BASE EXCESS BLDA CALC-SCNC: -1.2 MMOL/L (ref 0–2)
BASOPHILS # BLD AUTO: 0.04 10*3/MM3 (ref 0–0.2)
BASOPHILS NFR BLD AUTO: 0.6 % (ref 0–1.5)
BDY SITE: ABNORMAL
BH CV XLRA MEAS - DIST GSV CALF DIST LEFT: 0.09 CM
BH CV XLRA MEAS - DIST GSV CALF DIST RIGHT: 0.09 CM
BH CV XLRA MEAS - DIST GSV THIGH DIST LEFT: 0.23 CM
BH CV XLRA MEAS - DIST GSV THIGH DIST RIGHT: 0.15 CM
BH CV XLRA MEAS - DIST LSV CALF DIST LEFT: 0.18 CM
BH CV XLRA MEAS - DIST LSV CALF DIST RIGHT: 0.15 CM
BH CV XLRA MEAS - GSV ANKLE DIST LEFT: 0.11 CM
BH CV XLRA MEAS - GSV ANKLE DIST RIGHT: 0.21 CM
BH CV XLRA MEAS - GSV KNEE DIST LEFT: 0.16 CM
BH CV XLRA MEAS - GSV KNEE DIST RIGHT: 0.17 CM
BH CV XLRA MEAS - GSV ORIGIN DIST LEFT: 0.56 CM
BH CV XLRA MEAS - GSV ORIGIN DIST RIGHT: 0.46 CM
BH CV XLRA MEAS - MID GSV CALF LEFT: 0.06 CM
BH CV XLRA MEAS - MID GSV CALF RIGHT: 0.26 CM
BH CV XLRA MEAS - MID GSV THIGH  LEFT: 0.16 CM
BH CV XLRA MEAS - MID GSV THIGH  RIGHT: 0.11 CM
BH CV XLRA MEAS - MID LSV CALF DIST LEFT: 0.1 CM
BH CV XLRA MEAS - MID LSV CALF DIST RIGHT: 0.11 CM
BH CV XLRA MEAS - PROX GSV CALF DIST LEFT: 0.08 CM
BH CV XLRA MEAS - PROX GSV CALF DIST RIGHT: 0.11 CM
BH CV XLRA MEAS - PROX GSV THIGH  LEFT: 0.18 CM
BH CV XLRA MEAS - PROX GSV THIGH  RIGHT: 0.23 CM
BH CV XLRA MEAS - PROX LSV CALF DIST LEFT: 0.27 CM
BH CV XLRA MEAS - PROX LSV CALF DIST RIGHT: 0.35 CM
BH CV XLRA MEAS LEFT DIST CCA EDV: 23.5 CM/SEC
BH CV XLRA MEAS LEFT DIST CCA PSV: 100.3 CM/SEC
BH CV XLRA MEAS LEFT DIST ICA EDV: -39.1 CM/SEC
BH CV XLRA MEAS LEFT DIST ICA PSV: -97.5 CM/SEC
BH CV XLRA MEAS LEFT ICA/CCA RATIO: 0.97
BH CV XLRA MEAS LEFT MID ICA EDV: -36 CM/SEC
BH CV XLRA MEAS LEFT MID ICA PSV: -89.5 CM/SEC
BH CV XLRA MEAS LEFT PROX CCA EDV: 25.1 CM/SEC
BH CV XLRA MEAS LEFT PROX CCA PSV: 104 CM/SEC
BH CV XLRA MEAS LEFT PROX ECA EDV: -16.5 CM/SEC
BH CV XLRA MEAS LEFT PROX ECA PSV: -124.6 CM/SEC
BH CV XLRA MEAS LEFT PROX ICA EDV: -28 CM/SEC
BH CV XLRA MEAS LEFT PROX ICA PSV: -88.2 CM/SEC
BH CV XLRA MEAS LEFT PROX SCLA PSV: 139.5 CM/SEC
BH CV XLRA MEAS LEFT VERTEBRAL A EDV: 14 CM/SEC
BH CV XLRA MEAS LEFT VERTEBRAL A PSV: 52.2 CM/SEC
BH CV XLRA MEAS RIGHT DIST CCA EDV: 18.7 CM/SEC
BH CV XLRA MEAS RIGHT DIST CCA PSV: 83.4 CM/SEC
BH CV XLRA MEAS RIGHT DIST ICA EDV: -18.1 CM/SEC
BH CV XLRA MEAS RIGHT DIST ICA PSV: -59.3 CM/SEC
BH CV XLRA MEAS RIGHT ICA/CCA RATIO: 1.3
BH CV XLRA MEAS RIGHT MID ICA EDV: -37.3 CM/SEC
BH CV XLRA MEAS RIGHT MID ICA PSV: -98.8 CM/SEC
BH CV XLRA MEAS RIGHT PROX CCA EDV: 19.2 CM/SEC
BH CV XLRA MEAS RIGHT PROX CCA PSV: 113.6 CM/SEC
BH CV XLRA MEAS RIGHT PROX ECA EDV: -17 CM/SEC
BH CV XLRA MEAS RIGHT PROX ECA PSV: -124.6 CM/SEC
BH CV XLRA MEAS RIGHT PROX ICA EDV: -23.6 CM/SEC
BH CV XLRA MEAS RIGHT PROX ICA PSV: -108.7 CM/SEC
BH CV XLRA MEAS RIGHT PROX SCLA PSV: 92 CM/SEC
BH CV XLRA MEAS RIGHT VERTEBRAL A EDV: -18.4 CM/SEC
BH CV XLRA MEAS RIGHT VERTEBRAL A PSV: -50.9 CM/SEC
BILIRUB SERPL-MCNC: 0.7 MG/DL (ref 0–1.2)
BLD GP AB SCN SERPL QL: NEGATIVE
BUN SERPL-MCNC: 9 MG/DL (ref 6–20)
BUN/CREAT SERPL: 14.5 (ref 7–25)
CALCIUM SPEC-SCNC: 8.8 MG/DL (ref 8.6–10.5)
CHLORIDE SERPL-SCNC: 105 MMOL/L (ref 98–107)
CHOLEST SERPL-MCNC: 127 MG/DL (ref 0–200)
CLOSE TME COLL+ADP + EPINEP PNL BLD: 80 % (ref 86–100)
CO2 BLDA-SCNC: 23.1 MMOL/L (ref 23–27)
CO2 SERPL-SCNC: 24 MMOL/L (ref 22–29)
CREAT SERPL-MCNC: 0.62 MG/DL (ref 0.76–1.27)
DEPRECATED RDW RBC AUTO: 41.1 FL (ref 37–54)
EGFRCR SERPLBLD CKD-EPI 2021: 111.5 ML/MIN/1.73
EOSINOPHIL # BLD AUTO: 0.08 10*3/MM3 (ref 0–0.4)
EOSINOPHIL NFR BLD AUTO: 1.2 % (ref 0.3–6.2)
ERYTHROCYTE [DISTWIDTH] IN BLOOD BY AUTOMATED COUNT: 13 % (ref 12.3–15.4)
GLOBULIN UR ELPH-MCNC: 2 GM/DL
GLUCOSE BLDC GLUCOMTR-MCNC: 104 MG/DL (ref 70–130)
GLUCOSE BLDC GLUCOMTR-MCNC: 123 MG/DL (ref 70–130)
GLUCOSE BLDC GLUCOMTR-MCNC: 144 MG/DL (ref 70–130)
GLUCOSE BLDC GLUCOMTR-MCNC: 223 MG/DL (ref 70–130)
GLUCOSE SERPL-MCNC: 102 MG/DL (ref 65–99)
HCO3 BLDA-SCNC: 22.1 MMOL/L (ref 22–28)
HCT VFR BLD AUTO: 40.2 % (ref 37.5–51)
HDLC SERPL-MCNC: 40 MG/DL (ref 40–60)
HEMODILUTION: NO
HGB BLD-MCNC: 13.9 G/DL (ref 13–17.7)
IMM GRANULOCYTES # BLD AUTO: 0.02 10*3/MM3 (ref 0–0.05)
IMM GRANULOCYTES NFR BLD AUTO: 0.3 % (ref 0–0.5)
INR PPP: 1.1 (ref 0.9–1.1)
LDLC SERPL CALC-MCNC: 71 MG/DL (ref 0–100)
LDLC/HDLC SERPL: 1.79 {RATIO}
LEFT ARM BP: NORMAL MMHG
LYMPHOCYTES # BLD AUTO: 2.21 10*3/MM3 (ref 0.7–3.1)
LYMPHOCYTES NFR BLD AUTO: 32.7 % (ref 19.6–45.3)
MAGNESIUM SERPL-MCNC: 1.6 MG/DL (ref 1.6–2.6)
MCH RBC QN AUTO: 30.3 PG (ref 26.6–33)
MCHC RBC AUTO-ENTMCNC: 34.6 G/DL (ref 31.5–35.7)
MCV RBC AUTO: 87.8 FL (ref 79–97)
MODALITY: ABNORMAL
MONOCYTES # BLD AUTO: 0.53 10*3/MM3 (ref 0.1–0.9)
MONOCYTES NFR BLD AUTO: 7.9 % (ref 5–12)
NEUTROPHILS NFR BLD AUTO: 3.87 10*3/MM3 (ref 1.7–7)
NEUTROPHILS NFR BLD AUTO: 57.3 % (ref 42.7–76)
NRBC BLD AUTO-RTO: 0 /100 WBC (ref 0–0.2)
PCO2 BLDA: 32.7 MM HG (ref 35–45)
PH BLDA: 7.44 PH UNITS (ref 7.35–7.45)
PLATELET # BLD AUTO: 147 10*3/MM3 (ref 140–450)
PMV BLD AUTO: 9.5 FL (ref 6–12)
PO2 BLDA: 96.6 MM HG (ref 80–100)
POTASSIUM SERPL-SCNC: 3.5 MMOL/L (ref 3.5–5.2)
POTASSIUM SERPL-SCNC: 4.2 MMOL/L (ref 3.5–5.2)
PROT SERPL-MCNC: 6 G/DL (ref 6–8.5)
PROTHROMBIN TIME: 14.3 SECONDS (ref 11.7–14.2)
RBC # BLD AUTO: 4.58 10*6/MM3 (ref 4.14–5.8)
RH BLD: NEGATIVE
RIGHT ARM BP: NORMAL MMHG
SAO2 % BLDCOA: 97.8 % (ref 92–98.5)
SODIUM SERPL-SCNC: 140 MMOL/L (ref 136–145)
T&S EXPIRATION DATE: NORMAL
TOTAL RATE: 18 BREATHS/MINUTE
TRIGL SERPL-MCNC: 78 MG/DL (ref 0–150)
VLDLC SERPL-MCNC: 16 MG/DL (ref 5–40)
WBC NRBC COR # BLD AUTO: 6.75 10*3/MM3 (ref 3.4–10.8)

## 2023-12-13 PROCEDURE — 71046 X-RAY EXAM CHEST 2 VIEWS: CPT

## 2023-12-13 PROCEDURE — 86900 BLOOD TYPING SEROLOGIC ABO: CPT

## 2023-12-13 PROCEDURE — 84132 ASSAY OF SERUM POTASSIUM: CPT | Performed by: THORACIC SURGERY (CARDIOTHORACIC VASCULAR SURGERY)

## 2023-12-13 PROCEDURE — 85730 THROMBOPLASTIN TIME PARTIAL: CPT | Performed by: NURSE PRACTITIONER

## 2023-12-13 PROCEDURE — 86901 BLOOD TYPING SEROLOGIC RH(D): CPT | Performed by: NURSE PRACTITIONER

## 2023-12-13 PROCEDURE — 99223 1ST HOSP IP/OBS HIGH 75: CPT | Performed by: THORACIC SURGERY (CARDIOTHORACIC VASCULAR SURGERY)

## 2023-12-13 PROCEDURE — 93970 EXTREMITY STUDY: CPT

## 2023-12-13 PROCEDURE — 82803 BLOOD GASES ANY COMBINATION: CPT

## 2023-12-13 PROCEDURE — 86901 BLOOD TYPING SEROLOGIC RH(D): CPT

## 2023-12-13 PROCEDURE — 80053 COMPREHEN METABOLIC PANEL: CPT | Performed by: NURSE PRACTITIONER

## 2023-12-13 PROCEDURE — 36600 WITHDRAWAL OF ARTERIAL BLOOD: CPT

## 2023-12-13 PROCEDURE — 85576 BLOOD PLATELET AGGREGATION: CPT | Performed by: NURSE PRACTITIONER

## 2023-12-13 PROCEDURE — 82948 REAGENT STRIP/BLOOD GLUCOSE: CPT

## 2023-12-13 PROCEDURE — 85610 PROTHROMBIN TIME: CPT | Performed by: NURSE PRACTITIONER

## 2023-12-13 PROCEDURE — 86850 RBC ANTIBODY SCREEN: CPT | Performed by: NURSE PRACTITIONER

## 2023-12-13 PROCEDURE — 86900 BLOOD TYPING SEROLOGIC ABO: CPT | Performed by: NURSE PRACTITIONER

## 2023-12-13 PROCEDURE — 93880 EXTRACRANIAL BILAT STUDY: CPT

## 2023-12-13 PROCEDURE — 63710000001 INSULIN GLARGINE PER 5 UNITS: Performed by: NURSE PRACTITIONER

## 2023-12-13 PROCEDURE — 83735 ASSAY OF MAGNESIUM: CPT | Performed by: NURSE PRACTITIONER

## 2023-12-13 PROCEDURE — 25010000002 HYDRALAZINE PER 20 MG: Performed by: THORACIC SURGERY (CARDIOTHORACIC VASCULAR SURGERY)

## 2023-12-13 PROCEDURE — 86923 COMPATIBILITY TEST ELECTRIC: CPT

## 2023-12-13 PROCEDURE — 80061 LIPID PANEL: CPT | Performed by: NURSE PRACTITIONER

## 2023-12-13 PROCEDURE — 85025 COMPLETE CBC W/AUTO DIFF WBC: CPT | Performed by: NURSE PRACTITIONER

## 2023-12-13 PROCEDURE — 63710000001 INSULIN LISPRO (HUMAN) PER 5 UNITS: Performed by: NURSE PRACTITIONER

## 2023-12-13 RX ORDER — CHLORHEXIDINE GLUCONATE ORAL RINSE 1.2 MG/ML
15 SOLUTION DENTAL EVERY 12 HOURS SCHEDULED
Status: COMPLETED | OUTPATIENT
Start: 2023-12-13 | End: 2023-12-14

## 2023-12-13 RX ORDER — POTASSIUM CHLORIDE 750 MG/1
40 TABLET, FILM COATED, EXTENDED RELEASE ORAL EVERY 4 HOURS
Status: COMPLETED | OUTPATIENT
Start: 2023-12-13 | End: 2023-12-13

## 2023-12-13 RX ORDER — CHLORHEXIDINE GLUCONATE 500 MG/1
1 CLOTH TOPICAL EVERY 12 HOURS
Status: DISCONTINUED | OUTPATIENT
Start: 2023-12-13 | End: 2023-12-14

## 2023-12-13 RX ORDER — DEXTROSE MONOHYDRATE 25 G/50ML
25 INJECTION, SOLUTION INTRAVENOUS
Status: DISCONTINUED | OUTPATIENT
Start: 2023-12-13 | End: 2023-12-14

## 2023-12-13 RX ORDER — INSULIN LISPRO 100 [IU]/ML
3-14 INJECTION, SOLUTION INTRAVENOUS; SUBCUTANEOUS
Status: DISCONTINUED | OUTPATIENT
Start: 2023-12-13 | End: 2023-12-14

## 2023-12-13 RX ORDER — ALPRAZOLAM 0.25 MG/1
0.25 TABLET ORAL EVERY 8 HOURS PRN
Status: DISCONTINUED | OUTPATIENT
Start: 2023-12-13 | End: 2023-12-14

## 2023-12-13 RX ORDER — ASPIRIN 81 MG/1
81 TABLET ORAL DAILY
Status: DISCONTINUED | OUTPATIENT
Start: 2023-12-13 | End: 2023-12-14

## 2023-12-13 RX ORDER — AMLODIPINE BESYLATE 10 MG/1
10 TABLET ORAL
Status: DISCONTINUED | OUTPATIENT
Start: 2023-12-13 | End: 2023-12-13

## 2023-12-13 RX ORDER — BUTALBITAL, ACETAMINOPHEN AND CAFFEINE 50; 325; 40 MG/1; MG/1; MG/1
1 TABLET ORAL EVERY 6 HOURS PRN
Status: DISCONTINUED | OUTPATIENT
Start: 2023-12-13 | End: 2023-12-14

## 2023-12-13 RX ORDER — AMLODIPINE BESYLATE 5 MG/1
5 TABLET ORAL
Status: DISCONTINUED | OUTPATIENT
Start: 2023-12-13 | End: 2023-12-14

## 2023-12-13 RX ORDER — TEMAZEPAM 7.5 MG/1
7.5 CAPSULE ORAL NIGHTLY PRN
Status: DISCONTINUED | OUTPATIENT
Start: 2023-12-13 | End: 2023-12-14

## 2023-12-13 RX ORDER — CEFAZOLIN SODIUM 2 G/100ML
2000 INJECTION, SOLUTION INTRAVENOUS ONCE
Status: DISCONTINUED | OUTPATIENT
Start: 2023-12-13 | End: 2023-12-13

## 2023-12-13 RX ORDER — IBUPROFEN 600 MG/1
1 TABLET ORAL
Status: DISCONTINUED | OUTPATIENT
Start: 2023-12-13 | End: 2023-12-14

## 2023-12-13 RX ORDER — NICOTINE POLACRILEX 4 MG
15 LOZENGE BUCCAL
Status: DISCONTINUED | OUTPATIENT
Start: 2023-12-13 | End: 2023-12-14

## 2023-12-13 RX ORDER — CEFAZOLIN SODIUM 2 G/100ML
2000 INJECTION, SOLUTION INTRAVENOUS
Status: COMPLETED | OUTPATIENT
Start: 2023-12-14 | End: 2023-12-14

## 2023-12-13 RX ADMIN — HYDRALAZINE HYDROCHLORIDE 10 MG: 20 INJECTION, SOLUTION INTRAMUSCULAR; INTRAVENOUS at 14:05

## 2023-12-13 RX ADMIN — ROSUVASTATIN CALCIUM 10 MG: 10 TABLET, FILM COATED ORAL at 20:16

## 2023-12-13 RX ADMIN — INSULIN LISPRO 5 UNITS: 100 INJECTION, SOLUTION INTRAVENOUS; SUBCUTANEOUS at 08:50

## 2023-12-13 RX ADMIN — AMITRIPTYLINE HYDROCHLORIDE 50 MG: 50 TABLET, FILM COATED ORAL at 20:14

## 2023-12-13 RX ADMIN — CYCLOBENZAPRINE 10 MG: 10 TABLET, FILM COATED ORAL at 14:12

## 2023-12-13 RX ADMIN — HYDROCODONE BITARTRATE AND ACETAMINOPHEN 1 TABLET: 7.5; 325 TABLET ORAL at 06:51

## 2023-12-13 RX ADMIN — TEMAZEPAM 7.5 MG: 7.5 CAPSULE ORAL at 22:37

## 2023-12-13 RX ADMIN — Medication 10 ML: at 20:20

## 2023-12-13 RX ADMIN — LISINOPRIL 20 MG: 20 TABLET ORAL at 08:49

## 2023-12-13 RX ADMIN — HYDROCODONE BITARTRATE AND ACETAMINOPHEN 1 TABLET: 7.5; 325 TABLET ORAL at 20:14

## 2023-12-13 RX ADMIN — METOPROLOL TARTRATE 25 MG: 25 TABLET, FILM COATED ORAL at 20:15

## 2023-12-13 RX ADMIN — METOPROLOL TARTRATE 25 MG: 25 TABLET, FILM COATED ORAL at 08:49

## 2023-12-13 RX ADMIN — POTASSIUM CHLORIDE 40 MEQ: 750 TABLET, EXTENDED RELEASE ORAL at 08:49

## 2023-12-13 RX ADMIN — 0.12% CHLORHEXIDINE GLUCONATE 15 ML: 1.2 RINSE ORAL at 20:16

## 2023-12-13 RX ADMIN — HYDROCODONE BITARTRATE AND ACETAMINOPHEN 1 TABLET: 7.5; 325 TABLET ORAL at 14:12

## 2023-12-13 RX ADMIN — MUPIROCIN 1 APPLICATION: 20 OINTMENT TOPICAL at 20:16

## 2023-12-13 RX ADMIN — EMPAGLIFLOZIN 25 MG: 25 TABLET, FILM COATED ORAL at 11:32

## 2023-12-13 RX ADMIN — AMLODIPINE BESYLATE 5 MG: 5 TABLET ORAL at 08:50

## 2023-12-13 RX ADMIN — POTASSIUM CHLORIDE 40 MEQ: 750 TABLET, EXTENDED RELEASE ORAL at 11:32

## 2023-12-13 RX ADMIN — INSULIN GLARGINE 15 UNITS: 100 INJECTION, SOLUTION SUBCUTANEOUS at 11:32

## 2023-12-13 RX ADMIN — ASPIRIN 81 MG: 81 TABLET, COATED ORAL at 08:50

## 2023-12-13 RX ADMIN — BUTALBITAL, ACETAMINOPHEN AND CAFFEINE 1 TABLET: 325; 50; 40 TABLET ORAL at 12:48

## 2023-12-13 RX ADMIN — Medication 10 ML: at 08:55

## 2023-12-13 NOTE — NURSING NOTE
Patient transferred at 1953 to [facility] via EMS for higher level of care Dr. Cruz.

## 2023-12-13 NOTE — H&P
Name: Moreno Aleman ADMIT: 2023   : 1965  PCP: Steffany Gruber APRN    MRN: [id number] LOS: 1 days   AGE/SEX: 58 y.o. male  ROOM: [facility]/     Chief Complaint: CAD    Subjective   History of Present Illness  Patient is a 58 y.o. male with a history of CAD with previous PCI in 2016, hypertension, chronic back pain, hyperlipidemia, and DM II. He denies tobacco or illicit drug use but does drink alcohol on occasion. He reports episodes of chest pain with exertion over the last few months. He has associated nausea and shortness of breath. Symptoms usually improve with rest. Over the last week the pain has gotten more severe and frequent. Because of this he presented to the ER at [facility] on . Troponin and BNP on arrival to the ER were normal. He was admitted for further work up. Cardiac catheterization was performed which revealed severe multi-vessel coronary artery disease. He was transferred here for cardiac surgery evaluation. He denies persistent chest pain at this time, but does complain of headache. Last dose of Plavix was 12/10.    Past Medical History:   Diagnosis Date    Arthritis     Biceps tendonitis on left 2022    Bursitis of shoulder 2018    CAD (coronary artery disease)     Claustrophobia     Diabetes     Hyperlipidemia     Hypertension     Pneumonia      Past Surgical History:   Procedure Laterality Date    CARDIAC CATHETERIZATION      showed significant coronary artery disease of the LAD with calcification.     CHOLECYSTECTOMY      CIRCUMCISION      CORONARY ANGIOPLASTY WITH STENT PLACEMENT      INGUINAL HERNIA REPAIR      x2    NOSE SURGERY      ME RT/LT HEART CATHETERS N/A 2016    Procedure: Percutaneous Coronary Intervention - Rota/stent LAD;  Surgeon: Marek Emery MD;  Location: [facility] INVASIVE LOCATION;  Service: Cardiovascular    ROTATOR CUFF REPAIR Left     SHOULDER ARTHROSCOPY Right     x2    SHOULDER ARTHROSCOPY WITH SUBACROMIAL DECOMPRESSION  Left 4/11/2022    Procedure: left SHOULDER ARTHROSCOPY SUBACROMIAL DECOMPRESSION WITH ROTATOR CUFF DEBRIDEMENT;  Surgeon: Kam Dick MD;  Location: [facility];  Service: Orthopedics;  Laterality: Left;    SPINAL FUSION       Family History   Problem Relation Age of Onset    Hypertension Mother     Diabetes Mother     Lung cancer Mother     Stroke Mother     Heart attack Father     Heart disease Father     Heart failure Father     Diabetes Father      Social History     Tobacco Use    Smoking status: Never     Passive exposure: Past    Smokeless tobacco: Never   Vaping Use    Vaping Use: Never used   Substance Use Topics    Alcohol use: Yes     Comment: OCCASIONAL    Drug use: Never     Medications Prior to Admission   Medication Sig Dispense Refill Last Dose    amitriptyline (ELAVIL) 50 MG tablet TAKE 1 TABLET BY MOUTH EVERY NIGHT (Patient taking differently: Take 1 tablet by mouth Every Night.) 90 tablet 1 12/11/2023    amLODIPine (NORVASC) 5 MG tablet Take 1 tablet by mouth Daily.   12/12/2023    cyclobenzaprine (FLEXERIL) 10 MG tablet Take 1 tablet by mouth 3 (Three) Times a Day As Needed for Muscle Spasms. (Patient taking differently: Take 1 tablet by mouth Daily As Needed for Muscle Spasms.) 20 tablet 0 Past Week    diclofenac (VOLTAREN) 75 MG EC tablet Take 1 tablet by mouth 2 (Two) Times a Day As Needed.   Past Week    Farxiga 10 MG tablet TAKE 1 TABLET BY MOUTH DAILY (Patient taking differently: Take 10 mg by mouth Daily.) 90 tablet 1 Past Week    HYDROcodone-acetaminophen (NORCO) 7.5-325 MG per tablet Take 1 tablet by mouth Every 8 (Eight) Hours As Needed for Mild Pain, Moderate Pain or Severe Pain.   12/12/2023    Insulin Degludec (TRESIBA FLEXTOUCH) 200 UNIT/ML solution pen-injector pen injection Inject 30 Units under the skin into the appropriate area as directed Daily.   12/12/2023    lisinopril (PRINIVIL,ZESTRIL) 20 MG tablet Take 1 tablet by mouth 2 (Two) Times a Day.   12/12/2023     metFORMIN (GLUCOPHAGE) 1000 MG tablet TAKE 1 TABLET BY MOUTH TWICE DAILY WITH MEALS (Patient taking differently: Take 1 tablet by mouth 2 (Two) Times a Day.) 180 tablet 1 Past Week    methocarbamol (ROBAXIN) 500 MG tablet Take 1 tablet by mouth 2 (Two) Times a Day As Needed for Muscle Spasms.   Past Week    rosuvastatin (CRESTOR) 10 MG tablet Take 1 tablet by mouth Daily. (Patient taking differently: Take 1 tablet by mouth Every Night.) 90 tablet 3 Past Week    tadalafil (CIALIS) 20 MG tablet TAKE 1 TABLET BY MOUTH AS NEEDED FOR ERECTILE DYSFUNCTION 90 tablet 3 Past Week    clopidogrel (PLAVIX) 75 MG tablet Take 1 tablet by mouth Daily.   More than a month     Allergies:  Fentanyl, Cefdinir, and Nifedipine    Review of Systems   Constitutional:  Positive for activity change and fatigue.   HENT:  Positive for dental problem. Negative for hearing loss and trouble swallowing.    Eyes: Negative.    Respiratory:  Positive for shortness of breath. Negative for chest tightness.    Cardiovascular:  Positive for chest pain. Negative for palpitations and leg swelling.   Gastrointestinal:  Positive for nausea. Negative for vomiting.   Genitourinary:  Negative for difficulty urinating, frequency and urgency.   Musculoskeletal:  Negative for arthralgias and myalgias.   Skin:  Negative for rash and wound.   Allergic/Immunologic: Negative.    Neurological:  Negative for dizziness, seizures and syncope.   Hematological: Negative.    Psychiatric/Behavioral: Negative.        Objective    Vital Signs  Temp:  [98 °F (36.7 °C)-99 °F (37.2 °C)] 98.1 °F (36.7 °C)  Heart Rate:  [66-79] 74  Resp:  [18] 18  BP: (105-186)/(77-97) 158/88  SpO2:  [96 %-100 %] 98 %  on  Flow (L/min):  [2] 2;   Device (Oxygen Therapy): room air  There is no height or weight on file to calculate BMI.    Physical Exam  Vitals and nursing note reviewed.   Constitutional:       General: He is not in acute distress.     Appearance: Normal appearance. He is not  toxic-appearing.   HENT:      Head: Normocephalic.      Nose: Nose normal. No congestion or rhinorrhea.      Mouth/Throat:      Mouth: Mucous membranes are moist.      Pharynx: Oropharynx is clear.   Eyes:      Pupils: Pupils are equal, round, and reactive to light.   Cardiovascular:      Rate and Rhythm: Normal rate and regular rhythm.      Pulses: Normal pulses.      Heart sounds: Normal heart sounds.   Pulmonary:      Effort: Pulmonary effort is normal.      Breath sounds: Normal breath sounds.   Abdominal:      General: Bowel sounds are normal.      Palpations: Abdomen is soft.   Musculoskeletal:         General: Normal range of motion.      Cervical back: Normal range of motion and neck supple.      Right lower leg: No edema.      Left lower leg: No edema.   Skin:     General: Skin is warm and dry.      Capillary Refill: Capillary refill takes less than 2 seconds.   Neurological:      General: No focal deficit present.      Mental Status: He is alert and oriented to person, place, and time. Mental status is at baseline.   Psychiatric:         Mood and Affect: Mood normal.         Behavior: Behavior normal.         Thought Content: Thought content normal.         Judgment: Judgment normal.       Results Review:  I reviewed the patient's new clinical results.    Assessment & Plan  - severe multi-vessel CAD with hx of PCI 2016  - hypertension  - hyperlipidemia  - DM II--A1c 9.4  - chronic back pain    Dr. Cruz has reviewed the films and recommends surgical revascularization  Preoperative work up underway. Vein mapping and carotid duplex pending  Consult hospitalist for DM management  Add beta blocker and aspirin  Timing of surgery to be determined  Transfer to [facility] when bed available    I discussed the patients findings and my recommendations with patient, family, and nursing staff.    NINFA Silver  12/13/23  07:19 EST

## 2023-12-13 NOTE — CONSULTS
Patient Name:  Moreno Aleman  YOB: 1965  Patient Care Team:  Steffany Gruber APRN as PCP - General (Nurse Practitioner)    Date of Admission:  12/12/2023  Date of Consult:  12/13/2023    Inpatient Hospitalist Consult  Consult performed by: Erin Herrera APRN  Consult ordered by: Elena Benoit APRN  Reason for consult: diabetes management        Subjective   History of Present Illness  Mr. Aleman is a 58 y.o. male that has been admitted to [facility] due to severe multi vessel CAD with plan for surgical revascularization.  He has been admitted by Srinivas Cruz MD and we were asked to see and assist with his medical problems, specifically relating to his diabetes. He is prescribed farxiga, metformin, insulin degludec. A1C 9.40%. Denies known complications from diabetes. On exam he is complaining of headache. Denies history of migraines. He did not sleep well last night. Otherwise feels well. Denies chest pain, soa, nausea. No lower extremity edema.       Past Medical History:   Diagnosis Date    Arthritis     Biceps tendonitis on left 03/22/2022    Bursitis of shoulder 12/06/2018    CAD (coronary artery disease)     Claustrophobia     Diabetes     Hyperlipidemia     Hypertension     Pneumonia      Past Surgical History:   Procedure Laterality Date    CARDIAC CATHETERIZATION      showed significant coronary artery disease of the LAD with calcification.     CARDIAC CATHETERIZATION Left 12/12/2023    Procedure: Cardiac Catheterization/Vascular Study;  Surgeon: Mario Welch MD;  Location: [facility] INVASIVE LOCATION;  Service: Cardiology;  Laterality: Left;    CHOLECYSTECTOMY      CIRCUMCISION      CORONARY ANGIOPLASTY WITH STENT PLACEMENT      INGUINAL HERNIA REPAIR      x2    NOSE SURGERY      [place] RT/LT HEART CATHETERS N/A 03/22/2016    Procedure: Percutaneous Coronary Intervention - Rota/stent LAD;  Surgeon: Marek Emery MD;  Location: [facility]  INVASIVE LOCATION;  Service: Cardiovascular    ROTATOR CUFF REPAIR Left     SHOULDER ARTHROSCOPY Right     x2    SHOULDER ARTHROSCOPY WITH SUBACROMIAL DECOMPRESSION Left 4/11/2022    Procedure: left SHOULDER ARTHROSCOPY SUBACROMIAL DECOMPRESSION WITH ROTATOR CUFF DEBRIDEMENT;  Surgeon: Kam Dick MD;  Location: [facility] OR [facility];  Service: Orthopedics;  Laterality: Left;    SPINAL FUSION       Family History   Problem Relation Age of Onset    Hypertension Mother     Diabetes Mother     Lung cancer Mother     Stroke Mother     Heart attack Father     Heart disease Father     Heart failure Father     Diabetes Father      Social History     Tobacco Use    Smoking status: Never     Passive exposure: Past    Smokeless tobacco: Never   Vaping Use    Vaping Use: Never used   Substance Use Topics    Alcohol use: Yes     Comment: OCCASIONAL    Drug use: Never     Medications Prior to Admission   Medication Sig Dispense Refill Last Dose    amitriptyline (ELAVIL) 50 MG tablet TAKE 1 TABLET BY MOUTH EVERY NIGHT (Patient taking differently: Take 1 tablet by mouth Every Night.) 90 tablet 1 12/11/2023    amLODIPine (NORVASC) 5 MG tablet Take 1 tablet by mouth Daily.   12/12/2023    cyclobenzaprine (FLEXERIL) 10 MG tablet Take 1 tablet by mouth 3 (Three) Times a Day As Needed for Muscle Spasms. (Patient taking differently: Take 1 tablet by mouth Daily As Needed for Muscle Spasms.) 20 tablet 0 Past Week    diclofenac (VOLTAREN) 75 MG EC tablet Take 1 tablet by mouth 2 (Two) Times a Day As Needed.   Past Week    Farxiga 10 MG tablet TAKE 1 TABLET BY MOUTH DAILY (Patient taking differently: Take 10 mg by mouth Daily.) 90 tablet 1 Past Week    HYDROcodone-acetaminophen (NORCO) 7.5-325 MG per tablet Take 1 tablet by mouth Every 8 (Eight) Hours As Needed for Mild Pain, Moderate Pain or Severe Pain.   12/12/2023    Insulin Degludec (TRESIBA FLEXTOUCH) 200 UNIT/ML solution pen-injector pen injection Inject 30 Units under the skin  into the appropriate area as directed Daily.   12/12/2023    lisinopril (PRINIVIL,ZESTRIL) 20 MG tablet Take 1 tablet by mouth 2 (Two) Times a Day.   12/12/2023    metFORMIN (GLUCOPHAGE) 1000 MG tablet TAKE 1 TABLET BY MOUTH TWICE DAILY WITH MEALS (Patient taking differently: Take 1 tablet by mouth 2 (Two) Times a Day.) 180 tablet 1 Past Week    methocarbamol (ROBAXIN) 500 MG tablet Take 1 tablet by mouth 2 (Two) Times a Day As Needed for Muscle Spasms.   Past Week    rosuvastatin (CRESTOR) 10 MG tablet Take 1 tablet by mouth Daily. (Patient taking differently: Take 1 tablet by mouth Every Night.) 90 tablet 3 Past Week    tadalafil (CIALIS) 20 MG tablet TAKE 1 TABLET BY MOUTH AS NEEDED FOR ERECTILE DYSFUNCTION 90 tablet 3 Past Week    clopidogrel (PLAVIX) 75 MG tablet Take 1 tablet by mouth Daily.   More than a month     Allergies:  Fentanyl, Cefdinir, and Nifedipine    Review of Systems   Constitutional:  Negative for chills and fever.   HENT:  Negative for congestion.    Respiratory:  Negative for shortness of breath.    Cardiovascular:  Negative for chest pain.   Gastrointestinal:  Negative for nausea.   Genitourinary:  Negative for dysuria.   Musculoskeletal:  Negative for arthralgias.   Skin:  Negative for rash.   Neurological:  Positive for headaches.   Psychiatric/Behavioral:  Negative for sleep disturbance.        Objective      Vital Signs  Temp:  [97.5 °F (36.4 °C)-99 °F (37.2 °C)] 97.5 °F (36.4 °C)  Heart Rate:  [69-79] 71  Resp:  [18] 18  BP: (105-177)/(77-97) 172/91  There is no height or weight on file to calculate BMI.    Physical Exam  Vitals and nursing note reviewed.   Constitutional:       General: He is not in acute distress.  HENT:      Head: Normocephalic.      Mouth/Throat:      Mouth: Mucous membranes are moist.   Eyes:      Conjunctiva/sclera: Conjunctivae normal.   Cardiovascular:      Rate and Rhythm: Normal rate and regular rhythm.   Pulmonary:      Effort: Pulmonary effort is normal. No  respiratory distress.      Breath sounds: No wheezing or rales.   Abdominal:      General: Bowel sounds are normal.      Palpations: Abdomen is soft.   Musculoskeletal:      Cervical back: Neck supple.      Right lower leg: No edema.      Left lower leg: No edema.   Skin:     General: Skin is warm and dry.   Neurological:      General: No focal deficit present.      Mental Status: He is alert and oriented to person, place, and time.   Psychiatric:         Mood and Affect: Mood normal.         Behavior: Behavior normal.         Results Review:   I reviewed the patient's new clinical results.  I reviewed the patient's new imaging results and agree with the interpretation.  I reviewed the patient's other test results and agree with the interpretation  I personally viewed and interpreted the patient's EKG/Telemetry data         Active Hospital Problems    Diagnosis  POA    **CAD (coronary artery disease) [I25.10]  Yes    Headache [R51.9]  Yes    Abnormal findings on diagnostic imaging of heart and coronary circulation [R93.1]  Yes    CAD S/P percutaneous coronary angioplasty [I25.10, Z98.61]  Not Applicable    Hypertension, essential [I10]  Yes    Type 2 diabetes mellitus, with long-term current use of insulin [E11.9, Z79.4]  Not Applicable         Planning revascularization surgery for severe multivessel CAD  Monitor BG trends. Restart long acting insulin at lower than dose. Continue correctional scale. A1C 9.40%. Diabetes educator consulted. Farxiga continued; metformin held  Fioricet as needed for headache. Encourage good hydration, adequate sleep. Temazepam available as needed for sleep  Monitor BP. Amlodipine, lisinopril  Further recommendations pending hospital/postop course      Thank you very much for asking A to be involved in this patient's care. We will follow along with you.      NINFA Peralta  [facility]  12/13/23  14:30 EST

## 2023-12-13 NOTE — PROGRESS NOTES
Latest Reference Range & Units Most Recent   pH, Arterial 7.350 - 7.450 pH units 7.438  12/13/23 17:19   pCO2, Arterial 35.0 - 45.0 mm Hg 32.7 (L)  12/13/23 17:19   pO2, Arterial 80.0 - 100.0 mm Hg 96.6  12/13/23 17:19   HCO3, Arterial 22.0 - 28.0 mmol/L 22.1  12/13/23 17:19   Base Excess 0.0 - 2.0 mmol/L -1.2 (L)  12/13/23 17:19   O2 Saturation, Arterial 92.0 - 98.5 % 97.8  12/13/23 17:19   CO2 Content 23 - 27 mmol/L 23.1  12/13/23 17:19   (L): Data is abnormally low    Pt has no history of lung disease, no 20 year pack history of smoking and ABG results in range. No PFT required prior to surgery.     Will continue to monitor respiratory needs.

## 2023-12-14 ENCOUNTER — ANESTHESIA (OUTPATIENT)
Dept: PERIOP | Facility: HOSPITAL | Age: 58
End: 2023-12-14
Payer: MEDICARE

## 2023-12-14 ENCOUNTER — ANCILLARY PROCEDURE (OUTPATIENT)
Dept: PERIOP | Facility: HOSPITAL | Age: 58
DRG: 236 | End: 2023-12-14
Payer: MEDICARE

## 2023-12-14 ENCOUNTER — ANESTHESIA EVENT (OUTPATIENT)
Dept: PERIOP | Facility: HOSPITAL | Age: 58
End: 2023-12-14
Payer: MEDICARE

## 2023-12-14 ENCOUNTER — APPOINTMENT (OUTPATIENT)
Dept: GENERAL RADIOLOGY | Facility: HOSPITAL | Age: 58
DRG: 236 | End: 2023-12-14
Payer: MEDICARE

## 2023-12-14 LAB
A-A DO2: 337.7 MMHG
ACT BLD: 125 SECONDS (ref 82–152)
ACT BLD: 141 SECONDS (ref 82–152)
ACT BLD: 369 SECONDS (ref 82–152)
ACT BLD: 385 SECONDS (ref 82–152)
ACT BLD: 396 SECONDS (ref 82–152)
ACT BLD: 547 SECONDS (ref 82–152)
ALBUMIN SERPL-MCNC: 4.2 G/DL (ref 3.5–5.2)
ALBUMIN SERPL-MCNC: 4.9 G/DL (ref 3.5–5.2)
ALBUMIN SERPL-MCNC: 4.9 G/DL (ref 3.5–5.2)
ALBUMIN/GLOB SERPL: 1.7 G/DL
ALP SERPL-CCNC: 54 U/L (ref 39–117)
ALT SERPL W P-5'-P-CCNC: 6 U/L (ref 1–41)
ANION GAP SERPL CALCULATED.3IONS-SCNC: 10 MMOL/L (ref 5–15)
ANION GAP SERPL CALCULATED.3IONS-SCNC: 12.7 MMOL/L (ref 5–15)
ANION GAP SERPL CALCULATED.3IONS-SCNC: 13.3 MMOL/L (ref 5–15)
APTT PPP: 26.4 SECONDS (ref 22.7–35.4)
ARTERIAL PATENCY WRIST A: ABNORMAL
ARTERIAL PATENCY WRIST A: ABNORMAL
AST SERPL-CCNC: 15 U/L (ref 1–40)
ATMOSPHERIC PRESS: 765.3 MMHG
ATMOSPHERIC PRESS: 767 MMHG
BASE EXCESS BLDA CALC-SCNC: -1.2 MMOL/L (ref 0–2)
BASE EXCESS BLDA CALC-SCNC: -3 MMOL/L (ref 0–2)
BASE EXCESS BLDA CALC-SCNC: -4 MMOL/L (ref -5–5)
BASE EXCESS BLDA CALC-SCNC: -5 MMOL/L (ref -5–5)
BASE EXCESS BLDA CALC-SCNC: -6 MMOL/L (ref -5–5)
BASE EXCESS BLDA CALC-SCNC: 0 MMOL/L (ref -5–5)
BASE EXCESS BLDA CALC-SCNC: 2 MMOL/L (ref -5–5)
BASE EXCESS BLDA CALC-SCNC: 3 MMOL/L (ref -5–5)
BASOPHILS # BLD AUTO: 0.03 10*3/MM3 (ref 0–0.2)
BASOPHILS NFR BLD AUTO: 0.2 % (ref 0–1.5)
BDY SITE: ABNORMAL
BDY SITE: ABNORMAL
BILIRUB SERPL-MCNC: 0.6 MG/DL (ref 0–1.2)
BUN BLDA-MCNC: 15 MG/DL
BUN SERPL-MCNC: 14 MG/DL (ref 6–20)
BUN SERPL-MCNC: 15 MG/DL (ref 6–20)
BUN SERPL-MCNC: 15 MG/DL (ref 6–20)
BUN/CREAT SERPL: 16 (ref 7–25)
BUN/CREAT SERPL: 17.7 (ref 7–25)
BUN/CREAT SERPL: 18.5 (ref 7–25)
CA-I BLD-MCNC: 5 MG/DL (ref 4.6–5.4)
CA-I BLDA-SCNC: 1.21 MMOL/L (ref 2.15–2.5)
CA-I BLDA-SCNC: ABNORMAL MMOL/L
CA-I SERPL ISE-MCNC: 1.26 MMOL/L (ref 1.15–1.35)
CALCIUM SPEC-SCNC: 8.9 MG/DL (ref 8.6–10.5)
CALCIUM SPEC-SCNC: 9.3 MG/DL (ref 8.6–10.5)
CALCIUM SPEC-SCNC: 9.5 MG/DL (ref 8.6–10.5)
CHLORIDE BLDA-SCNC: 109 MMOL/L (ref 98–107)
CHLORIDE SERPL-SCNC: 103 MMOL/L (ref 98–107)
CHLORIDE SERPL-SCNC: 106 MMOL/L (ref 98–107)
CHLORIDE SERPL-SCNC: 108 MMOL/L (ref 98–107)
CLOSE TME COLL+ADP + EPINEP PNL BLD: 60 % (ref 86–100)
CO2 BLDA-SCNC: 22 MMOL/L (ref 24–29)
CO2 BLDA-SCNC: 22 MMOL/L (ref 24–29)
CO2 BLDA-SCNC: 23.3 MMOL/L (ref 23–27)
CO2 BLDA-SCNC: 23.4 MMOL/L (ref 23–27)
CO2 BLDA-SCNC: 25 MMOL/L (ref 24–29)
CO2 BLDA-SCNC: 28 MMOL/L (ref 24–29)
CO2 BLDA-SCNC: 29 MMOL/L (ref 24–29)
CO2 BLDA-SCNC: 31 MMOL/L (ref 24–29)
CO2 SERPL-SCNC: 20.7 MMOL/L (ref 22–29)
CO2 SERPL-SCNC: 23.3 MMOL/L (ref 22–29)
CO2 SERPL-SCNC: 24 MMOL/L (ref 22–29)
CREAT BLDA-MCNC: 0.66 MG/DL (ref 0.6–130)
CREAT SERPL-MCNC: 0.79 MG/DL (ref 0.76–1.27)
CREAT SERPL-MCNC: 0.81 MG/DL (ref 0.76–1.27)
CREAT SERPL-MCNC: 0.94 MG/DL (ref 0.76–1.27)
D-LACTATE SERPL-SCNC: <0.4 MMOL/L (ref 0.5–2)
DEPRECATED RDW RBC AUTO: 40.5 FL (ref 37–54)
DEPRECATED RDW RBC AUTO: 42.5 FL (ref 37–54)
EGFRCR SERPLBLD CKD-EPI 2021: 102.8 ML/MIN/1.73
EGFRCR SERPLBLD CKD-EPI 2021: 103.6 ML/MIN/1.73
EGFRCR SERPLBLD CKD-EPI 2021: 94.6 ML/MIN/1.73
EOSINOPHIL # BLD AUTO: 0.06 10*3/MM3 (ref 0–0.4)
EOSINOPHIL NFR BLD AUTO: 0.4 % (ref 0.3–6.2)
ERYTHROCYTE [DISTWIDTH] IN BLOOD BY AUTOMATED COUNT: 12.6 % (ref 12.3–15.4)
ERYTHROCYTE [DISTWIDTH] IN BLOOD BY AUTOMATED COUNT: 12.9 % (ref 12.3–15.4)
FIBRINOGEN PPP-MCNC: 195 MG/DL (ref 219–464)
GLOBULIN UR ELPH-MCNC: 2.5 GM/DL
GLUCOSE BLDC GLUCOMTR-MCNC: 101 MG/DL (ref 70–130)
GLUCOSE BLDC GLUCOMTR-MCNC: 102 MG/DL (ref 70–130)
GLUCOSE BLDC GLUCOMTR-MCNC: 103 MG/DL (ref 70–130)
GLUCOSE BLDC GLUCOMTR-MCNC: 107 MG/DL (ref 70–130)
GLUCOSE BLDC GLUCOMTR-MCNC: 112 MG/DL (ref 70–130)
GLUCOSE BLDC GLUCOMTR-MCNC: 119 MG/DL (ref 70–130)
GLUCOSE BLDC GLUCOMTR-MCNC: 128 MG/DL (ref 70–130)
GLUCOSE BLDC GLUCOMTR-MCNC: 146 MG/DL (ref 70–130)
GLUCOSE BLDC GLUCOMTR-MCNC: 146 MG/DL (ref 70–130)
GLUCOSE BLDC GLUCOMTR-MCNC: 148 MG/DL (ref 70–130)
GLUCOSE BLDC GLUCOMTR-MCNC: 149 MG/DL (ref 70–130)
GLUCOSE BLDC GLUCOMTR-MCNC: 151 MG/DL (ref 70–130)
GLUCOSE BLDC GLUCOMTR-MCNC: 159 MG/DL (ref 70–130)
GLUCOSE BLDC GLUCOMTR-MCNC: 162 MG/DL (ref 70–130)
GLUCOSE SERPL-MCNC: 104 MG/DL (ref 65–99)
GLUCOSE SERPL-MCNC: 118 MG/DL (ref 65–99)
GLUCOSE SERPL-MCNC: 127 MG/DL (ref 65–99)
HCO3 BLDA-SCNC: 20.4 MMOL/L (ref 22–26)
HCO3 BLDA-SCNC: 21.1 MMOL/L (ref 22–26)
HCO3 BLDA-SCNC: 22.1 MMOL/L (ref 22–28)
HCO3 BLDA-SCNC: 23.3 MMOL/L (ref 22–26)
HCO3 BLDA-SCNC: 23.8 MMOL/L (ref 22–28)
HCO3 BLDA-SCNC: 26.2 MMOL/L (ref 22–26)
HCO3 BLDA-SCNC: 27.6 MMOL/L (ref 22–26)
HCO3 BLDA-SCNC: 29 MMOL/L (ref 22–26)
HCT VFR BLD AUTO: 33.2 % (ref 37.5–51)
HCT VFR BLD AUTO: 36.2 % (ref 37.5–51)
HCT VFR BLDA CALC: 27 % (ref 38–51)
HCT VFR BLDA CALC: 29 % (ref 38–51)
HCT VFR BLDA CALC: 30 % (ref 38–51)
HCT VFR BLDA CALC: 35 % (ref 38–51)
HCT VFR BLDA CALC: 39 % (ref 38–51)
HEMODILUTION: NO
HEMODILUTION: NO
HGB BLD-MCNC: 11.1 G/DL (ref 13–17.7)
HGB BLD-MCNC: 11.6 G/DL (ref 13–17.7)
HGB BLDA-MCNC: 10.2 G/DL (ref 12–17)
HGB BLDA-MCNC: 11.7 G/DL (ref 12–17)
HGB BLDA-MCNC: 13.3 G/DL (ref 12–17)
HGB BLDA-MCNC: 9.2 G/DL (ref 12–17)
HGB BLDA-MCNC: 9.9 G/DL (ref 12–17)
IMM GRANULOCYTES # BLD AUTO: 0.07 10*3/MM3 (ref 0–0.05)
IMM GRANULOCYTES NFR BLD AUTO: 0.5 % (ref 0–0.5)
INHALED O2 CONCENTRATION: 100 %
INHALED O2 CONCENTRATION: 40 %
INR PPP: 1.4 (ref 0.9–1.1)
LYMPHOCYTES # BLD AUTO: 1.44 10*3/MM3 (ref 0.7–3.1)
LYMPHOCYTES NFR BLD AUTO: 10.5 % (ref 19.6–45.3)
MAGNESIUM SERPL-MCNC: 2 MG/DL (ref 1.6–2.6)
MAGNESIUM SERPL-MCNC: 3.2 MG/DL (ref 1.6–2.6)
MCH RBC QN AUTO: 28.2 PG (ref 26.6–33)
MCH RBC QN AUTO: 29.9 PG (ref 26.6–33)
MCHC RBC AUTO-ENTMCNC: 32 G/DL (ref 31.5–35.7)
MCHC RBC AUTO-ENTMCNC: 33.4 G/DL (ref 31.5–35.7)
MCV RBC AUTO: 88.1 FL (ref 79–97)
MCV RBC AUTO: 89.5 FL (ref 79–97)
MODALITY: ABNORMAL
MODALITY: ABNORMAL
MONOCYTES # BLD AUTO: 1.2 10*3/MM3 (ref 0.1–0.9)
MONOCYTES NFR BLD AUTO: 8.7 % (ref 5–12)
NEUTROPHILS NFR BLD AUTO: 10.94 10*3/MM3 (ref 1.7–7)
NEUTROPHILS NFR BLD AUTO: 79.7 % (ref 42.7–76)
NRBC BLD AUTO-RTO: 0 /100 WBC (ref 0–0.2)
O2 A-A PPRESDIFF RESPIRATORY: 0.5 MMHG
O2 A-A PPRESDIFF RESPIRATORY: 0.5 MMHG
PCO2 BLDA: 36.7 MM HG (ref 35–45)
PCO2 BLDA: 38.6 MM HG (ref 35–45)
PCO2 BLDA: 40.4 MM HG (ref 35–45)
PCO2 BLDA: 45.7 MM HG (ref 35–45)
PCO2 BLDA: 46.6 MM HG (ref 35–45)
PCO2 BLDA: 52.2 MM HG (ref 35–45)
PCO2 BLDA: 52.3 MM HG (ref 35–45)
PCO2 BLDA: 52.9 MM HG (ref 35–45)
PEEP RESPIRATORY: 8 CM[H2O]
PEEP RESPIRATORY: 8 CM[H2O]
PH BLDA: 7.29 PH UNITS (ref 7.35–7.6)
PH BLDA: 7.3 PH UNITS (ref 7.35–7.6)
PH BLDA: 7.31 PH UNITS (ref 7.35–7.6)
PH BLDA: 7.36 PH UNITS (ref 7.35–7.6)
PH BLDA: 7.37 PH UNITS (ref 7.35–7.45)
PH BLDA: 7.38 PH UNITS (ref 7.35–7.45)
PH BLDA: 7.38 PH UNITS (ref 7.35–7.6)
PH BLDA: 7.39 PH UNITS (ref 7.35–7.6)
PHOSPHATE SERPL-MCNC: 3.6 MG/DL (ref 2.5–4.5)
PHOSPHATE SERPL-MCNC: 5 MG/DL (ref 2.5–4.5)
PLATELET # BLD AUTO: 131 10*3/MM3 (ref 140–450)
PLATELET # BLD AUTO: 94 10*3/MM3 (ref 140–450)
PMV BLD AUTO: 9.4 FL (ref 6–12)
PMV BLD AUTO: 9.6 FL (ref 6–12)
PO2 BLDA: 123.4 MM HG (ref 80–100)
PO2 BLDA: 341.8 MM HG (ref 80–100)
PO2 BLDA: 444 MMHG (ref 80–105)
PO2 BLDA: 470 MMHG (ref 80–105)
PO2 BLDA: 479 MMHG (ref 80–105)
PO2 BLDA: 505 MMHG (ref 80–105)
PO2 BLDA: 52 MMHG (ref 80–105)
PO2 BLDA: 583 MMHG (ref 80–105)
POTASSIUM BLDA-SCNC: 3.9 MMOL/L (ref 3.5–4.9)
POTASSIUM BLDA-SCNC: 4.2 MMOL/L (ref 3.5–5.2)
POTASSIUM BLDA-SCNC: 4.6 MMOL/L (ref 3.5–4.9)
POTASSIUM BLDA-SCNC: 4.7 MMOL/L (ref 3.5–4.9)
POTASSIUM BLDA-SCNC: 4.7 MMOL/L (ref 3.5–4.9)
POTASSIUM BLDA-SCNC: 4.8 MMOL/L (ref 3.5–4.9)
POTASSIUM BLDA-SCNC: 5.8 MMOL/L (ref 3.5–4.9)
POTASSIUM SERPL-SCNC: 4 MMOL/L (ref 3.5–5.2)
POTASSIUM SERPL-SCNC: 4.2 MMOL/L (ref 3.5–5.2)
POTASSIUM SERPL-SCNC: 4.3 MMOL/L (ref 3.5–5.2)
PROT SERPL-MCNC: 6.7 G/DL (ref 6–8.5)
PROTHROMBIN TIME: 17.4 SECONDS (ref 11.7–14.2)
PSV: 8 CMH2O
RBC # BLD AUTO: 3.71 10*6/MM3 (ref 4.14–5.8)
RBC # BLD AUTO: 4.11 10*6/MM3 (ref 4.14–5.8)
SAO2 % BLDA: 100 % (ref 95–98)
SAO2 % BLDA: 80 % (ref 95–98)
SAO2 % BLDCOA: 98.7 % (ref 92–98.5)
SAO2 % BLDCOA: 99.9 % (ref 92–98.5)
SET MECH RESP RATE: 15
SET MECH RESP RATE: 20
SODIUM BLD-SCNC: 140 MMOL/L (ref 136–145)
SODIUM SERPL-SCNC: 139 MMOL/L (ref 136–145)
SODIUM SERPL-SCNC: 140 MMOL/L (ref 136–145)
SODIUM SERPL-SCNC: 142 MMOL/L (ref 136–145)
TOTAL RATE: 15 BREATHS/MINUTE
VENTILATOR MODE: ABNORMAL
VENTILATOR MODE: AC
VT ON VENT VENT: 600 ML
VT ON VENT VENT: 650 ML
WBC NRBC COR # BLD AUTO: 13.74 10*3/MM3 (ref 3.4–10.8)
WBC NRBC COR # BLD AUTO: 9.61 10*3/MM3 (ref 3.4–10.8)

## 2023-12-14 PROCEDURE — 33025 INCISION OF HEART SAC: CPT | Performed by: THORACIC SURGERY (CARDIOTHORACIC VASCULAR SURGERY)

## 2023-12-14 PROCEDURE — 82947 ASSAY GLUCOSE BLOOD QUANT: CPT

## 2023-12-14 PROCEDURE — 94002 VENT MGMT INPAT INIT DAY: CPT

## 2023-12-14 PROCEDURE — 25010000002 FENTANYL CITRATE (PF) 50 MCG/ML SOLUTION: Performed by: ANESTHESIOLOGY

## 2023-12-14 PROCEDURE — 85018 HEMOGLOBIN: CPT

## 2023-12-14 PROCEDURE — 25010000002 PROPOFOL 10 MG/ML EMULSION: Performed by: ANESTHESIOLOGY

## 2023-12-14 PROCEDURE — B245ZZ4 ULTRASONOGRAPHY OF LEFT HEART, TRANSESOPHAGEAL: ICD-10-PCS | Performed by: THORACIC SURGERY (CARDIOTHORACIC VASCULAR SURGERY)

## 2023-12-14 PROCEDURE — 021209W BYPASS CORONARY ARTERY, THREE ARTERIES FROM AORTA WITH AUTOLOGOUS VENOUS TISSUE, OPEN APPROACH: ICD-10-PCS | Performed by: THORACIC SURGERY (CARDIOTHORACIC VASCULAR SURGERY)

## 2023-12-14 PROCEDURE — 83605 ASSAY OF LACTIC ACID: CPT

## 2023-12-14 PROCEDURE — 85610 PROTHROMBIN TIME: CPT | Performed by: NURSE PRACTITIONER

## 2023-12-14 PROCEDURE — 25010000002 MAGNESIUM SULFATE IN D5W 1G/100ML (PREMIX) 1-5 GM/100ML-% SOLUTION: Performed by: NURSE PRACTITIONER

## 2023-12-14 PROCEDURE — 82948 REAGENT STRIP/BLOOD GLUCOSE: CPT

## 2023-12-14 PROCEDURE — 94799 UNLISTED PULMONARY SVC/PX: CPT

## 2023-12-14 PROCEDURE — 25010000002 HEPARIN (PORCINE) PER 1000 UNITS: Performed by: ANESTHESIOLOGY

## 2023-12-14 PROCEDURE — 80047 BASIC METABLC PNL IONIZED CA: CPT

## 2023-12-14 PROCEDURE — 0W9D0ZZ DRAINAGE OF PERICARDIAL CAVITY, OPEN APPROACH: ICD-10-PCS | Performed by: THORACIC SURGERY (CARDIOTHORACIC VASCULAR SURGERY)

## 2023-12-14 PROCEDURE — 93010 ELECTROCARDIOGRAM REPORT: CPT | Performed by: INTERNAL MEDICINE

## 2023-12-14 PROCEDURE — 71045 X-RAY EXAM CHEST 1 VIEW: CPT

## 2023-12-14 PROCEDURE — 85014 HEMATOCRIT: CPT

## 2023-12-14 PROCEDURE — 33519 CABG ARTERY-VEIN THREE: CPT | Performed by: THORACIC SURGERY (CARDIOTHORACIC VASCULAR SURGERY)

## 2023-12-14 PROCEDURE — 85025 COMPLETE CBC W/AUTO DIFF WBC: CPT | Performed by: NURSE PRACTITIONER

## 2023-12-14 PROCEDURE — 25010000002 PHENYLEPHRINE 10 MG/ML SOLUTION 5 ML VIAL: Performed by: ANESTHESIOLOGY

## 2023-12-14 PROCEDURE — 82330 ASSAY OF CALCIUM: CPT | Performed by: NURSE PRACTITIONER

## 2023-12-14 PROCEDURE — 25810000003 SODIUM CHLORIDE 0.9 % SOLUTION 250 ML FLEX CONT: Performed by: ANESTHESIOLOGY

## 2023-12-14 PROCEDURE — 85347 COAGULATION TIME ACTIVATED: CPT

## 2023-12-14 PROCEDURE — 25810000003 SODIUM CHLORIDE 0.9 % SOLUTION: Performed by: NURSE PRACTITIONER

## 2023-12-14 PROCEDURE — 25010000002 PAPAVERINE PER 60 MG: Performed by: THORACIC SURGERY (CARDIOTHORACIC VASCULAR SURGERY)

## 2023-12-14 PROCEDURE — 93005 ELECTROCARDIOGRAM TRACING: CPT | Performed by: NURSE PRACTITIONER

## 2023-12-14 PROCEDURE — 25010000002 ACETAMINOPHEN 10 MG/ML SOLUTION: Performed by: NURSE PRACTITIONER

## 2023-12-14 PROCEDURE — C1713 ANCHOR/SCREW BN/BN,TIS/BN: HCPCS | Performed by: THORACIC SURGERY (CARDIOTHORACIC VASCULAR SURGERY)

## 2023-12-14 PROCEDURE — 25010000002 ALBUMIN HUMAN 5% PER 50 ML: Performed by: NURSE PRACTITIONER

## 2023-12-14 PROCEDURE — 25010000002 CEFAZOLIN IN DEXTROSE 2000 MG/ 100 ML SOLUTION: Performed by: NURSE PRACTITIONER

## 2023-12-14 PROCEDURE — 25010000002 CEFAZOLIN IN DEXTROSE 2-4 GM/100ML-% SOLUTION: Performed by: NURSE PRACTITIONER

## 2023-12-14 PROCEDURE — 0 PROTAMINE SULFATE PER 10 MG: Performed by: THORACIC SURGERY (CARDIOTHORACIC VASCULAR SURGERY)

## 2023-12-14 PROCEDURE — 84100 ASSAY OF PHOSPHORUS: CPT | Performed by: NURSE PRACTITIONER

## 2023-12-14 PROCEDURE — C1751 CATH, INF, PER/CENT/MIDLINE: HCPCS | Performed by: ANESTHESIOLOGY

## 2023-12-14 PROCEDURE — 0 DEXTROSE 5 % SOLUTION 250 ML FLEX CONT: Performed by: ANESTHESIOLOGY

## 2023-12-14 PROCEDURE — 85576 BLOOD PLATELET AGGREGATION: CPT | Performed by: NURSE PRACTITIONER

## 2023-12-14 PROCEDURE — P9041 ALBUMIN (HUMAN),5%, 50ML: HCPCS | Performed by: NURSE PRACTITIONER

## 2023-12-14 PROCEDURE — 25010000002 MIDAZOLAM PER 1 MG: Performed by: ANESTHESIOLOGY

## 2023-12-14 PROCEDURE — 25010000002 HEPARIN (PORCINE) PER 1000 UNITS: Performed by: THORACIC SURGERY (CARDIOTHORACIC VASCULAR SURGERY)

## 2023-12-14 PROCEDURE — 93318 ECHO TRANSESOPHAGEAL INTRAOP: CPT | Performed by: ANESTHESIOLOGY

## 2023-12-14 PROCEDURE — 5A1221Z PERFORMANCE OF CARDIAC OUTPUT, CONTINUOUS: ICD-10-PCS | Performed by: THORACIC SURGERY (CARDIOTHORACIC VASCULAR SURGERY)

## 2023-12-14 PROCEDURE — A4648 IMPLANTABLE TISSUE MARKER: HCPCS | Performed by: THORACIC SURGERY (CARDIOTHORACIC VASCULAR SURGERY)

## 2023-12-14 PROCEDURE — 63710000001 INSULIN REGULAR HUMAN PER 5 UNITS: Performed by: ANESTHESIOLOGY

## 2023-12-14 PROCEDURE — 85027 COMPLETE CBC AUTOMATED: CPT | Performed by: NURSE PRACTITIONER

## 2023-12-14 PROCEDURE — 85384 FIBRINOGEN ACTIVITY: CPT | Performed by: NURSE PRACTITIONER

## 2023-12-14 PROCEDURE — 02100Z8 BYPASS CORONARY ARTERY, ONE ARTERY FROM RIGHT INTERNAL MAMMARY, OPEN APPROACH: ICD-10-PCS | Performed by: THORACIC SURGERY (CARDIOTHORACIC VASCULAR SURGERY)

## 2023-12-14 PROCEDURE — 02100Z9 BYPASS CORONARY ARTERY, ONE ARTERY FROM LEFT INTERNAL MAMMARY, OPEN APPROACH: ICD-10-PCS | Performed by: THORACIC SURGERY (CARDIOTHORACIC VASCULAR SURGERY)

## 2023-12-14 PROCEDURE — 25010000002 METOCLOPRAMIDE PER 10 MG: Performed by: NURSE PRACTITIONER

## 2023-12-14 PROCEDURE — 82803 BLOOD GASES ANY COMBINATION: CPT

## 2023-12-14 PROCEDURE — 80053 COMPREHEN METABOLIC PANEL: CPT | Performed by: NURSE PRACTITIONER

## 2023-12-14 PROCEDURE — 25010000002 LIDOCAINE PER 10 MG: Performed by: ANESTHESIOLOGY

## 2023-12-14 PROCEDURE — 33534 CABG ARTERIAL TWO: CPT | Performed by: THORACIC SURGERY (CARDIOTHORACIC VASCULAR SURGERY)

## 2023-12-14 PROCEDURE — 83735 ASSAY OF MAGNESIUM: CPT | Performed by: NURSE PRACTITIONER

## 2023-12-14 PROCEDURE — 06BQ4ZZ EXCISION OF LEFT SAPHENOUS VEIN, PERCUTANEOUS ENDOSCOPIC APPROACH: ICD-10-PCS | Performed by: THORACIC SURGERY (CARDIOTHORACIC VASCULAR SURGERY)

## 2023-12-14 PROCEDURE — 33508 ENDOSCOPIC VEIN HARVEST: CPT | Performed by: THORACIC SURGERY (CARDIOTHORACIC VASCULAR SURGERY)

## 2023-12-14 PROCEDURE — 85730 THROMBOPLASTIN TIME PARTIAL: CPT | Performed by: NURSE PRACTITIONER

## 2023-12-14 PROCEDURE — 25010000002 MORPHINE PER 10 MG: Performed by: NURSE PRACTITIONER

## 2023-12-14 DEVICE — SS SUTURE, 4 PER SLEEVE
Type: IMPLANTABLE DEVICE | Site: STERNUM | Status: FUNCTIONAL
Brand: MYO/WIRE II

## 2023-12-14 DEVICE — ABSORBABLE HEMOSTAT (OXIDIZED REGENERATED CELLULOSE)
Type: IMPLANTABLE DEVICE | Site: HEART | Status: FUNCTIONAL
Brand: SURGICEL

## 2023-12-14 DEVICE — CLIP LIGAT VASC HORIZON TI SM/WD RED 24CT: Type: IMPLANTABLE DEVICE | Site: HEART | Status: FUNCTIONAL

## 2023-12-14 DEVICE — SS SUTURE, 3 PER SLEEVE
Type: IMPLANTABLE DEVICE | Site: STERNUM | Status: FUNCTIONAL
Brand: MYO/WIRE II

## 2023-12-14 RX ORDER — ACETAMINOPHEN 325 MG/1
650 TABLET ORAL EVERY 4 HOURS PRN
Status: DISCONTINUED | OUTPATIENT
Start: 2023-12-15 | End: 2023-12-19 | Stop reason: HOSPADM

## 2023-12-14 RX ORDER — ACETAMINOPHEN 160 MG/5ML
650 SOLUTION ORAL EVERY 4 HOURS
Status: DISCONTINUED | OUTPATIENT
Start: 2023-12-14 | End: 2023-12-15

## 2023-12-14 RX ORDER — MORPHINE SULFATE 2 MG/ML
4 INJECTION, SOLUTION INTRAMUSCULAR; INTRAVENOUS
Status: DISCONTINUED | OUTPATIENT
Start: 2023-12-14 | End: 2023-12-15

## 2023-12-14 RX ORDER — MAGNESIUM HYDROXIDE 1200 MG/15ML
LIQUID ORAL AS NEEDED
Status: DISCONTINUED | OUTPATIENT
Start: 2023-12-14 | End: 2023-12-14 | Stop reason: HOSPADM

## 2023-12-14 RX ORDER — SODIUM CHLORIDE 9 MG/ML
INJECTION, SOLUTION INTRAVENOUS CONTINUOUS PRN
Status: DISCONTINUED | OUTPATIENT
Start: 2023-12-14 | End: 2023-12-14 | Stop reason: SURG

## 2023-12-14 RX ORDER — DEXTROSE MONOHYDRATE 25 G/50ML
10-50 INJECTION, SOLUTION INTRAVENOUS
Status: DISCONTINUED | OUTPATIENT
Start: 2023-12-14 | End: 2023-12-15

## 2023-12-14 RX ORDER — MILRINONE LACTATE 0.2 MG/ML
.25-.375 INJECTION, SOLUTION INTRAVENOUS CONTINUOUS PRN
Status: DISCONTINUED | OUTPATIENT
Start: 2023-12-14 | End: 2023-12-15

## 2023-12-14 RX ORDER — ALPRAZOLAM 0.25 MG/1
0.25 TABLET ORAL EVERY 8 HOURS PRN
Status: DISCONTINUED | OUTPATIENT
Start: 2023-12-14 | End: 2023-12-19 | Stop reason: HOSPADM

## 2023-12-14 RX ORDER — ENOXAPARIN SODIUM 100 MG/ML
40 INJECTION SUBCUTANEOUS EVERY 24 HOURS
Status: DISCONTINUED | OUTPATIENT
Start: 2023-12-15 | End: 2023-12-19 | Stop reason: HOSPADM

## 2023-12-14 RX ORDER — ACETAMINOPHEN 650 MG/1
650 SUPPOSITORY RECTAL EVERY 4 HOURS PRN
Status: DISCONTINUED | OUTPATIENT
Start: 2023-12-15 | End: 2023-12-19 | Stop reason: HOSPADM

## 2023-12-14 RX ORDER — NITROGLYCERIN 0.4 MG/1
0.4 TABLET SUBLINGUAL
Status: DISCONTINUED | OUTPATIENT
Start: 2023-12-14 | End: 2023-12-19 | Stop reason: HOSPADM

## 2023-12-14 RX ORDER — BISACODYL 10 MG
10 SUPPOSITORY, RECTAL RECTAL DAILY PRN
Status: DISCONTINUED | OUTPATIENT
Start: 2023-12-15 | End: 2023-12-19 | Stop reason: HOSPADM

## 2023-12-14 RX ORDER — AMITRIPTYLINE HYDROCHLORIDE 50 MG/1
50 TABLET, FILM COATED ORAL NIGHTLY
Status: DISCONTINUED | OUTPATIENT
Start: 2023-12-14 | End: 2023-12-19 | Stop reason: HOSPADM

## 2023-12-14 RX ORDER — ALBUMIN, HUMAN INJ 5% 5 %
1500 SOLUTION INTRAVENOUS AS NEEDED
Status: DISCONTINUED | OUTPATIENT
Start: 2023-12-14 | End: 2023-12-15

## 2023-12-14 RX ORDER — MIDAZOLAM HYDROCHLORIDE 1 MG/ML
INJECTION INTRAMUSCULAR; INTRAVENOUS AS NEEDED
Status: DISCONTINUED | OUTPATIENT
Start: 2023-12-14 | End: 2023-12-14 | Stop reason: SURG

## 2023-12-14 RX ORDER — METOCLOPRAMIDE HYDROCHLORIDE 5 MG/ML
10 INJECTION INTRAMUSCULAR; INTRAVENOUS EVERY 6 HOURS
Status: DISCONTINUED | OUTPATIENT
Start: 2023-12-14 | End: 2023-12-15

## 2023-12-14 RX ORDER — ACETAMINOPHEN 10 MG/ML
1000 INJECTION, SOLUTION INTRAVENOUS EVERY 8 HOURS
Status: COMPLETED | OUTPATIENT
Start: 2023-12-14 | End: 2023-12-15

## 2023-12-14 RX ORDER — CYCLOBENZAPRINE HCL 10 MG
10 TABLET ORAL EVERY 8 HOURS PRN
Status: DISCONTINUED | OUTPATIENT
Start: 2023-12-15 | End: 2023-12-19 | Stop reason: HOSPADM

## 2023-12-14 RX ORDER — OXYCODONE HYDROCHLORIDE 5 MG/1
10 TABLET ORAL EVERY 4 HOURS PRN
Status: DISCONTINUED | OUTPATIENT
Start: 2023-12-14 | End: 2023-12-19 | Stop reason: HOSPADM

## 2023-12-14 RX ORDER — PHENYLEPHRINE HCL IN 0.9% NACL 0.5 MG/5ML
.2-2 SYRINGE (ML) INTRAVENOUS CONTINUOUS PRN
Status: DISCONTINUED | OUTPATIENT
Start: 2023-12-14 | End: 2023-12-15

## 2023-12-14 RX ORDER — DOPAMINE HYDROCHLORIDE 160 MG/100ML
2-20 INJECTION, SOLUTION INTRAVENOUS CONTINUOUS PRN
Status: DISCONTINUED | OUTPATIENT
Start: 2023-12-14 | End: 2023-12-15

## 2023-12-14 RX ORDER — ROCURONIUM BROMIDE 10 MG/ML
INJECTION, SOLUTION INTRAVENOUS AS NEEDED
Status: DISCONTINUED | OUTPATIENT
Start: 2023-12-14 | End: 2023-12-14 | Stop reason: SURG

## 2023-12-14 RX ORDER — POLYETHYLENE GLYCOL 3350 17 G/17G
17 POWDER, FOR SOLUTION ORAL DAILY PRN
Status: DISCONTINUED | OUTPATIENT
Start: 2023-12-14 | End: 2023-12-19 | Stop reason: HOSPADM

## 2023-12-14 RX ORDER — LIDOCAINE HYDROCHLORIDE 20 MG/ML
INJECTION, SOLUTION INFILTRATION; PERINEURAL AS NEEDED
Status: DISCONTINUED | OUTPATIENT
Start: 2023-12-14 | End: 2023-12-14 | Stop reason: SURG

## 2023-12-14 RX ORDER — MORPHINE SULFATE 2 MG/ML
1 INJECTION, SOLUTION INTRAMUSCULAR; INTRAVENOUS EVERY 4 HOURS PRN
Status: DISCONTINUED | OUTPATIENT
Start: 2023-12-14 | End: 2023-12-15

## 2023-12-14 RX ORDER — DEXMEDETOMIDINE HYDROCHLORIDE 4 UG/ML
.2-1.5 INJECTION, SOLUTION INTRAVENOUS
Status: DISCONTINUED | OUTPATIENT
Start: 2023-12-14 | End: 2023-12-15

## 2023-12-14 RX ORDER — PROPOFOL 10 MG/ML
VIAL (ML) INTRAVENOUS AS NEEDED
Status: DISCONTINUED | OUTPATIENT
Start: 2023-12-14 | End: 2023-12-14 | Stop reason: SURG

## 2023-12-14 RX ORDER — ASPIRIN 81 MG/1
81 TABLET ORAL DAILY
Status: DISCONTINUED | OUTPATIENT
Start: 2023-12-15 | End: 2023-12-19 | Stop reason: HOSPADM

## 2023-12-14 RX ORDER — FENTANYL CITRATE 50 UG/ML
INJECTION, SOLUTION INTRAMUSCULAR; INTRAVENOUS AS NEEDED
Status: DISCONTINUED | OUTPATIENT
Start: 2023-12-14 | End: 2023-12-14 | Stop reason: SURG

## 2023-12-14 RX ORDER — DEXMEDETOMIDINE HYDROCHLORIDE 4 UG/ML
INJECTION, SOLUTION INTRAVENOUS CONTINUOUS PRN
Status: DISCONTINUED | OUTPATIENT
Start: 2023-12-14 | End: 2023-12-14 | Stop reason: SURG

## 2023-12-14 RX ORDER — NOREPINEPHRINE BITARTRATE 0.03 MG/ML
.02-.3 INJECTION, SOLUTION INTRAVENOUS CONTINUOUS PRN
Status: DISCONTINUED | OUTPATIENT
Start: 2023-12-14 | End: 2023-12-15

## 2023-12-14 RX ORDER — PROTAMINE SULFATE 10 MG/ML
INJECTION, SOLUTION INTRAVENOUS AS NEEDED
Status: DISCONTINUED | OUTPATIENT
Start: 2023-12-14 | End: 2023-12-14 | Stop reason: HOSPADM

## 2023-12-14 RX ORDER — ONDANSETRON 2 MG/ML
4 INJECTION INTRAMUSCULAR; INTRAVENOUS EVERY 6 HOURS PRN
Status: DISCONTINUED | OUTPATIENT
Start: 2023-12-14 | End: 2023-12-19 | Stop reason: HOSPADM

## 2023-12-14 RX ORDER — ACETAMINOPHEN 160 MG/5ML
650 SOLUTION ORAL EVERY 4 HOURS PRN
Status: DISCONTINUED | OUTPATIENT
Start: 2023-12-15 | End: 2023-12-19 | Stop reason: HOSPADM

## 2023-12-14 RX ORDER — SODIUM CHLORIDE 9 MG/ML
30 INJECTION, SOLUTION INTRAVENOUS CONTINUOUS
Status: DISCONTINUED | OUTPATIENT
Start: 2023-12-14 | End: 2023-12-19 | Stop reason: HOSPADM

## 2023-12-14 RX ORDER — PAPAVERINE HYDROCHLORIDE 30 MG/ML
INJECTION INTRAMUSCULAR; INTRAVENOUS AS NEEDED
Status: DISCONTINUED | OUTPATIENT
Start: 2023-12-14 | End: 2023-12-14 | Stop reason: HOSPADM

## 2023-12-14 RX ORDER — LIDOCAINE HYDROCHLORIDE ANHYDROUS AND DEXTROSE MONOHYDRATE 5; 400 G/100ML; MG/100ML
INJECTION, SOLUTION INTRAVENOUS CONTINUOUS PRN
Status: DISCONTINUED | OUTPATIENT
Start: 2023-12-14 | End: 2023-12-14 | Stop reason: SURG

## 2023-12-14 RX ORDER — ACETAMINOPHEN 650 MG/1
650 SUPPOSITORY RECTAL EVERY 4 HOURS
Status: DISCONTINUED | OUTPATIENT
Start: 2023-12-14 | End: 2023-12-15

## 2023-12-14 RX ORDER — AMOXICILLIN 250 MG
2 CAPSULE ORAL NIGHTLY
Status: DISCONTINUED | OUTPATIENT
Start: 2023-12-15 | End: 2023-12-19 | Stop reason: HOSPADM

## 2023-12-14 RX ORDER — MIDAZOLAM HYDROCHLORIDE 1 MG/ML
2 INJECTION INTRAMUSCULAR; INTRAVENOUS
Status: DISCONTINUED | OUTPATIENT
Start: 2023-12-14 | End: 2023-12-15

## 2023-12-14 RX ORDER — PANTOPRAZOLE SODIUM 40 MG/1
40 TABLET, DELAYED RELEASE ORAL EVERY MORNING
Status: DISCONTINUED | OUTPATIENT
Start: 2023-12-15 | End: 2023-12-19 | Stop reason: HOSPADM

## 2023-12-14 RX ORDER — NICOTINE POLACRILEX 4 MG
15 LOZENGE BUCCAL
Status: DISCONTINUED | OUTPATIENT
Start: 2023-12-14 | End: 2023-12-15

## 2023-12-14 RX ORDER — BISACODYL 5 MG/1
10 TABLET, DELAYED RELEASE ORAL DAILY PRN
Status: DISCONTINUED | OUTPATIENT
Start: 2023-12-14 | End: 2023-12-19 | Stop reason: HOSPADM

## 2023-12-14 RX ORDER — ATORVASTATIN CALCIUM 20 MG/1
40 TABLET, FILM COATED ORAL NIGHTLY
Status: DISCONTINUED | OUTPATIENT
Start: 2023-12-14 | End: 2023-12-19 | Stop reason: HOSPADM

## 2023-12-14 RX ORDER — AMINOCAPROIC ACID 250 MG/ML
INJECTION, SOLUTION INTRAVENOUS AS NEEDED
Status: DISCONTINUED | OUTPATIENT
Start: 2023-12-14 | End: 2023-12-14 | Stop reason: SURG

## 2023-12-14 RX ORDER — PANTOPRAZOLE SODIUM 40 MG/10ML
40 INJECTION, POWDER, LYOPHILIZED, FOR SOLUTION INTRAVENOUS ONCE
Status: COMPLETED | OUTPATIENT
Start: 2023-12-14 | End: 2023-12-14

## 2023-12-14 RX ORDER — ACETAMINOPHEN 325 MG/1
650 TABLET ORAL EVERY 4 HOURS
Status: DISCONTINUED | OUTPATIENT
Start: 2023-12-14 | End: 2023-12-15

## 2023-12-14 RX ORDER — CEFAZOLIN SODIUM 2 G/100ML
2 INJECTION, SOLUTION INTRAVENOUS EVERY 8 HOURS
Qty: 500 ML | Refills: 0 | Status: COMPLETED | OUTPATIENT
Start: 2023-12-14 | End: 2023-12-16

## 2023-12-14 RX ORDER — MAGNESIUM SULFATE 1 G/100ML
1 INJECTION INTRAVENOUS EVERY 8 HOURS
Status: DISCONTINUED | OUTPATIENT
Start: 2023-12-14 | End: 2023-12-15

## 2023-12-14 RX ORDER — CHLORHEXIDINE GLUCONATE ORAL RINSE 1.2 MG/ML
15 SOLUTION DENTAL EVERY 12 HOURS
Status: DISCONTINUED | OUTPATIENT
Start: 2023-12-14 | End: 2023-12-18

## 2023-12-14 RX ORDER — NITROGLYCERIN 20 MG/100ML
5-200 INJECTION INTRAVENOUS
Status: DISCONTINUED | OUTPATIENT
Start: 2023-12-14 | End: 2023-12-15

## 2023-12-14 RX ORDER — HEPARIN SODIUM 1000 [USP'U]/ML
INJECTION, SOLUTION INTRAVENOUS; SUBCUTANEOUS AS NEEDED
Status: DISCONTINUED | OUTPATIENT
Start: 2023-12-14 | End: 2023-12-14 | Stop reason: SURG

## 2023-12-14 RX ORDER — KETAMINE HCL IN NACL, ISO-OSM 100MG/10ML
SYRINGE (ML) INJECTION AS NEEDED
Status: DISCONTINUED | OUTPATIENT
Start: 2023-12-14 | End: 2023-12-14 | Stop reason: SURG

## 2023-12-14 RX ORDER — IBUPROFEN 600 MG/1
1 TABLET ORAL
Status: DISCONTINUED | OUTPATIENT
Start: 2023-12-14 | End: 2023-12-15

## 2023-12-14 RX ORDER — HYDROCODONE BITARTRATE AND ACETAMINOPHEN 5; 325 MG/1; MG/1
2 TABLET ORAL EVERY 4 HOURS PRN
Status: DISCONTINUED | OUTPATIENT
Start: 2023-12-14 | End: 2023-12-19 | Stop reason: HOSPADM

## 2023-12-14 RX ORDER — NALOXONE HCL 0.4 MG/ML
0.4 VIAL (ML) INJECTION
Status: DISCONTINUED | OUTPATIENT
Start: 2023-12-14 | End: 2023-12-15

## 2023-12-14 RX ADMIN — ALBUMIN (HUMAN) 500 ML: 12.5 INJECTION, SOLUTION INTRAVENOUS at 13:09

## 2023-12-14 RX ADMIN — PANTOPRAZOLE SODIUM 40 MG: 40 INJECTION, POWDER, FOR SOLUTION INTRAVENOUS at 12:34

## 2023-12-14 RX ADMIN — LIDOCAINE HYDROCHLORIDE 140 MG: 20 INJECTION, SOLUTION INFILTRATION; PERINEURAL at 07:40

## 2023-12-14 RX ADMIN — PHENYLEPHRINE HYDROCHLORIDE 0.2 MCG/KG/MIN: 10 INJECTION, SOLUTION INTRAVENOUS at 10:45

## 2023-12-14 RX ADMIN — SODIUM CHLORIDE 30 ML/HR: 9 INJECTION, SOLUTION INTRAVENOUS at 12:33

## 2023-12-14 RX ADMIN — MIDAZOLAM 2 MG: 1 INJECTION INTRAMUSCULAR; INTRAVENOUS at 06:46

## 2023-12-14 RX ADMIN — ACETAMINOPHEN 1000 MG: 10 INJECTION, SOLUTION INTRAVENOUS at 12:40

## 2023-12-14 RX ADMIN — FENTANYL CITRATE 100 MCG: 0.05 INJECTION, SOLUTION INTRAMUSCULAR; INTRAVENOUS at 08:01

## 2023-12-14 RX ADMIN — SODIUM CHLORIDE: 9 INJECTION, SOLUTION INTRAVENOUS at 06:38

## 2023-12-14 RX ADMIN — DEXMEDETOMIDINE HYDROCHLORIDE IN SODIUM CHLORIDE 0.5 MCG/KG/HR: 4 INJECTION INTRAVENOUS at 07:40

## 2023-12-14 RX ADMIN — HEPARIN SODIUM 26000 UNITS: 1000 INJECTION, SOLUTION INTRAVENOUS; SUBCUTANEOUS at 08:26

## 2023-12-14 RX ADMIN — MAGNESIUM SULFATE IN DEXTROSE 1 G: 10 INJECTION, SOLUTION INTRAVENOUS at 19:48

## 2023-12-14 RX ADMIN — ALPRAZOLAM 0.25 MG: 0.25 TABLET ORAL at 20:16

## 2023-12-14 RX ADMIN — Medication 50 MG: at 07:48

## 2023-12-14 RX ADMIN — METOCLOPRAMIDE HYDROCHLORIDE 10 MG: 5 INJECTION INTRAMUSCULAR; INTRAVENOUS at 12:34

## 2023-12-14 RX ADMIN — CEFAZOLIN SODIUM 2 G: 2 INJECTION, SOLUTION INTRAVENOUS at 06:48

## 2023-12-14 RX ADMIN — SODIUM CHLORIDE 1.7 UNITS/HR: 9 INJECTION, SOLUTION INTRAVENOUS at 08:13

## 2023-12-14 RX ADMIN — NOREPINEPHRINE BITARTRATE 0.03 MCG/KG/MIN: 1 SOLUTION INTRAVENOUS at 08:58

## 2023-12-14 RX ADMIN — ROCURONIUM BROMIDE 80 MG: 10 INJECTION, SOLUTION INTRAVENOUS at 07:00

## 2023-12-14 RX ADMIN — OXYCODONE HYDROCHLORIDE 10 MG: 5 TABLET ORAL at 15:55

## 2023-12-14 RX ADMIN — PROPOFOL 100 MG: 10 INJECTION, EMULSION INTRAVENOUS at 07:00

## 2023-12-14 RX ADMIN — ROCURONIUM BROMIDE 30 MG: 10 INJECTION, SOLUTION INTRAVENOUS at 09:46

## 2023-12-14 RX ADMIN — ATORVASTATIN CALCIUM 40 MG: 20 TABLET, FILM COATED ORAL at 20:02

## 2023-12-14 RX ADMIN — MORPHINE SULFATE 1 MG: 2 INJECTION, SOLUTION INTRAMUSCULAR; INTRAVENOUS at 22:04

## 2023-12-14 RX ADMIN — FENTANYL CITRATE 50 MCG: 0.05 INJECTION, SOLUTION INTRAMUSCULAR; INTRAVENOUS at 08:35

## 2023-12-14 RX ADMIN — METOPROLOL TARTRATE 12.5 MG: 25 TABLET, FILM COATED ORAL at 06:15

## 2023-12-14 RX ADMIN — CEFAZOLIN SODIUM 2 G: 2 INJECTION, SOLUTION INTRAVENOUS at 17:46

## 2023-12-14 RX ADMIN — 0.12% CHLORHEXIDINE GLUCONATE 15 ML: 1.2 RINSE ORAL at 20:02

## 2023-12-14 RX ADMIN — METOCLOPRAMIDE HYDROCHLORIDE 10 MG: 5 INJECTION INTRAMUSCULAR; INTRAVENOUS at 23:08

## 2023-12-14 RX ADMIN — ACETAMINOPHEN 1000 MG: 10 INJECTION, SOLUTION INTRAVENOUS at 19:48

## 2023-12-14 RX ADMIN — MORPHINE SULFATE 1 MG: 2 INJECTION, SOLUTION INTRAMUSCULAR; INTRAVENOUS at 18:01

## 2023-12-14 RX ADMIN — ALPRAZOLAM 0.25 MG: 0.25 TABLET ORAL at 06:15

## 2023-12-14 RX ADMIN — OXYCODONE HYDROCHLORIDE 10 MG: 5 TABLET ORAL at 20:16

## 2023-12-14 RX ADMIN — AMINOCAPROIC ACID 10 G: 250 INJECTION, SOLUTION INTRAVENOUS at 08:33

## 2023-12-14 RX ADMIN — DEXMEDETOMIDINE HYDROCHLORIDE 0.8 MCG/KG/HR: 4 INJECTION, SOLUTION INTRAVENOUS at 15:28

## 2023-12-14 RX ADMIN — MUPIROCIN 1 APPLICATION: 20 OINTMENT TOPICAL at 06:15

## 2023-12-14 RX ADMIN — FENTANYL CITRATE 100 MCG: 0.05 INJECTION, SOLUTION INTRAMUSCULAR; INTRAVENOUS at 07:49

## 2023-12-14 RX ADMIN — AMITRIPTYLINE HYDROCHLORIDE 50 MG: 50 TABLET, FILM COATED ORAL at 20:02

## 2023-12-14 RX ADMIN — FENTANYL CITRATE 250 MCG: 0.05 INJECTION, SOLUTION INTRAMUSCULAR; INTRAVENOUS at 07:00

## 2023-12-14 RX ADMIN — LIDOCAINE HYDROCHLORIDE 100 MG: 20 INJECTION, SOLUTION INFILTRATION; PERINEURAL at 07:00

## 2023-12-14 RX ADMIN — PROPOFOL 25 MCG/KG/MIN: 10 INJECTION, EMULSION INTRAVENOUS at 07:40

## 2023-12-14 RX ADMIN — AMINOCAPROIC ACID 10 G: 250 INJECTION, SOLUTION INTRAVENOUS at 10:19

## 2023-12-14 RX ADMIN — MUPIROCIN 1 APPLICATION: 20 OINTMENT TOPICAL at 20:02

## 2023-12-14 RX ADMIN — LIDOCAINE HYDROCHLORIDE 4 MG/MIN: 4 INJECTION, SOLUTION INTRAVENOUS at 07:41

## 2023-12-14 RX ADMIN — 0.12% CHLORHEXIDINE GLUCONATE 15 ML: 1.2 RINSE ORAL at 06:16

## 2023-12-14 RX ADMIN — CEFAZOLIN SODIUM 2 G: 2 INJECTION, SOLUTION INTRAVENOUS at 10:15

## 2023-12-14 RX ADMIN — ROCURONIUM BROMIDE 50 MG: 10 INJECTION, SOLUTION INTRAVENOUS at 08:48

## 2023-12-14 NOTE — ANESTHESIA PROCEDURE NOTES
Airway  Urgency: elective    Date/Time: 12/14/2023 7:02 AM  Airway not difficult    General Information and Staff    Patient location during procedure: OR  Anesthesiologist: Samm Macdonald MD    Indications and Patient Condition  Indications for airway management: airway protection    Preoxygenated: yes  MILS maintained throughout  Mask difficulty assessment: 2 - vent by mask + OA or adjuvant +/- NMBA    Final Airway Details  Final airway type: endotracheal airway      Successful airway: ETT  Cuffed: yes   Successful intubation technique: direct laryngoscopy  Facilitating devices/methods: anterior pressure/BURP  Endotracheal tube insertion site: oral  Blade: Jana  Blade size: 3  ETT size (mm): 8.0  Cormack-Lehane Classification: grade I - full view of glottis  Placement verified by: chest auscultation and capnometry   Measured from: lips  ETT/EBT  to lips (cm): 23  Number of attempts at approach: 1  Assessment: lips, teeth, and gum same as pre-op and atraumatic intubation

## 2023-12-14 NOTE — OP NOTE
[facility] CARDIAC SURGERY OP NOTE    Preop Diagnosis: Severe coronary artery disease.  Status post Rotablator and PTCA of the LAD years ago by Dr. Drew Emery.  Possible inadequate veins.  Diabetes.    Postop Diagnosis: Same.  Aortic sclerosis with no stenosis.    Indications: This patient had multivessel disease with unstable angina.  He had a Rotablator in the PTCA by Dr. Emery years ago.  Operation was advisable to prolong life and relieve symptoms.The  calculated STS Risk score was discussed with the patient and family. All risks and alternatives were discussed with the patient and family.  Counseling was done regarding abuse of tobacco, alcohol and drugs as needed.  A discussion about advanced directive was done with the patient. They understand and wish to proceed.    Procedure: CABG x 5.  Bilateral skeletonized RENETTA's.  KIERRA to diagonal branch of the LAD.  LIMA to LAD.  Sequential vein graft to PDA and left ventricular branch of right coronary artery.  Vein graft ramus intermedius.  Temporary cardiopulmonary bypass.  Left posterolateral pericardial window for drainage.  Neurologic monitoring.  Transesophageal echo.    Surgeon: Binu Ribeiro MD    Assistant: Assistant: Spring Frye CSA was responsible for performing the following activities: Cardiac Surgery First assist, Endoscopic Vein [place] if needed for CABG,  surgical wound closure and their skilled assistance was necessary for the success of this case.     Anesthesia: GET    Findings : Body mass index is 30.13 kg/m².  Heart and cardiac chambers were normal.  The PDA had a lesion that I could not get a 1.5 mm probe back through and it was 2.5 mm.  The left ventricular branch was 1.5.  The ramus intermedius was 1.5.  The diagonal branch was 1.5 and the LAD was 2 mm.  Both RENETTA's were 2 mm with excellent blood flow.  The vein map predicted vein in the right leg but it was not usable.  It also predicted no vein in the left  leg but it was usable 3.5 mm in diameter.    Operative Procedure: A primary median sternotomy was made while we first looked for vein in the right leg.  It was not usable.  We then look for the vein in the left leg with the endoscope and it was 3.5 mm and good quality.  Both RENETTA's was dissected off the chest wall with the cautery at low voltage and they were skeletonized.  Cardiopulmonary bypass was then established for 76 minutes drifting to 34 °C and appropriate flow rates.  The aorta was crossclamped for 59 minutes and we gave a liter of antegrade cold blood cardioplegia then 2 L of retrograde cold blood cardioplegia repeated doses every 10 to 15 minutes to good effect.  2 veins were anastomosed the ascending aorta with 6-0 Prolene and marked with washers.  The first vein was sewn side-to-side to the PDA and then to the left ventricular branch with 7-0 Prolene.  The next vein was sewn to the marginal branch with 7-0 Prolene.  A 5 cm posterolateral pericardial window was created for drainage into the left pleural space.  The KIERRA was brought through an incision in the right side the pericardium and sewn into Jairon to the diagonal branch with 7-0 Prolene and then the LIMA was sewn to the LAD with a vertical slit in the pericardium to the LAD with 7-0 Prolene.  A warm dose of retrograde cardioplegia was given and then with strong suction on the aortic needle vent the cross-clamp was released.  He regained spontaneous sinus rhythm.  The patient was rewarmed and cardiopulmonary bypass was weaned and discontinued.  Decannulation was effected than usual devices were placed.  Hemostasis was obtained.  4 chest tubes were inserted and we applied vancomycin enriched platelet rich plasma.  The sternum was closed with stainless steel wires.  I closed the pericardium over the aorta since the right mammary went across the aorta to the diagonal branch.  If he needs anything in the future I think a TAVR would be the best way to go  given this orientation.  The fascia, soft tissues and skin were closed as usual.  The sponge, needle and instrument counts noted to be correct all the [place] nicely and all the anastomoses were hemostatic.  A1c open-heart recovery in good condition.    Complications: None    Tubes: 4    Epicardial Wires: 3    Blood Loss: Minimal.  350 mL of Cell Saver returned.    Bypass Time: 76 min    Aortic cross-clamp time: 59 min    Specimen: None    Condition: Good      Patient Care Team:  Steffany Gruber, APRN as PCP - General (Nurse Practitioner)        Binu Ribeiro MD  12/14/2023  11:06 EST

## 2023-12-14 NOTE — ANESTHESIA PROCEDURE NOTES
Mesa Samantha catheter      Patient reassessed immediately prior to procedure    Patient location during procedure: OR  Indications: central pressure monitoring and vascular access  Staff  Anesthesiologist: Samm Macdonald MD  Preanesthetic Checklist  Completed: patient identified, IV checked, site marked, risks and benefits discussed, surgical consent, monitors and equipment checked, pre-op evaluation and timeout performed  Central Line Prep  Sterile Tech:gloves, cap, gown, mask and sterile barriers  Prep: chloraprep  no medical exclusion documented for following all elements of maximal sterile barrier technique  Patient monitoring: blood pressure monitoring, continuous pulse oximetry and EKG  Central Line Procedure  Laterality:right  Location:internal jugular  Catheter Type:MAC and Mesa-Samantha  Catheter Size:7.5 Fr  Guidance:ultrasound guided  PROCEDURE NOTE/ULTRASOUND INTERPRETATION.  Using ultrasound guidance the potential vascular sites for insertion of the catheter were visualized to determine the patency of the vessel to be used for vascular access.  After selecting the appropriate site for insertion, the needle was visualized under ultrasound being inserted into the internal jugular vein, followed by ultrasound confirmation of wire and catheter placement. There were no abnormalities seen on ultrasound; an image was taken; and the patient tolerated the procedure with no complications. Images: still images obtained, printed/placed on chart  Assessment  Post procedure:biopatch applied, line sutured and occlusive dressing applied  Assessement:blood return through all ports and free fluid flow  Complications:no  Patient Tolerance:patient tolerated the procedure well with no apparent complications

## 2023-12-14 NOTE — ANESTHESIA PREPROCEDURE EVALUATION
Anesthesia Evaluation     Patient summary reviewed   NPO Solid Status: > 8 hours  NPO Liquid Status: > 2 hours           Airway   Mallampati: I  TM distance: >3 FB  Neck ROM: full  No difficulty expected  Dental - normal exam     Pulmonary - normal exam   (-) pneumonia  Cardiovascular - normal exam  Exercise tolerance: poor (<4 METS)    ECG reviewed  Patient on routine beta blocker and Beta blocker given within 24 hours of surgery    (+) hypertension, CAD, angina, hyperlipidemia      Neuro/Psych  GI/Hepatic/Renal/Endo    (+) diabetes mellitus type 2    Musculoskeletal     (+) back pain, chronic pain  Abdominal    Substance History      OB/GYN          Other   arthritis,                 Anesthesia Plan    ASA 4     general, [place], CVL and PAC     intravenous induction   Postoperative Plan: Expected vent after surgery  Anesthetic plan, risks, benefits, and alternatives have been provided, discussed and informed consent has been obtained with: patient.  Pre-procedure education provided  Use of blood products discussed with patient .      CODE STATUS:    Level Of Support Discussed With: Patient  Code Status (Patient has no pulse and is not breathing): CPR (Attempt to Resuscitate)  Medical Interventions (Patient has pulse or is breathing): Full Support

## 2023-12-14 NOTE — ANESTHESIA PROCEDURE NOTES
Central Line      Patient reassessed immediately prior to procedure    Patient location during procedure: OR  Indications: central pressure monitoring and vascular access  Staff  Anesthesiologist: Samm Macdonald MD  Preanesthetic Checklist  Completed: patient identified, IV checked, site marked, risks and benefits discussed, surgical consent, monitors and equipment checked, pre-op evaluation and timeout performed  Central Line Prep  Sterile Tech:gloves, cap, gown, mask and sterile barriers  Prep: chloraprep  no medical exclusion documented for following all elements of maximal sterile barrier technique  Patient monitoring: blood pressure monitoring, continuous pulse oximetry and EKG  Central Line Procedure  Laterality:right  Location:internal jugular  Catheter Type:MAC  Catheter Size:9 Fr  Guidance:ultrasound guided  PROCEDURE NOTE/ULTRASOUND INTERPRETATION.  Using ultrasound guidance the potential vascular sites for insertion of the catheter were visualized to determine the patency of the vessel to be used for vascular access.  After selecting the appropriate site for insertion, the needle was visualized under ultrasound being inserted into the internal jugular vein, followed by ultrasound confirmation of wire and catheter placement. There were no abnormalities seen on ultrasound; an image was taken; and the patient tolerated the procedure with no complications. Images: still images obtained, printed/placed on chart  Assessment  Post procedure:biopatch applied, line sutured and occlusive dressing applied  Assessement:blood return through all ports and free fluid flow  Complications:no  Patient Tolerance:patient tolerated the procedure well with no apparent complications

## 2023-12-14 NOTE — ANESTHESIA PROCEDURE NOTES
Diagnostic IntraOp Gab    Procedure Performed: Diagnostic IntraOp Gab       Start Time:        End Time:      Preanesthesia Checklist:  Patient identified, IV assessed, risks and benefits discussed, monitors and equipment assessed, procedure being performed at surgeon's request and anesthesia consent obtained.    General Procedure Information  Diagnostic Indications for Echo:  assessment of ascending aorta and assessment of surgical repair  Location performed:  OR  Intubated  Bite block placed  Heart visualized  Probe Insertion:  Easy  Probe Type:  Multiplane  Modalities:  Color flow mapping, continuous wave Doppler and pulse wave Doppler    Echocardiographic and Doppler Measurements    Ventricles    Right Ventricle:  Cavity size normal.  Hypertrophy not present.  Thrombus not present.  Global function normal.    Left Ventricle:  Cavity size normal.  Thrombus not present.  Global Function normal.  Ejection Fraction 55%.          Valves    Aortic Valve:  Annulus calcified.  Stenosis mild.  Regurgitation absent.  Leaflets normal, calcified and thickened.  Leaflet motions normal.      Mitral Valve:  Annulus calcified.  Stenosis not present.  Regurgitation trace.  Leaflets normal.  Leaflet motions normal.      Tricuspid Valve:  Annulus normal.  Stenosis not present.  Regurgitation trace.  Leaflet motions normal.    Pulmonic Valve:  Annulus normal.  Stenosis not present.  Regurgitation trace.    Other Valve Findings:       Aortic valve is tricuspid and sclerosed. MARC by planimetry is 1.94 cm2, mean gradient is 6 mmHg and peak velocity is 1.45 m/sec.    Aorta    Ascending Aorta:  Size normal.  Dissection not present.  Plaque thickness less than 3 mm.  Mobile plaque not present.    Aortic Arch:  Size normal.  Dissection not present.  Plaque thickness less than 3 mm.  Mobile plaque not present.    Descending Aorta:  Size normal.  Dissection not present.  Plaque thickness less than 3 mm.  Mobile plaque not present.         Atria    Right Atrium:  Size dilated.  Spontaneous echo contrast not present.  Thrombus not present.  Tumor not present.  Device not present.      Left Atrium:  Size dilated.  Spontaneous echo contrast not present.  Thrombus not present.  Tumor not present.  Device not present.    Left atrial appendage normal.      Septa        Ventricular Septum:  Intra-ventricular septum morphology normal.          Other Findings  Pericardium:  normal  Pleural Effusion:  none  Pulmonary Arteries:  normal      Anesthesia Information  Performed Personally  Anesthesiologist:  Samm Macdonald MD      Echocardiogram Comments:       Post CPB:  LVEF has slightly improved. RV function is normal.    Valvular function is similar to baseline.    Aorta is intact.    There is no pericardial effusion.           All pertinent findings were discussed with the surgeon.

## 2023-12-14 NOTE — ANESTHESIA PROCEDURE NOTES
Arterial Line      Patient reassessed immediately prior to procedure    Patient location during procedure: OR   Line placed for hemodynamic monitoring and ABGs/Labs/ISTAT.  Performed By   Anesthesiologist: Samm Macdonald MD   Preanesthetic Checklist  Completed: patient identified, IV checked, site marked, risks and benefits discussed, surgical consent, monitors and equipment checked, pre-op evaluation and timeout performed  Arterial Line Prep    Sterile Tech: cap, gloves, sterile barriers and mask  Prep: ChloraPrep  Patient monitoring: EKG, continuous pulse oximetry and blood pressure monitoring  Arterial Line Procedure   Laterality:left  Location:  radial artery  Catheter size: 20 G   Guidance: palpation technique  Number of attempts: 1  Successful placement: yes Images: still images not obtained  Post Assessment   Dressing Type: wrist guard applied, secured with tape and occlusive dressing applied.   Complications no  Circ/Move/Sens Assessment: normal and unchanged.   Patient Tolerance: patient tolerated the procedure well with no apparent complications

## 2023-12-14 NOTE — ANESTHESIA POSTPROCEDURE EVALUATION
Patient: Moreno Aleman    Procedure Summary       Date: 12/14/23 Room / Location: [address] CARDIOVASCULAR OPERATING ROOM    Anesthesia Start: 0638 Anesthesia Stop: 1130    Procedure: CORONARY ARTERY BYPASS GRAFTING TIMES 5, UTILIZING LEFT AND RIGHT INTERNAL MAMMARY ARTERIES AND ENDOSCOPICALLY HARVESTED LEFT SAPHENOUS VEIN GRAFT W/ PRP; RIGHT LEG VEIN EXPLORATION; TRANSESOPHAGEAL ECHOCARDIOGRAM WITH ANESTHESIA (Chest) Diagnosis:       Coronary artery disease of native heart with stable angina pectoris, unspecified vessel or lesion type      Abnormal findings on diagnostic imaging of heart and coronary circulation      (Coronary artery disease of native heart with stable angina pectoris, unspecified vessel or lesion type [I25.118])      (Abnormal findings on diagnostic imaging of heart and coronary circulation [R93.1])    Surgeons: Jr Binu Ribeiro MD Provider: Samm Macdonald MD    Anesthesia Type: general, [place], CVL, PAC ASA Status: 4            Anesthesia Type: general, [place], CVL, PAC    Vitals  Vitals Value Taken Time   /78 12/14/23 1430   Temp 36.6 °C (97.88 °F) 12/14/23 1439   Pulse 80 12/14/23 1439   Resp 15 12/14/23 1122   SpO2 100 % 12/14/23 1439   Vitals shown include unfiled device data.        Post Anesthesia Care and Evaluation    Patient location during evaluation: ICU  Patient participation: complete - patient cannot participate  Level of consciousness: responsive to physical stimuli  Pain management: adequate    Airway patency: patent  Anesthetic complications: No anesthetic complications  PONV Status: none  Cardiovascular status: acceptable and hemodynamically stable  Respiratory status: acceptable, ETT, intubated and ventilator  Hydration status: acceptable

## 2023-12-15 ENCOUNTER — APPOINTMENT (OUTPATIENT)
Dept: GENERAL RADIOLOGY | Facility: HOSPITAL | Age: 58
DRG: 236 | End: 2023-12-15
Payer: MEDICARE

## 2023-12-15 LAB
ALBUMIN SERPL-MCNC: 4.6 G/DL (ref 3.5–5.2)
ANION GAP SERPL CALCULATED.3IONS-SCNC: 19.5 MMOL/L (ref 5–15)
BASOPHILS # BLD AUTO: 0.02 10*3/MM3 (ref 0–0.2)
BASOPHILS NFR BLD AUTO: 0.1 % (ref 0–1.5)
BUN SERPL-MCNC: 22 MG/DL (ref 6–20)
BUN/CREAT SERPL: 22 (ref 7–25)
CA-I BLD-MCNC: 4.8 MG/DL (ref 4.6–5.4)
CA-I SERPL ISE-MCNC: 1.19 MMOL/L (ref 1.15–1.35)
CALCIUM SPEC-SCNC: 8.5 MG/DL (ref 8.6–10.5)
CHLORIDE SERPL-SCNC: 104 MMOL/L (ref 98–107)
CO2 SERPL-SCNC: 16.5 MMOL/L (ref 22–29)
CREAT SERPL-MCNC: 1 MG/DL (ref 0.76–1.27)
DEPRECATED RDW RBC AUTO: 41.8 FL (ref 37–54)
EGFRCR SERPLBLD CKD-EPI 2021: 87.8 ML/MIN/1.73
EOSINOPHIL # BLD AUTO: 0 10*3/MM3 (ref 0–0.4)
EOSINOPHIL NFR BLD AUTO: 0 % (ref 0.3–6.2)
ERYTHROCYTE [DISTWIDTH] IN BLOOD BY AUTOMATED COUNT: 12.7 % (ref 12.3–15.4)
GLUCOSE BLDC GLUCOMTR-MCNC: 156 MG/DL (ref 70–130)
GLUCOSE BLDC GLUCOMTR-MCNC: 159 MG/DL (ref 70–130)
GLUCOSE BLDC GLUCOMTR-MCNC: 162 MG/DL (ref 70–130)
GLUCOSE BLDC GLUCOMTR-MCNC: 182 MG/DL (ref 70–130)
GLUCOSE BLDC GLUCOMTR-MCNC: 190 MG/DL (ref 70–130)
GLUCOSE BLDC GLUCOMTR-MCNC: 194 MG/DL (ref 70–130)
GLUCOSE BLDC GLUCOMTR-MCNC: 195 MG/DL (ref 70–130)
GLUCOSE BLDC GLUCOMTR-MCNC: 196 MG/DL (ref 70–130)
GLUCOSE SERPL-MCNC: 192 MG/DL (ref 65–99)
HCT VFR BLD AUTO: 33 % (ref 37.5–51)
HGB BLD-MCNC: 11.1 G/DL (ref 13–17.7)
IMM GRANULOCYTES # BLD AUTO: 0.03 10*3/MM3 (ref 0–0.05)
IMM GRANULOCYTES NFR BLD AUTO: 0.2 % (ref 0–0.5)
INR PPP: 1.29 (ref 0.9–1.1)
LYMPHOCYTES # BLD AUTO: 1.42 10*3/MM3 (ref 0.7–3.1)
LYMPHOCYTES NFR BLD AUTO: 10.3 % (ref 19.6–45.3)
MAGNESIUM SERPL-MCNC: 2.5 MG/DL (ref 1.6–2.6)
MCH RBC QN AUTO: 30.4 PG (ref 26.6–33)
MCHC RBC AUTO-ENTMCNC: 33.6 G/DL (ref 31.5–35.7)
MCV RBC AUTO: 90.4 FL (ref 79–97)
MONOCYTES # BLD AUTO: 1.34 10*3/MM3 (ref 0.1–0.9)
MONOCYTES NFR BLD AUTO: 9.8 % (ref 5–12)
NEUTROPHILS NFR BLD AUTO: 10.91 10*3/MM3 (ref 1.7–7)
NEUTROPHILS NFR BLD AUTO: 79.6 % (ref 42.7–76)
NRBC BLD AUTO-RTO: 0 /100 WBC (ref 0–0.2)
PHOSPHATE SERPL-MCNC: 6.2 MG/DL (ref 2.5–4.5)
PLATELET # BLD AUTO: 125 10*3/MM3 (ref 140–450)
PMV BLD AUTO: 10.2 FL (ref 6–12)
POTASSIUM SERPL-SCNC: 4.2 MMOL/L (ref 3.5–5.2)
PROTHROMBIN TIME: 16.3 SECONDS (ref 11.7–14.2)
QT INTERVAL: 392 MS
QT INTERVAL: 449 MS
QTC INTERVAL: 411 MS
QTC INTERVAL: 534 MS
RBC # BLD AUTO: 3.65 10*6/MM3 (ref 4.14–5.8)
SODIUM SERPL-SCNC: 140 MMOL/L (ref 136–145)
WBC NRBC COR # BLD AUTO: 13.72 10*3/MM3 (ref 3.4–10.8)

## 2023-12-15 PROCEDURE — 83735 ASSAY OF MAGNESIUM: CPT | Performed by: NURSE PRACTITIONER

## 2023-12-15 PROCEDURE — 25010000002 MORPHINE PER 10 MG: Performed by: NURSE PRACTITIONER

## 2023-12-15 PROCEDURE — 63710000001 INSULIN LISPRO (HUMAN) PER 5 UNITS: Performed by: NURSE PRACTITIONER

## 2023-12-15 PROCEDURE — 85025 COMPLETE CBC W/AUTO DIFF WBC: CPT | Performed by: NURSE PRACTITIONER

## 2023-12-15 PROCEDURE — 85610 PROTHROMBIN TIME: CPT | Performed by: NURSE PRACTITIONER

## 2023-12-15 PROCEDURE — 25010000002 CEFAZOLIN IN DEXTROSE 2-4 GM/100ML-% SOLUTION: Performed by: NURSE PRACTITIONER

## 2023-12-15 PROCEDURE — 71045 X-RAY EXAM CHEST 1 VIEW: CPT

## 2023-12-15 PROCEDURE — 25010000002 METOCLOPRAMIDE PER 10 MG: Performed by: NURSE PRACTITIONER

## 2023-12-15 PROCEDURE — 93010 ELECTROCARDIOGRAM REPORT: CPT | Performed by: INTERNAL MEDICINE

## 2023-12-15 PROCEDURE — 93005 ELECTROCARDIOGRAM TRACING: CPT | Performed by: NURSE PRACTITIONER

## 2023-12-15 PROCEDURE — 80069 RENAL FUNCTION PANEL: CPT | Performed by: NURSE PRACTITIONER

## 2023-12-15 PROCEDURE — 97530 THERAPEUTIC ACTIVITIES: CPT

## 2023-12-15 PROCEDURE — 63710000001 INSULIN GLARGINE PER 5 UNITS: Performed by: NURSE PRACTITIONER

## 2023-12-15 PROCEDURE — 25010000002 ENOXAPARIN PER 10 MG: Performed by: NURSE PRACTITIONER

## 2023-12-15 PROCEDURE — 82330 ASSAY OF CALCIUM: CPT | Performed by: NURSE PRACTITIONER

## 2023-12-15 PROCEDURE — 97162 PT EVAL MOD COMPLEX 30 MIN: CPT

## 2023-12-15 PROCEDURE — 25010000002 MAGNESIUM SULFATE IN D5W 1G/100ML (PREMIX) 1-5 GM/100ML-% SOLUTION: Performed by: NURSE PRACTITIONER

## 2023-12-15 PROCEDURE — 82948 REAGENT STRIP/BLOOD GLUCOSE: CPT

## 2023-12-15 PROCEDURE — 25010000002 ACETAMINOPHEN 10 MG/ML SOLUTION: Performed by: NURSE PRACTITIONER

## 2023-12-15 RX ORDER — NICOTINE POLACRILEX 4 MG
15 LOZENGE BUCCAL
Status: DISCONTINUED | OUTPATIENT
Start: 2023-12-15 | End: 2023-12-19 | Stop reason: HOSPADM

## 2023-12-15 RX ORDER — ACETAMINOPHEN 10 MG/ML
1000 INJECTION, SOLUTION INTRAVENOUS ONCE
Status: COMPLETED | OUTPATIENT
Start: 2023-12-15 | End: 2023-12-15

## 2023-12-15 RX ORDER — GUAIFENESIN 600 MG/1
1200 TABLET, EXTENDED RELEASE ORAL EVERY 12 HOURS SCHEDULED
Status: DISCONTINUED | OUTPATIENT
Start: 2023-12-15 | End: 2023-12-19 | Stop reason: HOSPADM

## 2023-12-15 RX ORDER — NALOXONE HCL 0.4 MG/ML
0.4 VIAL (ML) INJECTION
Status: DISCONTINUED | OUTPATIENT
Start: 2023-12-15 | End: 2023-12-19 | Stop reason: HOSPADM

## 2023-12-15 RX ORDER — MORPHINE SULFATE 2 MG/ML
2 INJECTION, SOLUTION INTRAMUSCULAR; INTRAVENOUS
Status: DISCONTINUED | OUTPATIENT
Start: 2023-12-15 | End: 2023-12-15

## 2023-12-15 RX ORDER — MORPHINE SULFATE 2 MG/ML
2 INJECTION, SOLUTION INTRAMUSCULAR; INTRAVENOUS
Status: DISPENSED | OUTPATIENT
Start: 2023-12-15 | End: 2023-12-17

## 2023-12-15 RX ORDER — GABAPENTIN 300 MG/1
600 CAPSULE ORAL 3 TIMES DAILY
Status: DISCONTINUED | OUTPATIENT
Start: 2023-12-15 | End: 2023-12-17

## 2023-12-15 RX ORDER — INSULIN LISPRO 100 [IU]/ML
3-14 INJECTION, SOLUTION INTRAVENOUS; SUBCUTANEOUS
Status: DISCONTINUED | OUTPATIENT
Start: 2023-12-15 | End: 2023-12-19 | Stop reason: HOSPADM

## 2023-12-15 RX ORDER — NALOXONE HCL 0.4 MG/ML
0.4 VIAL (ML) INJECTION
Status: DISCONTINUED | OUTPATIENT
Start: 2023-12-15 | End: 2023-12-15

## 2023-12-15 RX ORDER — DEXTROSE MONOHYDRATE 25 G/50ML
25 INJECTION, SOLUTION INTRAVENOUS
Status: DISCONTINUED | OUTPATIENT
Start: 2023-12-15 | End: 2023-12-19 | Stop reason: HOSPADM

## 2023-12-15 RX ORDER — IBUPROFEN 600 MG/1
1 TABLET ORAL
Status: DISCONTINUED | OUTPATIENT
Start: 2023-12-15 | End: 2023-12-19 | Stop reason: HOSPADM

## 2023-12-15 RX ADMIN — OXYCODONE HYDROCHLORIDE 10 MG: 5 TABLET ORAL at 03:41

## 2023-12-15 RX ADMIN — MORPHINE SULFATE 1 MG: 2 INJECTION, SOLUTION INTRAMUSCULAR; INTRAVENOUS at 08:49

## 2023-12-15 RX ADMIN — ASPIRIN 81 MG: 81 TABLET, COATED ORAL at 08:19

## 2023-12-15 RX ADMIN — 0.12% CHLORHEXIDINE GLUCONATE 15 ML: 1.2 RINSE ORAL at 20:14

## 2023-12-15 RX ADMIN — OXYCODONE HYDROCHLORIDE 10 MG: 5 TABLET ORAL at 21:16

## 2023-12-15 RX ADMIN — METOCLOPRAMIDE HYDROCHLORIDE 10 MG: 5 INJECTION INTRAMUSCULAR; INTRAVENOUS at 05:55

## 2023-12-15 RX ADMIN — ACETAMINOPHEN 1000 MG: 10 INJECTION, SOLUTION INTRAVENOUS at 03:41

## 2023-12-15 RX ADMIN — INSULIN LISPRO 3 UNITS: 100 INJECTION, SOLUTION INTRAVENOUS; SUBCUTANEOUS at 08:27

## 2023-12-15 RX ADMIN — CEFAZOLIN SODIUM 2 G: 2 INJECTION, SOLUTION INTRAVENOUS at 10:06

## 2023-12-15 RX ADMIN — CEFAZOLIN SODIUM 2 G: 2 INJECTION, SOLUTION INTRAVENOUS at 17:22

## 2023-12-15 RX ADMIN — MAGNESIUM SULFATE IN DEXTROSE 1 G: 10 INJECTION, SOLUTION INTRAVENOUS at 03:41

## 2023-12-15 RX ADMIN — MORPHINE SULFATE 2 MG: 2 INJECTION, SOLUTION INTRAMUSCULAR; INTRAVENOUS at 15:24

## 2023-12-15 RX ADMIN — ACETAMINOPHEN 650 MG: 325 TABLET, FILM COATED ORAL at 21:16

## 2023-12-15 RX ADMIN — ALPRAZOLAM 0.25 MG: 0.25 TABLET ORAL at 03:41

## 2023-12-15 RX ADMIN — CYCLOBENZAPRINE 10 MG: 10 TABLET, FILM COATED ORAL at 16:21

## 2023-12-15 RX ADMIN — METOPROLOL TARTRATE 25 MG: 25 TABLET, FILM COATED ORAL at 20:14

## 2023-12-15 RX ADMIN — GUAIFENESIN 1200 MG: 600 TABLET, EXTENDED RELEASE ORAL at 20:14

## 2023-12-15 RX ADMIN — GABAPENTIN 600 MG: 300 CAPSULE ORAL at 16:20

## 2023-12-15 RX ADMIN — SENNOSIDES AND DOCUSATE SODIUM 2 TABLET: 50; 8.6 TABLET ORAL at 20:14

## 2023-12-15 RX ADMIN — AMITRIPTYLINE HYDROCHLORIDE 50 MG: 50 TABLET, FILM COATED ORAL at 20:14

## 2023-12-15 RX ADMIN — ATORVASTATIN CALCIUM 40 MG: 20 TABLET, FILM COATED ORAL at 20:14

## 2023-12-15 RX ADMIN — OXYCODONE HYDROCHLORIDE 10 MG: 5 TABLET ORAL at 00:00

## 2023-12-15 RX ADMIN — GABAPENTIN 600 MG: 300 CAPSULE ORAL at 20:14

## 2023-12-15 RX ADMIN — MORPHINE SULFATE 1 MG: 2 INJECTION, SOLUTION INTRAMUSCULAR; INTRAVENOUS at 01:44

## 2023-12-15 RX ADMIN — INSULIN LISPRO 3 UNITS: 100 INJECTION, SOLUTION INTRAVENOUS; SUBCUTANEOUS at 16:21

## 2023-12-15 RX ADMIN — MORPHINE SULFATE 2 MG: 2 INJECTION, SOLUTION INTRAMUSCULAR; INTRAVENOUS at 23:26

## 2023-12-15 RX ADMIN — INSULIN LISPRO 3 UNITS: 100 INJECTION, SOLUTION INTRAVENOUS; SUBCUTANEOUS at 11:59

## 2023-12-15 RX ADMIN — ACETAMINOPHEN 1000 MG: 10 INJECTION, SOLUTION INTRAVENOUS at 10:06

## 2023-12-15 RX ADMIN — MUPIROCIN 1 APPLICATION: 20 OINTMENT TOPICAL at 08:19

## 2023-12-15 RX ADMIN — INSULIN GLARGINE 10 UNITS: 100 INJECTION, SOLUTION SUBCUTANEOUS at 08:27

## 2023-12-15 RX ADMIN — ACETAMINOPHEN 650 MG: 325 TABLET, FILM COATED ORAL at 17:21

## 2023-12-15 RX ADMIN — CYCLOBENZAPRINE 10 MG: 10 TABLET, FILM COATED ORAL at 08:20

## 2023-12-15 RX ADMIN — GABAPENTIN 600 MG: 300 CAPSULE ORAL at 08:19

## 2023-12-15 RX ADMIN — OXYCODONE HYDROCHLORIDE 10 MG: 5 TABLET ORAL at 17:21

## 2023-12-15 RX ADMIN — MORPHINE SULFATE 1 MG: 2 INJECTION, SOLUTION INTRAMUSCULAR; INTRAVENOUS at 05:27

## 2023-12-15 RX ADMIN — OXYCODONE HYDROCHLORIDE 10 MG: 5 TABLET ORAL at 11:57

## 2023-12-15 RX ADMIN — GUAIFENESIN 1200 MG: 600 TABLET, EXTENDED RELEASE ORAL at 08:19

## 2023-12-15 RX ADMIN — CYCLOBENZAPRINE 10 MG: 10 TABLET, FILM COATED ORAL at 00:00

## 2023-12-15 RX ADMIN — CEFAZOLIN SODIUM 2 G: 2 INJECTION, SOLUTION INTRAVENOUS at 01:44

## 2023-12-15 RX ADMIN — ENOXAPARIN SODIUM 40 MG: 100 INJECTION SUBCUTANEOUS at 17:21

## 2023-12-15 RX ADMIN — MUPIROCIN 1 APPLICATION: 20 OINTMENT TOPICAL at 20:14

## 2023-12-15 RX ADMIN — PANTOPRAZOLE SODIUM 40 MG: 40 TABLET, DELAYED RELEASE ORAL at 06:26

## 2023-12-15 RX ADMIN — METOPROLOL TARTRATE 25 MG: 25 TABLET, FILM COATED ORAL at 08:19

## 2023-12-15 RX ADMIN — OXYCODONE HYDROCHLORIDE 10 MG: 5 TABLET ORAL at 07:48

## 2023-12-15 RX ADMIN — MORPHINE SULFATE 2 MG: 2 INJECTION, SOLUTION INTRAMUSCULAR; INTRAVENOUS at 20:28

## 2023-12-15 RX ADMIN — INSULIN LISPRO 3 UNITS: 100 INJECTION, SOLUTION INTRAVENOUS; SUBCUTANEOUS at 20:29

## 2023-12-15 RX ADMIN — 0.12% CHLORHEXIDINE GLUCONATE 15 ML: 1.2 RINSE ORAL at 08:20

## 2023-12-15 NOTE — PLAN OF CARE
Goal Outcome Evaluation:  Plan of Care Reviewed With: patient           Outcome Evaluation: Patient is a 58 y.o male who presents POD1 CABGx5. Patient AOx4 sitting UIC upon arrival. Patient lives at home with his wife with 3STE. Patient is independent at baseline and does not use an AD. Patient educated in and performed cardiac post op protocol exercises. Patient performed STS from chair with CGA. Patient ambulated 45ft with HHA and CGA along with additional assistance for equipment management. Gait jaydon slow and guarded. No overt LOB noted. Patient requested to return to bed at end of session. Patient required Zeus-[id number] to return to supine. Patient would continue to benefit from skilled PT intervention to address deficits in functional mobility and maximize safety and independence. Anticipate home with assist and HHPT at d/c. PT will continue to monitor.      Anticipated Discharge Disposition (PT): home with home health, home with assist

## 2023-12-15 NOTE — PROGRESS NOTES
LOS: 3 days   Patient Care Team:  Steffany Gruber APRN as PCP - General (Nurse Practitioner)    Chief Complaint: post op    Subjective:  Symptoms:  He reports chest pain.  No shortness of breath or cough.    Diet:  No nausea or vomiting.    Activity level: Impaired due to pain.    Pain:  He complains of pain that is moderate.  Pain is partially controlled.      Vital Signs  Temp:  [97 °F (36.1 °C)-100 °F (37.8 °C)] 99.3 °F (37.4 °C)  Heart Rate:  [] 91  Resp:  [15-18] 18  BP: ()/(56-85) 124/75  FiO2 (%):  [38 %-98 %] 38 %  Body mass index is 32.02 kg/m².    Intake/Output Summary (Last 24 hours) at 12/15/2023 0742  Last data filed at 12/15/2023 0659  Gross per 24 hour   Intake 3167 ml   Output 5255 ml   Net -2088 ml     No intake/output data recorded.    Chest tube drainage last 8 hours: 110/110        12/14/23  0447 12/15/23  0554   Weight: 84.7 kg (186 lb 11.2 oz) 90 kg (198 lb 6.6 oz)         Objective:  General Appearance:  Comfortable and in no acute distress.    Vital signs: (most recent): Blood pressure 124/75, pulse 91, temperature 99.3 °F (37.4 °C), resp. rate 18, weight 90 kg (198 lb 6.6 oz), SpO2 100%.  Vital signs are normal.  No fever.    Output: Producing urine and no stool output.    Lungs:  Normal effort and normal respiratory rate.  There are decreased breath sounds.    Heart: Normal rate.  Regular rhythm.  (SR on tele monitor)  Abdomen: Abdomen is soft.  Hypoactive bowel sounds.     Extremities: There is no dependent edema.    Pulses: Distal pulses are intact.    Neurological: Patient is alert and oriented to person, place and time.    Skin:  Warm and dry.  (Sternal dressing clean, dry, and intact)          Results Review:        WBC WBC   Date Value Ref Range Status   12/15/2023 13.72 (H) 3.40 - 10.80 10*3/mm3 Final   12/14/2023 9.61 3.40 - 10.80 10*3/mm3 Final   12/14/2023 13.74 (H) 3.40 - 10.80 10*3/mm3 Final   12/13/2023 6.75 3.40 - 10.80 10*3/mm3 Final      HGB Hemoglobin    Date Value Ref Range Status   12/15/2023 11.1 (L) 13.0 - 17.7 g/dL Final   12/14/2023 11.1 (L) 13.0 - 17.7 g/dL Final   12/14/2023 11.7 (L) 12.0 - 17.0 g/dL Final     Comment:     Serial Number: [id number]:  [id number]   12/14/2023 11.6 (L) 13.0 - 17.7 g/dL Final   12/14/2023 9.9 (L) 12.0 - 17.0 g/dL Final   12/14/2023 9.9 (L) 12.0 - 17.0 g/dL Final   12/14/2023 10.2 (L) 12.0 - 17.0 g/dL Final   12/14/2023 9.9 (L) 12.0 - 17.0 g/dL Final   12/14/2023 9.2 (L) 12.0 - 17.0 g/dL Final   12/14/2023 13.3 12.0 - 17.0 g/dL Final   12/13/2023 13.9 13.0 - 17.7 g/dL Final      HCT Hematocrit   Date Value Ref Range Status   12/15/2023 33.0 (L) 37.5 - 51.0 % Final   12/14/2023 33.2 (L) 37.5 - 51.0 % Final   12/14/2023 35 (L) 38 - 51 % Final   12/14/2023 36.2 (L) 37.5 - 51.0 % Final   12/14/2023 29 (L) 38 - 51 % Final   12/14/2023 29 (L) 38 - 51 % Final   12/14/2023 30 (L) 38 - 51 % Final   12/14/2023 29 (L) 38 - 51 % Final   12/14/2023 27 (L) 38 - 51 % Final   12/14/2023 39 38 - 51 % Final   12/13/2023 40.2 37.5 - 51.0 % Final      Platelets Platelets   Date Value Ref Range Status   12/15/2023 125 (L) 140 - 450 10*3/mm3 Final   12/14/2023 94 (L) 140 - 450 10*3/mm3 Final   12/14/2023 131 (L) 140 - 450 10*3/mm3 Final   12/13/2023 147 140 - 450 10*3/mm3 Final        PT/INR:    Protime   Date Value Ref Range Status   12/15/2023 16.3 (H) 11.7 - 14.2 Seconds Final   12/14/2023 17.4 (H) 11.7 - 14.2 Seconds Final   12/13/2023 14.3 (H) 11.7 - 14.2 Seconds Final   /  INR   Date Value Ref Range Status   12/15/2023 1.29 (H) 0.90 - 1.10 Final   12/14/2023 1.40 (H) 0.90 - 1.10 Final   12/13/2023 1.10 0.90 - 1.10 Final       Sodium Sodium   Date Value Ref Range Status   12/15/2023 140 136 - 145 mmol/L Final   12/14/2023 142 136 - 145 mmol/L Final   12/14/2023 140 136 - 145 mmol/L Final   12/14/2023 139 136 - 145 mmol/L Final   12/13/2023 140 136 - 145 mmol/L Final      Potassium Potassium   Date Value Ref Range Status   12/15/2023 4.2 3.5 -  5.2 mmol/L Final   12/14/2023 4.3 3.5 - 5.2 mmol/L Final   12/14/2023 4.2 3.5 - 5.2 mmol/L Final   12/14/2023 4.0 3.5 - 5.2 mmol/L Final   12/13/2023 4.2 3.5 - 5.2 mmol/L Final   12/13/2023 3.5 3.5 - 5.2 mmol/L Final      Chloride Chloride   Date Value Ref Range Status   12/15/2023 104 98 - 107 mmol/L Final   12/14/2023 108 (H) 98 - 107 mmol/L Final   12/14/2023 106 98 - 107 mmol/L Final   12/14/2023 103 98 - 107 mmol/L Final   12/13/2023 105 98 - 107 mmol/L Final      Bicarbonate CO2   Date Value Ref Range Status   12/15/2023 16.5 (L) 22.0 - 29.0 mmol/L Final   12/14/2023 20.7 (L) 22.0 - 29.0 mmol/L Final   12/14/2023 24.0 22.0 - 29.0 mmol/L Final   12/14/2023 23.3 22.0 - 29.0 mmol/L Final   12/13/2023 24.0 22.0 - 29.0 mmol/L Final      BUN BUN   Date Value Ref Range Status   12/15/2023 22 (H) 6 - 20 mg/dL Final   12/14/2023 15 6 - 20 mg/dL Final   12/14/2023 15 6 - 20 mg/dL Final   12/14/2023 14 6 - 20 mg/dL Final   12/13/2023 9 6 - 20 mg/dL Final      Creatinine Creatinine   Date Value Ref Range Status   12/15/2023 1.00 0.76 - 1.27 mg/dL Final   12/14/2023 0.81 0.76 - 1.27 mg/dL Final   12/14/2023 0.66 0.60 - 130.00 mg/dL Final   12/14/2023 0.94 0.76 - 1.27 mg/dL Final   12/14/2023 0.79 0.76 - 1.27 mg/dL Final   12/13/2023 0.62 (L) 0.76 - 1.27 mg/dL Final      Calcium Calcium   Date Value Ref Range Status   12/15/2023 8.5 (L) 8.6 - 10.5 mg/dL Final   12/14/2023 8.9 8.6 - 10.5 mg/dL Final   12/14/2023 9.3 8.6 - 10.5 mg/dL Final   12/14/2023 9.5 8.6 - 10.5 mg/dL Final   12/13/2023 8.8 8.6 - 10.5 mg/dL Final      Magnesium Magnesium   Date Value Ref Range Status   12/15/2023 2.5 1.6 - 2.6 mg/dL Final   12/14/2023 2.0 1.6 - 2.6 mg/dL Final   12/14/2023 3.2 (H) 1.6 - 2.6 mg/dL Final   12/13/2023 1.6 1.6 - 2.6 mg/dL Final          amitriptyline, 50 mg, Oral, Nightly  aspirin, 81 mg, Oral, Daily  atorvastatin, 40 mg, Oral, Nightly  ceFAZolin, 2 g, Intravenous, Q8H  chlorhexidine, 15 mL, Mouth/Throat, Q12H  enoxaparin,  40 mg, Subcutaneous, Q24H  gabapentin, 600 mg, Oral, TID  magnesium sulfate, 1 g, Intravenous, Q8H  metoclopramide, 10 mg, Intravenous, Q6H  metoprolol tartrate, 12.5 mg, Oral, Q12H  mupirocin, , Each Nare, BID  pantoprazole, 40 mg, Oral, QAM  senna-docusate sodium, 2 tablet, Oral, Nightly      clevidipine, 2-32 mg/hr  dexmedetomidine, 0.2-1.5 mcg/kg/hr, Last Rate: 0.8 mcg/kg/hr (12/14/23 1528)  DOPamine, 2-20 mcg/kg/min  EPINEPHrine, 0.02-0.2 mcg/kg/min  insulin, 0-100 Units/hr, Last Rate: 3.4 Units/hr (12/15/23 0704)  milrinone, 0.25-0.375 mcg/kg/min  niCARdipine, 5-15 mg/hr  nitroglycerin, 5-200 mcg/min  norepinephrine, 0.02-0.3 mcg/kg/min  phenylephrine, 0.2-2 mcg/kg/min, Last Rate: Stopped (12/14/23 1145)  propofol, 5-50 mcg/kg/min, Last Rate: Stopped (12/14/23 1345)  sodium chloride, 30 mL/hr, Last Rate: 30 mL/hr (12/14/23 1233)        Assessment & Plan  - severe multi-vessel CAD with hx of PCI 2016; LD Plavix 12/10 s/p CABGx5 BIMA/LSVG POD#1 [place]  - hypertension  - hyperlipidemia  - DM II--A1c 9.4  - chronic back pain  - leukocytosis--reactive  - post op anemia--expected acute blood loss    Looks good this morning, but pain is an issues. Will add gabapentin  Remains in SR with rates in the 90s. Increase beta blocker  Transition from insulin gtt to SSI and Lantus  On 4L NC--wean as able  Evaluate need for diuretics this afternoon  Mobilize/encourage pulmonary toilet-- add mucinex/flutter  Transfer to stepdown  Continue routine care    Elena Benoit, NINFA  12/15/23  07:42 EST

## 2023-12-15 NOTE — THERAPY EVALUATION
Patient Name: Moreno Aleman  : 1965    MRN: [id number]                              Today's Date: 12/15/2023       Admit Date: 2023    Visit Dx:     ICD-10-CM ICD-9-CM   1. Coronary artery disease of native heart with stable angina pectoris, unspecified vessel or lesion type  I25.118 414.01     413.9   2. Abnormal findings on diagnostic imaging of heart and coronary circulation  R93.1 794.39   3. S/P CABG (coronary artery bypass graft)  Z95.1 V45.81     Patient Active Problem List   Diagnosis    Hypertension, essential    Hyperlipemia, mixed    Migraine    Indeterminate colitis    Type 2 diabetes mellitus, with long-term current use of insulin    Arthritis    Vitamin D deficiency    Closed fracture of metacarpal bone    Left epididymitis    Phimosis    S/P left shoulder arthroscopic subacromial decompression, mini open rotator cuff repair    Cervical spondylosis    Myofascial pain    Overweight    CAD S/P percutaneous coronary angioplasty    Chest pain    Unstable angina    CAD (coronary artery disease)    Abnormal findings on diagnostic imaging of heart and coronary circulation    Headache     Past Medical History:   Diagnosis Date    Arthritis     Biceps tendonitis on left 2022    Bursitis of shoulder 2018    CAD (coronary artery disease)     Claustrophobia     Diabetes     Hyperlipidemia     Hypertension     Pneumonia      Past Surgical History:   Procedure Laterality Date    CARDIAC CATHETERIZATION      showed significant coronary artery disease of the LAD with calcification.     CARDIAC CATHETERIZATION Left 2023    Procedure: Cardiac Catheterization/Vascular Study;  Surgeon: Mario Welch MD;  Location: [facility] CATH INVASIVE LOCATION;  Service: Cardiology;  Laterality: Left;    CHOLECYSTECTOMY      CIRCUMCISION      CORONARY ANGIOPLASTY WITH STENT PLACEMENT      CORONARY ARTERY BYPASS GRAFT N/A 2023    Procedure: CORONARY ARTERY BYPASS GRAFTING TIMES 5, UTILIZING LEFT  AND RIGHT INTERNAL MAMMARY ARTERIES AND ENDOSCOPICALLY HARVESTED LEFT SAPHENOUS VEIN GRAFT W/ PRP; RIGHT LEG VEIN EXPLORATION; TRANSESOPHAGEAL ECHOCARDIOGRAM WITH ANESTHESIA;  Surgeon: Jr Binu Ribeiro MD;  Location: [facility] CVOR;  Service: Cardiothoracic;  Laterality: N/A;    INGUINAL HERNIA REPAIR      x2    NOSE SURGERY      OK RT/LT HEART CATHETERS N/A 03/22/2016    Procedure: Percutaneous Coronary Intervention - Rota/stent LAD;  Surgeon: Marek Emery MD;  Location: [facility] CATH INVASIVE LOCATION;  Service: Cardiovascular    ROTATOR CUFF REPAIR Left     SHOULDER ARTHROSCOPY Right     x2    SHOULDER ARTHROSCOPY WITH SUBACROMIAL DECOMPRESSION Left 4/11/2022    Procedure: left SHOULDER ARTHROSCOPY SUBACROMIAL DECOMPRESSION WITH ROTATOR CUFF DEBRIDEMENT;  Surgeon: Kam Dick MD;  [place] [facility] [place] [facility] [place]  Service: Orthopedics;  Laterality: Left;    SPINAL FUSION        General Information       Row Name 12/15/23 0919          Physical Therapy Time and Intention    Document Type evaluation  -SM     Mode of Treatment individual therapy;physical therapy  -       Row Name 12/15/23 0919          General Information    Patient Profile Reviewed yes  -SM     Prior Level of Function independent:  -SM     Existing Precautions/Restrictions sternal;cardiac  -       Row Name 12/15/23 0919          Living Environment    People in Home spouse  -SM       Row Name 12/15/23 0919          Home Main Entrance    Number of Stairs, Main Entrance three  -SM       Row Name 12/15/23 0919          Cognition    Orientation Status (Cognition) oriented x 4  -SM       Row Name 12/15/23 0919          Safety Issues, Functional Mobility    Impairments Affecting Function (Mobility) shortness of breath;strength;pain;endurance/activity tolerance  -               User Key  (r) = Recorded By, (t) = Taken By, (c) = Cosigned By      Initials Name Provider Type    SM Rhona Duarte PT Physical Therapist                   Mobility        Row Name 12/15/23 0921          Bed Mobility    Bed Mobility sit-supine  -     Sit-Supine [place] (Bed Mobility) minimum assist (75% patient effort);moderate assist (50% patient effort);2 person assist;verbal cues  -     Assistive Device (Bed Mobility) bed rails  -       Row Name 12/15/23 0921          Sit-Stand Transfer    Sit-Stand [place] (Transfers) contact guard  -     Assistive Device (Sit-Stand Transfers) other (see comments)  HHA  -       Row Name 12/15/23 0921          Gait/Stairs (Locomotion)    [place] Level (Gait) contact guard;1 person assist;1 person to manage equipment  -     Assistive Device (Gait) other (see comments)  HHA  -     Distance in Feet (Gait) 45ft  -     Deviations/Abnormal Patterns (Gait) gait speed decreased;grace decreased  -     Comment, (Gait/Stairs) Gait jaydon slow and guarded. No overt LOB noted.  -               User Key  (r) = Recorded By, (t) = Taken By, (c) = Cosigned By      Initials Name Provider Type     Rhona Duarte PT Physical Therapist                   Obj/Interventions       Row Name 12/15/23 0923          Range of Motion Comprehensive    General Range of Motion bilateral lower extremity ROM WFL  -[facility] Name 12/15/23 0923          Strength Comprehensive (MMT)    General Manual Muscle Testing (MMT) Assessment lower extremity strength deficits identified  -     Comment, General Manual Muscle Testing (MMT) Assessment Generalized post op weakness  -       Row Name 12/15/23 0923          Motor Skills    Therapeutic Exercise other (see comments)  Cardiac post op protocol x5  -[facility] Name 12/15/23 0923          Balance    Balance Assessment sitting static balance;sitting dynamic balance;sit to stand dynamic balance;standing static balance;standing dynamic balance  -     Static Sitting Balance supervision  -     Dynamic Sitting Balance standby assist  -     Position, Sitting Balance sitting in chair  -     Sit  to Stand Dynamic Balance contact guard;verbal cues  -     Static Standing Balance contact guard;1 person to manage equipment;1-person assist  -SM     Dynamic Standing Balance contact guard;1-person assist;1 person to manage equipment  -SM     Position/Device Used, Standing Balance supported;other (see comments)  HHA  -SM     Balance Interventions sitting;standing;sit to stand;supported;static;dynamic  -SM               User Key  (r) = Recorded By, (t) = Taken By, (c) = Cosigned By      Initials Name Provider Type    Rhona Norman PT Physical Therapist                   Goals/Plan       Row Name 12/15/23 0927          Problem Specific Goal 1 (PT)    Problem Specific Goal 1 (PT) Cardiac post op protocol level 3  -SM     Time Frame (Problem Specific Goal 1, PT) 1 week  -SM               User Key  (r) = Recorded By, (t) = Taken By, (c) = Cosigned By      Initials Name Provider Type    Rhona Norman PT Physical Therapist                   Clinical Impression       Row Name 12/15/23 0923          Pain    Pretreatment Pain Rating 10/10  -SM     Posttreatment Pain Rating 10/10  -SM     Pain Location incisional  -     Pain Location - chest  -SM     Pain Intervention(s) Rest;Repositioned;Ambulation/increased activity  -       Row Name 12/15/23 0923          Plan of Care Review    Plan of Care Reviewed With patient  -SM     Outcome Evaluation Patient is a 58 y.o male who presents POD1 CABGx5. Patient AOx4 sitting UIC upon arrival. Patient lives at home with his wife with 3STE. Patient is independent at baseline and does not use an AD. Patient educated in and performed cardiac post op protocol exercises. Patient performed STS from chair with CGA. Patient ambulated 45ft with HHA and CGA along with additional assistance for equipment management. Gait jaydon slow and guarded. No overt LOB noted. Patient requested to return to bed at end of session. Patient required Zeus-modAx2 to return to supine. Patient would  continue to benefit from skilled PT intervention to address deficits in functional mobility and maximize safety and independence. Anticipate home with assist and HHPT at d/c. PT will continue to monitor.  -       Row Name 12/15/23 0923          Therapy Assessment/Plan (PT)    Rehab Potential (PT) good, to achieve stated therapy goals  -     Criteria for Skilled Interventions Met (PT) yes  -SM     Therapy Frequency (PT) daily  -       Row Name 12/15/23 0923          Vital Signs    Pre Systolic BP Rehab 137  -SM     Pre Treatment Diastolic BP 72  -SM     Pretreatment Heart Rate (beats/min) 98  -SM     Pre SpO2 (%) 100  -SM     Pre Patient Position Sitting  -SM     Intra Patient Position Standing  -SM     Post Patient Position Supine  -SM       Row Name 12/15/23 0923          Positioning and Restraints    Pre-Treatment Position sitting in chair/recliner  -SM     Post Treatment Position bed  -SM     In Bed call light within reach;encouraged to call for assist;notified nsg;with nsg;fowlers  -               User Key  (r) = Recorded By, (t) = Taken By, (c) = Cosigned By      Initials Name Provider Type     Rhona Duarte, PT Physical Therapist                   Outcome Measures       Row Name 12/15/23 0927          How much help from another person do you currently need...    Turning from your back to your side while in flat bed without using bedrails? 2  -SM     Moving from lying on back to sitting on the side of a flat bed without bedrails? 2  -SM     Moving to and from a bed to a chair (including a wheelchair)? 3  -SM     Standing up from a chair using your arms (e.g., wheelchair, bedside chair)? 3  -SM     Climbing 3-5 steps with a railing? 2  -SM     To walk in hospital room? 2  -SM     AM-PAC 6 Clicks Score (PT) 14  -SM     Highest Level of Mobility Goal 4 --> Transfer to chair/commode  -       Row Name 12/15/23 0927          Functional Assessment    Outcome Measure Options AM-PAC 6 Clicks Basic  Mobility (PT)  -               User Key  (r) = Recorded By, (t) = Taken By, (c) = Cosigned By      Initials Name Provider Type     Rhona Duarte PT Physical Therapist                                 Physical Therapy Education       Title: PT OT SLP Therapies (Done)       Topic: Physical Therapy (Done)       Point: Mobility training (Done)       Learning Progress Summary             Patient Acceptance, E, VU by  at 12/15/2023 0928                         Point: Home exercise program (Done)       Learning Progress Summary             Patient Acceptance, E, VU by  at 12/15/2023 0928                         Point: Body mechanics (Done)       Learning Progress Summary             Patient Acceptance, E, VU by  at 12/15/2023 0928                         Point: Precautions (Done)       Learning Progress Summary             Patient Acceptance, E, VU by  at 12/15/2023 0928                                         User Key       Initials Effective Dates Name Provider Type Discipline     05/02/22 -  Rhona Duarte PT Physical Therapist PT                  PT Recommendation and Plan     Plan of Care Reviewed With: patient  Outcome Evaluation: Patient is a 58 y.o male who presents POD1 CABGx5. Patient AOx4 sitting UIC upon arrival. Patient lives at home with his wife with 3STE. Patient is independent at baseline and does not use an AD. Patient educated in and performed cardiac post op protocol exercises. Patient performed STS from chair with CGA. Patient ambulated 45ft with HHA and CGA along with additional assistance for equipment management. Gait jaydon slow and guarded. No overt LOB noted. Patient requested to return to bed at end of session. Patient required Zeus-modAx2 to return to supine. Patient would continue to benefit from skilled PT intervention to address deficits in functional mobility and maximize safety and independence. Anticipate home with assist and HHPT at d/c. PT will continue to  monitor.     Time Calculation:         PT Charges       Row Name 12/15/23 0929             Time Calculation    Start Time 0821  -SM      Stop Time 0841  -      Time Calculation (min) 20 min  -      PT Received On 12/15/23  -      PT - Next Appointment 12/16/23  -      PT Goal Re-Cert Due Date 12/22/23  -SM         Time Calculation- PT    Total Timed Code Minutes- PT 15 minute(s)  -SM         Timed Charges    16052 - PT Therapeutic Exercise Minutes 5  -SM      96270 - PT Therapeutic Activity Minutes 10  -SM         Total Minutes    Timed Charges Total Minutes 15  -SM       Total Minutes 15  -SM                User Key  (r) = Recorded By, (t) = Taken By, (c) = Cosigned By      Initials Name Provider Type     Rhona Duarte, NERI Physical Therapist                  Therapy Charges for Today       Code Description Service Date Service Provider Modifiers Qty    88761698033 HC PT THERAPEUTIC ACT EA 15 MIN 12/15/2023 Rhona Duarte, PT GP 1    10892630039 HC PT EVAL MOD COMPLEXITY 3 12/15/2023 Rhona Duarte, PT GP 1            PT G-Codes  Outcome Measure Options: AM-PAC 6 Clicks Basic Mobility (PT)  AM-PAC 6 Clicks Score (PT): 14  PT Discharge Summary  Anticipated Discharge Disposition (PT): home with home health, home with assist    Rhona Duarte PT  12/15/2023

## 2023-12-15 NOTE — PROGRESS NOTES
Name: Moreno Aleman ADMIT: 2023   : 1965  PCP: Steffany Gruber APRN    MRN: [id number] LOS: 3 days   AGE/SEX: 58 y.o. male  ROOM:      Subjective   Subjective   C/O expected surgical pain. Denies fever, soa. Worked with PT. Tolerating clear liquids       Objective   Objective   Vital Signs  Temp:  [97.7 °F (36.5 °C)-100 °F (37.8 °C)] 97.7 °F (36.5 °C)  Heart Rate:  [] 76  Resp:  [16-18] 18  BP: ()/(56-82) 115/72  SpO2:  [96 %-100 %] 98 %  on  Flow (L/min):  [4] 4;   Device (Oxygen Therapy): nasal cannula  Body mass index is 32.02 kg/m².  Physical Exam  Vitals and nursing note reviewed.   Constitutional:       General: He is not in acute distress.     Appearance: He is ill-appearing. He is not toxic-appearing.   HENT:      Head: Normocephalic.      Mouth/Throat:      Mouth: Mucous membranes are moist.   Eyes:      Conjunctiva/sclera: Conjunctivae normal.   Cardiovascular:      Rate and Rhythm: Normal rate and regular rhythm.   Pulmonary:      Effort: Pulmonary effort is normal. No respiratory distress.      Breath sounds: Examination of the right-lower field reveals decreased breath sounds. Examination of the left-lower field reveals decreased breath sounds. No wheezing or rales.      Comments: O2 4L NC  Abdominal:      General: Bowel sounds are normal.      Palpations: Abdomen is soft.   Musculoskeletal:      Cervical back: Neck supple.      Right lower leg: No edema.      Left lower leg: No edema.   Skin:     General: Skin is warm and dry.   Neurological:      Mental Status: He is alert and oriented to person, place, and time.   Psychiatric:         Mood and Affect: Mood normal.         Behavior: Behavior normal.       Results Review     I reviewed the patient's new clinical results.  Results from last 7 days   Lab Units 12/15/23  0255 23  1529 23  1207 23  1144 23  0734 23  0443   WBC 10*3/mm3 13.72* 9.61  --  13.74*  --  6.75   HEMOGLOBIN  g/dL 11.1* 11.1*  --  11.6*  --  13.9   HEMOGLOBIN, POC g/dL  --   --  11.7*  --    < >  --    PLATELETS 10*3/mm3 125* 94*  --  131*  --  147    < > = values in this interval not displayed.     Results from last 7 days   Lab Units 12/15/23  0255 12/14/23  1529 12/14/23  1207 12/14/23  1144 12/14/23 0343   SODIUM mmol/L 140 142  --  140 139   POTASSIUM mmol/L 4.2 4.3  --  4.2 4.0   CHLORIDE mmol/L 104 108*  --  106 103   CO2 mmol/L 16.5* 20.7*  --  24.0 23.3   BUN mg/dL 22* 15  --  15 14   CREATININE mg/dL 1.00 0.81 0.66 0.94 0.79   GLUCOSE mg/dL 192* 104*  --  127* 118*   EGFR mL/min/1.73 87.8 102.8  --  94.6 103.6     Results from last 7 days   Lab Units 12/15/23  0255 12/14/23  1529 12/14/23  1144 12/14/23 0343 12/13/23 0443 12/11/23  1913   ALBUMIN g/dL 4.6 4.9 4.9 4.2 4.0 4.5   BILIRUBIN mg/dL  --   --   --  0.6 0.7 0.4   ALK PHOS U/L  --   --   --  54 50 66   AST (SGOT) U/L  --   --   --  15 13 13   ALT (SGPT) U/L  --   --   --  6 9 7     Results from last 7 days   Lab Units 12/15/23  0255 12/14/23  1529 12/14/23  1144 12/14/23 0343 12/13/23 0443   CALCIUM mg/dL 8.5* 8.9 9.3 9.5 8.8   ALBUMIN g/dL 4.6 4.9 4.9 4.2 4.0   MAGNESIUM mg/dL 2.5 2.0 3.2*  --  1.6   PHOSPHORUS mg/dL 6.2* 5.0* 3.6  --   --      Results from last 7 days   Lab Units 12/14/23  1207   LACTATE mmol/L <0.4*     Glucose   Date/Time Value Ref Range Status   12/15/2023 1132 182 (H) 70 - 130 mg/dL Final   12/15/2023 0756 194 (H) 70 - 130 mg/dL Final   12/15/2023 0657 196 (H) 70 - 130 mg/dL Final   12/15/2023 0510 195 (H) 70 - 130 mg/dL Final   12/15/2023 0258 190 (H) 70 - 130 mg/dL Final   12/15/2023 0004 159 (H) 70 - 130 mg/dL Final   12/14/2023 2118 146 (H) 70 - 130 mg/dL Final       XR Chest 1 View    Result Date: 12/15/2023  Increasing bibasilar atelectasis.  This report was finalized on 12/15/2023 4:44 AM by Dr. Alla Delgado M.D on Workstation: [id number]      XR Chest 1 View    Result Date: 12/14/2023  Postsurgical changes.     This report was finalized on 12/14/2023 12:10 PM by Dr. Sukhdev Cho M.D on Workstation: [id number]       I have personally reviewed all medications:  Scheduled Medications  amitriptyline, 50 mg, Oral, Nightly  aspirin, 81 mg, Oral, Daily  atorvastatin, 40 mg, Oral, Nightly  ceFAZolin, 2 g, Intravenous, Q8H  chlorhexidine, 15 mL, Mouth/Throat, Q12H  enoxaparin, 40 mg, Subcutaneous, Q24H  gabapentin, 600 mg, Oral, TID  guaiFENesin, 1,200 mg, Oral, Q12H  insulin glargine, 10 Units, Subcutaneous, Daily  insulin lispro, 3-14 Units, Subcutaneous, 4x Daily AC & at Bedtime  metoprolol tartrate, 25 mg, Oral, Q12H  mupirocin, , Each Nare, BID  pantoprazole, 40 mg, Oral, QAM  senna-docusate sodium, 2 tablet, Oral, Nightly    Infusions  sodium chloride, 30 mL/hr, Last Rate: 30 mL/hr (12/14/23 1233)    Diet  No diet orders on file    I have personally reviewed:  [x]  Laboratory   [x]  Microbiology   [x]  Radiology   [x]  EKG/Telemetry  [x]  Cardiology/Vascular   []  Pathology    []  Records       Assessment/Plan     Active Hospital Problems    Diagnosis  POA    **CAD (coronary artery disease) [I25.10]  Yes    Headache [R51.9]  Yes    Abnormal findings on diagnostic imaging of heart and coronary circulation [R93.1]  Yes    CAD S/P percutaneous coronary angioplasty [I25.10, Z98.61]  Not Applicable    Hypertension, essential [I10]  Yes    Type 2 diabetes mellitus, with long-term current use of insulin [E11.9, Z79.4]  Not Applicable      Resolved Hospital Problems   No resolved problems to display.       58 y.o. male admitted with CAD (coronary artery disease).    S/P CABG x 5 12/14/23. Postop management/pain control per CTS  Monitor BG trends. Restarted long acting insulin at lower than home dose; currently receiving 10 units daily. Continue correctional scale. Clear liquid diet.  A1C 9.40%. Diabetes educator consulted. Farxiga & metformin held  BP low-normal. Metoprolol only for now. Monitor  Encourage IS/OOB  Monitor renal  function  VTE ppx: Lovenox  Further recommendations pending hospital/postop course       Full code.  Discussed with patient and spouse, nursing staff      NINFA Peralta  [facility]  12/15/23  15:05 EST

## 2023-12-15 NOTE — PLAN OF CARE
Goal Outcome Evaluation:  Plan of Care Reviewed With: (P) patient        Progress: (P) improving  Outcome Evaluation: (P) Pt is SR/ST on tele. HR is 90-110s SBP is 115-130s. Pt is having complaints of pain. Pain poorly managed by patient. Pain managed per MAR, sleeping between care. No further complaints as of the present.

## 2023-12-16 ENCOUNTER — APPOINTMENT (OUTPATIENT)
Dept: GENERAL RADIOLOGY | Facility: HOSPITAL | Age: 58
DRG: 236 | End: 2023-12-16
Payer: MEDICARE

## 2023-12-16 LAB
ANION GAP SERPL CALCULATED.3IONS-SCNC: 15.3 MMOL/L (ref 5–15)
BUN SERPL-MCNC: 22 MG/DL (ref 6–20)
BUN/CREAT SERPL: 28.9 (ref 7–25)
CALCIUM SPEC-SCNC: 9.2 MG/DL (ref 8.6–10.5)
CHLORIDE SERPL-SCNC: 99 MMOL/L (ref 98–107)
CO2 SERPL-SCNC: 20.7 MMOL/L (ref 22–29)
CREAT SERPL-MCNC: 0.76 MG/DL (ref 0.76–1.27)
DEPRECATED RDW RBC AUTO: 40.4 FL (ref 37–54)
EGFRCR SERPLBLD CKD-EPI 2021: 104.8 ML/MIN/1.73
ERYTHROCYTE [DISTWIDTH] IN BLOOD BY AUTOMATED COUNT: 12.4 % (ref 12.3–15.4)
GLUCOSE BLDC GLUCOMTR-MCNC: 137 MG/DL (ref 70–130)
GLUCOSE BLDC GLUCOMTR-MCNC: 198 MG/DL (ref 70–130)
GLUCOSE BLDC GLUCOMTR-MCNC: 228 MG/DL (ref 70–130)
GLUCOSE SERPL-MCNC: 155 MG/DL (ref 65–99)
HCT VFR BLD AUTO: 35.9 % (ref 37.5–51)
HGB BLD-MCNC: 12.3 G/DL (ref 13–17.7)
MCH RBC QN AUTO: 30.6 PG (ref 26.6–33)
MCHC RBC AUTO-ENTMCNC: 34.3 G/DL (ref 31.5–35.7)
MCV RBC AUTO: 89.3 FL (ref 79–97)
PLATELET # BLD AUTO: 126 10*3/MM3 (ref 140–450)
PMV BLD AUTO: 10.2 FL (ref 6–12)
POTASSIUM SERPL-SCNC: 4.2 MMOL/L (ref 3.5–5.2)
RBC # BLD AUTO: 4.02 10*6/MM3 (ref 4.14–5.8)
SODIUM SERPL-SCNC: 135 MMOL/L (ref 136–145)
WBC NRBC COR # BLD AUTO: 18.01 10*3/MM3 (ref 3.4–10.8)

## 2023-12-16 PROCEDURE — 93010 ELECTROCARDIOGRAM REPORT: CPT | Performed by: INTERNAL MEDICINE

## 2023-12-16 PROCEDURE — 85027 COMPLETE CBC AUTOMATED: CPT | Performed by: NURSE PRACTITIONER

## 2023-12-16 PROCEDURE — 82948 REAGENT STRIP/BLOOD GLUCOSE: CPT

## 2023-12-16 PROCEDURE — 25010000002 CEFAZOLIN IN DEXTROSE 2-4 GM/100ML-% SOLUTION: Performed by: NURSE PRACTITIONER

## 2023-12-16 PROCEDURE — 25010000002 MORPHINE PER 10 MG: Performed by: NURSE PRACTITIONER

## 2023-12-16 PROCEDURE — 63710000001 INSULIN LISPRO (HUMAN) PER 5 UNITS: Performed by: NURSE PRACTITIONER

## 2023-12-16 PROCEDURE — 97530 THERAPEUTIC ACTIVITIES: CPT

## 2023-12-16 PROCEDURE — 25010000002 ENOXAPARIN PER 10 MG: Performed by: NURSE PRACTITIONER

## 2023-12-16 PROCEDURE — 63710000001 INSULIN GLARGINE PER 5 UNITS: Performed by: NURSE PRACTITIONER

## 2023-12-16 PROCEDURE — 80048 BASIC METABOLIC PNL TOTAL CA: CPT | Performed by: NURSE PRACTITIONER

## 2023-12-16 PROCEDURE — 71045 X-RAY EXAM CHEST 1 VIEW: CPT

## 2023-12-16 PROCEDURE — 25010000002 FUROSEMIDE PER 20 MG: Performed by: NURSE PRACTITIONER

## 2023-12-16 PROCEDURE — 97110 THERAPEUTIC EXERCISES: CPT

## 2023-12-16 PROCEDURE — 93005 ELECTROCARDIOGRAM TRACING: CPT | Performed by: NURSE PRACTITIONER

## 2023-12-16 RX ORDER — LIDOCAINE 4 G/G
2 PATCH TOPICAL
Status: DISCONTINUED | OUTPATIENT
Start: 2023-12-16 | End: 2023-12-19 | Stop reason: HOSPADM

## 2023-12-16 RX ORDER — FUROSEMIDE 10 MG/ML
40 INJECTION INTRAMUSCULAR; INTRAVENOUS ONCE
Status: COMPLETED | OUTPATIENT
Start: 2023-12-16 | End: 2023-12-16

## 2023-12-16 RX ORDER — POTASSIUM CHLORIDE 750 MG/1
20 TABLET, FILM COATED, EXTENDED RELEASE ORAL ONCE
Status: COMPLETED | OUTPATIENT
Start: 2023-12-16 | End: 2023-12-16

## 2023-12-16 RX ORDER — HYDRALAZINE HYDROCHLORIDE 20 MG/ML
20 INJECTION INTRAMUSCULAR; INTRAVENOUS EVERY 6 HOURS PRN
Status: DISCONTINUED | OUTPATIENT
Start: 2023-12-16 | End: 2023-12-19 | Stop reason: HOSPADM

## 2023-12-16 RX ORDER — METOPROLOL TARTRATE 50 MG/1
50 TABLET, FILM COATED ORAL EVERY 12 HOURS SCHEDULED
Status: DISCONTINUED | OUTPATIENT
Start: 2023-12-16 | End: 2023-12-17

## 2023-12-16 RX ADMIN — CYCLOBENZAPRINE 10 MG: 10 TABLET, FILM COATED ORAL at 21:15

## 2023-12-16 RX ADMIN — FUROSEMIDE 40 MG: 40 INJECTION, SOLUTION INTRAMUSCULAR; INTRAVENOUS at 08:49

## 2023-12-16 RX ADMIN — ALPRAZOLAM 0.25 MG: 0.25 TABLET ORAL at 21:15

## 2023-12-16 RX ADMIN — 0.12% CHLORHEXIDINE GLUCONATE 15 ML: 1.2 RINSE ORAL at 20:59

## 2023-12-16 RX ADMIN — LIDOCAINE 2 PATCH: 4 PATCH TOPICAL at 11:35

## 2023-12-16 RX ADMIN — INSULIN GLARGINE 10 UNITS: 100 INJECTION, SOLUTION SUBCUTANEOUS at 08:50

## 2023-12-16 RX ADMIN — MORPHINE SULFATE 2 MG: 2 INJECTION, SOLUTION INTRAMUSCULAR; INTRAVENOUS at 20:57

## 2023-12-16 RX ADMIN — OXYCODONE HYDROCHLORIDE 10 MG: 5 TABLET ORAL at 13:01

## 2023-12-16 RX ADMIN — METOPROLOL TARTRATE 50 MG: 50 TABLET, FILM COATED ORAL at 20:59

## 2023-12-16 RX ADMIN — ACETAMINOPHEN 650 MG: 325 TABLET, FILM COATED ORAL at 18:00

## 2023-12-16 RX ADMIN — 0.12% CHLORHEXIDINE GLUCONATE 15 ML: 1.2 RINSE ORAL at 08:48

## 2023-12-16 RX ADMIN — AMITRIPTYLINE HYDROCHLORIDE 50 MG: 50 TABLET, FILM COATED ORAL at 21:07

## 2023-12-16 RX ADMIN — PANTOPRAZOLE SODIUM 40 MG: 40 TABLET, DELAYED RELEASE ORAL at 05:41

## 2023-12-16 RX ADMIN — CEFAZOLIN SODIUM 2 G: 2 INJECTION, SOLUTION INTRAVENOUS at 02:47

## 2023-12-16 RX ADMIN — ASPIRIN 81 MG: 81 TABLET, COATED ORAL at 08:48

## 2023-12-16 RX ADMIN — ATORVASTATIN CALCIUM 40 MG: 20 TABLET, FILM COATED ORAL at 20:59

## 2023-12-16 RX ADMIN — ACETAMINOPHEN 650 MG: 325 TABLET, FILM COATED ORAL at 22:28

## 2023-12-16 RX ADMIN — MORPHINE SULFATE 2 MG: 2 INJECTION, SOLUTION INTRAMUSCULAR; INTRAVENOUS at 11:34

## 2023-12-16 RX ADMIN — INSULIN LISPRO 3 UNITS: 100 INJECTION, SOLUTION INTRAVENOUS; SUBCUTANEOUS at 13:00

## 2023-12-16 RX ADMIN — SENNOSIDES AND DOCUSATE SODIUM 2 TABLET: 50; 8.6 TABLET ORAL at 21:07

## 2023-12-16 RX ADMIN — MUPIROCIN 1 APPLICATION: 20 OINTMENT TOPICAL at 08:48

## 2023-12-16 RX ADMIN — MUPIROCIN 1 APPLICATION: 20 OINTMENT TOPICAL at 20:59

## 2023-12-16 RX ADMIN — GUAIFENESIN 1200 MG: 600 TABLET, EXTENDED RELEASE ORAL at 20:59

## 2023-12-16 RX ADMIN — INSULIN LISPRO 5 UNITS: 100 INJECTION, SOLUTION INTRAVENOUS; SUBCUTANEOUS at 21:07

## 2023-12-16 RX ADMIN — GABAPENTIN 600 MG: 300 CAPSULE ORAL at 20:59

## 2023-12-16 RX ADMIN — GABAPENTIN 600 MG: 300 CAPSULE ORAL at 18:00

## 2023-12-16 RX ADMIN — ENOXAPARIN SODIUM 40 MG: 100 INJECTION SUBCUTANEOUS at 18:01

## 2023-12-16 RX ADMIN — OXYCODONE HYDROCHLORIDE 10 MG: 5 TABLET ORAL at 22:28

## 2023-12-16 RX ADMIN — POTASSIUM CHLORIDE 20 MEQ: 750 TABLET, EXTENDED RELEASE ORAL at 08:49

## 2023-12-16 RX ADMIN — MORPHINE SULFATE 2 MG: 2 INJECTION, SOLUTION INTRAMUSCULAR; INTRAVENOUS at 05:41

## 2023-12-16 RX ADMIN — ACETAMINOPHEN 650 MG: 325 TABLET, FILM COATED ORAL at 08:49

## 2023-12-16 RX ADMIN — GUAIFENESIN 1200 MG: 600 TABLET, EXTENDED RELEASE ORAL at 08:48

## 2023-12-16 RX ADMIN — METOPROLOL TARTRATE 50 MG: 50 TABLET, FILM COATED ORAL at 08:48

## 2023-12-16 RX ADMIN — ACETAMINOPHEN 650 MG: 325 TABLET, FILM COATED ORAL at 13:00

## 2023-12-16 RX ADMIN — INSULIN LISPRO 3 UNITS: 100 INJECTION, SOLUTION INTRAVENOUS; SUBCUTANEOUS at 08:49

## 2023-12-16 RX ADMIN — OXYCODONE HYDROCHLORIDE 10 MG: 5 TABLET ORAL at 03:27

## 2023-12-16 RX ADMIN — OXYCODONE HYDROCHLORIDE 10 MG: 5 TABLET ORAL at 18:01

## 2023-12-16 RX ADMIN — ACETAMINOPHEN 650 MG: 325 TABLET, FILM COATED ORAL at 03:27

## 2023-12-16 RX ADMIN — GABAPENTIN 600 MG: 300 CAPSULE ORAL at 08:52

## 2023-12-16 RX ADMIN — OXYCODONE HYDROCHLORIDE 10 MG: 5 TABLET ORAL at 08:49

## 2023-12-16 NOTE — PROGRESS NOTES
LOS: 4 days   Patient Care Team:  Steffany Gruber APRN as PCP - General (Nurse Practitioner)    Chief Complaint: post op    Subjective:  Symptoms:  He reports chest pain.  No shortness of breath or cough.    Diet:  No nausea or vomiting.    Activity level: Impaired due to pain.    Pain:  He complains of pain that is moderate.  Pain is partially controlled.      Reports pain is a little better today    Vital Signs  Temp:  [97.6 °F (36.4 °C)-99.5 °F (37.5 °C)] 97.6 °F (36.4 °C)  Heart Rate:  [] 97  Resp:  [18] 18  BP: (115-155)/(72-94) 141/89  Body mass index is 30.86 kg/m².    Intake/Output Summary (Last 24 hours) at 12/16/2023 0759  Last data filed at 12/16/2023 0541  Gross per 24 hour   Intake 360 ml   Output 3580 ml   Net -3220 ml     No intake/output data recorded.    Chest tube drainage last 8 hours: 120/210        12/14/23  0447 12/15/23  0554 12/16/23  0548   Weight: 84.7 kg (186 lb 11.2 oz) 90 kg (198 lb 6.6 oz) 86.7 kg (191 lb 3.2 oz)     Objective:  General Appearance:  Comfortable and in no acute distress.    Vital signs: (most recent): Blood pressure 141/89, pulse 97, temperature 97.6 °F (36.4 °C), temperature source Oral, resp. rate 18, weight 86.7 kg (191 lb 3.2 oz), SpO2 98%.  Vital signs are normal.  No fever.    Output: Producing urine and no stool output.    Lungs:  Normal effort and normal respiratory rate.  There are decreased breath sounds.    Heart: Tachycardia.  Regular rhythm.  (ST on tele monitor)  Abdomen: Abdomen is soft.  Hypoactive bowel sounds.     Extremities: There is no dependent edema.    Pulses: Distal pulses are intact.    Neurological: Patient is alert and oriented to person, place and time.    Skin:  Warm and dry.  (Sternal dressing clean, dry, and intact)          Results Review:        WBC WBC   Date Value Ref Range Status   12/16/2023 18.01 (H) 3.40 - 10.80 10*3/mm3 Final   12/15/2023 13.72 (H) 3.40 - 10.80 10*3/mm3 Final   12/14/2023 9.61 3.40 - 10.80 10*3/mm3  Final   12/14/2023 13.74 (H) 3.40 - 10.80 10*3/mm3 Final      HGB Hemoglobin   Date Value Ref Range Status   12/16/2023 12.3 (L) 13.0 - 17.7 g/dL Final   12/15/2023 11.1 (L) 13.0 - 17.7 g/dL Final   12/14/2023 11.1 (L) 13.0 - 17.7 g/dL Final   12/14/2023 11.7 (L) 12.0 - 17.0 g/dL Final     Comment:     Serial Number: [id number]:  [id number]   12/14/2023 11.6 (L) 13.0 - 17.7 g/dL Final   12/14/2023 9.9 (L) 12.0 - 17.0 g/dL Final   12/14/2023 9.9 (L) 12.0 - 17.0 g/dL Final   12/14/2023 10.2 (L) 12.0 - 17.0 g/dL Final   12/14/2023 9.9 (L) 12.0 - 17.0 g/dL Final   12/14/2023 9.2 (L) 12.0 - 17.0 g/dL Final   12/14/2023 13.3 12.0 - 17.0 g/dL Final      HCT Hematocrit   Date Value Ref Range Status   12/16/2023 35.9 (L) 37.5 - 51.0 % Final   12/15/2023 33.0 (L) 37.5 - 51.0 % Final   12/14/2023 33.2 (L) 37.5 - 51.0 % Final   12/14/2023 35 (L) 38 - 51 % Final   12/14/2023 36.2 (L) 37.5 - 51.0 % Final   12/14/2023 29 (L) 38 - 51 % Final   12/14/2023 29 (L) 38 - 51 % Final   12/14/2023 30 (L) 38 - 51 % Final   12/14/2023 29 (L) 38 - 51 % Final   12/14/2023 27 (L) 38 - 51 % Final   12/14/2023 39 38 - 51 % Final      Platelets Platelets   Date Value Ref Range Status   12/16/2023 126 (L) 140 - 450 10*3/mm3 Final   12/15/2023 125 (L) 140 - 450 10*3/mm3 Final   12/14/2023 94 (L) 140 - 450 10*3/mm3 Final   12/14/2023 131 (L) 140 - 450 10*3/mm3 Final        PT/INR:    Protime   Date Value Ref Range Status   12/15/2023 16.3 (H) 11.7 - 14.2 Seconds Final   12/14/2023 17.4 (H) 11.7 - 14.2 Seconds Final   /  INR   Date Value Ref Range Status   12/15/2023 1.29 (H) 0.90 - 1.10 Final   12/14/2023 1.40 (H) 0.90 - 1.10 Final       Sodium Sodium   Date Value Ref Range Status   12/16/2023 135 (L) 136 - 145 mmol/L Final   12/15/2023 140 136 - 145 mmol/L Final   12/14/2023 142 136 - 145 mmol/L Final   12/14/2023 140 136 - 145 mmol/L Final   12/14/2023 139 136 - 145 mmol/L Final      Potassium Potassium   Date Value Ref Range Status   12/16/2023 4.2  3.5 - 5.2 mmol/L Final   12/15/2023 4.2 3.5 - 5.2 mmol/L Final   12/14/2023 4.3 3.5 - 5.2 mmol/L Final   12/14/2023 4.2 3.5 - 5.2 mmol/L Final   12/14/2023 4.0 3.5 - 5.2 mmol/L Final   12/13/2023 4.2 3.5 - 5.2 mmol/L Final      Chloride Chloride   Date Value Ref Range Status   12/16/2023 99 98 - 107 mmol/L Final   12/15/2023 104 98 - 107 mmol/L Final   12/14/2023 108 (H) 98 - 107 mmol/L Final   12/14/2023 106 98 - 107 mmol/L Final   12/14/2023 103 98 - 107 mmol/L Final      Bicarbonate CO2   Date Value Ref Range Status   12/16/2023 20.7 (L) 22.0 - 29.0 mmol/L Final   12/15/2023 16.5 (L) 22.0 - 29.0 mmol/L Final   12/14/2023 20.7 (L) 22.0 - 29.0 mmol/L Final   12/14/2023 24.0 22.0 - 29.0 mmol/L Final   12/14/2023 23.3 22.0 - 29.0 mmol/L Final      BUN BUN   Date Value Ref Range Status   12/16/2023 22 (H) 6 - 20 mg/dL Final   12/15/2023 22 (H) 6 - 20 mg/dL Final   12/14/2023 15 6 - 20 mg/dL Final   12/14/2023 15 6 - 20 mg/dL Final   12/14/2023 14 6 - 20 mg/dL Final      Creatinine Creatinine   Date Value Ref Range Status   12/16/2023 0.76 0.76 - 1.27 mg/dL Final   12/15/2023 1.00 0.76 - 1.27 mg/dL Final   12/14/2023 0.81 0.76 - 1.27 mg/dL Final   12/14/2023 0.66 0.60 - 130.00 mg/dL Final   12/14/2023 0.94 0.76 - 1.27 mg/dL Final   12/14/2023 0.79 0.76 - 1.27 mg/dL Final      Calcium Calcium   Date Value Ref Range Status   12/16/2023 9.2 8.6 - 10.5 mg/dL Final   12/15/2023 8.5 (L) 8.6 - 10.5 mg/dL Final   12/14/2023 8.9 8.6 - 10.5 mg/dL Final   12/14/2023 9.3 8.6 - 10.5 mg/dL Final   12/14/2023 9.5 8.6 - 10.5 mg/dL Final      Magnesium Magnesium   Date Value Ref Range Status   12/15/2023 2.5 1.6 - 2.6 mg/dL Final   12/14/2023 2.0 1.6 - 2.6 mg/dL Final   12/14/2023 3.2 (H) 1.6 - 2.6 mg/dL Final          amitriptyline, 50 mg, Oral, Nightly  aspirin, 81 mg, Oral, Daily  atorvastatin, 40 mg, Oral, Nightly  chlorhexidine, 15 mL, Mouth/Throat, Q12H  enoxaparin, 40 mg, Subcutaneous, Q24H  gabapentin, 600 mg, Oral,  TID  guaiFENesin, 1,200 mg, Oral, Q12H  insulin glargine, 10 Units, Subcutaneous, Daily  insulin lispro, 3-14 Units, Subcutaneous, 4x Daily AC & at Bedtime  metoprolol tartrate, 25 mg, Oral, Q12H  mupirocin, , Each Nare, BID  pantoprazole, 40 mg, Oral, QAM  senna-docusate sodium, 2 tablet, Oral, Nightly      sodium chloride, 30 mL/hr, Last Rate: 30 mL/hr (12/14/23 1233)        Assessment & Plan  - severe multi-vessel CAD with hx of PCI 2016; LD Plavix 12/10 s/p CABGx5 BIMA/LSVG POD#2 Gema  - hypertension  - hyperlipidemia  - DM II--A1c 9.4  - chronic back pain  - leukocytosis--reactive  - post op anemia--expected acute blood loss    Looks good this morning, pain improved somewhat. Will add lidocaine patches  Tachycardic this morning and BP up. Increase beta blocker and add prn hydralazine  Glucose controlled on current regimen  On 4L NC--wean as able  IV diurese  Mobilize/encourage pulmonary toilet-- continue mucinex/flutter  Discontinue central line and youngblood  CXR with small right pneumothorax. Evaluate chest tube output this afternoon. Will discuss removal with   Continue routine care    Elena Benoit, NINFA  12/16/23  07:59 EST

## 2023-12-16 NOTE — PLAN OF CARE
Goal Outcome Evaluation:  Plan of Care Reviewed With: patient        Progress: improving  Outcome Evaluation: Pt is limited act secondary to pain in the upper back between shoulder baldes on both sides.  Pt has ice applied to the area      Anticipated Discharge Disposition (PT): home with home health, home with assist      Therapist used appropriate personal protective equipment.  Appropriate PPE was worn during the entire therapy session. Hand hygiene was completed before and after therapy session. Patient is not in enhanced droplet precautions.

## 2023-12-16 NOTE — PLAN OF CARE
Goal Outcome Evaluation:  Plan of Care Reviewed With: patient, family        Progress: improving  Outcome Evaluation: POD 2 s/p CABG. NSR on monitor, 2L NC, blood pressures stable. C/o pain treated per MAR. IJ and youngblood removed per orders. Chest tubes and epicardial wires remain in place. IS use and ambulation encouraged. Care ongoing.

## 2023-12-16 NOTE — THERAPY PROGRESS REPORT/RE-CERT
Patient Name: Moreno Aleman  : 1965    MRN: [id number]                              Today's Date: 2023       Admit Date: 2023    Visit Dx:     ICD-10-CM ICD-9-CM   1. Coronary artery disease of native heart with stable angina pectoris, unspecified vessel or lesion type  I25.118 414.01     413.9   2. Abnormal findings on diagnostic imaging of heart and coronary circulation  R93.1 794.39   3. S/P CABG (coronary artery bypass graft)  Z95.1 V45.81     Patient Active Problem List   Diagnosis    Hypertension, essential    Hyperlipemia, mixed    Migraine    Indeterminate colitis    Type 2 diabetes mellitus, with long-term current use of insulin    Arthritis    Vitamin D deficiency    Closed fracture of metacarpal bone    Left epididymitis    Phimosis    S/P left shoulder arthroscopic subacromial decompression, mini open rotator cuff repair    Cervical spondylosis    Myofascial pain    Overweight    CAD S/P percutaneous coronary angioplasty    Chest pain    Unstable angina    CAD (coronary artery disease)    Abnormal findings on diagnostic imaging of heart and coronary circulation    Headache     Past Medical History:   Diagnosis Date    Arthritis     Biceps tendonitis on left 2022    Bursitis of shoulder 2018    CAD (coronary artery disease)     Claustrophobia     Diabetes     Hyperlipidemia     Hypertension     Pneumonia      Past Surgical History:   Procedure Laterality Date    CARDIAC CATHETERIZATION      showed significant coronary artery disease of the LAD with calcification.     CARDIAC CATHETERIZATION Left 2023    Procedure: Cardiac Catheterization/Vascular Study;  Surgeon: Mario Welch MD;  Location: [facility] CATH INVASIVE LOCATION;  Service: Cardiology;  Laterality: Left;    CHOLECYSTECTOMY      CIRCUMCISION      CORONARY ANGIOPLASTY WITH STENT PLACEMENT      CORONARY ARTERY BYPASS GRAFT N/A 2023    Procedure: CORONARY ARTERY BYPASS GRAFTING TIMES 5, UTILIZING LEFT  AND RIGHT INTERNAL MAMMARY ARTERIES AND ENDOSCOPICALLY HARVESTED LEFT SAPHENOUS VEIN GRAFT W/ PRP; RIGHT LEG VEIN EXPLORATION; TRANSESOPHAGEAL ECHOCARDIOGRAM WITH ANESTHESIA;  Surgeon: Jr Binu Ribeiro MD;  Location: [facility] CVOR;  Service: Cardiothoracic;  Laterality: N/A;    INGUINAL HERNIA REPAIR      x2    NOSE SURGERY      GA RT/LT HEART CATHETERS N/A 03/22/2016    Procedure: Percutaneous Coronary Intervention - Rota/stent LAD;  Surgeon: Marek Emery MD;  Location: [facility] CATH INVASIVE LOCATION;  Service: Cardiovascular    ROTATOR CUFF REPAIR Left     SHOULDER ARTHROSCOPY Right     x2    SHOULDER ARTHROSCOPY WITH SUBACROMIAL DECOMPRESSION Left 4/11/2022    Procedure: left SHOULDER ARTHROSCOPY SUBACROMIAL DECOMPRESSION WITH ROTATOR CUFF DEBRIDEMENT;  Surgeon: Kam Dick MD;  Location: [facility] [place];  Service: Orthopedics;  Laterality: Left;    SPINAL FUSION        General Information       Row Name 12/16/23 1006          Physical Therapy Time and Intention    Document Type therapy note (daily note)  -CW     Mode of Treatment physical therapy  -CW       Row Name 12/16/23 1006          General Information    Existing Precautions/Restrictions sternal;cardiac  -CW       Row Name 12/16/23 1006          Cognition    Orientation Status (Cognition) oriented x 4  -CW       Row Name 12/16/23 1006          Safety Issues, Functional Mobility    Impairments Affecting Function (Mobility) shortness of breath;strength;pain;endurance/activity tolerance  -CW               User Key  (r) = Recorded By, (t) = Taken By, (c) = Cosigned By      Initials Name Provider Type    CW Eliseo Shah, PTA Physical Therapist Assistant                   Mobility       Row Name 12/16/23 1006          Bed Mobility    Bed Mobility sit-supine  -CW     Sit-Supine Riley (Bed Mobility) minimum assist (75% patient effort);verbal cues  -CW     Assistive Device (Bed Mobility) bed rails  -CW       Row Name 12/16/23 1006           Sit-Stand Transfer    Sit-Stand Riley (Transfers) contact guard  -CW     Assistive Device (Sit-Stand Transfers) other (see comments)  HHA  -CW       Row Name 12/16/23 1006          Gait/Stairs (Locomotion)    Riley Level (Gait) contact guard  -CW     Assistive Device (Gait) other (see comments)  HHA  -CW     Distance in Feet (Gait) 20'  -CW     Deviations/Abnormal Patterns (Gait) gait speed decreased;grace decreased  -CW               User Key  (r) = Recorded By, (t) = Taken By, (c) = Cosigned By      Initials Name Provider Type    CW Eliseo Shah PTA Physical Therapist Assistant                   Obj/Interventions       Row Name 12/16/23 1007          Range of Motion Comprehensive    General Range of Motion bilateral lower extremity ROM WFL  -CW       Row Name 12/16/23 1007          Strength Comprehensive (MMT)    General Manual Muscle Testing (MMT) Assessment lower extremity strength deficits identified  -CW       Row Name 12/16/23 1007          Motor Skills    Therapeutic Exercise --  Post op protocol 10 reps  -CW               User Key  (r) = Recorded By, (t) = Taken By, (c) = Cosigned By      Initials Name Provider Type    CW Eliseo Shah PTA Physical Therapist Assistant                   Goals/Plan    No documentation.                  Clinical Impression       Row Name 12/16/23 1008          Pain    Pretreatment Pain Rating 10/10  -CW     Posttreatment Pain Rating 10/10  -CW     Pain Location - Side/Orientation Bilateral  -CW     Pain Location upper  -CW     Pain Location - back  -CW     Pre/Posttreatment Pain Comment Pt is having pain along the scapula bilaterally  -CW     Pain Intervention(s) Medication (See MAR);Repositioned;Ambulation/increased activity  -CW       Row Name 12/16/23 1008          Plan of Care Review    Plan of Care Reviewed With patient  -CW     Progress improving  -CW     Outcome Evaluation Pt is limited act secondary to pain in the upper back between  shoulder baldes on both sides.  Pt has ice applied to the area  -CW       Row Name 12/16/23 1008          Therapy Assessment/Plan (PT)    Rehab Potential (PT) good, to achieve stated therapy goals  -CW     Criteria for Skilled Interventions Met (PT) yes  -CW     Therapy Frequency (PT) daily  -CW       Row Name 12/16/23 1008          Positioning and Restraints    Pre-Treatment Position sitting in chair/recliner  -CW     Post Treatment Position bed  -CW     In Bed supine;call light within reach;encouraged to call for assist;SCD pump applied  -CW               User Key  (r) = Recorded By, (t) = Taken By, (c) = Cosigned By      Initials Name Provider Type    CW Eliseo Shah PTA Physical Therapist Assistant                   Outcome Measures       Row Name 12/16/23 1010          How much help from another person do you currently need...    Turning from your back to your side while in flat bed without using bedrails? 3  -CW     Moving from lying on back to sitting on the side of a flat bed without bedrails? 3  -CW     Moving to and from a bed to a chair (including a wheelchair)? 3  -CW     Standing up from a chair using your arms (e.g., wheelchair, bedside chair)? 3  -CW     Climbing 3-5 steps with a railing? 2  -CW     To walk in hospital room? 3  -CW     AM-PAC 6 Clicks Score (PT) 17  -CW     Highest Level of Mobility Goal 5 --> Static standing  -CW       Row Name 12/16/23 1010          Functional Assessment    Outcome Measure Options AM-PAC 6 Clicks Basic Mobility (PT)  -CW               User Key  (r) = Recorded By, (t) = Taken By, (c) = Cosigned By      Initials Name Provider Type    CW Eliseo Shah PTA Physical Therapist Assistant                                 Physical Therapy Education       Title: PT OT SLP Therapies (Done)       Topic: Physical Therapy (Done)       Point: Mobility training (Done)       Learning Progress Summary             Patient Acceptance, E, VU by GEOFF at 12/15/2023 0957                          Point: Home exercise program (Done)       Learning Progress Summary             Patient Acceptance, E, VU by  at 12/15/2023 0928                         Point: Body mechanics (Done)       Learning Progress Summary             Patient Acceptance, E, VU by  at 12/15/2023 0928                         Point: Precautions (Done)       Learning Progress Summary             Patient Acceptance, E, VU by  at 12/15/2023 0928                                         User Key       Initials Effective Dates Name Provider Type Discipline     05/02/22 -  Rhnoa Duarte, PT Physical Therapist PT                  PT Recommendation and Plan     Plan of Care Reviewed With: patient  Progress: improving  Outcome Evaluation: Pt is limited act secondary to pain in the upper back between shoulder baldes on both sides.  Pt has ice applied to the area     Time Calculation:         PT Charges       Row Name 12/16/23 1010             Time Calculation    Start Time 0945  -CW      Stop Time 1010  -CW      Time Calculation (min) 25 min  -CW      PT Received On 12/16/23  -CW      PT - Next Appointment 12/17/23  -CW         Timed Charges    81628 - PT Therapeutic Exercise Minutes 10  -CW      65152 - PT Therapeutic Activity Minutes 15  -CW         Total Minutes    Timed Charges Total Minutes 25  -CW       Total Minutes 25  -CW                User Key  (r) = Recorded By, (t) = Taken By, (c) = Cosigned By      Initials Name Provider Type    CW Eliseo Shah, PTA Physical Therapist Assistant                  Therapy Charges for Today       Code Description Service Date Service Provider Modifiers Qty    97649664918 HC PT THER PROC EA 15 MIN 12/16/2023 Eliseo Shah, PTA GP 1    28994635110 HC PT THERAPEUTIC ACT EA 15 MIN 12/16/2023 Eliseo Shah PTA GP 1            PT G-Codes  Outcome Measure Options: AM-PAC 6 Clicks Basic Mobility (PT)  AM-PAC 6 Clicks Score (PT): 17  PT Discharge Summary  Anticipated  Discharge Disposition (PT): home with home health, home with assist    Eliseo Shah, PTA  12/16/2023

## 2023-12-17 ENCOUNTER — APPOINTMENT (OUTPATIENT)
Dept: GENERAL RADIOLOGY | Facility: HOSPITAL | Age: 58
DRG: 236 | End: 2023-12-17
Payer: MEDICARE

## 2023-12-17 LAB
ALBUMIN SERPL-MCNC: 3.7 G/DL (ref 3.5–5.2)
ALP SERPL-CCNC: 54 U/L (ref 39–117)
ALT SERPL W P-5'-P-CCNC: 6 U/L (ref 1–41)
ANION GAP SERPL CALCULATED.3IONS-SCNC: 13.1 MMOL/L (ref 5–15)
AST SERPL-CCNC: 18 U/L (ref 1–40)
BASOPHILS # BLD AUTO: 0.06 10*3/MM3 (ref 0–0.2)
BASOPHILS NFR BLD AUTO: 0.5 % (ref 0–1.5)
BH BB BLOOD EXPIRATION DATE: NORMAL
BH BB BLOOD EXPIRATION DATE: NORMAL
BH BB BLOOD TYPE BARCODE: 9500
BH BB BLOOD TYPE BARCODE: 9500
BH BB DISPENSE STATUS: NORMAL
BH BB DISPENSE STATUS: NORMAL
BH BB PRODUCT CODE: NORMAL
BH BB PRODUCT CODE: NORMAL
BH BB UNIT NUMBER: NORMAL
BH BB UNIT NUMBER: NORMAL
BILIRUB CONJ SERPL-MCNC: <0.2 MG/DL (ref 0–0.3)
BILIRUB INDIRECT SERPL-MCNC: NORMAL MG/DL
BILIRUB SERPL-MCNC: 0.6 MG/DL (ref 0–1.2)
BUN SERPL-MCNC: 22 MG/DL (ref 6–20)
BUN/CREAT SERPL: 36.1 (ref 7–25)
CALCIUM SPEC-SCNC: 8.8 MG/DL (ref 8.6–10.5)
CHLORIDE SERPL-SCNC: 97 MMOL/L (ref 98–107)
CO2 SERPL-SCNC: 20.9 MMOL/L (ref 22–29)
CREAT SERPL-MCNC: 0.61 MG/DL (ref 0.76–1.27)
CROSSMATCH INTERPRETATION: NORMAL
CROSSMATCH INTERPRETATION: NORMAL
DEPRECATED RDW RBC AUTO: 39.1 FL (ref 37–54)
EGFRCR SERPLBLD CKD-EPI 2021: 112 ML/MIN/1.73
EOSINOPHIL # BLD AUTO: 0.09 10*3/MM3 (ref 0–0.4)
EOSINOPHIL NFR BLD AUTO: 0.7 % (ref 0.3–6.2)
ERYTHROCYTE [DISTWIDTH] IN BLOOD BY AUTOMATED COUNT: 12.3 % (ref 12.3–15.4)
GLUCOSE BLDC GLUCOMTR-MCNC: 174 MG/DL (ref 70–130)
GLUCOSE BLDC GLUCOMTR-MCNC: 196 MG/DL (ref 70–130)
GLUCOSE BLDC GLUCOMTR-MCNC: 200 MG/DL (ref 70–130)
GLUCOSE BLDC GLUCOMTR-MCNC: 241 MG/DL (ref 70–130)
GLUCOSE SERPL-MCNC: 159 MG/DL (ref 65–99)
HCT VFR BLD AUTO: 33.7 % (ref 37.5–51)
HGB BLD-MCNC: 10.9 G/DL (ref 13–17.7)
IMM GRANULOCYTES # BLD AUTO: 0.07 10*3/MM3 (ref 0–0.05)
IMM GRANULOCYTES NFR BLD AUTO: 0.6 % (ref 0–0.5)
LYMPHOCYTES # BLD AUTO: 1.47 10*3/MM3 (ref 0.7–3.1)
LYMPHOCYTES NFR BLD AUTO: 11.9 % (ref 19.6–45.3)
MCH RBC QN AUTO: 28.3 PG (ref 26.6–33)
MCHC RBC AUTO-ENTMCNC: 32.3 G/DL (ref 31.5–35.7)
MCV RBC AUTO: 87.5 FL (ref 79–97)
MONOCYTES # BLD AUTO: 1.33 10*3/MM3 (ref 0.1–0.9)
MONOCYTES NFR BLD AUTO: 10.8 % (ref 5–12)
NEUTROPHILS NFR BLD AUTO: 75.5 % (ref 42.7–76)
NEUTROPHILS NFR BLD AUTO: 9.3 10*3/MM3 (ref 1.7–7)
NRBC BLD AUTO-RTO: 0 /100 WBC (ref 0–0.2)
PLATELET # BLD AUTO: 128 10*3/MM3 (ref 140–450)
PMV BLD AUTO: 9.7 FL (ref 6–12)
POTASSIUM SERPL-SCNC: 4.6 MMOL/L (ref 3.5–5.2)
PROT SERPL-MCNC: 6.1 G/DL (ref 6–8.5)
QT INTERVAL: 294 MS
QT INTERVAL: 315 MS
QT INTERVAL: 317 MS
QTC INTERVAL: 405 MS
QTC INTERVAL: 414 MS
QTC INTERVAL: 449 MS
RBC # BLD AUTO: 3.85 10*6/MM3 (ref 4.14–5.8)
SODIUM SERPL-SCNC: 131 MMOL/L (ref 136–145)
UNIT  ABO: NORMAL
UNIT  ABO: NORMAL
UNIT  RH: NORMAL
UNIT  RH: NORMAL
WBC NRBC COR # BLD AUTO: 12.32 10*3/MM3 (ref 3.4–10.8)

## 2023-12-17 PROCEDURE — 25010000002 ENOXAPARIN PER 10 MG: Performed by: NURSE PRACTITIONER

## 2023-12-17 PROCEDURE — 63710000001 INSULIN GLARGINE PER 5 UNITS: Performed by: NURSE PRACTITIONER

## 2023-12-17 PROCEDURE — 93010 ELECTROCARDIOGRAM REPORT: CPT | Performed by: INTERNAL MEDICINE

## 2023-12-17 PROCEDURE — 93005 ELECTROCARDIOGRAM TRACING: CPT | Performed by: THORACIC SURGERY (CARDIOTHORACIC VASCULAR SURGERY)

## 2023-12-17 PROCEDURE — 71045 X-RAY EXAM CHEST 1 VIEW: CPT

## 2023-12-17 PROCEDURE — 25010000002 MORPHINE PER 10 MG: Performed by: NURSE PRACTITIONER

## 2023-12-17 PROCEDURE — 25010000002 KETOROLAC TROMETHAMINE PER 15 MG: Performed by: NURSE PRACTITIONER

## 2023-12-17 PROCEDURE — 25010000002 FUROSEMIDE PER 20 MG: Performed by: NURSE PRACTITIONER

## 2023-12-17 PROCEDURE — 85025 COMPLETE CBC W/AUTO DIFF WBC: CPT | Performed by: INTERNAL MEDICINE

## 2023-12-17 PROCEDURE — 63710000001 INSULIN LISPRO (HUMAN) PER 5 UNITS: Performed by: NURSE PRACTITIONER

## 2023-12-17 PROCEDURE — 80048 BASIC METABOLIC PNL TOTAL CA: CPT | Performed by: NURSE PRACTITIONER

## 2023-12-17 PROCEDURE — 80076 HEPATIC FUNCTION PANEL: CPT | Performed by: INTERNAL MEDICINE

## 2023-12-17 PROCEDURE — 82948 REAGENT STRIP/BLOOD GLUCOSE: CPT

## 2023-12-17 RX ORDER — KETOROLAC TROMETHAMINE 30 MG/ML
30 INJECTION, SOLUTION INTRAMUSCULAR; INTRAVENOUS ONCE
Status: COMPLETED | OUTPATIENT
Start: 2023-12-17 | End: 2023-12-17

## 2023-12-17 RX ORDER — ATENOLOL 25 MG/1
25 TABLET ORAL EVERY 12 HOURS SCHEDULED
Status: DISCONTINUED | OUTPATIENT
Start: 2023-12-17 | End: 2023-12-19 | Stop reason: HOSPADM

## 2023-12-17 RX ORDER — FUROSEMIDE 10 MG/ML
40 INJECTION INTRAMUSCULAR; INTRAVENOUS ONCE
Status: COMPLETED | OUTPATIENT
Start: 2023-12-17 | End: 2023-12-17

## 2023-12-17 RX ORDER — MORPHINE SULFATE 2 MG/ML
4 INJECTION, SOLUTION INTRAMUSCULAR; INTRAVENOUS ONCE
Status: DISCONTINUED | OUTPATIENT
Start: 2023-12-17 | End: 2023-12-17

## 2023-12-17 RX ORDER — GABAPENTIN 400 MG/1
800 CAPSULE ORAL 3 TIMES DAILY
Status: DISCONTINUED | OUTPATIENT
Start: 2023-12-17 | End: 2023-12-19 | Stop reason: HOSPADM

## 2023-12-17 RX ADMIN — GABAPENTIN 800 MG: 400 CAPSULE ORAL at 21:17

## 2023-12-17 RX ADMIN — SENNOSIDES AND DOCUSATE SODIUM 2 TABLET: 50; 8.6 TABLET ORAL at 21:16

## 2023-12-17 RX ADMIN — HYDROCODONE BITARTRATE AND ACETAMINOPHEN 2 TABLET: 5; 325 TABLET ORAL at 14:48

## 2023-12-17 RX ADMIN — ATENOLOL 25 MG: 25 TABLET ORAL at 21:16

## 2023-12-17 RX ADMIN — GUAIFENESIN 1200 MG: 600 TABLET, EXTENDED RELEASE ORAL at 10:13

## 2023-12-17 RX ADMIN — ATENOLOL 25 MG: 25 TABLET ORAL at 10:13

## 2023-12-17 RX ADMIN — HYDROCODONE BITARTRATE AND ACETAMINOPHEN 2 TABLET: 5; 325 TABLET ORAL at 06:30

## 2023-12-17 RX ADMIN — HYDROCODONE BITARTRATE AND ACETAMINOPHEN 2 TABLET: 5; 325 TABLET ORAL at 22:29

## 2023-12-17 RX ADMIN — ENOXAPARIN SODIUM 40 MG: 100 INJECTION SUBCUTANEOUS at 17:54

## 2023-12-17 RX ADMIN — GUAIFENESIN 1200 MG: 600 TABLET, EXTENDED RELEASE ORAL at 21:17

## 2023-12-17 RX ADMIN — ALPRAZOLAM 0.25 MG: 0.25 TABLET ORAL at 06:41

## 2023-12-17 RX ADMIN — ATORVASTATIN CALCIUM 40 MG: 20 TABLET, FILM COATED ORAL at 21:17

## 2023-12-17 RX ADMIN — PANTOPRAZOLE SODIUM 40 MG: 40 TABLET, DELAYED RELEASE ORAL at 06:31

## 2023-12-17 RX ADMIN — INSULIN LISPRO 3 UNITS: 100 INJECTION, SOLUTION INTRAVENOUS; SUBCUTANEOUS at 06:31

## 2023-12-17 RX ADMIN — AMITRIPTYLINE HYDROCHLORIDE 50 MG: 50 TABLET, FILM COATED ORAL at 21:17

## 2023-12-17 RX ADMIN — CYCLOBENZAPRINE 10 MG: 10 TABLET, FILM COATED ORAL at 14:48

## 2023-12-17 RX ADMIN — ACETAMINOPHEN 650 MG: 325 TABLET, FILM COATED ORAL at 02:26

## 2023-12-17 RX ADMIN — POLYETHYLENE GLYCOL 3350 17 G: 17 POWDER, FOR SOLUTION ORAL at 16:34

## 2023-12-17 RX ADMIN — CYCLOBENZAPRINE 10 MG: 10 TABLET, FILM COATED ORAL at 22:29

## 2023-12-17 RX ADMIN — INSULIN LISPRO 3 UNITS: 100 INJECTION, SOLUTION INTRAVENOUS; SUBCUTANEOUS at 12:46

## 2023-12-17 RX ADMIN — INSULIN LISPRO 5 UNITS: 100 INJECTION, SOLUTION INTRAVENOUS; SUBCUTANEOUS at 21:17

## 2023-12-17 RX ADMIN — INSULIN GLARGINE 10 UNITS: 100 INJECTION, SOLUTION SUBCUTANEOUS at 10:22

## 2023-12-17 RX ADMIN — OXYCODONE HYDROCHLORIDE 10 MG: 5 TABLET ORAL at 18:47

## 2023-12-17 RX ADMIN — ASPIRIN 81 MG: 81 TABLET, COATED ORAL at 10:21

## 2023-12-17 RX ADMIN — CYCLOBENZAPRINE 10 MG: 10 TABLET, FILM COATED ORAL at 06:41

## 2023-12-17 RX ADMIN — INSULIN LISPRO 3 UNITS: 100 INJECTION, SOLUTION INTRAVENOUS; SUBCUTANEOUS at 17:54

## 2023-12-17 RX ADMIN — HYDROCODONE BITARTRATE AND ACETAMINOPHEN 2 TABLET: 5; 325 TABLET ORAL at 10:13

## 2023-12-17 RX ADMIN — MUPIROCIN: 20 OINTMENT TOPICAL at 21:18

## 2023-12-17 RX ADMIN — LIDOCAINE 2 PATCH: 4 PATCH TOPICAL at 14:49

## 2023-12-17 RX ADMIN — KETOROLAC TROMETHAMINE 30 MG: 30 INJECTION, SOLUTION INTRAMUSCULAR; INTRAVENOUS at 08:10

## 2023-12-17 RX ADMIN — MORPHINE SULFATE 2 MG: 2 INJECTION, SOLUTION INTRAMUSCULAR; INTRAVENOUS at 05:04

## 2023-12-17 RX ADMIN — GABAPENTIN 800 MG: 400 CAPSULE ORAL at 08:10

## 2023-12-17 RX ADMIN — FUROSEMIDE 40 MG: 40 INJECTION, SOLUTION INTRAMUSCULAR; INTRAVENOUS at 12:46

## 2023-12-17 RX ADMIN — MUPIROCIN 1 APPLICATION: 20 OINTMENT TOPICAL at 10:23

## 2023-12-17 RX ADMIN — OXYCODONE HYDROCHLORIDE 10 MG: 5 TABLET ORAL at 02:26

## 2023-12-17 RX ADMIN — GABAPENTIN 800 MG: 400 CAPSULE ORAL at 16:34

## 2023-12-17 NOTE — PROGRESS NOTES
LOS: 5 days   Patient Care Team:  Steffany Gruber APRN as PCP - General (Nurse Practitioner)    Chief Complaint: post op    Subjective:  Symptoms:  No shortness of breath, cough or chest pain.    Diet:  No nausea or vomiting.    Activity level: Impaired due to pain.    Pain:  He complains of pain that is severe.  Pain is poorly controlled.      C/o upper back pain    Vital Signs  Temp:  [98.2 °F (36.8 °C)-99.4 °F (37.4 °C)] 99.4 °F (37.4 °C)  Heart Rate:  [] 100  Resp:  [16-18] 18  BP: (127-152)/(80-92) 149/83  Body mass index is 30.86 kg/m².    Intake/Output Summary (Last 24 hours) at 12/17/2023 0806  Last data filed at 12/17/2023 0745  Gross per 24 hour   Intake 600 ml   Output 2870 ml   Net -2270 ml     I/O this shift:  In: -   Out: 50 [Chest Tube:50]    Chest tube drainage last 8 hours: 240/40        12/14/23  0447 12/15/23  0554 12/16/23  0548   Weight: 84.7 kg (186 lb 11.2 oz) 90 kg (198 lb 6.6 oz) 86.7 kg (191 lb 3.2 oz)     Objective:  General Appearance:  Comfortable and in no acute distress.    Vital signs: (most recent): Blood pressure 149/83, pulse 100, temperature 99.4 °F (37.4 °C), temperature source Oral, resp. rate 18, weight 86.7 kg (191 lb 3.2 oz), SpO2 96%.  Vital signs are normal.  No fever.    Output: Producing urine and no stool output.    Lungs:  Normal effort and normal respiratory rate.  There are decreased breath sounds.    Heart: Tachycardia.  Regular rhythm.  (ST on tele monitor)  Abdomen: Abdomen is soft.  Hypoactive bowel sounds.     Extremities: There is no dependent edema.    Pulses: Distal pulses are intact.    Neurological: Patient is alert and oriented to person, place and time.    Skin:  Warm and dry.  (Sternal dressing clean, dry, and intact)          Results Review:        WBC WBC   Date Value Ref Range Status   12/17/2023 12.32 (H) 3.40 - 10.80 10*3/mm3 Final   12/16/2023 18.01 (H) 3.40 - 10.80 10*3/mm3 Final   12/15/2023 13.72 (H) 3.40 - 10.80 10*3/mm3 Final    12/14/2023 9.61 3.40 - 10.80 10*3/mm3 Final   12/14/2023 13.74 (H) 3.40 - 10.80 10*3/mm3 Final      HGB Hemoglobin   Date Value Ref Range Status   12/17/2023 10.9 (L) 13.0 - 17.7 g/dL Final   12/16/2023 12.3 (L) 13.0 - 17.7 g/dL Final   12/15/2023 11.1 (L) 13.0 - 17.7 g/dL Final   12/14/2023 11.1 (L) 13.0 - 17.7 g/dL Final   12/14/2023 11.7 (L) 12.0 - 17.0 g/dL Final     Comment:     Serial Number: [id number]:  [id number]   12/14/2023 11.6 (L) 13.0 - 17.7 g/dL Final   12/14/2023 9.9 (L) 12.0 - 17.0 g/dL Final   12/14/2023 9.9 (L) 12.0 - 17.0 g/dL Final   12/14/2023 10.2 (L) 12.0 - 17.0 g/dL Final   12/14/2023 9.9 (L) 12.0 - 17.0 g/dL Final   12/14/2023 9.2 (L) 12.0 - 17.0 g/dL Final      HCT Hematocrit   Date Value Ref Range Status   12/17/2023 33.7 (L) 37.5 - 51.0 % Final   12/16/2023 35.9 (L) 37.5 - 51.0 % Final   12/15/2023 33.0 (L) 37.5 - 51.0 % Final   12/14/2023 33.2 (L) 37.5 - 51.0 % Final   12/14/2023 35 (L) 38 - 51 % Final   12/14/2023 36.2 (L) 37.5 - 51.0 % Final   12/14/2023 29 (L) 38 - 51 % Final   12/14/2023 29 (L) 38 - 51 % Final   12/14/2023 30 (L) 38 - 51 % Final   12/14/2023 29 (L) 38 - 51 % Final   12/14/2023 27 (L) 38 - 51 % Final      Platelets Platelets   Date Value Ref Range Status   12/17/2023 128 (L) 140 - 450 10*3/mm3 Final   12/16/2023 126 (L) 140 - 450 10*3/mm3 Final   12/15/2023 125 (L) 140 - 450 10*3/mm3 Final   12/14/2023 94 (L) 140 - 450 10*3/mm3 Final   12/14/2023 131 (L) 140 - 450 10*3/mm3 Final        PT/INR:    Protime   Date Value Ref Range Status   12/15/2023 16.3 (H) 11.7 - 14.2 Seconds Final   12/14/2023 17.4 (H) 11.7 - 14.2 Seconds Final   /  INR   Date Value Ref Range Status   12/15/2023 1.29 (H) 0.90 - 1.10 Final   12/14/2023 1.40 (H) 0.90 - 1.10 Final       Sodium Sodium   Date Value Ref Range Status   12/17/2023 131 (L) 136 - 145 mmol/L Final   12/16/2023 135 (L) 136 - 145 mmol/L Final   12/15/2023 140 136 - 145 mmol/L Final   12/14/2023 142 136 - 145 mmol/L Final    12/14/2023 140 136 - 145 mmol/L Final      Potassium Potassium   Date Value Ref Range Status   12/17/2023 4.6 3.5 - 5.2 mmol/L Final   12/16/2023 4.2 3.5 - 5.2 mmol/L Final   12/15/2023 4.2 3.5 - 5.2 mmol/L Final   12/14/2023 4.3 3.5 - 5.2 mmol/L Final   12/14/2023 4.2 3.5 - 5.2 mmol/L Final      Chloride Chloride   Date Value Ref Range Status   12/17/2023 97 (L) 98 - 107 mmol/L Final   12/16/2023 99 98 - 107 mmol/L Final   12/15/2023 104 98 - 107 mmol/L Final   12/14/2023 108 (H) 98 - 107 mmol/L Final   12/14/2023 106 98 - 107 mmol/L Final      Bicarbonate CO2   Date Value Ref Range Status   12/17/2023 20.9 (L) 22.0 - 29.0 mmol/L Final   12/16/2023 20.7 (L) 22.0 - 29.0 mmol/L Final   12/15/2023 16.5 (L) 22.0 - 29.0 mmol/L Final   12/14/2023 20.7 (L) 22.0 - 29.0 mmol/L Final   12/14/2023 24.0 22.0 - 29.0 mmol/L Final      BUN BUN   Date Value Ref Range Status   12/17/2023 22 (H) 6 - 20 mg/dL Final   12/16/2023 22 (H) 6 - 20 mg/dL Final   12/15/2023 22 (H) 6 - 20 mg/dL Final   12/14/2023 15 6 - 20 mg/dL Final   12/14/2023 15 6 - 20 mg/dL Final      Creatinine Creatinine   Date Value Ref Range Status   12/17/2023 0.61 (L) 0.76 - 1.27 mg/dL Final   12/16/2023 0.76 0.76 - 1.27 mg/dL Final   12/15/2023 1.00 0.76 - 1.27 mg/dL Final   12/14/2023 0.81 0.76 - 1.27 mg/dL Final   12/14/2023 0.66 0.60 - 130.00 mg/dL Final   12/14/2023 0.94 0.76 - 1.27 mg/dL Final      Calcium Calcium   Date Value Ref Range Status   12/17/2023 8.8 8.6 - 10.5 mg/dL Final   12/16/2023 9.2 8.6 - 10.5 mg/dL Final   12/15/2023 8.5 (L) 8.6 - 10.5 mg/dL Final   12/14/2023 8.9 8.6 - 10.5 mg/dL Final   12/14/2023 9.3 8.6 - 10.5 mg/dL Final      Magnesium Magnesium   Date Value Ref Range Status   12/15/2023 2.5 1.6 - 2.6 mg/dL Final   12/14/2023 2.0 1.6 - 2.6 mg/dL Final   12/14/2023 3.2 (H) 1.6 - 2.6 mg/dL Final          amitriptyline, 50 mg, Oral, Nightly  aspirin, 81 mg, Oral, Daily  atenolol, 25 mg, Oral, Q12H  atorvastatin, 40 mg, Oral,  Nightly  chlorhexidine, 15 mL, Mouth/Throat, Q12H  enoxaparin, 40 mg, Subcutaneous, Q24H  gabapentin, 800 mg, Oral, TID  guaiFENesin, 1,200 mg, Oral, Q12H  insulin glargine, 10 Units, Subcutaneous, Daily  insulin lispro, 3-14 Units, Subcutaneous, 4x Daily AC & at Bedtime  ketorolac, 30 mg, Intravenous, Once  Lidocaine, 2 patch, Transdermal, Q24H  Morphine, 4 mg, Intravenous, Once  mupirocin, , Each Nare, BID  pantoprazole, 40 mg, Oral, QAM  senna-docusate sodium, 2 tablet, Oral, Nightly      sodium chloride, 30 mL/hr, Last Rate: 30 mL/hr (12/14/23 1233)        Assessment & Plan  - severe multi-vessel CAD with hx of PCI 2016; LD Plavix 12/10 s/p CABGx5 BIMA/LSVG POD#3 Gema  - hypertension  - hyperlipidemia  - DM II--A1c 9.4  - chronic back pain  - leukocytosis--reactive  - post op anemia--expected acute blood loss    Pain is an issue this morning. Will increase gabapentin, and okay to give a one time dose of Toradol per Dr. Ribeiro  Tachycardic this morning and BP up. He is having more ectopy, will switch him to atenolol  Glucose controlled on current regimen  On 2L NC--wean as able  IV diurese again today  Mobilize/encourage pulmonary toilet-- continue mucinex/flutter  Still with increase output from his pleural tubes. Discontinue mediastinal chest tubes, and separate pleural chest tubes  Leave epicardial wires another day  Continue routine care    Elena Benoit, NINFA  12/17/23  08:06 EST

## 2023-12-17 NOTE — PLAN OF CARE
Goal Outcome Evaluation:  Plan of Care Reviewed With: patient        Progress: no change  Outcome Evaluation: Pt is AO x4. All vs stable. Pain is an issue overnight. Pain treated per MAR. Chest tube and Ext pacemaker in place. Intake and output recorded on chart. No acute distress noted. Will continue to monitor.      Problem: Adult Inpatient Plan of Care  Goal: Plan of Care Review  Outcome: Ongoing, Progressing  Flowsheets (Taken 12/17/2023 0582)  Progress: no change  Plan of Care Reviewed With: patient  Outcome Evaluation: Pt is AO x4. All vs stable. Pain is an issue overnight. Pain treated per MAR. Chest tube and Ext pacemaker in place. Intake and output recorded on chart. No acute distress noted. Will continue to monitor.  Goal: Patient-Specific Goal (Individualized)  Outcome: Ongoing, Progressing  Goal: Absence of Hospital-Acquired Illness or Injury  Outcome: Ongoing, Progressing  Intervention: Identify and Manage Fall Risk  Description: Perform standard risk assessment on admission using a validated tool or comprehensive approach appropriate to the patient; reassess fall risk frequently, with change in status or transfer to another level of care.  Communicate fall injury risk to interprofessional healthcare team.  Determine need for increased observation, equipment and environmental modification, such as low bed, signage and supportive, nonskid footwear.  Adjust safety measures to individual developmental age, stage and identified risk factors.  Reinforce the importance of safety and physical activity with patient and family.  Perform regular intentional rounding to assess need for position change, pain assessment and personal needs, including assistance with toileting.  Recent Flowsheet Documentation  Taken 12/17/2023 8006 by Phil Carey RN  Safety Promotion/Fall Prevention:   assistive device/personal items within reach   activity supervised   fall prevention program maintained   clutter free environment  maintained   nonskid shoes/slippers when out of bed   muscle strengthening facilitated   room organization consistent   safety round/check completed  Taken 12/17/2023 0226 by Phil Carey RN  Safety Promotion/Fall Prevention:   activity supervised   assistive device/personal items within reach   fall prevention program maintained   clutter free environment maintained   muscle strengthening facilitated   nonskid shoes/slippers when out of bed   safety round/check completed   room organization consistent  Taken 12/17/2023 0007 by Phil Carey RN  Safety Promotion/Fall Prevention:   activity supervised   assistive device/personal items within reach   fall prevention program maintained   clutter free environment maintained   muscle strengthening facilitated   nonskid shoes/slippers when out of bed   safety round/check completed   room organization consistent  Taken 12/16/2023 2200 by Phil Carey RN  Safety Promotion/Fall Prevention:   assistive device/personal items within reach   activity supervised   fall prevention program maintained   clutter free environment maintained   muscle strengthening facilitated   nonskid shoes/slippers when out of bed   safety round/check completed   room organization consistent  Taken 12/16/2023 2053 by Phil Carey RN  Safety Promotion/Fall Prevention:   assistive device/personal items within reach   activity supervised   clutter free environment maintained   fall prevention program maintained   muscle strengthening facilitated   nonskid shoes/slippers when out of bed   room organization consistent   safety round/check completed  Intervention: Prevent Skin Injury  Description: Perform a screening for skin injury risk, such as pressure or moisture associated skin damage on admission and at regular intervals throughout hospital stay.  Keep all areas of skin (especially folds) clean and dry.  Maintain adequate skin hydration.  Relieve and redistribute pressure and protect bony  prominences; implement measures based on patient-specific risk factors.  Match turning and repositioning schedule to clinical condition.  Encourage weight shift frequently; assist with reposition if unable to complete independently.  Float heels off bed; avoid pressure on the Achilles tendon.  Keep skin free from extended contact with medical devices.  Encourage functional activity and mobility, as early as tolerated.  Use aids (e.g., slide boards, mechanical lift) during transfer.  Recent Flowsheet Documentation  Taken 12/17/2023 0226 by Phil Carey RN  Body Position: position changed independently  Taken 12/17/2023 0007 by Phil Carey RN  Body Position: (up in chair) other (see comments)  Taken 12/16/2023 2200 by Phil Carey RN  Body Position: position changed independently  Taken 12/16/2023 2053 by Phil Carey RN  Body Position: position changed independently  Intervention: Prevent and Manage VTE (Venous Thromboembolism) Risk  Description: Assess for VTE (venous thromboembolism) risk.  Encourage and assist with early ambulation.  Initiate and maintain compression or other therapy, as indicated, based on identified risk in accordance with organizational protocol and provider order.  Encourage both active and passive leg exercises while in bed, if unable to ambulate.  Recent Flowsheet Documentation  Taken 12/17/2023 0435 by Phil Carey RN  Activity Management: up in chair  VTE Prevention/Management:   bilateral   compression stockings on  Taken 12/17/2023 0226 by Phil Carey RN  Activity Management: activity encouraged  Taken 12/17/2023 0007 by Phil Carey RN  Activity Management: activity encouraged  VTE Prevention/Management:   bilateral   compression stockings on   sequential compression devices on  Taken 12/16/2023 2200 by Phil Carey RN  Activity Management: activity encouraged  Taken 12/16/2023 2053 by Phil Carey RN  Activity Management: activity encouraged  VTE Prevention/Management:    bilateral   compression stockings on  Intervention: Prevent Infection  Description: Maintain skin and mucous membrane integrity; promote hand, oral and pulmonary hygiene.  Optimize fluid balance, nutrition, sleep and glycemic control to maximize infection resistance.  Identify potential sources of infection early to prevent or mitigate progression of infection (e.g., wound, lines, devices).  Evaluate ongoing need for invasive devices; remove promptly when no longer indicated.  Recent Flowsheet Documentation  Taken 12/17/2023 0435 by Phil Carey RN  Infection Prevention:   visitors restricted/screened   single patient room provided   rest/sleep promoted   personal protective equipment utilized   hand hygiene promoted   equipment surfaces disinfected   environmental surveillance performed   cohorting utilized  Taken 12/17/2023 0226 by Phil Carey RN  Infection Prevention:   visitors restricted/screened   single patient room provided   rest/sleep promoted   personal protective equipment utilized   hand hygiene promoted   equipment surfaces disinfected   environmental surveillance performed   cohorting utilized  Taken 12/17/2023 0007 by Phil Carey RN  Infection Prevention:   visitors restricted/screened   single patient room provided   rest/sleep promoted   personal protective equipment utilized   environmental surveillance performed   cohorting utilized   hand hygiene promoted   equipment surfaces disinfected  Taken 12/16/2023 2200 by Phil Carey RN  Infection Prevention:   visitors restricted/screened   single patient room provided   rest/sleep promoted   personal protective equipment utilized   hand hygiene promoted   equipment surfaces disinfected   environmental surveillance performed   cohorting utilized  Taken 12/16/2023 2053 by Phil Carey RN  Infection Prevention:   visitors restricted/screened   single patient room provided   equipment surfaces disinfected   environmental surveillance performed    cohorting utilized   rest/sleep promoted   hand hygiene promoted   personal protective equipment utilized  Goal: Optimal Comfort and Wellbeing  Outcome: Ongoing, Progressing  Intervention: Monitor Pain and Promote Comfort  Description: Assess pain level, treatment efficacy and patient response at regular intervals using a consistent pain scale.  Consider the presence and impact of preexisting chronic pain.  Encourage patient and caregiver involvement in pain assessment, interventions and safety measures.  Recent Flowsheet Documentation  Taken 12/17/2023 0226 by Phil Carey RN  Pain Management Interventions:   see MAR   quiet environment facilitated   pillow support provided   care clustered  Taken 12/17/2023 0007 by Phil Carey RN  Pain Management Interventions:   see MAR   pillow support provided   position adjusted   quiet environment facilitated  Taken 12/16/2023 2053 by Phil Carey RN  Pain Management Interventions: see MAR  Intervention: Provide Person-Centered Care  Description: Use a family-focused approach to care.  Develop trust and rapport by proactively providing information, encouraging questions, addressing concerns and offering reassurance.  Acknowledge emotional response to hospitalization.  Recognize and utilize personal coping strategies.  Honor spiritual and cultural preferences.  Recent Flowsheet Documentation  Taken 12/17/2023 0435 by Phil Carey RN  Trust Relationship/Rapport:   care explained   choices provided   emotional support provided   empathic listening provided   questions answered   questions encouraged   reassurance provided   thoughts/feelings acknowledged  Taken 12/17/2023 0007 by Phil Carey RN  Trust Relationship/Rapport:   thoughts/feelings acknowledged   reassurance provided   empathic listening provided  Taken 12/16/2023 2053 by Phil Carey RN  Trust Relationship/Rapport:   thoughts/feelings acknowledged   reassurance provided   questions encouraged   questions  answered   empathic listening provided   emotional support provided   choices provided  Goal: Readiness for Transition of Care  Outcome: Ongoing, Progressing

## 2023-12-17 NOTE — PLAN OF CARE
Goal Outcome Evaluation:  Plan of Care Reviewed With: patient        Progress: improving  Outcome Evaluation: Two chest tubes removed. Pacer still on a backup rate. 2L. Pain more controlled today.

## 2023-12-17 NOTE — PROGRESS NOTES
Name: Moreno Aleman ADMIT: 2023   : 1965  PCP: Steffany Gruber APRN    MRN: [id number] LOS: 4 days   AGE/SEX: 58 y.o. male  ROOM: /     Subjective   Subjective   Complaining of interscapular pain especially with deep inspiration.  No fever or chills.  No shortness of breath.  No cough.  No hemoptysis.  No cardiac chest pain.  No palpitation    Review of Systems  GI.  No abdominal pain.  No nausea or vomiting  .  No dysuria or hematuria.     Objective   Objective   Vital Signs  Temp:  [97.6 °F (36.4 °C)-99 °F (37.2 °C)] 98.2 °F (36.8 °C)  Heart Rate:  [] 95  Resp:  [16-18] 16  BP: (126-155)/(80-94) 143/82  SpO2:  [88 %-98 %] 96 %  on  Flow (L/min):  [2-3] 3;   Device (Oxygen Therapy): nasal cannula    Intake/Output Summary (Last 24 hours) at 2023  Last data filed at 2023 1809  Gross per 24 hour   Intake 600 ml   Output 3575 ml   Net -2975 ml     Body mass index is 30.86 kg/m².      23  0447 12/15/23  0554 23  0548   Weight: 84.7 kg (186 lb 11.2 oz) 90 kg (198 lb 6.6 oz) 86.7 kg (191 lb 3.2 oz)     Physical Exam  General.  Middle-aged gentleman.  He is alert and oriented x 3.  In no apparent pain/distress/diaphoresis.  Normal mood and affect.  Eyes.  Pupils equal round and reactive.  Intact extraocular musculature.  Oral cavity.  Moist mucous membrane  Neck.  Supple.  No JVD.  No lymphadenopathy or thyromegaly.  Cardio vascular.  Regular rate and rhythm with no gallops or murmurs.  Chest.  Poor bilateral air entry with no added sounds.  Chest tube from chest.  Abdomen.  Soft lax.  No tenderness.  No organomegaly.  No guarding or rebound  Extremities.  No clubbing/cyanosis/edema.  CNS.  No acute focal neurological deficits.    Results Review:      Results from last 7 days   Lab Units 23  0327 12/15/23  0255 23  1529 23  1207 23  1144 23  0343 23  1455 23  0443 23  1913   SODIUM mmol/L 135* 140 142  --  140  139  --  140 140   POTASSIUM mmol/L 4.2 4.2 4.3  --  4.2 4.0 4.2 3.5 3.7   CHLORIDE mmol/L 99 104 108*  --  106 103  --  105 105   CO2 mmol/L 20.7* 16.5* 20.7*  --  24.0 23.3  --  24.0 24.3   BUN mg/dL 22* 22* 15  --  15 14  --  9 8   CREATININE mg/dL 0.76 1.00 0.81 0.66 0.94 0.79  --  0.62* 0.73*   GLUCOSE mg/dL 155* 192* 104*  --  127* 118*  --  102* 202*   CALCIUM mg/dL 9.2 8.5* 8.9  --  9.3 9.5  --  8.8 8.9   AST (SGOT) U/L  --   --   --   --   --  15  --  13 13   ALT (SGPT) U/L  --   --   --   --   --  6  --  9 7     Estimated Creatinine Clearance: 110.7 mL/min (by C-G formula based on SCr of 0.76 mg/dL).  Results from last 7 days   Lab Units 12/11/23 1913   HEMOGLOBIN A1C % 9.40*     Results from last 7 days   Lab Units 12/16/23  1633 12/16/23  1158 12/15/23  2017 12/15/23  1557 12/15/23  1132 12/15/23  0756 12/15/23  0657 12/15/23  0510   GLUCOSE mg/dL 137* 198* 162* 156* 182* 194* 196* 195*     Results from last 7 days   Lab Units 12/11/23  2133 12/11/23 1913   HSTROP T ng/L 9 8     Results from last 7 days   Lab Units 12/11/23 1913   PROBNP pg/mL 607.9         Results from last 7 days   Lab Units 12/15/23  0255 12/14/23  1529 12/14/23  1144 12/14/23  1144 12/13/23  0443 12/11/23 1913   MAGNESIUM mg/dL 2.5 2.0  --  3.2* 1.6 1.8   PHOSPHORUS mg/dL 6.2* 5.0*   < > 3.6  --   --     < > = values in this interval not displayed.     Results from last 7 days   Lab Units 12/13/23  0443 12/11/23  2133   CHOLESTEROL mg/dL 127 145   TRIGLYCERIDES mg/dL 78 128   HDL CHOL mg/dL 40 47     Results from last 7 days   Lab Units 12/16/23  0327 12/15/23  0255 12/14/23  1529 12/14/23  1207 12/14/23  1144 12/14/23  1017 12/14/23  0954 12/14/23  0734 12/13/23  0443 12/11/23  1913   WBC 10*3/mm3 18.01* 13.72* 9.61  --  13.74*  --   --   --  6.75 7.38   HEMOGLOBIN g/dL 12.3* 11.1* 11.1*  --  11.6*  --   --   --  13.9 13.7   HEMOGLOBIN, POC g/dL  --   --   --  11.7*  --  9.9* 9.9*   < >  --   --    HEMATOCRIT % 35.9* 33.0* 33.2*   --  36.2*  --   --   --  40.2 39.1   HEMATOCRIT POC %  --   --   --  35*  --  29* 29*   < >  --   --    PLATELETS 10*3/mm3 126* 125* 94*  --  131*  --   --   --  147 153   MCV fL 89.3 90.4 89.5  --  88.1  --   --   --  87.8 85.6   MCH pg 30.6 30.4 29.9  --  28.2  --   --   --  30.3 30.0   MCHC g/dL 34.3 33.6 33.4  --  32.0  --   --   --  34.6 35.0   RDW % 12.4 12.7 12.9  --  12.6  --   --   --  13.0 12.3   RDW-SD fl 40.4 41.8 42.5  --  40.5  --   --   --  41.1 38.2   MPV fL 10.2 10.2 9.4  --  9.6  --   --   --  9.5 9.5   NEUTROPHIL % %  --  79.6*  --   --  79.7*  --   --   --  57.3 58.2   LYMPHOCYTE % %  --  10.3*  --   --  10.5*  --   --   --  32.7 33.9   MONOCYTES % %  --  9.8  --   --  8.7  --   --   --  7.9 6.2   EOSINOPHIL % %  --  0.0*  --   --  0.4  --   --   --  1.2 0.9   BASOPHIL % %  --  0.1  --   --  0.2  --   --   --  0.6 0.4   IMM GRAN % %  --  0.2  --   --  0.5  --   --   --  0.3 0.4   NEUTROS ABS 10*3/mm3  --  10.91*  --   --  10.94*  --   --   --  3.87 4.29   LYMPHS ABS 10*3/mm3  --  1.42  --   --  1.44  --   --   --  2.21 2.50   MONOS ABS 10*3/mm3  --  1.34*  --   --  1.20*  --   --   --  0.53 0.46   EOS ABS 10*3/mm3  --  0.00  --   --  0.06  --   --   --  0.08 0.07   BASOS ABS 10*3/mm3  --  0.02  --   --  0.03  --   --   --  0.04 0.03   IMMATURE GRANS (ABS) 10*3/mm3  --  0.03  --   --  0.07*  --   --   --  0.02 0.03   NRBC /100 WBC  --  0.0  --   --  0.0  --   --   --  0.0 0.0    < > = values in this interval not displayed.     Results from last 7 days   Lab Units 12/15/23  0255 12/14/23  1144 12/13/23  0443   INR  1.29* 1.40* 1.10   APTT seconds  --  26.4 27.7     Results from last 7 days   Lab Units 12/14/23  1437 12/14/23  1207 12/14/23  1017 12/14/23  0954 12/14/23  0931 12/14/23  0915 12/14/23  0907 12/14/23  0734 12/13/23  1719   PH, ARTERIAL pH units 7.365 7.379 7.3120* 7.3870 7.3830 7.2920* 7.3020*   < > 7.438   PO2 ART mm Hg 123.4* 341.8*  --   --   --   --   --   --  96.6   PCO2,  ARTERIAL mm Hg 38.6 40.4  --   --   --   --   --   --  32.7*   HCO3 ART mmol/L 22.1 23.8  --   --   --   --   --   --  22.1    < > = values in this interval not displayed.     Results from last 7 days   Lab Units 12/14/23  1207   LACTATE mmol/L <0.4*         Results from last 7 days   Lab Units 12/11/23  1913   LIPASE U/L 25             Results from last 7 days   Lab Units 12/12/23  2322   NITRITE UA  Negative           Imaging:  Imaging Results (Last 24 Hours)       Procedure Component Value Units Date/Time    XR Chest 1 View [[id number]] Collected: 12/16/23 0653     Updated: 12/16/23 0701    Narrative:      XR CHEST 1 VW-     HISTORY: Male who is 58 years-old, postoperative evaluation     TECHNIQUE: Frontal view of the chest     COMPARISON: 12/15/2023.     FINDINGS: [place] catheter has been removed. Right IJ sheath remains.  Chest tubes appear stable. Sternotomy wires are noted. The heart is  enlarged. Pulmonary vasculature is mildly congested. Likely atelectasis  is seen in both lungs. Minimal, 5 mm right apical pneumothorax. No left  pneumothorax. No large pleural effusion. Otherwise stable.       Impression:      Minimal right apical pneumothorax.     This report was finalized on 12/16/2023 6:58 AM by Dr. Sukhdev Cho M.D on Workstation: [email or web address]                  I reviewed the patient's new clinical results / labs / tests / procedures      Assessment/Plan     Active Hospital Problems    Diagnosis  POA    **CAD (coronary artery disease) [I25.10]  Yes    Headache [R51.9]  Yes    Abnormal findings on diagnostic imaging of heart and coronary circulation [R93.1]  Yes    CAD S/P percutaneous coronary angioplasty [I25.10, Z98.61]  Not Applicable    Hypertension, essential [I10]  Yes    Type 2 diabetes mellitus, with long-term current use of insulin [E11.9, Z79.4]  Not Applicable      Resolved Hospital Problems   No resolved problems to display.           Coronary artery disease/hypertension status post 5  vessel CABG on 12/14/2023.  Cardiothoracic surgery is following..  Continue aspirin/Lipitor/metoprolol.  Normal ejection fraction.  Continue incentive spirometry.  Type 2 diabetes.  Acceptable blood sugar on Lantus and sliding scale insulin.  A1c is 9.4.  Once p.o. is fully tolerated would be placed on Jardiance.  VTE prophylaxis with Lovenox.    Discussed my findings and plan of treatment with the patient/wife      Danielito Green MD  [facility]  12/16/23  20:42 EST

## 2023-12-17 NOTE — PROGRESS NOTES
Name: Moreno Aleman ADMIT: 2023   : 1965  PCP: Steffany Gruber APRN    MRN: [id number] LOS: 5 days   AGE/SEX: 58 y.o. male  ROOM: 9/     Subjective   Subjective   Improved interscapular pain.  No cardiac chest pain.  No fever or chills.  No shortness of breath.  Positive occasional cough with questionable hemoptysis.  No fever or chills.    Review of Systems  GI.  No abdominal pain.  No nausea or vomiting.  No bowel movement x 3 days.  .  No dysuria or hematuria.     Objective   Objective   Vital Signs  Temp:  [98.2 °F (36.8 °C)-99.4 °F (37.4 °C)] 98.7 °F (37.1 °C)  Heart Rate:  [] 81  Resp:  [16-18] 18  BP: (115-149)/(71-83) 115/71  SpO2:  [96 %-99 %] 99 %  on  Flow (L/min):  [2-3] 2;   Device (Oxygen Therapy): nasal cannula    Intake/Output Summary (Last 24 hours) at 2023 1310  Last data filed at 2023 0830  Gross per 24 hour   Intake 360 ml   Output 2280 ml   Net -1920 ml     Body mass index is 30.54 kg/m².      12/15/23  0554 23  0548 23  0625   Weight: 90 kg (198 lb 6.6 oz) 86.7 kg (191 lb 3.2 oz) 85.8 kg (189 lb 3.2 oz)     Physical Exam  General.  Middle-aged gentleman.  He is alert and oriented x 3.  In no apparent pain/distress/diaphoresis.  Normal mood and affect.  Eyes.  Pupils equal round and reactive.  Intact extraocular musculature.  Oral cavity.  Moist mucous membrane  Neck.  Supple.  No JVD.  No lymphadenopathy or thyromegaly.  Cardio vascular.  Regular rate and rhythm with no gallops or murmurs.  Chest.  Poor bilateral air entry with scattered bilateral rhonchi.  Chest tube from chest.    Abdomen.  Soft lax.  No tenderness.  No organomegaly.  No guarding or rebound  Extremities.  No clubbing/cyanosis/edema.  CNS.  No acute focal neurological deficits.    Results Review:      Results from last 7 days   Lab Units 23  0317 23  0327 12/15/23  0255 23  1529 23  1207 23  1144 23  0343 23  1455  12/13/23 0443 12/11/23 1913   SODIUM mmol/L 131* 135* 140 142  --  140 139  --  140 140   POTASSIUM mmol/L 4.6 4.2 4.2 4.3  --  4.2 4.0 4.2 3.5 3.7   CHLORIDE mmol/L 97* 99 104 108*  --  106 103  --  105 105   CO2 mmol/L 20.9* 20.7* 16.5* 20.7*  --  24.0 23.3  --  24.0 24.3   BUN mg/dL 22* 22* 22* 15  --  15 14  --  9 8   CREATININE mg/dL 0.61* 0.76 1.00 0.81 0.66 0.94 0.79  --  0.62* 0.73*   GLUCOSE mg/dL 159* 155* 192* 104*  --  127* 118*  --  102* 202*   CALCIUM mg/dL 8.8 9.2 8.5* 8.9  --  9.3 9.5  --  8.8 8.9   AST (SGOT) U/L 18  --   --   --   --   --  15  --  13 13   ALT (SGPT) U/L 6  --   --   --   --   --  6  --  9 7     Estimated Creatinine Clearance: 137.2 mL/min (A) (by C-G formula based on SCr of 0.61 mg/dL (L)).  Results from last 7 days   Lab Units 12/11/23 1913   HEMOGLOBIN A1C % 9.40*     Results from last 7 days   Lab Units 12/17/23  1204 12/17/23  0624 12/16/23  2102 12/16/23  1633 12/16/23  1158 12/15/23  2017 12/15/23  1557 12/15/23  1132   GLUCOSE mg/dL 196* 174* 228* 137* 198* 162* 156* 182*     Results from last 7 days   Lab Units 12/11/23  2133 12/11/23 1913   HSTROP T ng/L 9 8     Results from last 7 days   Lab Units 12/11/23 1913   PROBNP pg/mL 607.9         Results from last 7 days   Lab Units 12/15/23  0255 12/14/23  1529 12/14/23  1144 12/14/23  1144 12/13/23 0443 12/11/23  1913   MAGNESIUM mg/dL 2.5 2.0  --  3.2* 1.6 1.8   PHOSPHORUS mg/dL 6.2* 5.0*   < > 3.6  --   --     < > = values in this interval not displayed.     Results from last 7 days   Lab Units 12/13/23  0443 12/11/23  2133   CHOLESTEROL mg/dL 127 145   TRIGLYCERIDES mg/dL 78 128   HDL CHOL mg/dL 40 47     Results from last 7 days   Lab Units 12/17/23  0317 12/16/23  0327 12/15/23  0255 12/14/23  1529 12/14/23  1207 12/14/23  1144 12/14/23  1017 12/14/23  0734 12/13/23  0443 12/11/23  1913   WBC 10*3/mm3 12.32* 18.01* 13.72* 9.61  --  13.74*  --   --  6.75 7.38   HEMOGLOBIN g/dL 10.9* 12.3* 11.1* 11.1*  --  11.6*  --    --  13.9 13.7   HEMOGLOBIN, POC g/dL  --   --   --   --  11.7*  --  9.9*   < >  --   --    HEMATOCRIT % 33.7* 35.9* 33.0* 33.2*  --  36.2*  --   --  40.2 39.1   HEMATOCRIT POC %  --   --   --   --  35*  --  29*   < >  --   --    PLATELETS 10*3/mm3 128* 126* 125* 94*  --  131*  --   --  147 153   MCV fL 87.5 89.3 90.4 89.5  --  88.1  --   --  87.8 85.6   MCH pg 28.3 30.6 30.4 29.9  --  28.2  --   --  30.3 30.0   MCHC g/dL 32.3 34.3 33.6 33.4  --  32.0  --   --  34.6 35.0   RDW % 12.3 12.4 12.7 12.9  --  12.6  --   --  13.0 12.3   RDW-SD fl 39.1 40.4 41.8 42.5  --  40.5  --   --  41.1 38.2   MPV fL 9.7 10.2 10.2 9.4  --  9.6  --   --  9.5 9.5   NEUTROPHIL % % 75.5  --  79.6*  --   --  79.7*  --   --  57.3 58.2   LYMPHOCYTE % % 11.9*  --  10.3*  --   --  10.5*  --   --  32.7 33.9   MONOCYTES % % 10.8  --  9.8  --   --  8.7  --   --  7.9 6.2   EOSINOPHIL % % 0.7  --  0.0*  --   --  0.4  --   --  1.2 0.9   BASOPHIL % % 0.5  --  0.1  --   --  0.2  --   --  0.6 0.4   IMM GRAN % % 0.6*  --  0.2  --   --  0.5  --   --  0.3 0.4   NEUTROS ABS 10*3/mm3 9.30*  --  10.91*  --   --  10.94*  --   --  3.87 4.29   LYMPHS ABS 10*3/mm3 1.47  --  1.42  --   --  1.44  --   --  2.21 2.50   MONOS ABS 10*3/mm3 1.33*  --  1.34*  --   --  1.20*  --   --  0.53 0.46   EOS ABS 10*3/mm3 0.09  --  0.00  --   --  0.06  --   --  0.08 0.07   BASOS ABS 10*3/mm3 0.06  --  0.02  --   --  0.03  --   --  0.04 0.03   IMMATURE GRANS (ABS) 10*3/mm3 0.07*  --  0.03  --   --  0.07*  --   --  0.02 0.03   NRBC /100 WBC 0.0  --  0.0  --   --  0.0  --   --  0.0 0.0    < > = values in this interval not displayed.     Results from last 7 days   Lab Units 12/15/23  0255 12/14/23  1144 12/13/23  0443   INR  1.29* 1.40* 1.10   APTT seconds  --  26.4 27.7     Results from last 7 days   Lab Units 12/14/23  1437 12/14/23  1207 12/14/23  1017 12/14/23  0954 12/14/23  0931 12/14/23  0915 12/14/23  0907 12/14/23  0734 12/13/23  1719   PH, ARTERIAL pH units 7.365 7.379  7.3120* 7.3870 7.3830 7.2920* 7.3020*   < > 7.438   PO2 ART mm Hg 123.4* 341.8*  --   --   --   --   --   --  96.6   PCO2, ARTERIAL mm Hg 38.6 40.4  --   --   --   --   --   --  32.7*   HCO3 ART mmol/L 22.1 23.8  --   --   --   --   --   --  22.1    < > = values in this interval not displayed.     Results from last 7 days   Lab Units 12/14/23  1207   LACTATE mmol/L <0.4*         Results from last 7 days   Lab Units 12/11/23  1913   LIPASE U/L 25             Results from last 7 days   Lab Units 12/12/23  2322   NITRITE UA  Negative           Imaging:  Imaging Results (Last 24 Hours)       Procedure Component Value Units Date/Time    XR Chest 1 View [[id number]] Collected: 12/17/23 1302     Updated: 12/17/23 1308    Narrative:      XR CHEST 1 VW-     HISTORY: Male who is 58 years-old, chest tube removal     TECHNIQUE: Frontal view of the chest     COMPARISON: 12/17/2023 at 0405 hours      FINDINGS: Interval removal of 2 upper chest tubes. 2 lower chest tubes  remain. Sternotomy wires are present. Heart size is borderline.  Pulmonary vasculature is mildly congested. Small likely atelectasis in  the lower lungs. No pleural effusion. 4 mm left apical pneumothorax.  Trace right apical pneumothorax. No acute osseous process.       Impression:      Removal of 2 chest tubes. Minimal left apical pneumothorax.  Trace right apical pneumothorax.     This report was finalized on 12/17/2023 1:05 PM by Dr. Sukhdev Cho M.D on Workstation: [email or web address]       XR Chest 1 View [[id number]] Collected: 12/17/23 0610     Updated: 12/17/23 0615    Narrative:      XR CHEST 1 VW-     HISTORY: Male who is 58 years-old, postoperative evaluation     TECHNIQUE: Frontal view of the chest     COMPARISON: 12/16/2023     FINDINGS: Right IJ sheath has been removed. Chest tubes appear stable.  Sternotomy wires are noted. The heart is enlarged. Pulmonary vasculature  is mildly congested. Likely atelectasis is seen in both lungs. Minimal,  decreased  right apical pneumothorax. No left  pneumothorax. No large pleural effusion. Otherwise stable.       Impression:      Minimal, decreased right apical pneumothorax.     This report was finalized on 12/17/2023 6:12 AM by Dr. Sukhdev Cho M.D on Workstation: [email or web address]                  I reviewed the patient's new clinical results / labs / tests / procedures      Assessment/Plan     Active Hospital Problems    Diagnosis  POA    **CAD (coronary artery disease) [I25.10]  Yes    Headache [R51.9]  Yes    Abnormal findings on diagnostic imaging of heart and coronary circulation [R93.1]  Yes    CAD S/P percutaneous coronary angioplasty [I25.10, Z98.61]  Not Applicable    Hypertension, essential [I10]  Yes    Type 2 diabetes mellitus, with long-term current use of insulin [E11.9, Z79.4]  Not Applicable      Resolved Hospital Problems   No resolved problems to display.           Coronary artery disease/hypertension status post 5 vessel CABG on 12/14/2023.  Cardiothoracic surgery is following..  Continue aspirin/Lipitor/atenolol normal ejection fraction.  Continue incentive spirometry.  Thoracic surgery giving intermittent diuresis.  Leukocytosis improving.  Postoperative anemia/thrombocytopenia.  Positive mild hemoglobin drop.  Continue to monitor.  No bleeding diathesis.  Continue to monitor platelets.    Type 2 diabetes.  Acceptable blood sugar on Lantus and sliding scale insulin.  A1c is 9.4.  Once p.o. is fully tolerated would be placed on Jardiance.  VTE prophylaxis with Lovenox.    Discussed my findings and plan of treatment with the patient/wife      Danielito Green MD  [facility]  12/17/23  13:10 EST

## 2023-12-18 ENCOUNTER — APPOINTMENT (OUTPATIENT)
Dept: GENERAL RADIOLOGY | Facility: HOSPITAL | Age: 58
DRG: 236 | End: 2023-12-18
Payer: MEDICARE

## 2023-12-18 LAB
ANION GAP SERPL CALCULATED.3IONS-SCNC: 9 MMOL/L (ref 5–15)
BASOPHILS # BLD AUTO: 0.03 10*3/MM3 (ref 0–0.2)
BASOPHILS NFR BLD AUTO: 0.3 % (ref 0–1.5)
BUN SERPL-MCNC: 23 MG/DL (ref 6–20)
BUN/CREAT SERPL: 37.7 (ref 7–25)
CALCIUM SPEC-SCNC: 8.7 MG/DL (ref 8.6–10.5)
CHLORIDE SERPL-SCNC: 100 MMOL/L (ref 98–107)
CO2 SERPL-SCNC: 27 MMOL/L (ref 22–29)
CREAT SERPL-MCNC: 0.61 MG/DL (ref 0.76–1.27)
DEPRECATED RDW RBC AUTO: 41.5 FL (ref 37–54)
EGFRCR SERPLBLD CKD-EPI 2021: 112 ML/MIN/1.73
EOSINOPHIL # BLD AUTO: 0.19 10*3/MM3 (ref 0–0.4)
EOSINOPHIL NFR BLD AUTO: 2.1 % (ref 0.3–6.2)
ERYTHROCYTE [DISTWIDTH] IN BLOOD BY AUTOMATED COUNT: 12.5 % (ref 12.3–15.4)
GLUCOSE BLDC GLUCOMTR-MCNC: 181 MG/DL (ref 70–130)
GLUCOSE BLDC GLUCOMTR-MCNC: 255 MG/DL (ref 70–130)
GLUCOSE BLDC GLUCOMTR-MCNC: 272 MG/DL (ref 70–130)
GLUCOSE SERPL-MCNC: 201 MG/DL (ref 65–99)
HCT VFR BLD AUTO: 34.5 % (ref 37.5–51)
HGB BLD-MCNC: 11.5 G/DL (ref 13–17.7)
IMM GRANULOCYTES # BLD AUTO: 0.03 10*3/MM3 (ref 0–0.05)
IMM GRANULOCYTES NFR BLD AUTO: 0.3 % (ref 0–0.5)
LYMPHOCYTES # BLD AUTO: 2.03 10*3/MM3 (ref 0.7–3.1)
LYMPHOCYTES NFR BLD AUTO: 22.4 % (ref 19.6–45.3)
MCH RBC QN AUTO: 30.3 PG (ref 26.6–33)
MCHC RBC AUTO-ENTMCNC: 33.3 G/DL (ref 31.5–35.7)
MCV RBC AUTO: 90.8 FL (ref 79–97)
MONOCYTES # BLD AUTO: 0.85 10*3/MM3 (ref 0.1–0.9)
MONOCYTES NFR BLD AUTO: 9.4 % (ref 5–12)
NEUTROPHILS NFR BLD AUTO: 5.94 10*3/MM3 (ref 1.7–7)
NEUTROPHILS NFR BLD AUTO: 65.5 % (ref 42.7–76)
NRBC BLD AUTO-RTO: 0 /100 WBC (ref 0–0.2)
PLATELET # BLD AUTO: 171 10*3/MM3 (ref 140–450)
PMV BLD AUTO: 9.7 FL (ref 6–12)
POTASSIUM SERPL-SCNC: 3.5 MMOL/L (ref 3.5–5.2)
POTASSIUM SERPL-SCNC: 4.1 MMOL/L (ref 3.5–5.2)
RBC # BLD AUTO: 3.8 10*6/MM3 (ref 4.14–5.8)
SODIUM SERPL-SCNC: 136 MMOL/L (ref 136–145)
WBC NRBC COR # BLD AUTO: 9.07 10*3/MM3 (ref 3.4–10.8)

## 2023-12-18 PROCEDURE — 94762 N-INVAS EAR/PLS OXIMTRY CONT: CPT

## 2023-12-18 PROCEDURE — 25010000002 ENOXAPARIN PER 10 MG: Performed by: NURSE PRACTITIONER

## 2023-12-18 PROCEDURE — 71045 X-RAY EXAM CHEST 1 VIEW: CPT

## 2023-12-18 PROCEDURE — 80048 BASIC METABOLIC PNL TOTAL CA: CPT | Performed by: NURSE PRACTITIONER

## 2023-12-18 PROCEDURE — 63710000001 INSULIN GLARGINE PER 5 UNITS: Performed by: NURSE PRACTITIONER

## 2023-12-18 PROCEDURE — 63710000001 INSULIN LISPRO (HUMAN) PER 5 UNITS: Performed by: NURSE PRACTITIONER

## 2023-12-18 PROCEDURE — 84132 ASSAY OF SERUM POTASSIUM: CPT | Performed by: THORACIC SURGERY (CARDIOTHORACIC VASCULAR SURGERY)

## 2023-12-18 PROCEDURE — 82948 REAGENT STRIP/BLOOD GLUCOSE: CPT

## 2023-12-18 PROCEDURE — 85025 COMPLETE CBC W/AUTO DIFF WBC: CPT | Performed by: INTERNAL MEDICINE

## 2023-12-18 PROCEDURE — 97530 THERAPEUTIC ACTIVITIES: CPT

## 2023-12-18 RX ORDER — POTASSIUM CHLORIDE 750 MG/1
40 TABLET, FILM COATED, EXTENDED RELEASE ORAL EVERY 4 HOURS
Status: COMPLETED | OUTPATIENT
Start: 2023-12-18 | End: 2023-12-18

## 2023-12-18 RX ORDER — FUROSEMIDE 40 MG/1
40 TABLET ORAL DAILY
Status: DISCONTINUED | OUTPATIENT
Start: 2023-12-18 | End: 2023-12-19 | Stop reason: HOSPADM

## 2023-12-18 RX ORDER — POTASSIUM CHLORIDE 750 MG/1
20 TABLET, FILM COATED, EXTENDED RELEASE ORAL DAILY
Status: DISCONTINUED | OUTPATIENT
Start: 2023-12-18 | End: 2023-12-19 | Stop reason: HOSPADM

## 2023-12-18 RX ADMIN — INSULIN GLARGINE 15 UNITS: 100 INJECTION, SOLUTION SUBCUTANEOUS at 20:45

## 2023-12-18 RX ADMIN — MUPIROCIN 1 APPLICATION: 20 OINTMENT TOPICAL at 20:35

## 2023-12-18 RX ADMIN — HYDROCODONE BITARTRATE AND ACETAMINOPHEN 2 TABLET: 5; 325 TABLET ORAL at 20:34

## 2023-12-18 RX ADMIN — GUAIFENESIN 1200 MG: 600 TABLET, EXTENDED RELEASE ORAL at 09:44

## 2023-12-18 RX ADMIN — ATORVASTATIN CALCIUM 40 MG: 20 TABLET, FILM COATED ORAL at 20:33

## 2023-12-18 RX ADMIN — EMPAGLIFLOZIN 25 MG: 25 TABLET, FILM COATED ORAL at 16:21

## 2023-12-18 RX ADMIN — INSULIN LISPRO 8 UNITS: 100 INJECTION, SOLUTION INTRAVENOUS; SUBCUTANEOUS at 06:39

## 2023-12-18 RX ADMIN — PANTOPRAZOLE SODIUM 40 MG: 40 TABLET, DELAYED RELEASE ORAL at 06:16

## 2023-12-18 RX ADMIN — INSULIN LISPRO 8 UNITS: 100 INJECTION, SOLUTION INTRAVENOUS; SUBCUTANEOUS at 17:54

## 2023-12-18 RX ADMIN — GUAIFENESIN 1200 MG: 600 TABLET, EXTENDED RELEASE ORAL at 20:45

## 2023-12-18 RX ADMIN — HYDROCODONE BITARTRATE AND ACETAMINOPHEN 2 TABLET: 5; 325 TABLET ORAL at 02:56

## 2023-12-18 RX ADMIN — ATENOLOL 25 MG: 25 TABLET ORAL at 09:44

## 2023-12-18 RX ADMIN — HYDROCODONE BITARTRATE AND ACETAMINOPHEN 2 TABLET: 5; 325 TABLET ORAL at 16:21

## 2023-12-18 RX ADMIN — POTASSIUM CHLORIDE 20 MEQ: 750 TABLET, EXTENDED RELEASE ORAL at 09:44

## 2023-12-18 RX ADMIN — ASPIRIN 81 MG: 81 TABLET, COATED ORAL at 09:44

## 2023-12-18 RX ADMIN — AMITRIPTYLINE HYDROCHLORIDE 50 MG: 50 TABLET, FILM COATED ORAL at 20:33

## 2023-12-18 RX ADMIN — POTASSIUM CHLORIDE 40 MEQ: 750 TABLET, EXTENDED RELEASE ORAL at 06:16

## 2023-12-18 RX ADMIN — METFORMIN HYDROCHLORIDE 850 MG: 850 TABLET, COATED ORAL at 13:02

## 2023-12-18 RX ADMIN — INSULIN GLARGINE 15 UNITS: 100 INJECTION, SOLUTION SUBCUTANEOUS at 09:44

## 2023-12-18 RX ADMIN — HYDROCODONE BITARTRATE AND ACETAMINOPHEN 2 TABLET: 5; 325 TABLET ORAL at 10:59

## 2023-12-18 RX ADMIN — GABAPENTIN 800 MG: 400 CAPSULE ORAL at 20:35

## 2023-12-18 RX ADMIN — LIDOCAINE 2 PATCH: 4 PATCH TOPICAL at 09:51

## 2023-12-18 RX ADMIN — SENNOSIDES AND DOCUSATE SODIUM 2 TABLET: 50; 8.6 TABLET ORAL at 20:34

## 2023-12-18 RX ADMIN — GABAPENTIN 800 MG: 400 CAPSULE ORAL at 16:23

## 2023-12-18 RX ADMIN — ALPRAZOLAM 0.25 MG: 0.25 TABLET ORAL at 20:34

## 2023-12-18 RX ADMIN — INSULIN LISPRO 3 UNITS: 100 INJECTION, SOLUTION INTRAVENOUS; SUBCUTANEOUS at 20:45

## 2023-12-18 RX ADMIN — HYDROCODONE BITARTRATE AND ACETAMINOPHEN 2 TABLET: 5; 325 TABLET ORAL at 06:39

## 2023-12-18 RX ADMIN — ATENOLOL 25 MG: 25 TABLET ORAL at 20:33

## 2023-12-18 RX ADMIN — MUPIROCIN 1 APPLICATION: 20 OINTMENT TOPICAL at 09:45

## 2023-12-18 RX ADMIN — FUROSEMIDE 40 MG: 40 TABLET ORAL at 09:44

## 2023-12-18 RX ADMIN — POTASSIUM CHLORIDE 40 MEQ: 750 TABLET, EXTENDED RELEASE ORAL at 09:45

## 2023-12-18 RX ADMIN — ENOXAPARIN SODIUM 40 MG: 100 INJECTION SUBCUTANEOUS at 16:22

## 2023-12-18 RX ADMIN — GABAPENTIN 800 MG: 400 CAPSULE ORAL at 09:45

## 2023-12-18 NOTE — PROGRESS NOTES
Name: Moreno Aleman ADMIT: 2023   : 1965  PCP: Steffany Gruber APRN    MRN: [id number] LOS: 6 days   AGE/SEX: 58 y.o. male  ROOM:      Subjective   Subjective   C/O post op pain. +BM. Appetite wnl.        Objective   Objective   Vital Signs  Temp:  [98.2 °F (36.8 °C)-99.8 °F (37.7 °C)] 98.2 °F (36.8 °C)  Heart Rate:  [79-95] 83  Resp:  [18] 18  BP: (111-138)/(73-85) 138/85  SpO2:  [94 %-97 %] 94 %  on  Flow (L/min):  [1] 1;   Device (Oxygen Therapy): room air  Body mass index is 29.81 kg/m².  Physical Exam  Vitals and nursing note reviewed.   Constitutional:       General: He is not in acute distress.  HENT:      Head: Normocephalic.      Mouth/Throat:      Mouth: Mucous membranes are moist.   Eyes:      Conjunctiva/sclera: Conjunctivae normal.   Cardiovascular:      Rate and Rhythm: Normal rate and regular rhythm.   Pulmonary:      Effort: Pulmonary effort is normal. No respiratory distress.      Breath sounds: No wheezing or rales.   Abdominal:      General: Bowel sounds are normal.      Palpations: Abdomen is soft.   Musculoskeletal:      Cervical back: Neck supple.      Right lower leg: No edema.      Left lower leg: No edema.   Skin:     General: Skin is warm and dry.   Neurological:      Mental Status: He is alert and oriented to person, place, and time.   Psychiatric:         Mood and Affect: Mood normal.         Behavior: Behavior normal.       Results Review     I reviewed the patient's new clinical results.  Results from last 7 days   Lab Units 23  03123  0327 12/15/23  0255   WBC 10*3/mm3 9.07 12.32* 18.01* 13.72*   HEMOGLOBIN g/dL 11.5* 10.9* 12.3* 11.1*   PLATELETS 10*3/mm3 171 128* 126* 125*     Results from last 7 days   Lab Units 23  03123  03123  0327 12/15/23  0255   SODIUM mmol/L 136 131* 135* 140   POTASSIUM mmol/L 3.5 4.6 4.2 4.2   CHLORIDE mmol/L 100 97* 99 104   CO2 mmol/L 27.0 20.9* 20.7* 16.5*   BUN mg/dL 23*  22* 22* 22*   CREATININE mg/dL 0.61* 0.61* 0.76 1.00   GLUCOSE mg/dL 201* 159* 155* 192*   EGFR mL/min/1.73 112.0 112.0 104.8 87.8     Results from last 7 days   Lab Units 12/17/23  0317 12/15/23  0255 12/14/23  1529 12/14/23  1144 12/14/23  0343 12/13/23  0443 12/11/23  1913   ALBUMIN g/dL 3.7 4.6 4.9 4.9 4.2 4.0 4.5   BILIRUBIN mg/dL 0.6  --   --   --  0.6 0.7 0.4   ALK PHOS U/L 54  --   --   --  54 50 66   AST (SGOT) U/L 18  --   --   --  15 13 13   ALT (SGPT) U/L 6  --   --   --  6 9 7     Results from last 7 days   Lab Units 12/18/23 0314 12/17/23 0317 12/16/23  0327 12/15/23  0255 12/14/23  1529 12/14/23  1144 12/14/23  0343 12/13/23  0443   CALCIUM mg/dL 8.7 8.8 9.2 8.5* 8.9 9.3   < > 8.8   ALBUMIN g/dL  --  3.7  --  4.6 4.9 4.9   < > 4.0   MAGNESIUM mg/dL  --   --   --  2.5 2.0 3.2*  --  1.6   PHOSPHORUS mg/dL  --   --   --  6.2* 5.0* 3.6  --   --     < > = values in this interval not displayed.     Results from last 7 days   Lab Units 12/14/23  1207   LACTATE mmol/L <0.4*     Glucose   Date/Time Value Ref Range Status   12/18/2023 0634 255 (H) 70 - 130 mg/dL Final   12/17/2023 2011 241 (H) 70 - 130 mg/dL Final   12/17/2023 1634 200 (H) 70 - 130 mg/dL Final   12/17/2023 1204 196 (H) 70 - 130 mg/dL Final   12/17/2023 0624 174 (H) 70 - 130 mg/dL Final   12/16/2023 2102 228 (H) 70 - 130 mg/dL Final   12/16/2023 1633 137 (H) 70 - 130 mg/dL Final       XR Chest 1 View    Result Date: 12/18/2023  1. No pneumothorax is seen. 2. Bibasilar atelectasis and borderline cardiomegaly.   This report was finalized on 12/18/2023 12:12 PM by Dr. Leobardo Lakhani M.D on Workstation: [email or web address]      XR Chest 1 View    Result Date: 12/17/2023  Removal of 2 chest tubes. Minimal left apical pneumothorax. Trace right apical pneumothorax.  This report was finalized on 12/17/2023 1:05 PM by Dr. Sukhdev Cho M.D on Workstation: [id number]      XR Chest 1 View    Result Date: 12/17/2023  Minimal, decreased right apical  pneumothorax.  This report was finalized on 12/17/2023 6:12 AM by Dr. Sukhdev Cho M.D on Workstation: [id number]       I have personally reviewed all medications:  Scheduled Medications  amitriptyline, 50 mg, Oral, Nightly  aspirin, 81 mg, Oral, Daily  atenolol, 25 mg, Oral, Q12H  atorvastatin, 40 mg, Oral, Nightly  enoxaparin, 40 mg, Subcutaneous, Q24H  furosemide, 40 mg, Oral, Daily  gabapentin, 800 mg, Oral, TID  guaiFENesin, 1,200 mg, Oral, Q12H  insulin glargine, 15 Units, Subcutaneous, Q12H  insulin lispro, 3-14 Units, Subcutaneous, 4x Daily AC & at Bedtime  Lidocaine, 2 patch, Transdermal, Q24H  metFORMIN, 850 mg, Oral, BID With Meals  mupirocin, , Each Nare, BID  pantoprazole, 40 mg, Oral, QAM  potassium chloride, 20 mEq, Oral, Daily  senna-docusate sodium, 2 tablet, Oral, Nightly    Infusions  sodium chloride, 30 mL/hr, Last Rate: 30 mL/hr (12/14/23 1233)    Diet  Diet: Regular/House Diet, Diabetic Diets, Cardiac Diets; Healthy Heart (2-3 Na+); Consistent Carbohydrate; Fluid Consistency: Thin (IDDSI 0)    I have personally reviewed:  [x]  Laboratory   [x]  Microbiology   [x]  Radiology   [x]  EKG/Telemetry  [x]  Cardiology/Vascular   []  Pathology    []  Records       Assessment/Plan     Active Hospital Problems    Diagnosis  POA    **CAD (coronary artery disease) [I25.10]  Yes    Headache [R51.9]  Yes    Abnormal findings on diagnostic imaging of heart and coronary circulation [R93.1]  Yes    CAD S/P percutaneous coronary angioplasty [I25.10, Z98.61]  Not Applicable    Hypertension, essential [I10]  Yes    Type 2 diabetes mellitus, with long-term current use of insulin [E11.9, Z79.4]  Not Applicable      Resolved Hospital Problems   No resolved problems to display.       58 y.o. male admitted with CAD (coronary artery disease).    Coronary artery disease/hypertension status post 5 vessel CABG on 12/14/2023.  Cardiothoracic surgery is following..  Continue aspirin/Lipitor/atenolol normal ejection  fraction.  Continue incentive spirometry.  Thoracic surgery giving intermittent diuresis.  Leukocytosis resolved  Postoperative anemia/thrombocytopenia.  Positive mild hemoglobin drop.  Continue to monitor.  No bleeding diathesis.  Continue to monitor platelets.    Type 2 diabetes. Sliding scale insulin.  A1c is 9.4. BG higher past 24 hours; lantus increased and metformin restarted per CTS  VTE prophylaxis with Lovenox.     Will continue to follow    NINFA Peralta  [facility]  12/18/23  13:40 EST

## 2023-12-18 NOTE — THERAPY TREATMENT NOTE
Patient Name: Moreno Aleman  : 1965    MRN: [id number]                              Today's Date: 2023       Admit Date: 2023    Visit Dx:     ICD-10-CM ICD-9-CM   1. Coronary artery disease of native heart with stable angina pectoris, unspecified vessel or lesion type  I25.118 414.01     413.9   2. Abnormal findings on diagnostic imaging of heart and coronary circulation  R93.1 794.39   3. S/P CABG (coronary artery bypass graft)  Z95.1 V45.81     Patient Active Problem List   Diagnosis    Hypertension, essential    Hyperlipemia, mixed    Migraine    Indeterminate colitis    Type 2 diabetes mellitus, with long-term current use of insulin    Arthritis    Vitamin D deficiency    Closed fracture of metacarpal bone    Left epididymitis    Phimosis    S/P left shoulder arthroscopic subacromial decompression, mini open rotator cuff repair    Cervical spondylosis    Myofascial pain    Overweight    CAD S/P percutaneous coronary angioplasty    Chest pain    Unstable angina    CAD (coronary artery disease)    Abnormal findings on diagnostic imaging of heart and coronary circulation    Headache     Past Medical History:   Diagnosis Date    Arthritis     Biceps tendonitis on left 2022    Bursitis of shoulder 2018    CAD (coronary artery disease)     Claustrophobia     Diabetes     Hyperlipidemia     Hypertension     Pneumonia      Past Surgical History:   Procedure Laterality Date    CARDIAC CATHETERIZATION      showed significant coronary artery disease of the LAD with calcification.     CARDIAC CATHETERIZATION Left 2023    Procedure: Cardiac Catheterization/Vascular Study;  Surgeon: Mario Welch MD;  Location: [facility] CATH INVASIVE LOCATION;  Service: Cardiology;  Laterality: Left;    CHOLECYSTECTOMY      CIRCUMCISION      CORONARY ANGIOPLASTY WITH STENT PLACEMENT      CORONARY ARTERY BYPASS GRAFT N/A 2023    Procedure: CORONARY ARTERY BYPASS GRAFTING TIMES 5, UTILIZING LEFT  AND RIGHT INTERNAL MAMMARY ARTERIES AND ENDOSCOPICALLY HARVESTED LEFT SAPHENOUS VEIN GRAFT W/ PRP; RIGHT LEG VEIN EXPLORATION; TRANSESOPHAGEAL ECHOCARDIOGRAM WITH ANESTHESIA;  Surgeon: Jr Binu Ribeiro MD;  Location: [facility] CVOR;  Service: Cardiothoracic;  Laterality: N/A;    INGUINAL HERNIA REPAIR      x2    NOSE SURGERY      MI RT/LT HEART CATHETERS N/A 03/22/2016    Procedure: Percutaneous Coronary Intervention - Rota/stent LAD;  Surgeon: Marek Emery MD;  Location: [facility] CATH INVASIVE LOCATION;  Service: Cardiovascular    ROTATOR CUFF REPAIR Left     SHOULDER ARTHROSCOPY Right     x2    SHOULDER ARTHROSCOPY WITH SUBACROMIAL DECOMPRESSION Left 4/11/2022    Procedure: left SHOULDER ARTHROSCOPY SUBACROMIAL DECOMPRESSION WITH ROTATOR CUFF DEBRIDEMENT;  Surgeon: Kam Dick MD;  Location: [facility] OR [facility];  Service: Orthopedics;  Laterality: Left;    SPINAL FUSION        General Information       Row Name 12/18/23 1326          Physical Therapy Time and Intention    Document Type therapy note (daily note)  -     Mode of Treatment individual therapy;physical therapy  -       Row Name 12/18/23 1326          General Information    Existing Precautions/Restrictions sternal;cardiac  -       Row Name 12/18/23 1326          Cognition    Orientation Status (Cognition) oriented x 4  -       Row Name 12/18/23 1326          Safety Issues, Functional Mobility    Impairments Affecting Function (Mobility) shortness of breath;strength;pain;endurance/activity tolerance  -               User Key  (r) = Recorded By, (t) = Taken By, (c) = Cosigned By      Initials Name Provider Type     Tatiana Dickson, PT Physical Therapist                   Mobility       Row Name 12/18/23 1327          Bed Mobility    Bed Mobility supine-sit;sit-supine  -     Supine-Sit [place] (Bed Mobility) verbal cues;nonverbal cues (demo/gesture);contact guard  -     Sit-Supine [place] (Bed Mobility) verbal cues;nonverbal  cues (demo/gesture);contact guard  -     Assistive Device (Bed Mobility) bed rails;head of bed elevated  -     Comment, (Bed Mobility) pt educated on log rolling  -       Row Name 12/18/23 1327          Sit-Stand Transfer    Sit-Stand [place] (Transfers) verbal cues;nonverbal cues (demo/gesture);contact guard  -       Row Name 12/18/23 1327          Gait/Stairs (Locomotion)    [place] Level (Gait) verbal cues;nonverbal cues (demo/gesture);contact guard  -CH     Distance in Feet (Gait) 100  -CH     Deviations/Abnormal Patterns (Gait) grace decreased;gait speed decreased;stride length decreased  -     Comment, (Gait/Stairs) gait distance limited by pain and SOA  -               User Key  (r) = Recorded By, (t) = Taken By, (c) = Cosigned By      Initials Name Provider Type    Tatiana Roper, PT Physical Therapist                   Obj/Interventions       Row Name 12/18/23 1329          Motor Skills    Therapeutic Exercise --  10 reps cardiac protocol  -               User Key  (r) = Recorded By, (t) = Taken By, (c) = Cosigned By      Initials Name Provider Type    Tatiana Roper, PT Physical Therapist                   Goals/Plan    No documentation.                  Clinical Impression       Row Name 12/18/23 1331          Pain    Pretreatment Pain Rating 7/10  -CH     Posttreatment Pain Rating 7/10  -     Pain Location - Side/Orientation Left  -     Pre/Posttreatment Pain Comment chest tube site  -     Pain Intervention(s) Repositioned  -       Row Name 12/18/23 1331          Plan of Care Review    Plan of Care Reviewed With patient  -     Outcome Evaluation Pt demonstrates increased activity tolerance and functional strength as he was ableto increase his gait distance and required less assistance. Pt continues to have a lot of pain at chest tube site and was taking short shallow breaths, Pt encouraged to relax and take deeper breaths. PT will continue to follow to  address strength, mobility, and gait.  -       Row Name 12/18/23 1331          Positioning and Restraints    Pre-Treatment Position in bed  -     Post Treatment Position bed  -     In Bed supine;call light within reach;encouraged to call for assist;exit alarm on;with family/caregiver  -               User Key  (r) = Recorded By, (t) = Taken By, (c) = Cosigned By      Initials Name Provider Type     Tatiana Dickson, PT Physical Therapist                   Outcome Measures       Row Name 12/18/23 1333 12/18/23 0920       How much help from another person do you currently need...    Turning from your back to your side while in flat bed without using bedrails? 3  -CH 3  -AC    Moving from lying on back to sitting on the side of a flat bed without bedrails? 3  -CH 3  -AC    Moving to and from a bed to a chair (including a wheelchair)? 3  -CH 2  -AC    Standing up from a chair using your arms (e.g., wheelchair, bedside chair)? 3  - 2  -AC    Climbing 3-5 steps with a railing? 2  - 2  -AC    To walk in hospital room? 3  -CH 2  -AC    AM-PAC 6 Clicks Score (PT) 17  - 14  -AC    Highest Level of Mobility Goal 5 --> Static standing  - 4 --> Transfer to chair/commode  -      Row Name 12/18/23 1333          Functional Assessment    Outcome Measure Options AM-PAC 6 Clicks Basic Mobility (PT)  -               User Key  (r) = Recorded By, (t) = Taken By, (c) = Cosigned By      Initials Name Provider Type     Tatiana Dickson, PT Physical Therapist    Kary Aranda, RN Registered Nurse                                 Physical Therapy Education       Title: PT OT SLP Therapies (Done)       Topic: Physical Therapy (Done)       Point: Mobility training (Done)       Learning Progress Summary             Patient Acceptance, E,TB,D, VU,NR by  at 12/18/2023 1334    Acceptance, E, VU by  at 12/15/2023 0928                         Point: Home exercise program (Done)       Learning Progress Summary              Patient Acceptance, E,TB,D, VU,NR by  at 12/18/2023 1334    Acceptance, E, VU by  at 12/15/2023 0928                         Point: Body mechanics (Done)       Learning Progress Summary             Patient Acceptance, E,TB,D, VU,NR by  at 12/18/2023 1334    Acceptance, E, VU by  at 12/15/2023 0928                         Point: Precautions (Done)       Learning Progress Summary             Patient Acceptance, E,TB,D, VU,NR by  at 12/18/2023 1334    Acceptance, E, VU by  at 12/15/2023 0928                                         User Key       Initials Effective Dates Name Provider Type [facility] 06/16/21 -  Tatiana Dickson PT Physical Therapist PT     05/02/22 -  Rhona Duarte PT Physical Therapist PT                  PT Recommendation and Plan     Plan of Care Reviewed With: patient  Outcome Evaluation: Pt demonstrates increased activity tolerance and functional strength as he was ableto increase his gait distance and required less assistance. Pt continues to have a lot of pain at chest tube site and was taking short shallow breaths, Pt encouraged to relax and take deeper breaths. PT will continue to follow to address strength, mobility, and gait.     Time Calculation:         PT Charges       Row Name 12/18/23 1334             Time Calculation    Start Time 1035  -      Stop Time 1047  -      Time Calculation (min) 12 min  -      PT Received On 12/18/23  -      PT - Next Appointment 12/19/23  -         Time Calculation- PT    Total Timed Code Minutes- PT 12 minute(s)  -         Timed Charges    77700 - PT Therapeutic Activity Minutes 12  -         Total Minutes    Timed Charges Total Minutes 12  -       Total Minutes 12  -                User Key  (r) = Recorded By, (t) = Taken By, (c) = Cosigned By      Initials Name Provider Type    CH Tatiana Dickson, PT Physical Therapist                  Therapy Charges for Today       Code Description Service Date  Service Provider Modifiers Qty    [id number]  PT THERAPEUTIC ACT EA 15 MIN 12/18/2023 Tatiana Dickson, PT GP 1            PT G-Codes  Outcome Measure Options: AM-PAC 6 Clicks Basic Mobility (PT)  AM-PAC 6 Clicks Score (PT): 17  PT Discharge Summary  Anticipated Discharge Disposition (PT): home with home health, home with assist    Tatiana Dickson, PT  12/18/2023

## 2023-12-18 NOTE — PLAN OF CARE
Goal Outcome Evaluation:  Plan of Care Reviewed With: patient        Progress: no change  Outcome Evaluation: Pt is AO x4. All vs stable. Pain is an issue overnight. Pain treated per MAR. Chest tube and Ext pacemaker in place. Intake and output recorded on chart. No acute distress noted. Will continue to monitor.         Problem: Adult Inpatient Plan of Care  Goal: Plan of Care Review  12/18/2023 0510 by Phil Carey RN  Outcome: Ongoing, Progressing  12/18/2023 0510 by Phil Carey RN  Outcome: Ongoing, Progressing  Goal: Patient-Specific Goal (Individualized)  12/18/2023 0510 by Phil Carey RN  Outcome: Ongoing, Progressing  12/18/2023 0510 by Phil Carey RN  Outcome: Ongoing, Progressing  Goal: Absence of Hospital-Acquired Illness or Injury  12/18/2023 0510 by Phil Carey RN  Outcome: Ongoing, Progressing  12/18/2023 0510 by Phil Carey RN  Outcome: Ongoing, Progressing  Intervention: Identify and Manage Fall Risk  Description: Perform standard risk assessment on admission using a validated tool or comprehensive approach appropriate to the patient; reassess fall risk frequently, with change in status or transfer to another level of care.  Communicate fall injury risk to interprofessional healthcare team.  Determine need for increased observation, equipment and environmental modification, such as low bed, signage and supportive, nonskid footwear.  Adjust safety measures to individual developmental age, stage and identified risk factors.  Reinforce the importance of safety and physical activity with patient and family.  Perform regular intentional rounding to assess need for position change, pain assessment and personal needs, including assistance with toileting.  Recent Flowsheet Documentation  Taken 12/18/2023 0036 by Phil Carey RN  Safety Promotion/Fall Prevention:   activity supervised   assistive device/personal items within reach   fall prevention program maintained   clutter free environment  maintained   nonskid shoes/slippers when out of bed   muscle strengthening facilitated   safety round/check completed   room organization consistent  Taken 12/17/2023 2225 by Phil Carey RN  Safety Promotion/Fall Prevention:   activity supervised   assistive device/personal items within reach   lighting adjusted   mobility aid in reach   room organization consistent   safety round/check completed  Taken 12/17/2023 2055 by Phil Carey RN  Safety Promotion/Fall Prevention:   activity supervised   assistive device/personal items within reach   fall prevention program maintained   clutter free environment maintained   muscle strengthening facilitated   nonskid shoes/slippers when out of bed   safety round/check completed   room organization consistent  Intervention: Prevent Skin Injury  Description: Perform a screening for skin injury risk, such as pressure or moisture associated skin damage on admission and at regular intervals throughout hospital stay.  Keep all areas of skin (especially folds) clean and dry.  Maintain adequate skin hydration.  Relieve and redistribute pressure and protect bony prominences; implement measures based on patient-specific risk factors.  Match turning and repositioning schedule to clinical condition.  Encourage weight shift frequently; assist with reposition if unable to complete independently.  Float heels off bed; avoid pressure on the Achilles tendon.  Keep skin free from extended contact with medical devices.  Encourage functional activity and mobility, as early as tolerated.  Use aids (e.g., slide boards, mechanical lift) during transfer.  Recent Flowsheet Documentation  Taken 12/18/2023 0420 by Phil Carey RN  Body Position: position changed independently  Taken 12/18/2023 0200 by Phil Carey RN  Body Position: position changed independently  Taken 12/18/2023 0036 by Phil Carey RN  Body Position: position changed independently  Taken 12/17/2023 2225 by Phil Carey  RN  Body Position: position changed independently  Taken 12/17/2023 2055 by Phil Carey RN  Body Position: position changed independently  Intervention: Prevent and Manage VTE (Venous Thromboembolism) Risk  Description: Assess for VTE (venous thromboembolism) risk.  Encourage and assist with early ambulation.  Initiate and maintain compression or other therapy, as indicated, based on identified risk in accordance with organizational protocol and provider order.  Encourage both active and passive leg exercises while in bed, if unable to ambulate.  Recent Flowsheet Documentation  Taken 12/18/2023 0420 by Phil Carey RN  Activity Management: activity encouraged  Taken 12/18/2023 0200 by Phil Carey RN  Activity Management: activity encouraged  Taken 12/18/2023 0036 by Phil Carey RN  Activity Management: activity encouraged  Taken 12/17/2023 2225 by Phil Carey RN  Activity Management: activity encouraged  Taken 12/17/2023 2055 by Phil Carey RN  Activity Management: activity encouraged  VTE Prevention/Management:   bilateral   compression stockings off  Intervention: Prevent Infection  Description: Maintain skin and mucous membrane integrity; promote hand, oral and pulmonary hygiene.  Optimize fluid balance, nutrition, sleep and glycemic control to maximize infection resistance.  Identify potential sources of infection early to prevent or mitigate progression of infection (e.g., wound, lines, devices).  Evaluate ongoing need for invasive devices; remove promptly when no longer indicated.  Recent Flowsheet Documentation  Taken 12/18/2023 0420 by Phil Carey RN  Infection Prevention:   visitors restricted/screened   single patient room provided   rest/sleep promoted   personal protective equipment utilized   hand hygiene promoted   equipment surfaces disinfected   environmental surveillance performed   cohorting utilized  Taken 12/18/2023 0200 by Phil Carey RN  Infection Prevention:   visitors  restricted/screened   single patient room provided   rest/sleep promoted   personal protective equipment utilized   hand hygiene promoted   equipment surfaces disinfected   environmental surveillance performed   cohorting utilized  Taken 12/18/2023 0036 by Phil Carey RN  Infection Prevention:   visitors restricted/screened   single patient room provided   hand hygiene promoted   equipment surfaces disinfected   environmental surveillance performed   cohorting utilized   personal protective equipment utilized   rest/sleep promoted  Taken 12/17/2023 2225 by Phil Carey RN  Infection Prevention:   visitors restricted/screened   single patient room provided   rest/sleep promoted   hand hygiene promoted   equipment surfaces disinfected   cohorting utilized   environmental surveillance performed   personal protective equipment utilized  Taken 12/17/2023 2055 by Phil Carey RN  Infection Prevention:   visitors restricted/screened   single patient room provided   rest/sleep promoted   personal protective equipment utilized   hand hygiene promoted   equipment surfaces disinfected   environmental surveillance performed   cohorting utilized  Goal: Optimal Comfort and Wellbeing  12/18/2023 0510 by Phil Carey RN  Outcome: Ongoing, Progressing  12/18/2023 0510 by Phil Carey RN  Outcome: Ongoing, Progressing  Intervention: Monitor Pain and Promote Comfort  Description: Assess pain level, treatment efficacy and patient response at regular intervals using a consistent pain scale.  Consider the presence and impact of preexisting chronic pain.  Encourage patient and caregiver involvement in pain assessment, interventions and safety measures.  Recent Flowsheet Documentation  Taken 12/17/2023 2055 by Phil Carey RN  Pain Management Interventions: see MAR  Intervention: Provide Person-Centered Care  Description: Use a family-focused approach to care.  Develop trust and rapport by proactively providing information,  encouraging questions, addressing concerns and offering reassurance.  Acknowledge emotional response to hospitalization.  Recognize and utilize personal coping strategies.  Honor spiritual and cultural preferences.  Recent Flowsheet Documentation  Taken 12/18/2023 0420 by Phil Carey RN  Trust Relationship/Rapport:   thoughts/feelings acknowledged   reassurance provided   questions answered   empathic listening provided   emotional support provided   choices provided   care explained   questions encouraged  Taken 12/18/2023 0036 by Phil Carey RN  Trust Relationship/Rapport:   thoughts/feelings acknowledged   reassurance provided   questions encouraged   questions answered   empathic listening provided   emotional support provided   care explained   choices provided  Taken 12/17/2023 2055 by Phil Carey RN  Trust Relationship/Rapport:   thoughts/feelings acknowledged   reassurance provided   questions encouraged   questions answered   empathic listening provided   emotional support provided   choices provided   care explained  Goal: Readiness for Transition of Care  12/18/2023 0510 by Phil Carey RN  Outcome: Ongoing, Progressing  12/18/2023 0510 by Phil Carey RN  Outcome: Ongoing, Progressing

## 2023-12-18 NOTE — CONSULTS
Met with patient and his wife, discussed benefits of cardiac rehab. Provided phase II information along with the contact information for cardiac rehab at [facility]..Requested for referral to be sent.  Explained if receiving home health would not be able to attend cardiac rehab until finished with home health.

## 2023-12-18 NOTE — DISCHARGE PLACEMENT REQUEST
"Moreno Aleman (58 y.o. Male)       Date of Birth   1965    Social Security Number       Address   [address] [place] [address]    Home Phone   [phone]    MRN   [id number]       Hindu   Confucianist    Marital Status                               Admission Date   12/12/23    Admission Type   Urgent    Admitting Provider   Srinivas Cruz MD    Attending Provider   Jr Binu Ribeiro MD    Department, Room/Bed   [facility] CARDIOVASC UNIT, 2209/1       Discharge Date       Discharge Disposition       Discharge Destination                                 Attending Provider: Jr Binu Ribeiro MD    Allergies: Fentanyl, Cefdinir, Nifedipine    Isolation: None   Infection: None   Code Status: CPR    Ht: 167.6 cm (66\")   Wt: 83.8 kg (184 lb 11.2 oz)    Admission Cmt: None   Principal Problem: CAD (coronary artery disease) [I25.10]                   Active Insurance as of 12/12/2023       Primary Coverage       Payor Plan Insurance Group Employer/Plan Group    MEDICARE MEDICARE A & B        Payor Plan Address Payor Plan Phone Number Payor Plan Fax Number Effective Dates    [address] [phone]  4/1/2021 - None Entered    [place] [address]         Subscriber Name Subscriber Birth Date Member ID       MORENO ALEMAN 1965 [id number]                     Emergency Contacts        (Rel.) Home Phone Work Phone Mobile Phone    kim aleman (Spouse) [phone] -- [phone]                "

## 2023-12-18 NOTE — PROGRESS NOTES
LOS: 6 days   Patient Care Team:  Steffany Gruber APRN as PCP - General (Nurse Practitioner)    Chief Complaint: post op    Subjective:  Symptoms:  No shortness of breath, cough or chest pain.    Diet:  No nausea or vomiting.    Activity level: Impaired due to pain.    Pain:  He complains of pain that is severe.  Pain is poorly controlled.          Vital Signs  Temp:  [98.7 °F (37.1 °C)-99.8 °F (37.7 °C)] 99 °F (37.2 °C)  Heart Rate:  [] 79  Resp:  [18] 18  BP: (111-133)/(71-83) 111/75  Body mass index is 29.81 kg/m².    Intake/Output Summary (Last 24 hours) at 12/18/2023 0742  Last data filed at 12/18/2023 0631  Gross per 24 hour   Intake 600 ml   Output 2625 ml   Net -2025 ml     No intake/output data recorded.    Chest tube drainage last 8 hours: 25/45        12/16/23  0548 12/17/23  0625 12/18/23  0631   Weight: 86.7 kg (191 lb 3.2 oz) 85.8 kg (189 lb 3.2 oz) 83.8 kg (184 lb 11.2 oz)     Objective:  General Appearance:  Comfortable and in no acute distress.    Vital signs: (most recent): Blood pressure 122/77, pulse 85, temperature 98.6 °F (37 °C), temperature source Oral, resp. rate 18, weight 83.8 kg (184 lb 11.2 oz), SpO2 97%.  Vital signs are normal.  No fever.    Output: Producing urine and no stool output.    Lungs:  Normal effort and normal respiratory rate.  There are decreased breath sounds.    Heart: Tachycardia.  Regular rhythm.  (ST on tele monitor)  Abdomen: Abdomen is soft.  Hypoactive bowel sounds.     Extremities: There is no dependent edema.    Pulses: Distal pulses are intact.    Neurological: Patient is alert and oriented to person, place and time.    Skin:  Warm and dry.  (Sternal dressing clean, dry, and intact)          Results Review:        WBC WBC   Date Value Ref Range Status   12/18/2023 9.07 3.40 - 10.80 10*3/mm3 Final   12/17/2023 12.32 (H) 3.40 - 10.80 10*3/mm3 Final   12/16/2023 18.01 (H) 3.40 - 10.80 10*3/mm3 Final      HGB Hemoglobin   Date Value Ref Range Status  "  12/18/2023 11.5 (L) 13.0 - 17.7 g/dL Final   12/17/2023 10.9 (L) 13.0 - 17.7 g/dL Final   12/16/2023 12.3 (L) 13.0 - 17.7 g/dL Final      HCT Hematocrit   Date Value Ref Range Status   12/18/2023 34.5 (L) 37.5 - 51.0 % Final   12/17/2023 33.7 (L) 37.5 - 51.0 % Final   12/16/2023 35.9 (L) 37.5 - 51.0 % Final      Platelets Platelets   Date Value Ref Range Status   12/18/2023 171 140 - 450 10*3/mm3 Final   12/17/2023 128 (L) 140 - 450 10*3/mm3 Final   12/16/2023 126 (L) 140 - 450 10*3/mm3 Final        PT/INR:    No results found for: \"PROTIME\"  /  No results found for: \"INR\"      Sodium Sodium   Date Value Ref Range Status   12/18/2023 136 136 - 145 mmol/L Final   12/17/2023 131 (L) 136 - 145 mmol/L Final   12/16/2023 135 (L) 136 - 145 mmol/L Final      Potassium Potassium   Date Value Ref Range Status   12/18/2023 3.5 3.5 - 5.2 mmol/L Final   12/17/2023 4.6 3.5 - 5.2 mmol/L Final   12/16/2023 4.2 3.5 - 5.2 mmol/L Final      Chloride Chloride   Date Value Ref Range Status   12/18/2023 100 98 - 107 mmol/L Final   12/17/2023 97 (L) 98 - 107 mmol/L Final   12/16/2023 99 98 - 107 mmol/L Final      Bicarbonate CO2   Date Value Ref Range Status   12/18/2023 27.0 22.0 - 29.0 mmol/L Final   12/17/2023 20.9 (L) 22.0 - 29.0 mmol/L Final   12/16/2023 20.7 (L) 22.0 - 29.0 mmol/L Final      BUN BUN   Date Value Ref Range Status   12/18/2023 23 (H) 6 - 20 mg/dL Final   12/17/2023 22 (H) 6 - 20 mg/dL Final   12/16/2023 22 (H) 6 - 20 mg/dL Final      Creatinine Creatinine   Date Value Ref Range Status   12/18/2023 0.61 (L) 0.76 - 1.27 mg/dL Final   12/17/2023 0.61 (L) 0.76 - 1.27 mg/dL Final   12/16/2023 0.76 0.76 - 1.27 mg/dL Final      Calcium Calcium   Date Value Ref Range Status   12/18/2023 8.7 8.6 - 10.5 mg/dL Final   12/17/2023 8.8 8.6 - 10.5 mg/dL Final   12/16/2023 9.2 8.6 - 10.5 mg/dL Final      Magnesium No results found for: \"MG\"         amitriptyline, 50 mg, Oral, Nightly  aspirin, 81 mg, Oral, Daily  atenolol, 25 mg, " Oral, Q12H  atorvastatin, 40 mg, Oral, Nightly  chlorhexidine, 15 mL, Mouth/Throat, Q12H  enoxaparin, 40 mg, Subcutaneous, Q24H  gabapentin, 800 mg, Oral, TID  guaiFENesin, 1,200 mg, Oral, Q12H  insulin glargine, 10 Units, Subcutaneous, Daily  insulin lispro, 3-14 Units, Subcutaneous, 4x Daily AC & at Bedtime  Lidocaine, 2 patch, Transdermal, Q24H  mupirocin, , Each Nare, BID  pantoprazole, 40 mg, Oral, QAM  potassium chloride ER, 40 mEq, Oral, Q4H  senna-docusate sodium, 2 tablet, Oral, Nightly      sodium chloride, 30 mL/hr, Last Rate: 30 mL/hr (12/14/23 1233)        Assessment & Plan  - severe multi-vessel CAD with hx of PCI 2016; LD Plavix 12/10 s/p CABGx5 BIMA/LSVG POD#4 [place]  - hypertension  - hyperlipidemia  - DM II--A1c 9.4  - chronic back pain  - leukocytosis--reactive  - post op anemia--expected acute blood loss    Looks good this morning  Up in the chair  On 2L NC--wean as able  Tachycardic this morning and BP up. He is having more ectopy, will switch him to atenolol  Glucose elevated today-- will resume his metformin and increase the lantus  PO diurese  Mobilize/encourage pulmonary toilet-- continue mucinex/flutter  Drainage out of his pleural tubes improved-- okay to discontinue AV wires   Check an overnight tonight  May be ableto go home with home health in the next 1-2 days  Continue routine care    NINFA Spencer  12/18/23  07:42 EST

## 2023-12-18 NOTE — CASE MANAGEMENT/SOCIAL WORK
Discharge Planning Assessment  [facility]     Patient Name: Moreno Aleman  MRN: [id number]  Today's Date: 12/18/2023    Admit Date: 12/12/2023    Plan: Home w/ April    Discharge Needs Assessment       Row Name 12/18/23 1450       Living Environment    People in Home spouse  Monika Aleman/spouse    Current Living Arrangements home    Potentially Unsafe Housing Conditions none    In the past 12 months has the electric, gas, oil, or water company threatened to shut off services in your home? No    Primary Care Provided by self    Provides Primary Care For pet(s)  1 outside DOG    Family Caregiver if Needed spouse    Family Caregiver Names Monika Aleman/spouse    Quality of Family Relationships helpful;involved;supportive    Able to Return to Prior Arrangements yes       Resource/Environmental Concerns    Resource/Environmental Concerns none    Transportation Concerns none       Food Insecurity    Within the past 12 months, you worried that your food would run out before you got the money to buy more. Never true    Within the past 12 months, the food you bought just didn't last and you didn't have money to get more. Never true       Transition Planning    Patient/Family Anticipates Transition to home with family    Patient/Family Anticipated Services at Transition home health care    Transportation Anticipated family or friend will provide       Discharge Needs Assessment    Equipment Currently Used at Home glucometer;scales    Concerns to be Addressed discharge planning    Anticipated Changes Related to Illness none    Equipment Needed After Discharge none    Discharge Facility/Level of Care Needs home with home health    Provided Post Acute Provider List? Yes    Post Acute Provider List Home Health    Delivered To Patient    Method of Delivery In person    Patient's Choice of Community Agency(s) [facility]                   Discharge Plan       Row Name 12/18/23 1451       Plan    Plan Home w/ [facility]     Plan Comments CCP spoke with Pt and his spouse/Monika Aleman, at bedside.  CCP role explained and discharge planning discussed.  Face sheet verified.  Pt stated he is IADL's, disabled and drives.  Pt [place] [facility] in a mobile home with three entrance stair steps.  Pt reports PCP is, Steffany Gruber/NINFA.  Pt confirmed pharmacy is, [facility].  Pt denies use of past home health or going to a sub-acute rehab.  Pt has the following DME- glucometer & scale.  Pt plans to return home at discharge with spouse assistance.  Pt chose [facility]  from list provided.  [facility].  CCP will continue to follow…….Maribel ROGEL /.                  Continued Care and Services - Admitted Since 12/12/2023       Home Medical Care Coordination complete.      Service Provider Request Status Selected Services Address Phone Fax Patient Preferred    [facility] - [facility] [address]  [place] [address] [phone] --                  Expected Discharge Date and Time       Expected Discharge Date Expected Discharge Time    Dec 20, 2023            Demographic Summary       Row Name 12/18/23 1449       General Information    Admission Type inpatient    Arrived From home    Required Notices Provided Important Message from Medicare    Referral Source admission list    Reason for Consult discharge planning    Preferred Language English       Contact Information    Permission Granted to Share Info With family/designee                   Functional Status       Row Name 12/18/23 9864       Functional Status    Usual Activity Tolerance good    Current Activity Tolerance moderate       Assessment of Health Literacy    How often do you have someone help you read hospital materials? Never    Health Literacy Good       Functional Status, IADL    Medications independent    Meal Preparation independent     Housekeeping independent    Laundry independent    Shopping independent       Mental Status    General Appearance WDL WDL       Mental Status Summary    Recent Changes in Mental Status/Cognitive Functioning no changes       Employment/    Employment Status disabled                   Psychosocial    No documentation.                  Abuse/Neglect    No documentation.                  Legal    No documentation.                  Substance Abuse    No documentation.                  Patient Forms    No documentation.                     Maribel Ferrer RN

## 2023-12-18 NOTE — PLAN OF CARE
Goal Outcome Evaluation:  Plan of Care Reviewed With: patient           Outcome Evaluation: Pt demonstrates increased activity tolerance and functional strength as he was ableto increase his gait distance and required less assistance. Pt continues to have a lot of pain at chest tube site and was taking short shallow breaths, Pt encouraged to relax and take deeper breaths. PT will continue to follow to address strength, mobility, and gait.      Anticipated Discharge Disposition (PT): home with home health, home with assist

## 2023-12-19 ENCOUNTER — READMISSION MANAGEMENT (OUTPATIENT)
Dept: CALL CENTER | Facility: HOSPITAL | Age: 58
End: 2023-12-19
Payer: MEDICARE

## 2023-12-19 VITALS
OXYGEN SATURATION: 98 % | BODY MASS INDEX: 29.81 KG/M2 | TEMPERATURE: 97.4 F | RESPIRATION RATE: 16 BRPM | HEART RATE: 79 BPM | WEIGHT: 184.7 LBS | DIASTOLIC BLOOD PRESSURE: 70 MMHG | SYSTOLIC BLOOD PRESSURE: 110 MMHG

## 2023-12-19 LAB
ANION GAP SERPL CALCULATED.3IONS-SCNC: 10.9 MMOL/L (ref 5–15)
BASOPHILS # BLD AUTO: 0.04 10*3/MM3 (ref 0–0.2)
BASOPHILS NFR BLD AUTO: 0.5 % (ref 0–1.5)
BUN SERPL-MCNC: 18 MG/DL (ref 6–20)
BUN/CREAT SERPL: 29 (ref 7–25)
CALCIUM SPEC-SCNC: 8.5 MG/DL (ref 8.6–10.5)
CHLORIDE SERPL-SCNC: 99 MMOL/L (ref 98–107)
CO2 SERPL-SCNC: 26.1 MMOL/L (ref 22–29)
CREAT SERPL-MCNC: 0.62 MG/DL (ref 0.76–1.27)
DEPRECATED RDW RBC AUTO: 40 FL (ref 37–54)
EGFRCR SERPLBLD CKD-EPI 2021: 111.5 ML/MIN/1.73
EOSINOPHIL # BLD AUTO: 0.17 10*3/MM3 (ref 0–0.4)
EOSINOPHIL NFR BLD AUTO: 2 % (ref 0.3–6.2)
ERYTHROCYTE [DISTWIDTH] IN BLOOD BY AUTOMATED COUNT: 12.5 % (ref 12.3–15.4)
GLUCOSE BLDC GLUCOMTR-MCNC: 127 MG/DL (ref 70–130)
GLUCOSE BLDC GLUCOMTR-MCNC: 220 MG/DL (ref 70–130)
GLUCOSE SERPL-MCNC: 164 MG/DL (ref 65–99)
HCT VFR BLD AUTO: 32.8 % (ref 37.5–51)
HGB BLD-MCNC: 10.9 G/DL (ref 13–17.7)
IMM GRANULOCYTES # BLD AUTO: 0.05 10*3/MM3 (ref 0–0.05)
IMM GRANULOCYTES NFR BLD AUTO: 0.6 % (ref 0–0.5)
LYMPHOCYTES # BLD AUTO: 1.78 10*3/MM3 (ref 0.7–3.1)
LYMPHOCYTES NFR BLD AUTO: 20.8 % (ref 19.6–45.3)
MCH RBC QN AUTO: 29.7 PG (ref 26.6–33)
MCHC RBC AUTO-ENTMCNC: 33.2 G/DL (ref 31.5–35.7)
MCV RBC AUTO: 89.4 FL (ref 79–97)
MONOCYTES # BLD AUTO: 0.98 10*3/MM3 (ref 0.1–0.9)
MONOCYTES NFR BLD AUTO: 11.4 % (ref 5–12)
NEUTROPHILS NFR BLD AUTO: 5.55 10*3/MM3 (ref 1.7–7)
NEUTROPHILS NFR BLD AUTO: 64.7 % (ref 42.7–76)
NRBC BLD AUTO-RTO: 0 /100 WBC (ref 0–0.2)
PLATELET # BLD AUTO: 191 10*3/MM3 (ref 140–450)
PMV BLD AUTO: 9 FL (ref 6–12)
POTASSIUM SERPL-SCNC: 3.7 MMOL/L (ref 3.5–5.2)
RBC # BLD AUTO: 3.67 10*6/MM3 (ref 4.14–5.8)
SODIUM SERPL-SCNC: 136 MMOL/L (ref 136–145)
WBC NRBC COR # BLD AUTO: 8.57 10*3/MM3 (ref 3.4–10.8)

## 2023-12-19 PROCEDURE — 80048 BASIC METABOLIC PNL TOTAL CA: CPT | Performed by: NURSE PRACTITIONER

## 2023-12-19 PROCEDURE — 97530 THERAPEUTIC ACTIVITIES: CPT

## 2023-12-19 PROCEDURE — 82948 REAGENT STRIP/BLOOD GLUCOSE: CPT

## 2023-12-19 PROCEDURE — 63710000001 INSULIN GLARGINE PER 5 UNITS: Performed by: NURSE PRACTITIONER

## 2023-12-19 PROCEDURE — 85025 COMPLETE CBC W/AUTO DIFF WBC: CPT | Performed by: INTERNAL MEDICINE

## 2023-12-19 RX ORDER — ASPIRIN 81 MG/1
81 TABLET ORAL DAILY
Qty: 30 TABLET | Refills: 2 | Status: SHIPPED | OUTPATIENT
Start: 2023-12-20

## 2023-12-19 RX ORDER — POTASSIUM CHLORIDE 20 MEQ/1
20 TABLET, EXTENDED RELEASE ORAL DAILY
Qty: 30 TABLET | Refills: 0 | Status: SHIPPED | OUTPATIENT
Start: 2023-12-20 | End: 2024-01-19

## 2023-12-19 RX ORDER — ATORVASTATIN CALCIUM 40 MG/1
40 TABLET, FILM COATED ORAL NIGHTLY
Qty: 30 TABLET | Refills: 2 | Status: SHIPPED | OUTPATIENT
Start: 2023-12-19

## 2023-12-19 RX ORDER — FUROSEMIDE 40 MG/1
40 TABLET ORAL DAILY
Qty: 30 TABLET | Refills: 0 | Status: SHIPPED | OUTPATIENT
Start: 2023-12-20

## 2023-12-19 RX ORDER — POTASSIUM CHLORIDE 750 MG/1
20 TABLET, FILM COATED, EXTENDED RELEASE ORAL DAILY
Status: COMPLETED | OUTPATIENT
Start: 2023-12-19 | End: 2023-12-19

## 2023-12-19 RX ORDER — ATENOLOL 25 MG/1
25 TABLET ORAL EVERY 12 HOURS SCHEDULED
Qty: 60 TABLET | Refills: 2 | Status: SHIPPED | OUTPATIENT
Start: 2023-12-19

## 2023-12-19 RX ORDER — POTASSIUM CHLORIDE 750 MG/1
20 TABLET, FILM COATED, EXTENDED RELEASE ORAL ONCE
Status: COMPLETED | OUTPATIENT
Start: 2023-12-19 | End: 2023-12-19

## 2023-12-19 RX ORDER — GABAPENTIN 400 MG/1
800 CAPSULE ORAL 3 TIMES DAILY
Qty: 42 CAPSULE | Refills: 0 | Status: SHIPPED | OUTPATIENT
Start: 2023-12-19 | End: 2023-12-26

## 2023-12-19 RX ORDER — HYDROCODONE BITARTRATE AND ACETAMINOPHEN 5; 325 MG/1; MG/1
1 TABLET ORAL EVERY 4 HOURS PRN
Qty: 42 TABLET | Refills: 0 | Status: SHIPPED | OUTPATIENT
Start: 2023-12-19 | End: 2023-12-26

## 2023-12-19 RX ADMIN — INSULIN GLARGINE 15 UNITS: 100 INJECTION, SOLUTION SUBCUTANEOUS at 09:18

## 2023-12-19 RX ADMIN — GUAIFENESIN 1200 MG: 600 TABLET, EXTENDED RELEASE ORAL at 09:17

## 2023-12-19 RX ADMIN — LIDOCAINE 2 PATCH: 4 PATCH TOPICAL at 09:19

## 2023-12-19 RX ADMIN — ALPRAZOLAM 0.25 MG: 0.25 TABLET ORAL at 05:08

## 2023-12-19 RX ADMIN — CYCLOBENZAPRINE 10 MG: 10 TABLET, FILM COATED ORAL at 02:28

## 2023-12-19 RX ADMIN — ATENOLOL 25 MG: 25 TABLET ORAL at 09:17

## 2023-12-19 RX ADMIN — ASPIRIN 81 MG: 81 TABLET, COATED ORAL at 09:20

## 2023-12-19 RX ADMIN — MUPIROCIN 1 APPLICATION: 20 OINTMENT TOPICAL at 09:17

## 2023-12-19 RX ADMIN — FUROSEMIDE 40 MG: 40 TABLET ORAL at 09:17

## 2023-12-19 RX ADMIN — PANTOPRAZOLE SODIUM 40 MG: 40 TABLET, DELAYED RELEASE ORAL at 06:42

## 2023-12-19 RX ADMIN — POTASSIUM CHLORIDE 20 MEQ: 750 TABLET, EXTENDED RELEASE ORAL at 09:19

## 2023-12-19 RX ADMIN — HYDROCODONE BITARTRATE AND ACETAMINOPHEN 2 TABLET: 5; 325 TABLET ORAL at 05:08

## 2023-12-19 RX ADMIN — EMPAGLIFLOZIN 25 MG: 25 TABLET, FILM COATED ORAL at 09:17

## 2023-12-19 RX ADMIN — GABAPENTIN 800 MG: 400 CAPSULE ORAL at 09:17

## 2023-12-19 RX ADMIN — HYDROCODONE BITARTRATE AND ACETAMINOPHEN 2 TABLET: 5; 325 TABLET ORAL at 09:18

## 2023-12-19 RX ADMIN — OXYCODONE HYDROCHLORIDE 10 MG: 5 TABLET ORAL at 02:28

## 2023-12-19 NOTE — PROGRESS NOTES
.[facility] Clinical Pharmacy Services: National Cardiology Data Registry (NCDR) Medication Review    Moreno Aleman is s/p CABG x5 for severe multi-vessel CAD . Pharmacy to review discharge medications to make sure appropriate medications have been prescribed.    Patient has been discharged on the following:  Aspirin EC 81 mg daily  High Intensity Statin: Atorvastatin 40 mg nightly  Beta-blocker: Atenolol 25 mg twice daily  P2Y12 Inhibitor: Clopidogrel 75 mg daily  LVEF-55% (no need for ACE/ARB)    These medications meet the requirements for NCDR discharge medication for chest pain and MI.    Lala Julio, Pharm.D., [facility]   Clinical Pharmacist   Phone Extension #[phone]

## 2023-12-19 NOTE — PROGRESS NOTES
[facility] Clinical [facility]acy Services: Patient Counseling Consult    Moreno Aleman and family have been counseled on the following medications: aspirin, atenolol, atorvastatin, empagliflozin, furosemide, gabapentin, Norco, and potassium. Counseling points included the following:    Explained indication of each medication, and patient's need for these medications.  Went over dosing and frequency of each medication.  Discussed any special administration, storage, or monitoring instructions with medications.  Discussed all important drug interactions, including over-the-counter medications and supplements.  Explained possible side effects for each medication.  Instructed the patient not to begin or discontinue any medications without informing his/her physician/pharmacist.  Talked about keeping a week-long blood pressure journal to bring to her next appointment with the nurse practitioner. Spoke to the importance of taking blood pressure the same time every day.     Patient expressed understanding and had no further questions.      Lala Julio, Pharm.D., [facility]   Clinical Pharmacist  Phone Extension #[phone]

## 2023-12-19 NOTE — PLAN OF CARE
Goal Outcome Evaluation:  Plan of Care Reviewed With: patient           Outcome Evaluation: Pt demonstrates increased activity tolerance and functional strength as he was able to increase his gait distance and required less assistance. Pt was able to ambulate in the le and navigate stairs safely. Acute care PT will sign off at this time. Pt is encouraged to continue ambulating in the halls with family and nursing.      Anticipated Discharge Disposition (PT): home with home health, home with assist

## 2023-12-19 NOTE — CASE MANAGEMENT/SOCIAL WORK
Case Management Discharge Note      Final Note: Pt discharged home with [facility] and [place] will provide nocturnal oxygen……..Maribel ROGEL/ELSI CM    Provided Post Acute Provider List?: Yes  Post Acute Provider List: Home Health  Delivered To: Patient  Method of Delivery: In person    Selected Continued Care - Discharged on 12/19/2023 Admission date: 12/12/2023 - Discharge disposition: Home-Health Care Svc      Destination    No services have been selected for the patient.                Durable Medical Equipment Coordination complete.      Service Provider Selected Services Address Phone Fax Patient Preferred    [facility] - [facility] [address], [place] [address] [phone] --              Dialysis/Infusion    No services have been selected for the patient.                Home Medical Care Coordination complete.      Service Provider Selected Services Address Phone Fax Patient Preferred    [facility] - [facility] [address] [place] [address] [facility] [place] [address] [phone] --              Therapy    No services have been selected for the patient.                Community Resources    No services have been selected for the patient.                Community & DME    No services have been selected for the patient.                         Final Discharge Disposition Code: 06 - home with home health care

## 2023-12-19 NOTE — DISCHARGE INSTR - APPOINTMENTS
Steffany Gruber, APRN PCP - General [phone] [address] [place] [address]   **APPT. ON WEDNESDAY 12/27 AT 10AM CENTRAL TIME!!** ARRIVE EARLY!!

## 2023-12-19 NOTE — CASE MANAGEMENT/SOCIAL WORK
Continued Stay Note  [facility]     Patient Name: Moreno Aleman  MRN: [id number]  Today's Date: 12/19/2023    Admit Date: 12/12/2023    Plan: Home w/ [facility]; [facility] will provide nocturnal oxygen.   Discharge Plan       Row Name 12/19/23 1125       Plan    Plan Home w/ [facility]; [facility] will provide nocturnal oxygen.      Row Name 12/19/23 1002       Plan    Plan Home w/ [facility];  Nocturnal oxygen eval PENDING approval.    Plan Comments CCP notified Tirso with [facility] that Pt needs nocturnal oxygen at discharge.  Eval pending............Maribel ROGEL/TERRA                   Discharge Codes    No documentation.                 Expected Discharge Date and Time       Expected Discharge Date Expected Discharge Time    Dec 19, 2023               Maribel Ferrer RN

## 2023-12-19 NOTE — PLAN OF CARE
Goal Outcome Evaluation:  Plan of Care Reviewed With: patient        Progress: improving  Outcome Evaluation: VSS. CT and epicardial wires removed, tolerated well. Norco for pain with good relief. night oximetry tonight. Possible home tomorrow.

## 2023-12-19 NOTE — PROGRESS NOTES
Name: Moreno Aleman ADMIT: 2023   : 1965  PCP: Steffany Gruber APRN    MRN: [id number] LOS: 7 days   AGE/SEX: 58 y.o. male  ROOM: /     Subjective   Subjective   Positive atypical left-sided chest pain.  No cardiac chest pain.  No fever or chills.  No shortness of breath.  Positive occasional cough.  No hemoptysis.  No fever or chills.    Review of Systems  GI.  No abdominal pain.  No nausea or vomiting.    .  No dysuria or hematuria.     Objective   Objective   Vital Signs  Temp:  [98.2 °F (36.8 °C)-99.2 °F (37.3 °C)] 98.7 °F (37.1 °C)  Heart Rate:  [83-91] 91  Resp:  [16-18] 16  BP: (116-138)/(66-85) 116/66  SpO2:  [91 %-97 %] 95 %  on  Flow (L/min):  [2] 2;   Device (Oxygen Therapy): nasal cannula    Intake/Output Summary (Last 24 hours) at 2023 1109  Last data filed at 2023 1100  Gross per 24 hour   Intake 1020 ml   Output 2815 ml   Net -1795 ml     Body mass index is 29.81 kg/m².      23  0625 23  0631 23  0443   Weight: 85.8 kg (189 lb 3.2 oz) 83.8 kg (184 lb 11.2 oz) 83.8 kg (184 lb 11.2 oz)     Physical Exam  General.  Middle-aged gentleman.  He is alert and oriented x 3.  In no apparent pain/distress/diaphoresis.  Normal mood and affect.  Eyes.  Pupils equal round and reactive.  Intact extraocular musculature.  Oral cavity.  Moist mucous membrane  Neck.  Supple.  No JVD.  No lymphadenopathy or thyromegaly.  Cardio vascular.  Regular rate and rhythm with no gallops or murmurs.  Chest.  Poor bilateral air entry with no added sounds.  Chest tubes removed.     Abdomen.  Soft lax.  No tenderness.  No organomegaly.  No guarding or rebound  Extremities.  No clubbing/cyanosis/edema.  CNS.  No acute focal neurological deficits.    Results Review:      Results from last 7 days   Lab Units 23  0307 23  1408 23  0314 23  0317 23  0327 12/15/23  0255 23  1529 23  1207 23  1144 23  0343 23  1455  12/13/23  0443   SODIUM mmol/L 136  --  136 131* 135* 140 142  --  140 139  --  140   POTASSIUM mmol/L 3.7 4.1 3.5 4.6 4.2 4.2 4.3  --  4.2 4.0   < > 3.5   CHLORIDE mmol/L 99  --  100 97* 99 104 108*  --  106 103  --  105   CO2 mmol/L 26.1  --  27.0 20.9* 20.7* 16.5* 20.7*  --  24.0 23.3  --  24.0   BUN mg/dL 18  --  23* 22* 22* 22* 15  --  15 14  --  9   CREATININE mg/dL 0.62*  --  0.61* 0.61* 0.76 1.00 0.81 0.66 0.94 0.79  --  0.62*   GLUCOSE mg/dL 164*  --  201* 159* 155* 192* 104*  --  127* 118*  --  102*   CALCIUM mg/dL 8.5*  --  8.7 8.8 9.2 8.5* 8.9  --  9.3 9.5  --  8.8   AST (SGOT) U/L  --   --   --  18  --   --   --   --   --  15  --  13   ALT (SGPT) U/L  --   --   --  6  --   --   --   --   --  6  --  9    < > = values in this interval not displayed.     Estimated Creatinine Clearance: 133.5 mL/min (A) (by C-G formula based on SCr of 0.62 mg/dL (L)).        Results from last 7 days   Lab Units 12/19/23  0625 12/18/23 2025 12/18/23  1654 12/18/23  0634 12/17/23 2011 12/17/23  1634 12/17/23  1204 12/17/23  0624   GLUCOSE mg/dL 127 181* 272* 255* 241* 200* 196* 174*                     Results from last 7 days   Lab Units 12/15/23  0255 12/14/23  1529 12/14/23  1144 12/14/23  1144 12/13/23  0443   MAGNESIUM mg/dL 2.5 2.0  --  3.2* 1.6   PHOSPHORUS mg/dL 6.2* 5.0*   < > 3.6  --     < > = values in this interval not displayed.     Results from last 7 days   Lab Units 12/13/23  0443   CHOLESTEROL mg/dL 127   TRIGLYCERIDES mg/dL 78   HDL CHOL mg/dL 40     Results from last 7 days   Lab Units 12/19/23  0307 12/18/23  0314 12/17/23  0317 12/16/23  0327 12/15/23  0255 12/14/23  1529 12/14/23  1207 12/14/23  1144   WBC 10*3/mm3 8.57 9.07 12.32* 18.01* 13.72* 9.61  --  13.74*   HEMOGLOBIN g/dL 10.9* 11.5* 10.9* 12.3* 11.1* 11.1*  --  11.6*   HEMOGLOBIN, POC g/dL  --   --   --   --   --   --  11.7*  --    HEMATOCRIT % 32.8* 34.5* 33.7* 35.9* 33.0* 33.2*  --  36.2*   HEMATOCRIT POC %  --   --   --   --   --   --  35*   --    PLATELETS 10*3/mm3 191 171 128* 126* 125* 94*  --  131*   MCV fL 89.4 90.8 87.5 89.3 90.4 89.5  --  88.1   MCH pg 29.7 30.3 28.3 30.6 30.4 29.9  --  28.2   MCHC g/dL 33.2 33.3 32.3 34.3 33.6 33.4  --  32.0   RDW % 12.5 12.5 12.3 12.4 12.7 12.9  --  12.6   RDW-SD fl 40.0 41.5 39.1 40.4 41.8 42.5  --  40.5   MPV fL 9.0 9.7 9.7 10.2 10.2 9.4  --  9.6   NEUTROPHIL % % 64.7 65.5 75.5  --  79.6*  --   --  79.7*   LYMPHOCYTE % % 20.8 22.4 11.9*  --  10.3*  --   --  10.5*   MONOCYTES % % 11.4 9.4 10.8  --  9.8  --   --  8.7   EOSINOPHIL % % 2.0 2.1 0.7  --  0.0*  --   --  0.4   BASOPHIL % % 0.5 0.3 0.5  --  0.1  --   --  0.2   IMM GRAN % % 0.6* 0.3 0.6*  --  0.2  --   --  0.5   NEUTROS ABS 10*3/mm3 5.55 5.94 9.30*  --  10.91*  --   --  10.94*   LYMPHS ABS 10*3/mm3 1.78 2.03 1.47  --  1.42  --   --  1.44   MONOS ABS 10*3/mm3 0.98* 0.85 1.33*  --  1.34*  --   --  1.20*   EOS ABS 10*3/mm3 0.17 0.19 0.09  --  0.00  --   --  0.06   BASOS ABS 10*3/mm3 0.04 0.03 0.06  --  0.02  --   --  0.03   IMMATURE GRANS (ABS) 10*3/mm3 0.05 0.03 0.07*  --  0.03  --   --  0.07*   NRBC /100 WBC 0.0 0.0 0.0  --  0.0  --   --  0.0     Results from last 7 days   Lab Units 12/15/23  0255 12/14/23  1144 12/13/23  0443   INR  1.29* 1.40* 1.10   APTT seconds  --  26.4 27.7     Results from last 7 days   Lab Units 12/14/23  1437 12/14/23  1207 12/14/23  1017 12/14/23  0954 12/14/23  0931 12/14/23  0915 12/14/23  0907 12/14/23  0734 12/13/23  1719   PH, ARTERIAL pH units 7.365 7.379 7.3120* 7.3870 7.3830 7.2920* 7.3020*   < > 7.438   PO2 ART mm Hg 123.4* 341.8*  --   --   --   --   --   --  96.6   PCO2, ARTERIAL mm Hg 38.6 40.4  --   --   --   --   --   --  32.7*   HCO3 ART mmol/L 22.1 23.8  --   --   --   --   --   --  22.1    < > = values in this interval not displayed.     Results from last 7 days   Lab Units 12/14/23  1207   LACTATE mmol/L <0.4*                       Results from last 7 days   Lab Units 12/12/23  2322   NITRITE UA  Negative            Imaging:  Imaging Results (Last 24 Hours)       Procedure Component Value Units Date/Time    XR Chest 1 View [[id number]] Collected: 12/18/23 1211     Updated: 12/18/23 1215    Narrative:      XR CHEST 1 VW-12/18/2023     HISTORY: Chest tube removal.     Heart size is at the upper limits of normal. Lungs are underinflated.  There is some patchy areas of probable atelectasis of the lung bases.     Sternotomy wires are seen.     No significant pneumothorax is seen following the chest tube removal.       Impression:      1. No pneumothorax is seen.  2. Bibasilar atelectasis and borderline cardiomegaly.        This report was finalized on 12/18/2023 12:12 PM by Dr. Leobardo Lakhani M.D on Workstation: [id number]                  I reviewed the patient's new clinical results / labs / tests / procedures      Assessment/Plan     Active Hospital Problems    Diagnosis  POA    **CAD (coronary artery disease) [I25.10]  Yes    Headache [R51.9]  Yes    Abnormal findings on diagnostic imaging of heart and coronary circulation [R93.1]  Yes    CAD S/P percutaneous coronary angioplasty [I25.10, Z98.61]  Not Applicable    Hypertension, essential [I10]  Yes    Type 2 diabetes mellitus, with long-term current use of insulin [E11.9, Z79.4]  Not Applicable      Resolved Hospital Problems   No resolved problems to display.           Coronary artery disease/hypertension status post 5 vessel CABG on 12/14/2023.  Cardiothoracic surgery is following..  Continue aspirin/Lipitor/atenolol.  Normal ejection fraction.  Continue incentive spirometry.  Resolved leukocytosis.    Postoperative anemia/thrombocytopenia.  Resolved thrombocytopenia.  No active bleed.  The hemoglobin is stable.    Type 2 diabetes.  A1c is 9.4.  Acceptable Accu-Chek.  Will continue Tresiba at 30 units daily.  Will stop Farxiga and metformin at home and initiate Jardiance.  I counseled the patient to titrate Tresiba up by 2 units every 3 days to a target goal of  fasting blood sugar between 100 and 120.  If this goal is achieved and the A1c is still elevated might benefit from Humalog AC.    VTE prophylaxis with Lovenox.    Discussed my findings and plan of treatment with the patient/wife  Okay to discharge from my standpoint of view.      Danielito Green MD  [facility]  12/19/23  11:09 EST

## 2023-12-19 NOTE — DISCHARGE SUMMARY
Date of Admission:   Date of Discharge:  12/19/2023    Discharge Diagnosis:   - severe multi-vessel CAD with hx of PCI 2016; LD Plavix 12/10 s/p CABGx5 BIMA/LSVG-- Gema  - hypertension  - hyperlipidemia  - DM II--A1c 9.4  - chronic back pain  - leukocytosis--reactive  - post op anemia--expected acute blood loss      Presenting Problem/History of Present Illness  CAD (coronary artery disease) [I25.10]     Hospital Course  Patient is a 58 y.o. male presented for cardiac surgery after presenting to [facility] with complaints of chest pain.  On 12/14 he underwent a CABG x 5.  Bilateral skeletonized RENETTA's.  KIERRA to diagonal branch of the LAD.  LIMA to LAD.  Sequential vein graft to PDA and left ventricular branch of right coronary artery.  Vein graft ramus intermedius.  Temporary cardiopulmonary bypass.  Left posterolateral pericardial window for drainage.  Neurologic monitoring.  Transesophageal echo. By Dr. Ribeiro (see op report for full details).  Post operatively he did well. Preoperatively his A1C was noted to be 9-- internal medicine was consulted for assistance in managing preoperatively and postoperative management.  All lines tubes and wires removed without issue.  Adequate PO intake. Urinating and defecating normally.  He was weaned from supplemental oxygen.  An overnight oximetry revealed the need for nocturnal oxygen with 37 minutes of desaturations.  On post operative day 5 he was deemed ready for discharge home with home health.  A follow up appointment has been made for him to see Dr. Ortiz.  And he will come to see Dr. Ribeiro on 1/17 at 1 pm.  Patient was provided with appropriate discharge education. He  was instructed to call office with any questions or concerns.      Procedures Performed  Procedure(s):  CORONARY ARTERY BYPASS GRAFTING TIMES 5, UTILIZING LEFT AND RIGHT INTERNAL MAMMARY ARTERIES AND ENDOSCOPICALLY HARVESTED LEFT SAPHENOUS VEIN GRAFT W/ PRP; RIGHT LEG VEIN EXPLORATION;  TRANSESOPHAGEAL ECHOCARDIOGRAM WITH ANESTHESIA       Consults:   Consults       Date and Time Order Name Status Description    12/13/2023  8:03 AM Inpatient Hospitalist Consult Completed             Pertinent Test Results:    Lab Results   Component Value Date    WBC 8.57 12/19/2023    HGB 10.9 (L) 12/19/2023    HCT 32.8 (L) 12/19/2023    MCV 89.4 12/19/2023     12/19/2023      Lab Results   Component Value Date    GLUCOSE 164 (H) 12/19/2023    CALCIUM 8.5 (L) 12/19/2023     12/19/2023    K 3.7 12/19/2023    CO2 26.1 12/19/2023    CL 99 12/19/2023    BUN 18 12/19/2023    CREATININE 0.62 (L) 12/19/2023    EGFRIFNONA 121 02/08/2022    BCR 29.0 (H) 12/19/2023    ANIONGAP 10.9 12/19/2023     Lab Results   Component Value Date    INR 1.29 (H) 12/15/2023    PROTIME 16.3 (H) 12/15/2023         Condition on Discharge:  good  Vital Signs  Temp:  [98.2 °F (36.8 °C)-99.2 °F (37.3 °C)] 98.7 °F (37.1 °C)  Heart Rate:  [83-89] 89  Resp:  [16-18] 16  BP: (116-138)/(66-85) 116/66      Discharge Disposition  Home-Health Care [facility]    Discharge Medications     Discharge Medications        New Medications        Instructions Start Date   aspirin 81 MG EC tablet   81 mg, Oral, Daily   Start Date: December 20, 2023     atenolol 25 MG tablet  Commonly known as: TENORMIN   25 mg, Oral, Every 12 Hours Scheduled      atorvastatin 40 MG tablet  Commonly known as: LIPITOR   40 mg, Oral, Nightly      empagliflozin 25 MG tablet tablet  Commonly known as: JARDIANCE   25 mg, Oral, Daily   Start Date: December 20, 2023     furosemide 40 MG tablet  Commonly known as: LASIX   40 mg, Oral, Daily   Start Date: December 20, 2023     gabapentin 400 MG capsule  Commonly known as: NEURONTIN   800 mg, Oral, 3 Times Daily      HYDROcodone-acetaminophen 5-325 MG per tablet  Commonly known as: NORCO  Replaces: HYDROcodone-acetaminophen 7.5-325 MG per tablet   1 tablet, Oral, Every 4 Hours PRN      potassium chloride 20 MEQ CR tablet  Commonly known  as: K-DUR,KLOR-CON   20 mEq, Oral, Daily   Start Date: December 20, 2023            Changes to Medications        Instructions Start Date   amitriptyline 50 MG tablet  Commonly known as: ELAVIL  What changed: how much to take   Oral, Nightly      cyclobenzaprine 10 MG tablet  Commonly known as: FLEXERIL  What changed: when to take this   10 mg, Oral, 3 Times Daily PRN             Continue These Medications        Instructions Start Date   clopidogrel 75 MG tablet  Commonly known as: PLAVIX   75 mg, Oral, Daily      Insulin Degludec 200 UNIT/ML solution pen-injector pen injection  Commonly known as: TRESIBA FLEXTOUCH   30 Units, Subcutaneous, Daily      methocarbamol 500 MG tablet  Commonly known as: ROBAXIN   Take 1 tablet by mouth 2 (Two) Times a Day As Needed for Muscle Spasms.      tadalafil 20 MG tablet  Commonly known as: CIALIS   20 mg, Oral, As Needed             Stop These Medications      amLODIPine 5 MG tablet  Commonly known as: NORVASC     diclofenac 75 MG EC tablet  Commonly known as: VOLTAREN     Farxiga 10 MG tablet  Generic drug: dapagliflozin Propanediol     HYDROcodone-acetaminophen 7.5-325 MG per tablet  Commonly known as: NORCO  Replaced by: HYDROcodone-acetaminophen 5-325 MG per tablet     lisinopril 20 MG tablet  Commonly known as: PRINIVILZESTRIL     metFORMIN 1000 MG tablet  Commonly known as: GLUCOPHAGE     rosuvastatin 10 MG tablet  Commonly known as: CRESTOR              Discharge Diet:   Diet Instructions       Diet: Cardiac Diets, Diabetic Diets; Healthy Heart (2-3 Na+); Thin (IDDSI 0); Consistent Carbohydrate      Discharge Diet:  Cardiac Diets  Diabetic Diets       Cardiac Diet: Healthy Heart (2-3 Na+)    Fluid Consistency: Thin (IDDSI 0)    Diabetic Diet: Consistent Carbohydrate            Activity at Discharge: 1. No driving for 2 weeks and off narcotic pain medications.  2. Shower daily. Clean incisions with warm water and antibacterial soap only. Do not put any lotion or ointments  on incisions.  3. Ambulate for 10 minutes at least 3 times a day.  4. No heavy lifting > 10lbs until seen in office.   5. Take all medications as prescribed.       Follow-up Appointments  Future Appointments   Date Time Provider Department Center   1/2/2024  1:30 PM Ran Pan MD [facility] [place]   1/17/2024  1:00 PM Jr Perry Ribeiro MD [facility] [place]   5/2/2024 10:15 AM Ran Pan MD [facility] [place]     Additional Instructions for the Follow-ups that You Need to Schedule       Ambulatory Referral to Cardiac Rehab   As directed      Ambulatory Referral to Home Health   As directed      Face to Face Visit Date: 12/19/2023   Follow-up provider for Plan of Care?: I will be treating the patient on an ongoing basis.  Please send me the Plan of Care for signature.   Follow-up provider: JR PERRY RIBEIRO [[id number]]   Reason/Clinical Findings: post op open heart   Describe mobility limitations that make leaving home difficult: weakness   Nursing/Therapeutic Services Requested: Skilled Nursing   Skilled nursing orders: Post CABG care   Frequency: 1 Week 1        Call MD With Problems / Concerns   As directed      Instructions:  Call office at [phone] for any drainage, increased redness, or fever over 100.5    Order Comments: Instructions:  Call office at [phone] for any drainage, increased redness, or fever over 100.5         Discharge Follow-up with PCP   As directed       Currently Documented PCP:    Steffany Gruber APRN    PCP Phone Number:    [phone]     Follow Up Details: in 1 week        Discharge Follow-up with Specialty: Cardiologist NINFA/PA; 1 Week   As directed      Specialty: Cardiologist APRN/PA   Follow Up: 1 Week   Follow Up Details: bring all prescription bottles to appointment, call for appointment        Discharge Follow-up with Specified Provider: CardiologistDr. Ortiz on 1/2 at 1:30; 1 Month   As directed      To: CardiologDr. Diana stephenson on 1/2 at 1:30    Follow Up: 1 Month   Follow Up Details: call for appointment, bring all medication bottles to appointment        Discharge Follow-up with Specified Provider: Dr. Ribeiro on 1/17 at 1pm   As directed      To: Dr. Ribeiro on 1/17 at 1pm   Follow Up Details: 4-6 weeks, bring all current medications to appointment                Test Results Pending at Discharge       NINFA Spencer  12/19/23  10:59 EST

## 2023-12-19 NOTE — CASE MANAGEMENT/SOCIAL WORK
Continued Stay Note  [facility]     Patient Name: Moreno Aleman  MRN: [id number]  Today's Date: 12/19/2023    Admit Date: 12/12/2023    Plan: Home w/ [facility];  Nocturnal oxygen eval PENDING approval.   Discharge Plan       Row Name 12/19/23 1002       Plan    Plan Home w/ [facility];  Nocturnal oxygen eval PENDING approval.    Plan Comments [facility] notified Tirso Prather that Pt needs nocturnal oxygen at discharge.  Eval pending............Maribel ROGEL/TERRA                   Discharge Codes    No documentation.                 Expected Discharge Date and Time       Expected Discharge Date Expected Discharge Time    Dec 19, 2023               Maribel Ferrer RN

## 2023-12-20 NOTE — OUTREACH NOTE
Prep Survey      Flowsheet Row Responses   Spiritism facility patient discharged from? [place]   Is LACE score < 7 ? No   Eligibility Readm Mgmt   Discharge diagnosis severe multi-vessel CAD with hx of PCI 2016,  LD Plavix 12/10 s/p CABGx5   Does the patient have one of the following disease processes/diagnoses(primary or secondary)? Cardiothoracic surgery   Does the patient have Home health ordered? Yes   What is the [facility] health agency?  [facility] [place] [facility]   Is there a DME ordered? Yes   What D[facility] was ordered[facility] [place]   Prep survey completed? Yes            MEDINA MORTENSEN - Registered Nurse

## 2023-12-22 ENCOUNTER — READMISSION MANAGEMENT (OUTPATIENT)
Dept: CALL CENTER | Facility: HOSPITAL | Age: 58
End: 2023-12-22
Payer: MEDICARE

## 2023-12-22 ENCOUNTER — TELEPHONE (OUTPATIENT)
Dept: CARDIAC SURGERY | Facility: CLINIC | Age: 58
End: 2023-12-22
Payer: MEDICARE

## 2023-12-22 NOTE — TELEPHONE ENCOUNTER
Notified spouse that paperwork has been received and will be signed by Dr. Ribeiro next week. Spouse verbalized understanding.

## 2023-12-22 NOTE — OUTREACH NOTE
CT Surgery Week 1 Survey      Flowsheet Row Responses   [facility] patient discharged from? [place]   Does the patient have one of the following disease processes/diagnoses(primary or secondary)? Cardiothoracic surgery   Week 1 attempt successful? Yes   Call start time 1555   Call end time 1557   Discharge diagnosis severe multi-vessel CAD with hx of PCI 2016,  LD Plavix 12/10 s/p CABGx5   Person spoke with today (if not patient) and relationship patient   Meds reviewed with patient/caregiver? Yes   Is the patient having any side effects they believe may be caused by any medication additions or changes? No   Does the patient have all medications related to this admission filled (includes all antibiotics, pain medications, cardiac medications, etc.) Yes   Is the patient taking all medications as directed (includes completed medication regime)? Yes   Does the patient have a primary care provider?  Yes   Does the patient have an appointment scheduled with their C/T surgeon? Yes   Has the patient kept scheduled appointments due by today? Yes   What [facility]   Has home health visited the patient within 72 hours of discharge? Yes   What DME was ordered? [facility]   Has all DME been delivered? Yes   Psychosocial issues? No   Did the patient receive a copy of their discharge instructions? Yes   Nursing interventions Reviewed instructions with patient   What is the patient's perception of their health status since discharge? Improving   Nursing interventions Nurse provided patient education   Nursing interventions Reassured on normal signs of recovery   Is the patient/caregiver able to teach back signs and symptoms of incisional infection? Increased redness, swelling or pain at the incisonal site, Increased drainage or bleeding, Incisional warmth, Pus or odor from incision, Fever   Is the patient/caregiver able to teach back steps to  recovery at home? Set small, achievable goals for return to baseline health, Rest and rebuild strength, gradually increase activity   Is the patient /caregiver able to teach back the importance of cardiac rehab? Yes   If the patient is a current smoker, are they able to teach back resources for cessation? Not a smoker   Is the patient/caregiver able to teach back the hierarchy of who to call/visit for symptoms/problems? PCP, Specialist, Home health nurse, Urgent Care, ED, 911 Yes   Week 1 call completed? Yes   Graduated Yes   Is the patient interested in additional calls from an ambulatory ? No   Would this patient benefit from a Referral to Amb Social Work? No   Graduated/Revoked comments Patient is doing well. No issues or needs.   Call end time [id number]              Surjit MYLES - Registered Nurse

## 2023-12-22 NOTE — TELEPHONE ENCOUNTER
Caller: kim campbell    Relationship: Emergency Contact    Best call back number: [phone]    What form or medical record are you requesting: PAPERWORK BEING FAXED OVER BY [employer] FOR FMLA    Who is requesting this form or medical record from you: [employer]     How would you like to receive the form or medical records (pick-up, mail, fax): FAX NUMBER LISTED ON PAPERWORK.    Timeframe paperwork needed: ASAP     Additional notes: PT SPOUSE STATING THAT SHE WAS OFF FOR MEDICAL LEAVE WHEN THE PT HAD HIS PROCEDURE. PT SPOUSE STATED THAT [employer] WOULD BE SENDING OVER PAPERWORK TODAY FOR DR. RAMIREZ TO FILL OUT AND TO BE SENT BACK TO [employer].

## 2023-12-31 NOTE — PROGRESS NOTES
[facility]  Cardiology progress Note    Patient Name: Moreno Aleman  : 1965    CHIEF COMPLAINT  CAD        Subjective   Subjective     HISTORY OF PRESENT ILLNESS    Moreno Aleman is a 58 y.o. male with CAD s/p PTCA/stent.  No chest pain or shortness of breath.    REVIEW OF SYSTEMS    Constitutional:    No fever, no weight loss  Skin:     No rash  Otolaryngeal:    No difficulty swallowing  Cardiovascular: See HPI.  Pulmonary:    No cough, no sputum production    Personal History     Social History:    reports that he has never smoked. He has been exposed to tobacco smoke. He has never used smokeless tobacco. He reports current alcohol use. He reports that he does not use drugs.    Home Medications:  Current Outpatient Medications on File Prior to Visit   Medication Sig    amitriptyline (ELAVIL) 50 MG tablet TAKE 1 TABLET BY MOUTH EVERY NIGHT    aspirin 81 MG EC tablet Take 1 tablet by mouth Daily.    atenolol (TENORMIN) 25 MG tablet Take 1 tablet by mouth Every 12 (Twelve) Hours.    atorvastatin (LIPITOR) 40 MG tablet Take 1 tablet by mouth Every Night.    Belsomra 10 MG tablet Take 1 tablet by mouth Every Night.    clopidogrel (PLAVIX) 75 MG tablet Take 1 tablet by mouth Daily.    cyclobenzaprine (FLEXERIL) 10 MG tablet Take 1 tablet by mouth 3 (Three) Times a Day As Needed for Muscle Spasms.    empagliflozin (JARDIANCE) 25 MG tablet tablet Take 1 tablet by mouth Daily.    furosemide (LASIX) 40 MG tablet Take 1 tablet by mouth Daily.    HYDROcodone-acetaminophen (NORCO) 7.5-325 MG per tablet Take 1 tablet by mouth 3 times a day.    Insulin Degludec (TRESIBA FLEXTOUCH) 200 UNIT/ML solution pen-injector pen injection Inject 30 Units under the skin into the appropriate area as directed Daily.    methocarbamol (ROBAXIN) 500 MG tablet Take 1 tablet by mouth 2 (Two) Times a Day As Needed for Muscle Spasms.    potassium chloride (K-DUR,KLOR-CON) 20 MEQ CR tablet Take 1 tablet by mouth Daily for 30  days.    tadalafil (CIALIS) 20 MG tablet TAKE 1 TABLET BY MOUTH AS NEEDED FOR ERECTILE DYSFUNCTION    diclofenac (VOLTAREN) 75 MG EC tablet Take 1 tablet by mouth Every 12 (Twelve) Hours. (Patient not taking: Reported on 1/2/2024)    [DISCONTINUED] gabapentin (NEURONTIN) 400 MG capsule Take 2 capsules by mouth 3 (Three) Times a Day for 7 days.     No current facility-administered medications on file prior to visit.       Past Medical History:   Diagnosis Date    Arthritis     Biceps tendonitis on left 03/22/2022    Bursitis of shoulder 12/06/2018    CAD (coronary artery disease)     Claustrophobia     Diabetes     Hyperlipidemia     Hypertension     Pneumonia        Allergies:  Allergies   Allergen Reactions    Fentanyl Angioedema and Swelling    Cefdinir Itching    Nifedipine Hives and Itching       Objective    Objective       Vitals:   Heart Rate:  [79] 79  BP: (123)/(76) 123/76  Body mass index is 29.38 kg/m².     PHYSICAL EXAM:    General Appearance:   well developed  well nourished  HENT:   oropharynx moist  lips not cyanotic  Neck:  thyroid not enlarged  supple  Respiratory:  no respiratory distress  normal breath sounds  no rales  Cardiovascular:  no jugular venous distention  regular rhythm  apical impulse normal  S1 normal, S2 normal  no S3, no S4   no murmur  no rub, no thrill  carotid pulses normal; no bruit  pedal pulses normal  lower extremity edema: none    Skin:   warm, dry  Psychiatric:  judgement and insight appropriate  normal mood and affect        Result Review:  I have personally reviewed the available results from  [x]  Laboratory  [x]  EKG  [x]  Cardiology  [x]  Medications  [x]  Old records  []  Other:     Procedures  Lab Results   Component Value Date    CHOL 127 12/13/2023    CHOL 145 12/11/2023    CHOL 166 09/25/2023     Lab Results   Component Value Date    TRIG 78 12/13/2023    TRIG 128 12/11/2023    TRIG 238 (H) 09/25/2023     Lab Results   Component Value Date    HDL 40 12/13/2023     HDL 47 12/11/2023    HDL 40 09/25/2023     Lab Results   Component Value Date    LDL 71 12/13/2023    LDL 75 12/11/2023    LDL 86 09/25/2023     Lab Results   Component Value Date    VLDL 16 12/13/2023    VLDL 23 12/11/2023    VLDL 40 09/25/2023     Results for orders placed in visit on 12/14/23    Diagnostic IntraOp Gab    Narrative  Diagnostic IntraOp Gab    Procedure Performed: Diagnostic IntraOp Gab  Start Time:  End Time:    Preanesthesia Checklist:  Patient identified, IV assessed, risks and benefits discussed, monitors and equipment assessed, procedure being performed at surgeon's request and anesthesia consent obtained.    General Procedure Information  Diagnostic Indications for Echo:  assessment of ascending aorta and assessment of surgical repair  Location performed:  OR  Intubated  Bite block placed  Heart visualized  Probe Insertion:  Easy  Probe Type:  Multiplane  Modalities:  Color flow mapping, continuous wave Doppler and pulse wave Doppler    Echocardiographic and Doppler Measurements    Ventricles    Right Ventricle:  Cavity size normal.  Hypertrophy not present.  Thrombus not present.  Global function normal.  Left Ventricle:  Cavity size normal.  Thrombus not present.  Global Function normal.  Ejection Fraction 55%.        Valves    Aortic Valve:  Annulus calcified.  Stenosis mild.  Regurgitation absent.  Leaflets normal, calcified and thickened.  Leaflet motions normal.    Mitral Valve:  Annulus calcified.  Stenosis not present.  Regurgitation trace.  Leaflets normal.  Leaflet motions normal.    Tricuspid Valve:  Annulus normal.  Stenosis not present.  Regurgitation trace.  Leaflet motions normal.  Pulmonic Valve:  Annulus normal.  Stenosis not present.  Regurgitation trace.  Other Valve Findings:  Aortic valve is tricuspid and sclerosed. MARC by planimetry is 1.94 cm2, mean gradient is 6 mmHg and peak velocity is 1.45 m/sec.    Aorta    Ascending Aorta:  Size normal.  Dissection not present.   Plaque thickness less than 3 mm.  Mobile plaque not present.  Aortic Arch:  Size normal.  Dissection not present.  Plaque thickness less than 3 mm.  Mobile plaque not present.  Descending Aorta:  Size normal.  Dissection not present.  Plaque thickness less than 3 mm.  Mobile plaque not present.      Atria    Right Atrium:  Size dilated.  Spontaneous echo contrast not present.  Thrombus not present.  Tumor not present.  Device not present.    Left Atrium:  Size dilated.  Spontaneous echo contrast not present.  Thrombus not present.  Tumor not present.  Device not present.  Left atrial appendage normal.      Septa        Ventricular Septum:  Intra-ventricular septum morphology normal.        Other Findings  Pericardium:  normal  Pleural Effusion:  none  Pulmonary Arteries:  normal      Anesthesia Information  Performed Personally  Anesthesiologist:  Samm Macdonald MD      Echocardiogram Comments:  Post CPB:  LVEF has slightly improved. RV function is normal.    Valvular function is similar to baseline.    Aorta is intact.    There is no pericardial effusion.          All pertinent findings were discussed with the surgeon.     Impression/Plan:  1.  Essential hypertension controlled: Continue atenolol 25 mg twice a day.  Monitor blood pressure regularly.  2.  CAD s/p recent CABG: Continue aspirin 81 mg once a day.  Continue Plavix 75 mg once a day.  No chest pain.  3.  Hyperlipidemia: Continue Lipitor 40 mg once a day..  Monitor lipid and hepatic profile.  4.  Chronic diastolic heart failure stable: Continue Lasix 40 mg once a day.        Ran Pan MD   01/02/24   13:50 EST

## 2024-01-02 ENCOUNTER — OFFICE VISIT (OUTPATIENT)
Dept: CARDIOLOGY | Facility: CLINIC | Age: 59
End: 2024-01-02
Payer: MEDICARE

## 2024-01-02 ENCOUNTER — APPOINTMENT (OUTPATIENT)
Dept: CT IMAGING | Facility: HOSPITAL | Age: 59
End: 2024-01-02
Payer: MEDICARE

## 2024-01-02 ENCOUNTER — APPOINTMENT (OUTPATIENT)
Dept: GENERAL RADIOLOGY | Facility: HOSPITAL | Age: 59
End: 2024-01-02
Payer: MEDICARE

## 2024-01-02 ENCOUNTER — HOSPITAL ENCOUNTER (EMERGENCY)
Facility: HOSPITAL | Age: 59
Discharge: HOME | End: 2024-01-02
Admitting: EMERGENCY MEDICINE
Payer: MEDICARE

## 2024-01-02 VITALS
SYSTOLIC BLOOD PRESSURE: 132 MMHG | HEART RATE: 85 BPM | WEIGHT: 183 LBS | OXYGEN SATURATION: 100 % | BODY MASS INDEX: 29.41 KG/M2 | TEMPERATURE: 98 F | DIASTOLIC BLOOD PRESSURE: 98 MMHG | HEIGHT: 66 IN | RESPIRATION RATE: 18 BRPM

## 2024-01-02 VITALS
BODY MASS INDEX: 29.25 KG/M2 | WEIGHT: 182 LBS | SYSTOLIC BLOOD PRESSURE: 123 MMHG | HEART RATE: 79 BPM | DIASTOLIC BLOOD PRESSURE: 76 MMHG | HEIGHT: 66 IN

## 2024-01-02 DIAGNOSIS — E78.2 HYPERLIPEMIA, MIXED: ICD-10-CM

## 2024-01-02 DIAGNOSIS — Z95.5 HISTORY OF CORONARY ANGIOPLASTY WITH INSERTION OF STENT: ICD-10-CM

## 2024-01-02 DIAGNOSIS — Z53.21 ELOPED FROM EMERGENCY DEPARTMENT: Primary | ICD-10-CM

## 2024-01-02 DIAGNOSIS — I10 HYPERTENSION, ESSENTIAL: ICD-10-CM

## 2024-01-02 DIAGNOSIS — I25.10 CORONARY ARTERY DISEASE INVOLVING NATIVE CORONARY ARTERY OF NATIVE HEART WITHOUT ANGINA PECTORIS: Primary | ICD-10-CM

## 2024-01-02 LAB
ALBUMIN SERPL-MCNC: 4.2 G/DL (ref 3.5–5.2)
ALBUMIN/GLOB SERPL: 1.2 G/DL
ALP SERPL-CCNC: 116 U/L (ref 39–117)
ALT SERPL W P-5'-P-CCNC: 8 U/L (ref 1–41)
ANION GAP SERPL CALCULATED.3IONS-SCNC: 16.7 MMOL/L (ref 5–15)
AST SERPL-CCNC: 10 U/L (ref 1–40)
BASOPHILS # BLD AUTO: 0.07 10*3/MM3 (ref 0–0.2)
BASOPHILS NFR BLD AUTO: 0.8 % (ref 0–1.5)
BILIRUB SERPL-MCNC: 0.6 MG/DL (ref 0–1.2)
BUN SERPL-MCNC: 11 MG/DL (ref 6–20)
BUN/CREAT SERPL: 14.7 (ref 7–25)
CALCIUM SPEC-SCNC: 9.7 MG/DL (ref 8.6–10.5)
CHLORIDE SERPL-SCNC: 96 MMOL/L (ref 98–107)
CO2 SERPL-SCNC: 25.3 MMOL/L (ref 22–29)
CREAT SERPL-MCNC: 0.75 MG/DL (ref 0.76–1.27)
DEPRECATED RDW RBC AUTO: 38.4 FL (ref 37–54)
EGFRCR SERPLBLD CKD-EPI 2021: 105.3 ML/MIN/1.73
EOSINOPHIL # BLD AUTO: 0.18 10*3/MM3 (ref 0–0.4)
EOSINOPHIL NFR BLD AUTO: 2 % (ref 0.3–6.2)
ERYTHROCYTE [DISTWIDTH] IN BLOOD BY AUTOMATED COUNT: 12.3 % (ref 12.3–15.4)
GLOBULIN UR ELPH-MCNC: 3.6 GM/DL
GLUCOSE SERPL-MCNC: 294 MG/DL (ref 65–99)
HCT VFR BLD AUTO: 39.6 % (ref 37.5–51)
HGB BLD-MCNC: 13.1 G/DL (ref 13–17.7)
HOLD SPECIMEN: NORMAL
HOLD SPECIMEN: NORMAL
IMM GRANULOCYTES # BLD AUTO: 0.04 10*3/MM3 (ref 0–0.05)
IMM GRANULOCYTES NFR BLD AUTO: 0.4 % (ref 0–0.5)
LYMPHOCYTES # BLD AUTO: 1.65 10*3/MM3 (ref 0.7–3.1)
LYMPHOCYTES NFR BLD AUTO: 17.9 % (ref 19.6–45.3)
MCH RBC QN AUTO: 28.4 PG (ref 26.6–33)
MCHC RBC AUTO-ENTMCNC: 33.1 G/DL (ref 31.5–35.7)
MCV RBC AUTO: 85.7 FL (ref 79–97)
MONOCYTES # BLD AUTO: 0.73 10*3/MM3 (ref 0.1–0.9)
MONOCYTES NFR BLD AUTO: 7.9 % (ref 5–12)
NEUTROPHILS NFR BLD AUTO: 6.56 10*3/MM3 (ref 1.7–7)
NEUTROPHILS NFR BLD AUTO: 71 % (ref 42.7–76)
NRBC BLD AUTO-RTO: 0 /100 WBC (ref 0–0.2)
NT-PROBNP SERPL-MCNC: 865.1 PG/ML (ref 0–900)
PLATELET # BLD AUTO: 293 10*3/MM3 (ref 140–450)
PMV BLD AUTO: 9.1 FL (ref 6–12)
POTASSIUM SERPL-SCNC: 4.2 MMOL/L (ref 3.5–5.2)
PROT SERPL-MCNC: 7.8 G/DL (ref 6–8.5)
RBC # BLD AUTO: 4.62 10*6/MM3 (ref 4.14–5.8)
SODIUM SERPL-SCNC: 138 MMOL/L (ref 136–145)
TROPONIN T SERPL HS-MCNC: 23 NG/L
WBC NRBC COR # BLD AUTO: 9.23 10*3/MM3 (ref 3.4–10.8)
WHOLE BLOOD HOLD COAG: NORMAL
WHOLE BLOOD HOLD SPECIMEN: NORMAL

## 2024-01-02 PROCEDURE — 1160F RVW MEDS BY RX/DR IN RCRD: CPT | Performed by: SPECIALIST

## 2024-01-02 PROCEDURE — 3078F DIAST BP <80 MM HG: CPT | Performed by: SPECIALIST

## 2024-01-02 PROCEDURE — 1159F MED LIST DOCD IN RCRD: CPT | Performed by: SPECIALIST

## 2024-01-02 PROCEDURE — 99285 EMERGENCY DEPT VISIT HI MDM: CPT

## 2024-01-02 PROCEDURE — 85025 COMPLETE CBC W/AUTO DIFF WBC: CPT | Performed by: EMERGENCY MEDICINE

## 2024-01-02 PROCEDURE — 25510000001 IOPAMIDOL PER 1 ML

## 2024-01-02 PROCEDURE — 83880 ASSAY OF NATRIURETIC PEPTIDE: CPT | Performed by: EMERGENCY MEDICINE

## 2024-01-02 PROCEDURE — 80053 COMPREHEN METABOLIC PANEL: CPT | Performed by: EMERGENCY MEDICINE

## 2024-01-02 PROCEDURE — 71260 CT THORAX DX C+: CPT

## 2024-01-02 PROCEDURE — 3074F SYST BP LT 130 MM HG: CPT | Performed by: SPECIALIST

## 2024-01-02 PROCEDURE — 84484 ASSAY OF TROPONIN QUANT: CPT | Performed by: EMERGENCY MEDICINE

## 2024-01-02 PROCEDURE — 36415 COLL VENOUS BLD VENIPUNCTURE: CPT | Performed by: EMERGENCY MEDICINE

## 2024-01-02 PROCEDURE — 93005 ELECTROCARDIOGRAM TRACING: CPT | Performed by: EMERGENCY MEDICINE

## 2024-01-02 PROCEDURE — 99214 OFFICE O/P EST MOD 30 MIN: CPT | Performed by: SPECIALIST

## 2024-01-02 RX ORDER — SUVOREXANT 10 MG/1
1 TABLET, FILM COATED ORAL NIGHTLY
COMMUNITY

## 2024-01-02 RX ORDER — SODIUM CHLORIDE 0.9 % (FLUSH) 0.9 %
10 SYRINGE (ML) INJECTION AS NEEDED
Status: DISCONTINUED | OUTPATIENT
Start: 2024-01-02 | End: 2024-01-03 | Stop reason: HOSPADM

## 2024-01-02 RX ORDER — HYDROCODONE BITARTRATE AND ACETAMINOPHEN 7.5; 325 MG/1; MG/1
1 TABLET ORAL 3 TIMES DAILY
COMMUNITY
Start: 2023-12-27

## 2024-01-02 RX ORDER — DICLOFENAC SODIUM 75 MG/1
1 TABLET, DELAYED RELEASE ORAL EVERY 12 HOURS SCHEDULED
COMMUNITY
Start: 2023-12-29

## 2024-01-02 RX ADMIN — IOPAMIDOL 100 ML: 755 INJECTION, SOLUTION INTRAVENOUS at 20:48

## 2024-01-03 ENCOUNTER — TELEPHONE (OUTPATIENT)
Dept: CARDIAC SURGERY | Facility: CLINIC | Age: 59
End: 2024-01-03
Payer: MEDICARE

## 2024-01-03 LAB
QT INTERVAL: 386 MS
QTC INTERVAL: 449 MS

## 2024-01-03 NOTE — TELEPHONE ENCOUNTER
Provider: ASHLEY    Caller: DUNIA ELDRIDGE    Relationship to Patient: SPOUSE    Phone Number: [phone]    Reason for Call: ASKING DR RAMIREZ REVIEW PT RECENT CT SCAN TO DETERMINE IF HE NEEDS TO BE SEEN SOONER THAN 1/17/23

## 2024-01-03 NOTE — TELEPHONE ENCOUNTER
Left voicemail notifying spouse that Dr. Ribeiro has reviewed his CT chest and does not see any concerning abnormalities.

## 2024-01-03 NOTE — ED PROVIDER NOTES
Time: 7:16 PM EST  Date of encounter:  1/2/2024  Independent Historian/Clinical History and Information was obtained by:   Patient    History is limited by: N/A    Chief Complaint   Patient presents with    Shortness of Breath    Cough         History of Present Illness:  Patient is a 58 y.o. year old male who presents to the emergency department for evaluation of cough.  Patient states he has had a cough for the last 3 days that is productive.  Patient states he is also having associated shortness of breath and chest pain.  Patient states that he was seen urgent care today and had an abnormal chest x-ray and he was told by them that they could not see his right lower portion of his lung and recommended to come to the emergency department for CT scan.  (Provider in triage, Isidro Rosenbaum PA-C)    Patient Care Team  Primary Care Provider: Steffany Gruber APRN    Past Medical History:     Allergies   Allergen Reactions    Fentanyl Angioedema and Swelling    Cefdinir Itching    Nifedipine Hives and Itching     Past Medical History:   Diagnosis Date    Arthritis     Biceps tendonitis on left 03/22/2022    Bursitis of shoulder 12/06/2018    CAD (coronary artery disease)     Claustrophobia     Diabetes     Hyperlipidemia     Hypertension     Pneumonia      Past Surgical History:   Procedure Laterality Date    CARDIAC CATHETERIZATION      showed significant coronary artery disease of the LAD with calcification.     CARDIAC CATHETERIZATION Left 12/12/2023    Procedure: Cardiac Catheterization/Vascular Study;  Surgeon: Mario Welch MD;  Location: [facility] CATH INVASIVE LOCATION;  Service: Cardiology;  Laterality: Left;    CHOLECYSTECTOMY      CIRCUMCISION      CORONARY ANGIOPLASTY WITH STENT PLACEMENT      CORONARY ARTERY BYPASS GRAFT N/A 12/14/2023    Procedure: CORONARY ARTERY BYPASS GRAFTING TIMES 5, UTILIZING LEFT AND RIGHT INTERNAL MAMMARY ARTERIES AND ENDOSCOPICALLY HARVESTED LEFT SAPHENOUS VEIN GRAFT W/ PRP;  RIGHT LEG VEIN EXPLORATION; TRANSESOPHAGEAL ECHOCARDIOGRAM WITH ANESTHESIA;  Surgeon: Jr Binu Ribeiro MD;  Location: [facility] CVOR;  Service: Cardiothoracic;  Laterality: N/A;    INGUINAL HERNIA REPAIR      x2    NOSE SURGERY      DE RT/LT HEART CATHETERS N/A 03/22/2016    Procedure: Percutaneous Coronary Intervention - Rota/stent LAD;  Surgeon: Marek Emery MD;  Location: [facility] CATH INVASIVE LOCATION;  Service: Cardiovascular    ROTATOR CUFF REPAIR Left     SHOULDER ARTHROSCOPY Right     x2    SHOULDER ARTHROSCOPY WITH SUBACROMIAL DECOMPRESSION Left 4/11/2022    Procedure: left SHOULDER ARTHROSCOPY SUBACROMIAL DECOMPRESSION WITH ROTATOR CUFF DEBRIDEMENT;  Surgeon: Kam Dick MD;  Location: [facility];  Service: Orthopedics;  Laterality: Left;    SPINAL FUSION       Family History   Problem Relation Age of Onset    Hypertension Mother     Diabetes Mother     Lung cancer Mother     Stroke Mother     Heart attack Father     Heart disease Father     Heart failure Father     Diabetes Father        Home Medications:  Prior to Admission medications    Medication Sig Start Date End Date Taking? Authorizing Provider   amitriptyline (ELAVIL) 50 MG tablet TAKE 1 TABLET BY MOUTH EVERY NIGHT 9/5/23   Ollie Moreland APRN   aspirin 81 MG EC tablet Take 1 tablet by mouth Daily. 12/20/23   Chelsey Holder APRN   atenolol (TENORMIN) 25 MG tablet Take 1 tablet by mouth Every 12 (Twelve) Hours. 12/19/23   Chelsey Holder APRN   atorvastatin (LIPITOR) 40 MG tablet Take 1 tablet by mouth Every Night. 12/19/23   Chelsey Holder APRN   Belsomra 10 MG tablet Take 1 tablet by mouth Every Night.    Provider, MD Jody   clopidogrel (PLAVIX) 75 MG tablet Take 1 tablet by mouth Daily.    Provider, MD Jody   cyclobenzaprine (FLEXERIL) 10 MG tablet Take 1 tablet by mouth 3 (Three) Times a Day As Needed for Muscle Spasms. 9/11/23   Sherice Melgoza APRN   diclofenac (VOLTAREN) 75 MG EC tablet Take 1 tablet by mouth  "Every 12 (Twelve) Hours.  Patient not taking: Reported on 1/2/2024 12/29/23   Jody Bejarano MD   empagliflozin (JARDIANCE) 25 MG tablet tablet Take 1 tablet by mouth Daily. 12/20/23   Danielito Green MD   furosemide (LASIX) 40 MG tablet Take 1 tablet by mouth Daily. 12/20/23   Chelsey Holder APRN   HYDROcodone-acetaminophen (NORCO) 7.5-325 MG per tablet Take 1 tablet by mouth 3 times a day. 12/27/23   Jody Bejarano MD   Insulin Degludec (TRESIBA FLEXTOUCH) 200 UNIT/ML solution pen-injector pen injection Inject 30 Units under the skin into the appropriate area as directed Daily. 11/29/23   Jody Bejarano MD   methocarbamol (ROBAXIN) 500 MG tablet Take 1 tablet by mouth 2 (Two) Times a Day As Needed for Muscle Spasms. 11/13/23   Jody Bejarano MD   potassium chloride (K-DUR,KLOR-CON) 20 MEQ CR tablet Take 1 tablet by mouth Daily for 30 days. 12/20/23 1/19/24  Chelsey Holder APRN   tadalafil (CIALIS) 20 MG tablet TAKE 1 TABLET BY MOUTH AS NEEDED FOR ERECTILE DYSFUNCTION 10/9/23   Dulce Miguel APRN   gabapentin (NEURONTIN) 400 MG capsule Take 2 capsules by mouth 3 (Three) Times a Day for 7 days. 12/19/23 1/2/24  Chelsey Holder APRN        Social History:   Social History     Tobacco Use    Smoking status: Never     Passive exposure: Past    Smokeless tobacco: Never   Vaping Use    Vaping Use: Never used   Substance Use Topics    Alcohol use: Yes     Comment: OCCASIONAL    Drug use: Never         Review of Systems:  Review of Systems   Respiratory:  Positive for cough and shortness of breath.    Cardiovascular:  Positive for chest pain.        Physical Exam:  /98 (BP Location: Right arm, Patient Position: Sitting)   Pulse 85   Temp 98 °F (36.7 °C) (Oral)   Resp 18   Ht 167.6 cm (66\")   Wt 83 kg (183 lb)   SpO2 100%   BMI 29.54 kg/m²         Physical Exam  Constitutional:       Appearance: Normal appearance.   HENT:      Head: Normocephalic.   Eyes:      " Extraocular Movements: Extraocular movements intact.      Conjunctiva/sclera: Conjunctivae normal.   Pulmonary:      Effort: Pulmonary effort is normal.      Breath sounds: Wheezing and rhonchi present.   Abdominal:      General: There is no distension.   Skin:     General: Skin is warm.      Coloration: Skin is not cyanotic.   Neurological:      Mental Status: He is alert and oriented to person, place, and time.   Psychiatric:         Attention and Perception: Attention and perception normal.         Mood and Affect: Mood normal.                    Procedures:  Procedures      Medical Decision Making:      Comorbidities that affect care:        External Notes reviewed:          The following orders were placed and all results were independently analyzed by me:  Orders Placed This Encounter   Procedures    CT Chest With Contrast Diagnostic    [place] Draw    Comprehensive Metabolic Panel    BNP    Single High Sensitivity Troponin T    CBC Auto Differential    NPO Diet NPO Type: Strict NPO    Undress & Gown    Continuous Pulse Oximetry    Vital Signs    Oxygen Therapy- Nasal Cannula; Titrate 1-6 LPM Per SpO2; 90 - 95%    ECG 12 Lead ED Triage Standing Order; SOA    Insert Peripheral IV    CBC & Differential    Green Top (Gel)    Lavender Top    Gold Top - SST    Light Blue Top       Medications Given in the Emergency Department:  Medications   sodium chloride 0.9 % flush 10 mL (has no administration in time range)   iopamidol (ISOVUE-370) 76 % injection 100 mL (100 mL Intravenous Given 1/2/24 2048)        ED Course:    The patient was initially evaluated in the triage area where orders were placed. The patient was later dispositioned by Isidro Rosenbaum PA-C.      The patient was advised to stay for completion of workup which includes but is not limited to communication of labs and radiological results, reassessment and plan. The patient was advised that leaving prior to disposition by a provider could result in  critical findings that are not communicated to the patient.     ED Course as of 01/02/24 2243 Tue Jan 02, 2024 1917 PROVIDER IN TRIAGE  Patient was evaluated by me in triage, Isidro Rosenbaum PA-C.  Orders were placed and patient is currently awaiting final results and disposition.  [MD]   1932 Patient refused COVID swab. [MD]      ED Course User Index  [MD] Isidro Rosenbaum PA-C       Labs:    Lab Results (last 24 hours)       Procedure Component Value Units Date/Time    Covid-19 + Flu A&B AG, Veritor (KQC8390) [[id number]]  (Normal) Collected: 01/02/24 1842    Specimen: Swab Updated: 01/02/24 1843     SARS Antigen Not Detected     Influenza A Antigen DANY Not Detected     Influenza B Antigen DANY Not Detected     Internal Control Passed     Lot Number [id number]     Expiration Date 11/10/24    CBC & Differential [[id number]]  (Abnormal) Collected: 01/02/24 1923    Specimen: Blood from Arm, Right Updated: 01/02/24 1940    Narrative:      The following orders were created for panel order CBC & Differential.  Procedure                               Abnormality         Status                     ---------                               -----------         ------                     CBC Auto Differential[[id number]]        Abnormal            Final result                 Please view results for these tests on the individual orders.    Comprehensive Metabolic Panel [[id number]]  (Abnormal) Collected: 01/02/24 1923    Specimen: Blood from Arm, Right Updated: 01/02/24 2013     Glucose 294 mg/dL      BUN 11 mg/dL      Creatinine 0.75 mg/dL      Sodium 138 mmol/L      Potassium 4.2 mmol/L      Chloride 96 mmol/L      CO2 25.3 mmol/L      Calcium 9.7 mg/dL      Total Protein 7.8 g/dL      Albumin 4.2 g/dL      ALT (SGPT) 8 U/L      AST (SGOT) 10 U/L      Alkaline Phosphatase 116 U/L      Total Bilirubin 0.6 mg/dL      Globulin 3.6 gm/dL      A/G Ratio 1.2 g/dL      BUN/Creatinine Ratio 14.7     Anion Gap 16.7 mmol/L      eGFR  105.3 mL/min/1.73     Narrative:      GFR Normal >60  Chronic Kidney Disease <60  Kidney Failure <15      BNP [[id number]]  (Normal) Collected: 01/02/24 1923    Specimen: Blood from Arm, Right Updated: 01/02/24 2000     proBNP 865.1 pg/mL     Narrative:      This assay is used as an aid in the diagnosis of individuals suspected of having heart failure. It can be used as an aid in the diagnosis of acute decompensated heart failure (ADHF) in patients presenting with signs and symptoms of ADHF to the emergency department (ED). In addition, NT-proBNP of <300 pg/mL indicates ADHF is not likely.    Age Range Result Interpretation  NT-proBNP Concentration (pg/mL:      <50             Positive            >450                   Gray                 300-450                    Negative             <300    50-75           Positive            >900                  Gray                300-900                  Negative            <300      >75             Positive            >1800                  Gray                300-1800                  Negative            <300    Single High Sensitivity Troponin T [[id number]]  (Abnormal) Collected: 01/02/24 1923    Specimen: Blood from Arm, Right Updated: 01/02/24 2002     HS Troponin T 23 ng/L     Narrative:      High Sensitive Troponin T Reference Range:  <14.0 ng/L- Negative Female for AMI  <22.0 ng/L- Negative Male for AMI  >=14 - Abnormal Female indicating possible myocardial injury.  >=22 - Abnormal Male indicating possible myocardial injury.   Clinicians would have to utilize clinical acumen, EKG, Troponin, and serial changes to determine if it is an Acute Myocardial Infarction or myocardial injury due to an underlying chronic condition.         CBC Auto Differential [[id number]]  (Abnormal) Collected: 01/02/24 1923    Specimen: Blood from Arm, Right Updated: 01/02/24 1940     WBC 9.23 10*3/mm3      RBC 4.62 10*6/mm3      Hemoglobin 13.1 g/dL      Hematocrit 39.6 %      MCV 85.7 fL       MCH 28.4 pg      MCHC 33.1 g/dL      RDW 12.3 %      RDW-SD 38.4 fl      MPV 9.1 fL      Platelets 293 10*3/mm3      Neutrophil % 71.0 %      Lymphocyte % 17.9 %      Monocyte % 7.9 %      Eosinophil % 2.0 %      Basophil % 0.8 %      Immature Grans % 0.4 %      Neutrophils, Absolute 6.56 10*3/mm3      Lymphocytes, Absolute 1.65 10*3/mm3      Monocytes, Absolute 0.73 10*3/mm3      Eosinophils, Absolute 0.18 10*3/mm3      Basophils, Absolute 0.07 10*3/mm3      Immature Grans, Absolute 0.04 10*3/mm3      nRBC 0.0 /100 WBC              Imaging:    CT Chest With Contrast Diagnostic    Result Date: 1/2/2024  PROCEDURE: CT CHEST W CONTRAST DIAGNOSTIC  COMPARISONS: 1/2/2024; 12/11/2023; 10/1/2022.  INDICATIONS: Shortness of breath; h/o recent heart surgery (12/14/2023).  TECHNIQUE: After obtaining the patient's consent, 886 CT/CTA images were obtained with non-ionic intravenous contrast material.  No three-dimensional (3D) reformations are provided for review.  Despite best efforts, poor image quality limits interpretation of the study, especially due to patient motion artifact.   PROTOCOL:   Pulmonary embolism CTA imaging protocol performed.    RADIATION:   Total DLP: 370.6 mGy*cm.   Automated exposure control was utilized to minimize radiation dose. CONTRAST: 100 mL Isovue 370 I.V.  FINDINGS: No pulmonary embolism is identified.  Pleural-parenchymal scarring and/or atelectasis is (are) seen bilaterally; these findings are probably greatest in the right lung base.  The patient has undergone median sternotomy and CABG (coronary artery bypass graft) surgery.  Native coronary artery calcifications are seen.  Again, there is slight motion artifact on the study.  Increased attenuation is seen in the anterior mediastinal fat with small foci of gas, which may be related to the recent surgery.  An infectious process cannot be excluded entirely, however.  Please correlate clinically.  No sizable (drainable) fluid pocket is  suggested in this area.  This area of inflammatory mass, containing gas, is roughly estimated 2.3 x 1.8 x 3.1 cm in transverse, anteroposterior (AP), and craniocaudal extent, respectively.  No thoracic aortic aneurysm or dissection.  A trace amount pericardial effusion is seen.  Minimal if any pleural effusion is appreciated.  There is asymmetric elevation of the right diaphragm.  The patient has undergone cholecystectomy.  There is chronic calcified granulomatous disease of the chest and the abdomen.  There are colonic diverticula without acute diverticulitis partially imaged upper abdomen.  Air-fluid levels are seen within the lower trachea and in the right mainstem bronchus and may represent retained secretions, such as mucus.  Aspirated content cannot be excluded.  Peribronchial thickening is seen bilaterally.  There are centrilobular infiltrates, especially in the right upper lobe and in the right middle lobe.  They may represent bronchiolitis/bronchitis as well as pneumonitis/pneumonia.  Aspiration pneumonia cannot be excluded.  To a much lesser extent, similar findings are seen contralaterally, especially in the left upper lobe.  Degenerative changes are seen throughout the imaged spine.  There may be diffuse idiopathic skeletal hyperostosis (DISH).  No acute fracture.  No aggressive osseous lesion is suggested.  No definite osteomyelitis is suggested involving the sternum by CT.        1. No pulmonary embolism is seen.  2. The patient has undergone median sternotomy and CABG surgery since the prior 12/11/2023 chest x-ray exam.  3. Increased attenuation with gas and fluid is seen in the anterior mediastinum, especially subjacent to the superior sternum, and is probably related to the recent surgery (median sternotomy).  However, an infectious process cannot be excluded entirely.  Please correlate clinically.  This area of inflammatory mass, containing gas, is roughly estimated 2.3 x 1.8 x 3.1 cm in  transverse, anteroposterior (AP), and craniocaudal extent, respectively.  4. There may be bronchiolitis/bronchitis as well as pneumonitis/pneumonia throughout the right lung, especially within the right upper lobe and right middle lobe.  To a lesser extent there may be similar findings in the left upper lobe.  Aspiration pneumonia cannot be excluded.  5. Nonspecific air-fluid levels are seen in the lower trachea and right mainstem bronchus.  6. Pleural-parenchymal scarring and/or atelectasis in the lung bases, greater on the right than the left.  7. There are extensive native coronary artery calcifications.  No cardiac enlargement.  A trace amount of pericardial effusion is seen.  8. There is minimal if any pleural effusion identified.  9. The study is motion-limited.  10. Please see above comments for further detail.  1.   Please note that portions of this note were completed with a voice recognition program.  BARRY GARCIA JR, MD       Electronically Signed and Approved By: BARRY GARCIA JR, MD on 1/02/2024 at 21:21              XR Chest 2 View    Result Date: 1/2/2024  PROCEDURE: XR CHEST 2 VW  COMPARISON: [facility], CR, XR CHEST 1 VW, 12/11/2023, 19:31.  [facility], CR, XR CHEST 2 VW, 2/07/2023, 9:25.  INDICATIONS: Cough x3 days, expiratory rhonchi auscultated across all lung fields on exam today.  FINDINGS:  The cardiac silhouette is within normal limits.  There are postsurgical changes of prior CABG.  There is bandlike atelectasis or scarring within the right mid lung.  There is no pleural effusion or pneumothorax.  There are degenerative changes of the thoracic spine.  Median sternotomy wires are present and intact.        1. Linear subsegmental atelectasis or scarring within the right middle lobe.  2. Postsurgical changes of prior CABG.     GILDARDO JENSEN MD       Electronically Signed and Approved By: GILDARDO JENSEN MD on 1/02/2024 at 18:37                Differential  Diagnosis and Discussion:      Dyspnea: Differential diagnosis includes but is not limited to metabolic acidosis, neurological disorders, psychogenic, asthma, pneumothorax, upper airway obstruction, COPD, pneumonia, noncardiogenic pulmonary edema, interstitial lung disease, anemia, congestive heart failure, and pulmonary embolism        MDM     Amount and/or Complexity of Data Reviewed  Clinical lab tests: reviewed  Tests in the radiology section of CPT®: reviewed  Tests in the medicine section of CPT®: reviewed                 Patient Care Considerations:          Consultants/Shared Management Plan:        Social Determinants of Health:    Patient is independent, reliable, and has access to care.       Disposition and Care Coordination:    Eloped: This patient has left the emergency department or waiting room with no communication to myself, nursing or administrative staff. There was no opportunity to discuss the patient's decision to leave, provide medical advice or discuss alternatives to. The staff has made efforts to locate patient without success.        Final diagnoses:   Eloped from emergency department        ED Disposition       None            This medical record created using voice recognition software.             Isidro Rosenbaum PA-C  01/02/24 8257

## 2024-01-17 ENCOUNTER — OFFICE VISIT (OUTPATIENT)
Dept: CARDIAC SURGERY | Facility: CLINIC | Age: 59
End: 2024-01-17
Payer: MEDICARE

## 2024-01-17 VITALS
TEMPERATURE: 98.2 F | RESPIRATION RATE: 18 BRPM | DIASTOLIC BLOOD PRESSURE: 87 MMHG | HEIGHT: 68 IN | BODY MASS INDEX: 27.58 KG/M2 | OXYGEN SATURATION: 97 % | WEIGHT: 182 LBS | SYSTOLIC BLOOD PRESSURE: 155 MMHG | HEART RATE: 81 BPM

## 2024-01-17 DIAGNOSIS — Z95.1 S/P CABG X 5: Primary | ICD-10-CM

## 2024-01-17 RX ORDER — SIMVASTATIN 10 MG
20 TABLET ORAL NIGHTLY
Qty: 90 TABLET | Refills: 5 | Status: SHIPPED | OUTPATIENT
Start: 2024-01-17

## 2024-01-17 RX ORDER — TRAMADOL HYDROCHLORIDE 50 MG/1
50 TABLET ORAL EVERY 6 HOURS PRN
Qty: 30 TABLET | Refills: 0 | Status: SHIPPED | OUTPATIENT
Start: 2024-01-17

## 2024-01-17 NOTE — LETTER
"January 17, 2024       No Recipients    Patient: Moreno Aleman   YOB: 1965   Date of Visit: 1/17/2024     Dear NINFA Christensen:       Thank you for referring Moreno Aleman to me for evaluation. Below are the relevant portions of my assessment and plan of care.    If you have questions, please do not hesitate to call me. I look forward to following Moreno along with you.         Sincerely,        Binu Ribeiro MD        CC:   No Recipients    Jr Binu Ribeiro MD  01/17/24 1231  Sign when Signing Visit  CARDIOVASCULAR SURGERY FOLLOW-UP PROGRESS NOTE  Chief Complaint: Cough, chest soreness and itching all over.        HPI:   Dear  Steffany Gruber APRN and colleagues:    It was nice to see Moreno Aleman in follow up 4 weeks after surgery.  As you know, he is a 58 y.o. male with coronary artery disease with unstable angina and non-STEMI who underwent CABG x 5 with bilateral RENETTA's on 12/14/2023. He did well postoperatively and continues to do well. He comes in today complaining of incisional pain and itching all over.  His activity level has been good.   He said that he had taken Lipitor in the past but had itching and he had stopped it in the past.  I think we should stop this and go to Zocor.  He had a CT scan that I reviewed earlier that looks great.  He has some mild soreness especially when he coughs now.  This is typical and young man.    Physical Exam:         /87 (BP Location: Left arm, Patient Position: Sitting, Cuff Size: Adult)   Pulse 81   Temp 98.2 °F (36.8 °C)   Resp 18   Ht 171.5 cm (67.5\")   Wt 82.6 kg (182 lb)   SpO2 97%   BMI 28.08 kg/m²   Heart:  regular rate and rhythm, S1, S2 normal, no murmur, click, rub or gallop  Lungs:  clear to auscultation bilaterally  Extremities:  no edema  Incision(s):  mid chest healing well, right leg healing well, sternum stable    Assessment/Plan:     S/P CABG. Overall, he is doing well.    No significant post-op " complications    Keep incisions clean and dry  OK to drive if not taking narcotic pain medicine  OK to begin cardiac rehab  Follow-up as scheduled with cardiology  Follow-up as scheduled with PCP  Follow-up with CT surgery prn    No restrictions of activity.  I stopped his Lipitor and switched him to Zocor.  I also gave him some Ultram and warned him about this being addictive.      Thank you for allowing me to participate in the care of your   patient.  Regards,  Binu Ribeiro MD

## 2024-01-17 NOTE — PROGRESS NOTES
"CARDIOVASCULAR SURGERY FOLLOW-UP PROGRESS NOTE  Chief Complaint: Cough, chest soreness and itching all over.        HPI:   Dear  Steffany Gruber APRN and colleagues:    It was nice to see Moreno Aleman in follow up 4 weeks after surgery.  As you know, he is a 58 y.o. male with coronary artery disease with unstable angina and non-STEMI who underwent CABG x 5 with bilateral RENETTA's on 12/14/2023. He did well postoperatively and continues to do well. He comes in today complaining of incisional pain and itching all over.  His activity level has been good.   He said that he had taken Lipitor in the past but had itching and he had stopped it in the past.  I think we should stop this and go to Zocor.  He had a CT scan that I reviewed earlier that looks great.  He has some mild soreness especially when he coughs now.  This is typical and young man.    Physical Exam:         /87 (BP Location: Left arm, Patient Position: Sitting, Cuff Size: Adult)   Pulse 81   Temp 98.2 °F (36.8 °C)   Resp 18   Ht 171.5 cm (67.5\")   Wt 82.6 kg (182 lb)   SpO2 97%   BMI 28.08 kg/m²   Heart:  regular rate and rhythm, S1, S2 normal, no murmur, click, rub or gallop  Lungs:  clear to auscultation bilaterally  Extremities:  no edema  Incision(s):  mid chest healing well, right leg healing well, sternum stable    Assessment/Plan:     S/P CABG. Overall, he is doing well.    No significant post-op complications    Keep incisions clean and dry  OK to drive if not taking narcotic pain medicine  OK to begin cardiac rehab  Follow-up as scheduled with cardiology  Follow-up as scheduled with PCP  Follow-up with CT surgery prn    No restrictions of activity.  I stopped his Lipitor and switched him to Zocor.  I also gave him some Ultram and warned him about this being addictive.      Thank you for allowing me to participate in the care of your   patient.  Regards,  Binu Ribeiro MD  "

## 2024-01-25 ENCOUNTER — TELEPHONE (OUTPATIENT)
Dept: CARDIOLOGY | Facility: CLINIC | Age: 59
End: 2024-01-25
Payer: MEDICARE

## 2024-01-25 NOTE — TELEPHONE ENCOUNTER
[facility] received a fax that requires your attention. The document has been indexed to the patient’s chart for your review.      Reason for sending: EXTERNAL MEDICAL RECORD NOTIFICATION     Documents Description: PHYS ORD-[place] SURGICAL CARDIAC CLEARANCE REQ-1.25.24    Name of Sender: [facility]    Date Indexed: 1.25.24

## 2024-02-19 RX ORDER — SIMVASTATIN 20 MG
20 TABLET ORAL NIGHTLY
Qty: 90 TABLET | Refills: 2 | Status: SHIPPED | OUTPATIENT
Start: 2024-02-19

## 2024-02-21 ENCOUNTER — HOSPITAL ENCOUNTER (EMERGENCY)
Facility: HOSPITAL | Age: 59
Discharge: LEFT BEFORE DISCHARGE | End: 2024-02-21
Payer: COMMERCIAL

## 2024-02-21 PROCEDURE — 99211 OFF/OP EST MAY X REQ PHY/QHP: CPT

## 2024-04-02 ENCOUNTER — HOSPITAL ENCOUNTER (OUTPATIENT)
Dept: GENERAL RADIOLOGY | Facility: HOSPITAL | Age: 59
Discharge: HOME | End: 2024-04-02
Payer: MEDICARE

## 2024-04-02 ENCOUNTER — OFFICE VISIT (OUTPATIENT)
Dept: PULMONOLOGY | Facility: CLINIC | Age: 59
End: 2024-04-02
Payer: MEDICARE

## 2024-04-02 VITALS
BODY MASS INDEX: 28.37 KG/M2 | SYSTOLIC BLOOD PRESSURE: 183 MMHG | WEIGHT: 187.2 LBS | HEART RATE: 62 BPM | TEMPERATURE: 98.2 F | RESPIRATION RATE: 16 BRPM | OXYGEN SATURATION: 98 % | DIASTOLIC BLOOD PRESSURE: 100 MMHG | HEIGHT: 68 IN

## 2024-04-02 DIAGNOSIS — R06.09 DYSPNEA ON EXERTION: ICD-10-CM

## 2024-04-02 DIAGNOSIS — R93.89 ABNORMAL CT OF THE CHEST: ICD-10-CM

## 2024-04-02 DIAGNOSIS — R06.09 DYSPNEA ON EXERTION: Primary | ICD-10-CM

## 2024-04-02 PROCEDURE — 71046 X-RAY EXAM CHEST 2 VIEWS: CPT

## 2024-04-02 PROCEDURE — 99203 OFFICE O/P NEW LOW 30 MIN: CPT | Performed by: INTERNAL MEDICINE

## 2024-04-02 PROCEDURE — 3080F DIAST BP >= 90 MM HG: CPT | Performed by: INTERNAL MEDICINE

## 2024-04-02 PROCEDURE — 1160F RVW MEDS BY RX/DR IN RCRD: CPT | Performed by: INTERNAL MEDICINE

## 2024-04-02 PROCEDURE — 3077F SYST BP >= 140 MM HG: CPT | Performed by: INTERNAL MEDICINE

## 2024-04-02 PROCEDURE — 1159F MED LIST DOCD IN RCRD: CPT | Performed by: INTERNAL MEDICINE

## 2024-04-02 RX ORDER — METHOCARBAMOL 500 MG/1
500 TABLET, FILM COATED ORAL 3 TIMES DAILY
COMMUNITY
Start: 2023-11-21

## 2024-04-02 RX ORDER — ROSUVASTATIN CALCIUM 20 MG/1
20 TABLET, COATED ORAL DAILY
COMMUNITY
Start: 2024-01-25

## 2024-04-02 RX ORDER — POTASSIUM CHLORIDE 750 MG/1
10 TABLET, FILM COATED, EXTENDED RELEASE ORAL 2 TIMES DAILY
COMMUNITY
Start: 2024-01-25

## 2024-04-02 NOTE — PROGRESS NOTES
Pulmonary Consultation    Steffany Gruber, NINFA,    Thank you for asking me to see Moreno Aleman for   Chief Complaint   Patient presents with    Establish Care     Abnormal chest CT    Shortness of Breath    Cough   .      History of Present Illness  Moreno Aleman is a 58 y.o. male with a PMH significant for coronary artery disease s/p CABG who developed pneumonia a month after the surgery with cough and greenish expectoration presents for evaluation patient complains of dyspnea on exertion but denies any significant cough expectoration or wheezing he does not smoke patient denies any chest pain calf pain or swelling      Tobacco use history:  Never smoker      Review of Systems: History obtained from chart review and the patient.  Review of Systems   Respiratory:  Positive for shortness of breath.    All other systems reviewed and are negative.    As described in the HPI. Otherwise, remainder of ROS (14 systems) were negative.    Patient Active Problem List   Diagnosis    Hypertension, essential    Hyperlipemia, mixed    Migraine    Indeterminate colitis    Type 2 diabetes mellitus, with long-term current use of insulin    Arthritis    Vitamin D deficiency    Closed fracture of metacarpal bone    Left epididymitis    Phimosis    S/P left shoulder arthroscopic subacromial decompression, mini open rotator cuff repair    Cervical spondylosis    Myofascial pain    Overweight    CAD S/P percutaneous coronary angioplasty    Chest pain    Unstable angina    CAD (coronary artery disease)    Abnormal findings on diagnostic imaging of heart and coronary circulation    Headache    S/P CABG x 5         Current Outpatient Medications:     aspirin 81 MG EC tablet, Take 1 tablet by mouth Daily., Disp: 30 tablet, Rfl: 2    atenolol (TENORMIN) 25 MG tablet, Take 1 tablet by mouth Every 12 (Twelve) Hours., Disp: 60 tablet, Rfl: 2    Belsomra 10 MG tablet, Take 1 tablet by mouth Every Night., Disp: , Rfl:     clopidogrel  (PLAVIX) 75 MG tablet, Take 1 tablet by mouth Daily., Disp: , Rfl:     empagliflozin (JARDIANCE) 25 MG tablet tablet, Take 1 tablet by mouth Daily., Disp: 30 tablet, Rfl: 3    furosemide (LASIX) 40 MG tablet, Take 1 tablet by mouth Daily., Disp: 30 tablet, Rfl: 0    HYDROcodone-acetaminophen (NORCO) 7.5-325 MG per tablet, Take 1 tablet by mouth 3 times a day., Disp: , Rfl:     Insulin Degludec (TRESIBA FLEXTOUCH) 200 UNIT/ML solution pen-injector pen injection, Inject 30 Units under the skin into the appropriate area as directed Daily., Disp: , Rfl:     methocarbamol (ROBAXIN) 500 MG tablet, Take 1 tablet by mouth 3 (Three) Times a Day., Disp: , Rfl:     rosuvastatin (CRESTOR) 20 MG tablet, Take 1 tablet by mouth Daily., Disp: , Rfl:     cyclobenzaprine (FLEXERIL) 10 MG tablet, Take 1 tablet by mouth 3 (Three) Times a Day As Needed for Muscle Spasms. (Patient not taking: Reported on 4/2/2024), Disp: 20 tablet, Rfl: 0    potassium chloride 10 MEQ CR tablet, Take 1 tablet by mouth 2 (Two) Times a Day. (Patient not taking: Reported on 4/2/2024), Disp: , Rfl:     simvastatin (Zocor) 20 MG tablet, Take 1 tablet by mouth Every Night. Will replace you taking 2 10mg tablets nightly. (Patient not taking: Reported on 4/2/2024), Disp: 90 tablet, Rfl: 2    tadalafil (CIALIS) 20 MG tablet, TAKE 1 TABLET BY MOUTH AS NEEDED FOR ERECTILE DYSFUNCTION (Patient not taking: Reported on 4/2/2024), Disp: 90 tablet, Rfl: 3    traMADol (ULTRAM) 50 MG tablet, Take 1 tablet by mouth Every 6 (Six) Hours As Needed for Moderate Pain. (Patient not taking: Reported on 4/2/2024), Disp: 30 tablet, Rfl: 0    Allergies   Allergen Reactions    Fentanyl Angioedema and Swelling    Cefdinir Itching    Nifedipine Hives and Itching       Past Medical History:   Diagnosis Date    Arthritis     Biceps tendonitis on left 03/22/2022    Bursitis of shoulder 12/06/2018    CAD (coronary artery disease)     Claustrophobia     Diabetes     Hyperlipidemia      Hypertension     Pneumonia      Past Surgical History:   Procedure Laterality Date    CARDIAC CATHETERIZATION      showed significant coronary artery disease of the LAD with calcification.     CARDIAC CATHETERIZATION Left 12/12/2023    Procedure: Cardiac Catheterization/Vascular Study;  Surgeon: Mario Welch MD;  Location: [facility] CATH INVASIVE LOCATION;  Service: Cardiology;  Laterality: Left;    CHOLECYSTECTOMY      CIRCUMCISION      CORONARY ANGIOPLASTY WITH STENT PLACEMENT      CORONARY ARTERY BYPASS GRAFT N/A 12/14/2023    Procedure: CORONARY ARTERY BYPASS GRAFTING TIMES 5, UTILIZING LEFT AND RIGHT INTERNAL MAMMARY ARTERIES AND ENDOSCOPICALLY HARVESTED LEFT SAPHENOUS VEIN GRAFT W/ PRP; RIGHT LEG VEIN EXPLORATION; TRANSESOPHAGEAL ECHOCARDIOGRAM WITH ANESTHESIA;  Surgeon: Jr Binu Ribeiro MD;  Location: [facility] CVOR;  Service: Cardiothoracic;  Laterality: N/A;    INGUINAL HERNIA REPAIR      x2    NOSE SURGERY      HI RT/LT HEART CATHETERS N/A 03/22/2016    Procedure: Percutaneous Coronary Intervention - Rota/stent LAD;  Surgeon: Marek Emery MD;  Location: [facility] CATH INVASIVE LOCATION;  Service: Cardiovascular    ROTATOR CUFF REPAIR Left     SHOULDER ARTHROSCOPY Right     x2    SHOULDER ARTHROSCOPY WITH SUBACROMIAL DECOMPRESSION Left 4/11/2022    Procedure: left SHOULDER ARTHROSCOPY SUBACROMIAL DECOMPRESSION WITH ROTATOR CUFF DEBRIDEMENT;  Surgeon: Kam Dick MD;  Location: [facility] OR [facility];  Service: Orthopedics;  Laterality: Left;    SPINAL FUSION       Social History     Socioeconomic History    Marital status:    Tobacco Use    Smoking status: Never     Passive exposure: Past    Smokeless tobacco: Never   Vaping Use    Vaping status: Never Used   Substance and Sexual Activity    Alcohol use: Yes     Comment: OCCASIONAL    Drug use: Never    Sexual activity: Defer     Family History   Problem Relation Age of Onset    Hypertension Mother     Diabetes Mother     Lung cancer Mother      Stroke Mother     Heart attack Father     Heart disease Father     Heart failure Father     Diabetes Father        XR Chest 2 View    Result Date: 2/21/2024  No change from 1/25/2024. Electronically Signed: Yobani Hatch MD 2024/02/21 at 15:53 CST Reading Location ID and State: [address] / [place] Tel [phone], Service support  [phone], Fax [phone]    XR Chest 2 View    Result Date: 1/25/2024  1.  Interval coronary artery bypass grafting. 2.  Small right pleural effusion. Electronically Signed: Yobani Hatch MD 2024/01/25 at 15:42 CST Reading Location ID and State: [address] / [place] Tel [phone], Service support  [phone], Fax [phone]    CT Chest With Contrast Diagnostic    Result Date: 1/2/2024    1. No pulmonary embolism is seen.  2. The patient has undergone median sternotomy and CABG surgery since the prior 12/11/2023 chest x-ray exam.  3. Increased attenuation with gas and fluid is seen in the anterior mediastinum, especially subjacent to the superior sternum, and is probably related to the recent surgery (median sternotomy).  However, an infectious process cannot be excluded entirely.  Please correlate clinically.  This area of inflammatory mass, containing gas, is roughly estimated 2.3 x 1.8 x 3.1 cm in transverse, anteroposterior (AP), and craniocaudal extent, respectively.  4. There may be bronchiolitis/bronchitis as well as pneumonitis/pneumonia throughout the right lung, especially within the right upper lobe and right middle lobe.  To a lesser extent there may be similar findings in the left upper lobe.  Aspiration pneumonia cannot be excluded.  5. Nonspecific air-fluid levels are seen in the lower trachea and right mainstem bronchus.  6. Pleural-parenchymal scarring and/or atelectasis in the lung bases, greater on the right than the left.  7. There are extensive native coronary artery calcifications.  No cardiac enlargement.  A trace amount of pericardial effusion is seen.  8. There  "is minimal if any pleural effusion identified.  9. The study is motion-limited.  10. Please see above comments for further detail.  1.   Please note that portions of this note were completed with a voice recognition program.  BARRY GARCIA JR, MD       Electronically Signed and Approved By: BARRY GARCIA JR, MD on 1/02/2024 at 21:21              XR Chest 2 View    Result Date: 1/2/2024    1. Linear subsegmental atelectasis or scarring within the right middle lobe.  2. Postsurgical changes of prior CABG.     GILDARDO JENSEN MD       Electronically Signed and Approved By: GILDARDO JENSEN MD on 1/02/2024 at 18:37                  Objective     Blood pressure (!) 183/100, pulse 62, temperature 98.2 °F (36.8 °C), temperature source Tympanic, resp. rate 16, height 171.5 cm (67.5\"), weight 84.9 kg (187 lb 3.2 oz), SpO2 98%.  Physical Exam  Vitals and nursing note reviewed.   Constitutional:       Appearance: Normal appearance.   HENT:      Head: Normocephalic and atraumatic.      Nose: Nose normal.      Mouth/Throat:      Mouth: Mucous membranes are moist.      Pharynx: Oropharynx is clear.   Eyes:      Extraocular Movements: Extraocular movements intact.      Conjunctiva/sclera: Conjunctivae normal.      Pupils: Pupils are equal, round, and reactive to light.   Cardiovascular:      Rate and Rhythm: Normal rate and regular rhythm.      Pulses: Normal pulses.      Heart sounds: Normal heart sounds.   Pulmonary:      Effort: Pulmonary effort is normal.      Breath sounds: Normal breath sounds.   Abdominal:      General: Abdomen is flat. Bowel sounds are normal.      Palpations: Abdomen is soft.   Musculoskeletal:         General: Normal range of motion.      Cervical back: Normal range of motion and neck supple.   Skin:     General: Skin is warm.      Capillary Refill: Capillary refill takes 2 to 3 seconds.   Neurological:      General: No focal deficit present.      Mental Status: He is alert and oriented to person, " place, and time.   Psychiatric:         Mood and Affect: Mood normal.         Behavior: Behavior normal.       Immunization History   Administered Date(s) Administered    COVID-19 (PFIZER) BIVALENT 12+YRS 01/22/2022    COVID-19 (PFIZER) Purple Cap Monovalent 12/15/2021, 01/05/2022    Flu Vaccine Quad PF >36MO 10/01/2020    Fluzone (or Fluarix & Flulaval for VFC) >6mos 10/01/2020, 09/17/2021, 11/09/2022    Fluzone Quad >6mos (Multi-dose) 09/30/2015, 11/01/2016    Hep B, Adolescent or Pediatric 05/04/2009    Influenza, Unspecified 11/09/2022    PEDS-Pneumococcal Conjugate (PCV7) 05/04/2009    Pneumococcal Conjugate 13-Valent (PCV13) 01/12/2016    Pneumococcal Polysaccharide (PPSV23) 11/16/2019    Shingrix 11/16/2019, 01/31/2020            Assessment & Plan     Diagnoses and all orders for this visit:    1. Dyspnea on exertion (Primary)  -     Complete PFT - Pre & Post Bronchodilator; Future  -     XR Chest 2 View; Future    2. Abnormal CT of the chest  -     Complete PFT - Pre & Post Bronchodilator; Future  -     XR Chest 2 View; Future         Result Review :            Discussion/ Recommendations:   CT chest reviewed  Will repeat chest x-ray  Patient is advised regular exercise  Will order PFTs for evaluation  Continue medications for his coronary artery disease  Discussed vaccination and recommended                Return in about 3 months (around 7/2/2024).      Thank you for allowing me to participate in the care of Moreno Aleman. Please do not hesitate to contact me with any questions.         This document has been electronically signed by Jason Kumar MD on April 2, 2024 09:38 EDT

## 2024-04-09 ENCOUNTER — PATIENT ROUNDING (BHMG ONLY) (OUTPATIENT)
Dept: PULMONOLOGY | Facility: CLINIC | Age: 59
End: 2024-04-09
Payer: MEDICARE

## 2024-04-09 NOTE — PROGRESS NOTES
April 9, 2024    Hello, may I speak with Moreno Aleman?    My name is Spring      I am  with [facility]  [address]  [address]  [place] [address] [phone].    Before we get started may I verify your date of birth? 1965    I am calling to officially welcome you to our practice and ask about your recent visit. Is this a good time to talk? My chart message sent for patient rounding.

## 2024-06-29 ENCOUNTER — APPOINTMENT (OUTPATIENT)
Dept: CT IMAGING | Facility: HOSPITAL | Age: 59
End: 2024-06-29
Payer: MEDICARE

## 2024-06-29 ENCOUNTER — HOSPITAL ENCOUNTER (EMERGENCY)
Facility: HOSPITAL | Age: 59
Discharge: HOME | End: 2024-06-29
Attending: EMERGENCY MEDICINE
Payer: MEDICARE

## 2024-06-29 VITALS
TEMPERATURE: 99.6 F | HEIGHT: 68 IN | DIASTOLIC BLOOD PRESSURE: 103 MMHG | SYSTOLIC BLOOD PRESSURE: 182 MMHG | WEIGHT: 199 LBS | BODY MASS INDEX: 30.16 KG/M2 | OXYGEN SATURATION: 99 % | HEART RATE: 78 BPM | RESPIRATION RATE: 18 BRPM

## 2024-06-29 DIAGNOSIS — M51.36 DEGENERATIVE DISC DISEASE, LUMBAR: Primary | ICD-10-CM

## 2024-06-29 DIAGNOSIS — M54.41 ACUTE MIDLINE LOW BACK PAIN WITH RIGHT-SIDED SCIATICA: ICD-10-CM

## 2024-06-29 LAB — GLUCOSE BLDC GLUCOMTR-MCNC: 174 MG/DL (ref 70–99)

## 2024-06-29 PROCEDURE — 96372 THER/PROPH/DIAG INJ SC/IM: CPT

## 2024-06-29 PROCEDURE — 25010000002 HYDROMORPHONE 1 MG/ML SOLUTION: Performed by: EMERGENCY MEDICINE

## 2024-06-29 PROCEDURE — 72131 CT LUMBAR SPINE W/O DYE: CPT

## 2024-06-29 PROCEDURE — 99284 EMERGENCY DEPT VISIT MOD MDM: CPT

## 2024-06-29 PROCEDURE — 82948 REAGENT STRIP/BLOOD GLUCOSE: CPT

## 2024-06-29 RX ORDER — LIDOCAINE 4 G/G
1 PATCH TOPICAL ONCE
Status: DISCONTINUED | OUTPATIENT
Start: 2024-06-29 | End: 2024-06-29 | Stop reason: HOSPADM

## 2024-06-29 RX ADMIN — HYDROMORPHONE HYDROCHLORIDE 0.5 MG: 1 INJECTION, SOLUTION INTRAMUSCULAR; INTRAVENOUS; SUBCUTANEOUS at 11:58

## 2024-06-29 RX ADMIN — LIDOCAINE 1 PATCH: 4 PATCH TOPICAL at 12:01

## 2024-06-29 NOTE — ED PROVIDER NOTES
Time: 11:46 AM EDT  Date of encounter:  6/29/2024  Independent Historian/Clinical History and Information was obtained by:   Patient    History is limited by: N/A    Chief Complaint: Back pain      History of Present Illness:  Patient is a 58 y.o. year old male who presents to the emergency department for evaluation of back pain with radiating symptoms to the right lower extremity.  Symptoms has been getting worse in the last 3 to 4 months.  History of back surgery in 2001 by [facility].  He is currently established with [facility] for his chronic neck pain and bilateral shoulder pain.  He is currently prescribed Norco 7.5mg 3 times a day and Flexeril 10 mg 3 times a day.  He has not had any epidural steroid injection.  Denies any loss of bowel or bladder functions.  Denies any saddle anesthesia.  Denies any recent falls or traumas.    HPI    Patient Care Team  Primary Care Provider: Steffany Gruber APRN    Past Medical History:     Allergies   Allergen Reactions    Fentanyl Angioedema and Swelling    Cefdinir Itching    Nifedipine Hives and Itching     Past Medical History:   Diagnosis Date    Arthritis     Biceps tendonitis on left 03/22/2022    Bursitis of shoulder 12/06/2018    CAD (coronary artery disease)     Claustrophobia     Diabetes     Hyperlipidemia     Hypertension     Pneumonia      Past Surgical History:   Procedure Laterality Date    CARDIAC CATHETERIZATION      showed significant coronary artery disease of the LAD with calcification.     CARDIAC CATHETERIZATION Left 12/12/2023    Procedure: Cardiac Catheterization/Vascular Study;  Surgeon: Mario Welch MD;  Location: [facility] CATH INVASIVE LOCATION;  Service: Cardiology;  Laterality: Left;    CHOLECYSTECTOMY      CIRCUMCISION      CORONARY ANGIOPLASTY WITH STENT PLACEMENT      CORONARY ARTERY BYPASS GRAFT N/A 12/14/2023    Procedure: CORONARY ARTERY BYPASS GRAFTING TIMES 5, UTILIZING LEFT AND RIGHT INTERNAL MAMMARY  ARTERIES AND ENDOSCOPICALLY HARVESTED LEFT SAPHENOUS VEIN GRAFT W/ PRP; RIGHT LEG VEIN EXPLORATION; TRANSESOPHAGEAL ECHOCARDIOGRAM WITH ANESTHESIA;  Surgeon: Jr Binu Ribeiro MD;  Location: [facility] CVOR;  Service: Cardiothoracic;  Laterality: N/A;    INGUINAL HERNIA REPAIR      x2    NOSE SURGERY      NC RT/LT HEART CATHETERS N/A 03/22/2016    Procedure: Percutaneous Coronary Intervention - Rota/stent LAD;  Surgeon: Marek Emery MD;  Location: [facility] CATH INVASIVE LOCATION;  Service: Cardiovascular    ROTATOR CUFF REPAIR Left     SHOULDER ARTHROSCOPY Right     x2    SHOULDER ARTHROSCOPY WITH SUBACROMIAL DECOMPRESSION Left 4/11/2022    Procedure: left SHOULDER ARTHROSCOPY SUBACROMIAL DECOMPRESSION WITH ROTATOR CUFF DEBRIDEMENT;  Surgeon: Kam Dick MD;  Location: [facility] OR [facility] [place];  Service: Orthopedics;  Laterality: Left;    SPINAL FUSION       Family History   Problem Relation Age of Onset    Hypertension Mother     Diabetes Mother     Lung cancer Mother     Stroke Mother     Heart attack Father     Heart disease Father     Heart failure Father     Diabetes Father        Home Medications:  Prior to Admission medications    Medication Sig Start Date End Date Taking? Authorizing Provider   aspirin 81 MG EC tablet Take 1 tablet by mouth Daily. 12/20/23   Chelsey Holder APRN   atenolol (TENORMIN) 25 MG tablet Take 1 tablet by mouth Every 12 (Twelve) Hours. 12/19/23   Chelsey Holder APRN   Belsomra 10 MG tablet Take 1 tablet by mouth Every Night.    Provider, MD Jody   clopidogrel (PLAVIX) 75 MG tablet Take 1 tablet by mouth Daily.    Provider, MD Jody   cyclobenzaprine (FLEXERIL) 10 MG tablet Take 1 tablet by mouth 3 (Three) Times a Day As Needed for Muscle Spasms.  Patient not taking: Reported on 4/2/2024 9/11/23   Sherice Melgoza APRN   empagliflozin (JARDIANCE) 25 MG tablet tablet Take 1 tablet by mouth Daily. 12/20/23   Danielito Green MD   furosemide (LASIX) 40 MG tablet Take 1 tablet  by mouth Daily. 12/20/23   Chelsey Holder APRN   HYDROcodone-acetaminophen (NORCO) 7.5-325 MG per tablet Take 1 tablet by mouth 3 times a day. 12/27/23   Jody Bejarano MD   Insulin Degludec (TRESIBA FLEXTOUCH) 200 UNIT/ML solution pen-injector pen injection Inject 30 Units under the skin into the appropriate area as directed Daily. 11/29/23   Jody Bejarano MD   methocarbamol (ROBAXIN) 500 MG tablet Take 1 tablet by mouth 3 (Three) Times a Day. 11/21/23   Jody Bejarano MD   potassium chloride 10 MEQ CR tablet Take 1 tablet by mouth 2 (Two) Times a Day.  Patient not taking: Reported on 4/2/2024 1/25/24   Jody Bejarano MD   rosuvastatin (CRESTOR) 20 MG tablet Take 1 tablet by mouth Daily. 1/25/24   Jody Bejarano MD   simvastatin (Zocor) 20 MG tablet Take 1 tablet by mouth Every Night. Will replace you taking 2 10mg tablets nightly.  Patient not taking: Reported on 4/2/2024 2/19/24   Jr Binu Ribeiro MD   tadalafil (CIALIS) 20 MG tablet TAKE 1 TABLET BY MOUTH AS NEEDED FOR ERECTILE DYSFUNCTION  Patient not taking: Reported on 4/2/2024 10/9/23   Dulce Miguel APRN   traMADol (ULTRAM) 50 MG tablet Take 1 tablet by mouth Every 6 (Six) Hours As Needed for Moderate Pain.  Patient not taking: Reported on 4/2/2024 1/17/24   Jr Binu Ribeiro MD        Social History:   Social History     Tobacco Use    Smoking status: Never     Passive exposure: Past    Smokeless tobacco: Never   Vaping Use    Vaping status: Never Used   Substance Use Topics    Alcohol use: Yes     Comment: OCCASIONAL    Drug use: Never         Review of Systems:  Review of Systems   Constitutional:  Negative for chills and fever.   HENT:  Negative for congestion and ear pain.    Eyes:  Negative for pain.   Respiratory:  Negative for cough and shortness of breath.    Cardiovascular:  Negative for chest pain.   Gastrointestinal:  Negative for abdominal pain, diarrhea, nausea and vomiting.   Genitourinary:  " Negative for dysuria and hematuria.   Musculoskeletal:  Positive for back pain. Negative for myalgias.   Skin:  Negative for rash.   Neurological:  Negative for dizziness and headaches.        Physical Exam:  BP (!) 182/103 (BP Location: Right arm, Patient Position: Sitting)   Pulse 78   Temp 99.6 °F (37.6 °C) (Oral)   Resp 18   Ht 171.5 cm (67.5\")   Wt 90.3 kg (199 lb)   SpO2 99%   BMI 30.71 kg/m²     Physical Exam  Vitals and nursing note reviewed.   Constitutional:       Appearance: Normal appearance. He is normal weight.   HENT:      Head: Normocephalic and atraumatic.      Nose: Nose normal.   Eyes:      Conjunctiva/sclera: Conjunctivae normal.      Pupils: Pupils are equal, round, and reactive to light.   Cardiovascular:      Rate and Rhythm: Normal rate and regular rhythm.   Pulmonary:      Effort: Pulmonary effort is normal.      Breath sounds: Normal breath sounds.   Abdominal:      General: Abdomen is flat. Bowel sounds are normal.      Palpations: Abdomen is soft.   Musculoskeletal:      Cervical back: Normal range of motion and neck supple.      Comments: Midline tenderness to the lumbar spine   Skin:     General: Skin is warm and dry.   Neurological:      General: No focal deficit present.      Mental Status: He is alert and oriented to person, place, and time.   Psychiatric:         Mood and Affect: Mood normal.         Behavior: Behavior normal.                  Procedures:  Procedures      Medical Decision Making:      Comorbidities that affect care:    Diabetes, hypertension, hyperlipidemia    External Notes reviewed:    Previous Radiological Studies: Reviewed CT Abd pelvis [facility]      The following orders were placed and all results were independently analyzed by me:  Orders Placed This Encounter   Procedures    CT Lumbar Spine Without Contrast    POC Glucose STAT       Medications Given in the Emergency Department:  Medications   Lidocaine 4 % 1 patch (1 patch Transdermal " Medication Applied 6/29/24 1201)   HYDROmorphone (DILAUDID) injection 0.5 mg (0.5 mg Intramuscular Given 6/29/24 1158)        ED Course:    ED Course as of 06/29/24 1216   Sat Jun 29, 2024   1206 POC Glucose STAT(!)  BG is elevated. Will hold off on Decadron. [MV]      ED Course User Index  [MV] Edgardo Suarez PA       Labs:    Lab Results (last 24 hours)       Procedure Component Value Units Date/Time    POC Glucose STAT [[id number]]  (Abnormal) Collected: 06/29/24 1140    Specimen: Blood Updated: 06/29/24 1143     Glucose 174 mg/dL      Comment: Serial Number: [id number]:  [id number]                Imaging:    CT Lumbar Spine Without Contrast    Result Date: 6/29/2024  CT LUMBAR SPINE WO CONTRAST Date of Exam: 6/29/2024 11:33 AM EDT Indication: low back pain radiating to RLE, hx spinal fusion. Comparison: None available. Technique: Axial CT images were obtained of the lumbar spine without contrast administration.  Reconstructed coronal and sagittal images were also obtained. Automated exposure control and iterative construction methods were used. Findings: There is normal height and alignment of the lumbar vertebral bodies. There is mild disc space narrowing at the L5/S1 level. No acute fractures are seen. The facet joints appear intact. There are degenerative facet changes at the L5/S1 level. No pars defects identified. There is a posterior partially calcified disc bulge at the L4-5/S1 level which is asymmetric to the left contacting the left S1 nerve root within the lateral recess. There is no significant canal stenosis. There are changes of prior left L5 laminotomy. No neural foraminal narrowing. The unenhanced paraspinal soft tissues appear within normal limits.     Impression: 1. No acute lumbar spine fracture. 2. Degenerative disc disease and degenerative facet change at the L5/S1 level with disc material contacting the left S1 nerve root within the lateral recess. No significant canal stenosis or neural  foraminal narrowing. Electronically Signed: Pepito Crooks MD  6/29/2024 12:04 PM EDT  Workstation ID: [id number]       Differential Diagnosis and Discussion:    Back Pain: The patient presents with back pain. My differential diagnosis includes but is not limited to acute spinal epidural abscess, acute spinal epidural bleed, cauda equina syndrome, abdominal aortic aneurysm, aortic dissection, kidney stone, pyelonephritis, musculoskeletal back pain, spinal fracture, and osteoarthritis.     CT scan radiology impression was interpreted by me.    MDM     Amount and/or Complexity of Data Reviewed  Tests in the radiology section of CPT®: reviewed    Risk of Complications, Morbidity, and/or Mortality  Presenting problems: moderate  Diagnostic procedures: low  Management options: low    Patient Progress  Patient progress: stable    Patient presents to the emergency department for evaluation of back pain with radiating symptoms to the right lower extremity.  Symptoms has been getting worse in the last 3 to 4 months.  History of back surgery in 2001 by [facility].  He is currently established with [facility] spine for his chronic neck pain and bilateral shoulder pain.  He is currently prescribed Norco 7.5mg 3 times a day and Flexeril 10 mg 3 times a day.  He has not had any epidural steroid injection.  Denies any loss of bowel or bladder functions.  Denies any saddle anesthesia.  Denies any recent falls or traumas.    On exam  Flexion and extension of the lumbar spine is guarded 2/2 pain. Midline tenderness to the lumbar spine.    CT lumbar:  Impression: 1. No acute lumbar spine fracture. 2. Degenerative disc disease and degenerative facet change at the L5/S1 level with disc material contacting the left S1 nerve root within the lateral recess. No significant canal stenosis or neural foraminal narrowing.    Discussed with the patient to make an appointment with [facility] for his worsening  low back pain. He may need a lumbar MRI. He may also benefit from seeing Goldy Borrero again once he gets an updated lumbar spine.     Follow up with his PCP in 3-5 days.            Patient Care Considerations:    STEROIDS: I considered prescribing steroids, however I did not as this may adversely affect the patient's blood sugar in the setting of diabetes.      Consultants/Shared Management Plan:    None    Social Determinants of Health:    Patient is independent, reliable, and has access to care.       Disposition and Care Coordination:    Discharged: The patient is suitable and stable for discharge with no need for consideration of admission.    I have explained the patient´s condition, diagnoses and treatment plan based on the information available to me at this time. I have answered questions and addressed any concerns. The patient has a good  understanding of the patient´s diagnosis, condition, and treatment plan as can be expected at this point. The vital signs have been stable. The patient´s condition is stable and appropriate for discharge from the emergency department.      The patient will pursue further outpatient evaluation with the primary care physician or other designated or consulting physician as outlined in the discharge instructions. They are agreeable to this plan of care and follow-up instructions have been explained in detail. The patient has received these instructions in written format and has expressed an understanding of the discharge instructions. The patient is aware that any significant change in condition or worsening of symptoms should prompt an immediate return to this or the closest emergency department or call to 911.  I have explained discharge medications and the need for follow up with the patient/caretakers. This was also printed in the discharge instructions. Patient was discharged with the following medications and follow up:      Medication List      No changes were made to  your prescriptions during this visit.      [facility]  [address]  [place] [address]  [phone]  Schedule an appointment as soon as possible for a visit          Final diagnoses:   Degenerative disc disease, lumbar   Acute midline low back pain with right-sided sciatica        ED Disposition       ED Disposition   Discharge    Condition   Stable    Comment   --               This medical record created using voice recognition software.             Edgardo Suarez PA  06/29/24 1215       Edgardo Suarez PA  06/29/24 1216

## 2024-06-29 NOTE — DISCHARGE INSTRUCTIONS
Your CT lumbar shows that you have degenerative disc disease. As discussed, this is a common finding with the older population. Follow up with [facility] in the next 2-3 days. You may benefit from Epidural Steroid Injection. You may also need advanced imaging such as a Lumbar MRI. If the conservative treatments do not work, you may need to set up an appointment with [facility].    Continue taking your Norco and Muscle relaxer from [facility].    Follow up with your PCP in 3-5 days.    Return to the Emergency Department if you develop any uncontrollable fever, intractable pain, nausea, vomiting.

## 2024-07-28 ENCOUNTER — HOSPITAL ENCOUNTER (EMERGENCY)
Facility: HOSPITAL | Age: 59
Discharge: HOME | End: 2024-07-28
Attending: EMERGENCY MEDICINE | Admitting: EMERGENCY MEDICINE
Payer: MEDICARE

## 2024-07-28 VITALS
TEMPERATURE: 98 F | OXYGEN SATURATION: 98 % | WEIGHT: 190 LBS | HEART RATE: 83 BPM | HEIGHT: 68 IN | BODY MASS INDEX: 28.79 KG/M2 | SYSTOLIC BLOOD PRESSURE: 188 MMHG | DIASTOLIC BLOOD PRESSURE: 95 MMHG | RESPIRATION RATE: 20 BRPM

## 2024-07-28 DIAGNOSIS — S61.412A LACERATION OF LEFT HAND WITHOUT FOREIGN BODY, INITIAL ENCOUNTER: Primary | ICD-10-CM

## 2024-07-28 PROCEDURE — 90715 TDAP VACCINE 7 YRS/> IM: CPT | Performed by: NURSE PRACTITIONER

## 2024-07-28 PROCEDURE — 90471 IMMUNIZATION ADMIN: CPT | Performed by: NURSE PRACTITIONER

## 2024-07-28 PROCEDURE — 25010000002 TETANUS-DIPHTH-ACELL PERTUSSIS 5-2.5-18.5 LF-MCG/0.5 SUSPENSION PREFILLED SYRINGE: Performed by: NURSE PRACTITIONER

## 2024-07-28 PROCEDURE — 99282 EMERGENCY DEPT VISIT SF MDM: CPT

## 2024-07-28 RX ORDER — LIDOCAINE HYDROCHLORIDE AND EPINEPHRINE 10; 10 MG/ML; UG/ML
10 INJECTION, SOLUTION INFILTRATION; PERINEURAL ONCE
Status: COMPLETED | OUTPATIENT
Start: 2024-07-28 | End: 2024-07-28

## 2024-07-28 RX ADMIN — TETANUS TOXOID, REDUCED DIPHTHERIA TOXOID AND ACELLULAR PERTUSSIS VACCINE, ADSORBED 0.5 ML: 5; 2.5; 8; 8; 2.5 SUSPENSION INTRAMUSCULAR at 20:09

## 2024-07-28 RX ADMIN — LIDOCAINE HYDROCHLORIDE,EPINEPHRINE BITARTRATE 10 ML: 10; .01 INJECTION, SOLUTION INFILTRATION; PERINEURAL at 20:09

## 2024-07-28 NOTE — ED PROVIDER NOTES
Time: 7:51 PM EDT  Date of encounter:  7/28/2024  Independent Historian/Clinical History and Information was obtained by:   Patient    History is limited by: N/A    Chief Complaint: Laceration      History of Present Illness:  Patient is a 58 y.o. year old male who presents to the emergency department for evaluation of laceration of left hand.  Patient was trying to cut a piece of plastic with a utility knife and ended up cutting his left palm.  This happened about 2 PM.  He states he tried to superglue it but it would not close shut.  Unknown tetanus status.  No numbness tingling or weakness.  Complains of pain about a 2 out of 10 and describes it as burning and sore.    HPI    Patient Care Team  Primary Care Provider: Steffany Gruber APRN    Past Medical History:     Allergies   Allergen Reactions    Fentanyl Angioedema and Swelling    Cefdinir Itching    Nifedipine Hives and Itching     Past Medical History:   Diagnosis Date    Arthritis     Biceps tendonitis on left 03/22/2022    Bursitis of shoulder 12/06/2018    CAD (coronary artery disease)     Claustrophobia     Diabetes     Hyperlipidemia     Hypertension     Pneumonia      Past Surgical History:   Procedure Laterality Date    CARDIAC CATHETERIZATION      showed significant coronary artery disease of the LAD with calcification.     CARDIAC CATHETERIZATION Left 12/12/2023    Procedure: Cardiac Catheterization/Vascular Study;  Surgeon: Mario Welch MD;  Location: [facility] CATH INVASIVE LOCATION;  Service: Cardiology;  Laterality: Left;    CHOLECYSTECTOMY      CIRCUMCISION      CORONARY ANGIOPLASTY WITH STENT PLACEMENT      CORONARY ARTERY BYPASS GRAFT N/A 12/14/2023    Procedure: CORONARY ARTERY BYPASS GRAFTING TIMES 5, UTILIZING LEFT AND RIGHT INTERNAL MAMMARY ARTERIES AND ENDOSCOPICALLY HARVESTED LEFT SAPHENOUS VEIN GRAFT W/ PRP; RIGHT LEG VEIN EXPLORATION; TRANSESOPHAGEAL ECHOCARDIOGRAM WITH ANESTHESIA;  Surgeon: Jr Binu Ribeiro MD;   Location:  [facility];  Service: Cardiothoracic;  Laterality: N/A;    INGUINAL HERNIA REPAIR      x2    NOSE SURGERY      PA RT/LT HEART CATHETERS N/A 03/22/2016    Procedure: Percutaneous Coronary Intervention - Rota/stent LAD;  Surgeon: Marek Emery MD;  Location: [facility] CATH INVASIVE LOCATION;  Service: Cardiovascular    ROTATOR CUFF REPAIR Left     SHOULDER ARTHROSCOPY Right     x2    SHOULDER ARTHROSCOPY WITH SUBACROMIAL DECOMPRESSION Left 4/11/2022    Procedure: left SHOULDER ARTHROSCOPY SUBACROMIAL DECOMPRESSION WITH ROTATOR CUFF DEBRIDEMENT;  Surgeon: Kam Dick MD;  Location: [facility];  Service: Orthopedics;  Laterality: Left;    SPINAL FUSION       Family History   Problem Relation Age of Onset    Hypertension Mother     Diabetes Mother     Lung cancer Mother     Stroke Mother     Heart attack Father     Heart disease Father     Heart failure Father     Diabetes Father        Home Medications:  Prior to Admission medications    Medication Sig Start Date End Date Taking? Authorizing Provider   aspirin 81 MG EC tablet Take 1 tablet by mouth Daily. 12/20/23   Chelsey Holder APRN   atenolol (TENORMIN) 25 MG tablet Take 1 tablet by mouth Every 12 (Twelve) Hours. 12/19/23   Chelsey Holder APRN   Belsomra 10 MG tablet Take 1 tablet by mouth Every Night.    Provider, MD Jody   clopidogrel (PLAVIX) 75 MG tablet Take 1 tablet by mouth Daily.    Provider, MD Jody   cyclobenzaprine (FLEXERIL) 10 MG tablet Take 1 tablet by mouth 3 (Three) Times a Day As Needed for Muscle Spasms.  Patient not taking: Reported on 4/2/2024 9/11/23   Sherice Melgoza APRN   empagliflozin (JARDIANCE) 25 MG tablet tablet Take 1 tablet by mouth Daily. 12/20/23   Danielito Green MD   furosemide (LASIX) 40 MG tablet Take 1 tablet by mouth Daily. 12/20/23   Chelsey Holder APRN   HYDROcodone-acetaminophen (NORCO) 7.5-325 MG per tablet Take 1 tablet by mouth 3 times a day. 12/27/23   ProviderJody MD  "  Insulin Degludec (TRESIBA FLEXTOUCH) 200 UNIT/ML solution pen-injector pen injection Inject 30 Units under the skin into the appropriate area as directed Daily. 11/29/23   Jody Bejarano MD   methocarbamol (ROBAXIN) 500 MG tablet Take 1 tablet by mouth 3 (Three) Times a Day. 11/21/23   Jody Bejarano MD   potassium chloride 10 MEQ CR tablet Take 1 tablet by mouth 2 (Two) Times a Day.  Patient not taking: Reported on 4/2/2024 1/25/24   Jody Bejarano MD   rosuvastatin (CRESTOR) 20 MG tablet Take 1 tablet by mouth Daily. 1/25/24   Jody Bejarano MD   simvastatin (Zocor) 20 MG tablet Take 1 tablet by mouth Every Night. Will replace you taking 2 10mg tablets nightly.  Patient not taking: Reported on 4/2/2024 2/19/24   Jr Binu Ribeiro MD   tadalafil (CIALIS) 20 MG tablet TAKE 1 TABLET BY MOUTH AS NEEDED FOR ERECTILE DYSFUNCTION  Patient not taking: Reported on 4/2/2024 10/9/23   Dulce Miguel APRN   traMADol (ULTRAM) 50 MG tablet Take 1 tablet by mouth Every 6 (Six) Hours As Needed for Moderate Pain.  Patient not taking: Reported on 4/2/2024 1/17/24   Jr Binu Ribiero MD        Social History:   Social History     Tobacco Use    Smoking status: Never     Passive exposure: Past    Smokeless tobacco: Never   Vaping Use    Vaping status: Never Used   Substance Use Topics    Alcohol use: Yes     Comment: OCCASIONAL    Drug use: Never         Review of Systems:  Review of Systems   Musculoskeletal:  Positive for arthralgias (Left hand). Negative for joint swelling.   Skin:  Positive for wound (Left hand laceration).   Neurological:  Negative for weakness and numbness.   Hematological: Negative.    Psychiatric/Behavioral: Negative.     All other systems reviewed and are negative.       Physical Exam:  BP (!) 188/95 (BP Location: Left arm, Patient Position: Sitting)   Pulse 83   Temp 98 °F (36.7 °C) (Oral)   Resp 20   Ht 171.5 cm (67.5\")   Wt 86.2 kg (190 lb)   SpO2 98%   BMI " 29.32 kg/m²     Physical Exam  Vitals and nursing note reviewed.   HENT:      Head: Atraumatic.      Nose: Nose normal.   Eyes:      Conjunctiva/sclera: Conjunctivae normal.   Cardiovascular:      Pulses: Normal pulses.   Pulmonary:      Effort: Pulmonary effort is normal.   Musculoskeletal:         General: Tenderness (Mild soft tissue tenderness) present. No swelling. Normal range of motion.      Cervical back: Normal range of motion.   Skin:     General: Skin is warm and dry.      Capillary Refill: Capillary refill takes less than 2 seconds.      Comments: Linear 1.5 cm laceration to the left palm at the thenar surface with no acute bleeding   Neurological:      General: No focal deficit present.      Mental Status: He is alert.   Psychiatric:         Mood and Affect: Mood normal.         Behavior: Behavior normal.                Procedures:  Laceration Repair    Date/Time: 7/28/2024 8:00 PM    Performed by: Sherice Melgoza APRN  Authorized by: Pepito Dwyer DO    Consent:     Consent obtained:  Verbal    Consent given by:  Patient    Risks, benefits, and alternatives were discussed: yes      Risks discussed:  Infection, pain, poor cosmetic result, poor wound healing, nerve damage, retained foreign body, tendon damage, vascular damage and need for additional repair    Alternatives discussed:  No treatment  Universal protocol:     Procedure explained and questions answered to patient or proxy's satisfaction: yes      Patient identity confirmed:  Verbally with patient  Anesthesia:     Anesthesia method:  Local infiltration    Local anesthetic:  Lidocaine 1% WITH epi  Laceration details:     Location:  Hand    Hand location:  L palm    Length (cm):  1.5    Depth (mm):  1  Pre-procedure details:     Preparation:  Patient was prepped and draped in usual sterile fashion  Exploration:     Hemostasis achieved with:  Direct pressure and epinephrine    Imaging outcome: foreign body not noted      Wound exploration: entire  depth of wound visualized    Treatment:     Area cleansed with:  Povidone-iodine    Amount of cleaning:  Standard    Irrigation solution:  Sterile saline    Irrigation volume:  250    Irrigation method:  Syringe  Skin repair:     Repair method:  Sutures    Suture size:  4-0    Suture material:  Nylon    Suture technique:  Simple interrupted    Number of sutures:  3  Approximation:     Approximation:  Close  Repair type:     Repair type:  Simple  Post-procedure details:     Dressing:  Non-adherent dressing    Procedure completion:  Tolerated        Medical Decision Making:      Comorbidities that affect care:    Coronary Artery Disease, Diabetes, Hypertension    External Notes reviewed:    Previous ED Note: Patient was seen earlier today by different ER in [facility] for right-sided sciatica with did not disc disease chronic pain and hypertensive emergency      The following orders were placed and all results were independently analyzed by me:  Orders Placed This Encounter   Procedures    Laceration Repair       Medications Given in the Emergency Department:  Medications   Tetanus-Diphth-Acell Pertussis (BOOSTRIX) injection 0.5 mL (0.5 mL Intramuscular Given 7/28/24 2009)   lidocaine 1% - EPINEPHrine 1:[id number] (XYLOCAINE W/EPI) 1 %-1:780598 injection 10 mL (10 mL Injection Given 7/28/24 2009)        ED Course:         Labs:    Lab Results (last 24 hours)       ** No results found for the last 24 hours. **             Imaging:    No Radiology Exams Resulted Within Past 24 Hours      Differential Diagnosis and Discussion:    Laceration: Laceration was evaluated for arterial injury, ligamentous damage, and other neurovascular injury.        MDM  Number of Diagnoses or Management Options  Laceration of left hand without foreign body, initial encounter  Diagnosis management comments: The patient presented with a laceration in need of repair. See laceration repair note for details. The wound was irrigated with  copious normal saline irrigation. 3 sutures were used to approximate the wound edges. Tetanus  was given. The patient tolerated the procedure well. Acute bleeding has ceased and the wound was approximated in the emergency department. Patient was counseled to keep the wound clean, dry, and out of the sun. Patient was counseled to change dressings daily. Patient was advised to return to the ED for worsening erythema, pain, swelling, fever, excessive drainage or signs of infection. They were counseled to follow up for suture removal as described in the discharge instructions. Patient verbalizes understanding and agrees to follow up as instructed.       Amount and/or Complexity of Data Reviewed  Tests in the medicine section of CPT®: ordered and reviewed    Risk of Complications, Morbidity, and/or Mortality  Presenting problems: low  Management options: low    Patient Progress  Patient progress: stable           Patient Care Considerations:    I considered imaging of this site but after evaluation of the wound it is not deep and did not penetrate into the underlying muscle fascia patient has no actual bony tenderness no limited range of motion and no motor deficits.  There is no contamination to be concerned about foreign body retention      Consultants/Shared Management Plan:    None    Social Determinants of Health:    Patient has presented with family members who are responsible, reliable and will ensure follow up care.      Disposition and Care Coordination:    Discharged: The patient is suitable and stable for discharge with no need for consideration of admission.    I have explained the patient´s condition, diagnoses and treatment plan based on the information available to me at this time. I have answered questions and addressed any concerns. The patient has a good  understanding of the patient´s diagnosis, condition, and treatment plan as can be expected at this point. The vital signs have been stable. The  patient´s condition is stable and appropriate for discharge from the emergency department.      The patient will pursue further outpatient evaluation with the primary care physician or other designated or consulting physician as outlined in the discharge instructions. They are agreeable to this plan of care and follow-up instructions have been explained in detail. The patient has received these instructions in written format and has expressed an understanding of the discharge instructions. The patient is aware that any significant change in condition or worsening of symptoms should prompt an immediate return to this or the closest emergency department or call to 911.    Final diagnoses:   Laceration of left hand without foreign body, initial encounter        ED Disposition       ED Disposition   Discharge    Condition   Stable    Comment   --               This medical record created using voice recognition software.             Sherice Melgoza, NINFA  07/28/24 2444

## 2024-07-29 NOTE — DISCHARGE INSTRUCTIONS
Sutures out in 7 to 10 days follow-up with PCP or return here.    May alternate Tylenol or Motrin as needed for any pain or discomfort.    Return for new or worsening symptoms

## 2024-12-25 NOTE — THERAPY DISCHARGE NOTE
Patient Name: Moreno Aleman  : 1965    MRN: [id number]                              Today's Date: 2023       Admit Date: 2023    Visit Dx:     ICD-10-CM ICD-9-CM   1. Coronary artery disease of native heart with stable angina pectoris, unspecified vessel or lesion type  I25.118 414.01     413.9   2. Abnormal findings on diagnostic imaging of heart and coronary circulation  R93.1 794.39   3. S/P CABG (coronary artery bypass graft)  Z95.1 V45.81     Patient Active Problem List   Diagnosis    Hypertension, essential    Hyperlipemia, mixed    Migraine    Indeterminate colitis    Type 2 diabetes mellitus, with long-term current use of insulin    Arthritis    Vitamin D deficiency    Closed fracture of metacarpal bone    Left epididymitis    Phimosis    S/P left shoulder arthroscopic subacromial decompression, mini open rotator cuff repair    Cervical spondylosis    Myofascial pain    Overweight    CAD S/P percutaneous coronary angioplasty    Chest pain    Unstable angina    CAD (coronary artery disease)    Abnormal findings on diagnostic imaging of heart and coronary circulation    Headache     Past Medical History:   Diagnosis Date    Arthritis     Biceps tendonitis on left 2022    Bursitis of shoulder 2018    CAD (coronary artery disease)     Claustrophobia     Diabetes     Hyperlipidemia     Hypertension     Pneumonia      Past Surgical History:   Procedure Laterality Date    CARDIAC CATHETERIZATION      showed significant coronary artery disease of the LAD with calcification.     CARDIAC CATHETERIZATION Left 2023    Procedure: Cardiac Catheterization/Vascular Study;  Surgeon: Mario Welch MD;  Location: [facility] CATH INVASIVE LOCATION;  Service: Cardiology;  Laterality: Left;    CHOLECYSTECTOMY      CIRCUMCISION      CORONARY ANGIOPLASTY WITH STENT PLACEMENT      CORONARY ARTERY BYPASS GRAFT N/A 2023    Procedure: CORONARY ARTERY BYPASS GRAFTING TIMES 5, UTILIZING LEFT  Patient ambulatory reporting elevated blood pressure. Report mild headache. Denies chest pain.          AND RIGHT INTERNAL MAMMARY ARTERIES AND ENDOSCOPICALLY HARVESTED LEFT SAPHENOUS VEIN GRAFT W/ PRP; RIGHT LEG VEIN EXPLORATION; TRANSESOPHAGEAL ECHOCARDIOGRAM WITH ANESTHESIA;  Surgeon: Jr Binu Ribeiro MD;  Location: [facility];  Service: Cardiothoracic;  Laterality: N/A;    INGUINAL HERNIA REPAIR      x2    NOSE SURGERY      MT RT/LT HEART CATHETERS N/A 03/22/2016    Procedure: Percutaneous Coronary Intervention - Rota/stent LAD;  Surgeon: Marek Emery MD;  Location: [facility] CATH INVASIVE LOCATION;  Service: Cardiovascular    ROTATOR CUFF REPAIR Left     SHOULDER ARTHROSCOPY Right     x2    SHOULDER ARTHROSCOPY WITH SUBACROMIAL DECOMPRESSION Left 4/11/2022    Procedure: left SHOULDER ARTHROSCOPY SUBACROMIAL DECOMPRESSION WITH ROTATOR CUFF DEBRIDEMENT;  Surgeon: Kam Dick MD;  Location: [facility];  Service: Orthopedics;  Laterality: Left;    SPINAL FUSION        General Information       Row Name 12/19/23 1101          Physical Therapy Time and Intention    Document Type discharge treatment  -     Mode of Treatment individual therapy;physical therapy  -       Row Name 12/19/23 1101          General Information    Existing Precautions/Restrictions sternal;cardiac  -       Row Name 12/19/23 1101          Cognition    Orientation Status (Cognition) oriented x 4  -       Row Name 12/19/23 1101          Safety Issues, Functional Mobility    Impairments Affecting Function (Mobility) endurance/activity tolerance;shortness of breath;pain  -               User Key  (r) = Recorded By, (t) = Taken By, (c) = Cosigned By      Initials Name Provider Type     Tatiana Dickson, PT Physical Therapist                   Mobility       Row Name 12/19/23 1101          Bed Mobility    Supine-Sit [place] (Bed Mobility) not tested  -     Sit-Supine [place] (Bed Mobility) not tested  -     Comment, (Bed Mobility) sitting in chair  -       Row Name 12/19/23 1101          Sit-Stand Transfer     Sit-Stand [place] (Transfers) supervision  -       Row Name 12/19/23 1101          Gait/Stairs (Locomotion)    [place] Level (Gait) supervision  -     Distance in Feet (Gait) 220  -CH     Deviations/Abnormal Patterns (Gait) grace decreased;gait speed decreased;stride length decreased  -     [place] Level (Stairs) verbal cues;stand by assist  -     Handrail Location (Stairs) both sides  -     Number of Steps (Stairs) 8  -CH     Ascending Technique (Stairs) step-over-step  -CH     Descending Technique (Stairs) step-over-step  -               User Key  (r) = Recorded By, (t) = Taken By, (c) = Cosigned By      Initials Name Provider Type     Tatiana Dickson, PT Physical Therapist                   Obj/Interventions       Row Name 12/19/23 1102          Motor Skills    Therapeutic Exercise --  10 reps cardiac protocol, level 3  -               User Key  (r) = Recorded By, (t) = Taken By, (c) = Cosigned By      Initials Name Provider Type     Tataina Dickson, PT Physical Therapist                   Goals/Plan       Row Name 12/19/23 1104          Problem Specific Goal 1 (PT)    Progress/Outcome (Problem Specific Goal 1, PT) goal met  -               User Key  (r) = Recorded By, (t) = Taken By, (c) = Cosigned By      Initials Name Provider Type     Tatiana Dickson, PT Physical Therapist                   Clinical Impression       Row Name 12/19/23 1102          Pain    Pretreatment Pain Rating 6/10  -     Posttreatment Pain Rating 6/10  -     Pain Location - Side/Orientation Left  -     Pain Location - flank  -     Pain Intervention(s) Repositioned  -       Row Name 12/19/23 1102          Plan of Care Review    Plan of Care Reviewed With patient  -     Outcome Evaluation Pt demonstrates increased activity tolerance and functional strength as he was able to increase his gait distance and required less assistance. Pt was able to ambulate in the le and navigate  stairs safely. Acute care PT will sign off at this time. Pt is encouraged to continue ambulating in the halls with family and nursing.  -       Row Name 12/19/23 1102          Positioning and Restraints    Pre-Treatment Position sitting in chair/recliner  -     Post Treatment Position chair  -CH     In Chair reclined;call light within reach;encouraged to call for assist;with family/caregiver  -               User Key  (r) = Recorded By, (t) = Taken By, (c) = Cosigned By      Initials Name Provider Type     Tatiana Dickson, PT Physical Therapist                   Outcome Measures       Row Name 12/19/23 1104 12/19/23 0917       How much help from another person do you currently need...    Turning from your back to your side while in flat bed without using bedrails? 4  -CH 3  -AC    Moving from lying on back to sitting on the side of a flat bed without bedrails? 4  -CH 3  -AC    Moving to and from a bed to a chair (including a wheelchair)? 4  -CH 3  -AC    Standing up from a chair using your arms (e.g., wheelchair, bedside chair)? 4  -CH 3  -AC    Climbing 3-5 steps with a railing? 4  -CH 2  -AC    To walk in hospital room? 4  -CH 3  -AC    AM-PAC 6 Clicks Score (PT) 24  -CH 17  -AC    Highest Level of Mobility Goal 8 --> Walked 250 feet or more  - 5 --> Static standing  -AC      Row Name 12/19/23 1104          Functional Assessment    Outcome Measure Options AM-PAC 6 Clicks Basic Mobility (PT)  -               User Key  (r) = Recorded By, (t) = Taken By, (c) = Cosigned By      Initials Name Provider Type     Tatiana Dickson, PT Physical Therapist    Kary Aranda, RN Registered Nurse                  Physical Therapy Education       Title: PT OT SLP Therapies (Done)       Topic: Physical Therapy (Done)       Point: Mobility training (Done)       Learning Progress Summary             Patient Acceptance, E,TB,D, VU,NR by  at 12/19/2023 1105    Acceptance, E,TB,D, VU,NR by  at 12/18/2023  1334    Acceptance, E, VU by  at 12/15/2023 0928                         Point: Home exercise program (Done)       Learning Progress Summary             Patient Acceptance, E,TB,D, VU,NR by  at 12/19/2023 1105    Acceptance, E,TB,D, VU,NR by  at 12/18/2023 1334    Acceptance, E, VU by  at 12/15/2023 0928                         Point: Body mechanics (Done)       Learning Progress Summary             Patient Acceptance, E,TB,D, VU,NR by  at 12/19/2023 1105    Acceptance, E,TB,D, VU,NR by  at 12/18/2023 1334    Acceptance, E, VU by  at 12/15/2023 0928                         Point: Precautions (Done)       Learning Progress Summary             Patient Acceptance, E,TB,D, VU,NR by  at 12/19/2023 1105    Acceptance, E,TB,D, VU,NR by  at 12/18/2023 1334    Acceptance, E, VU by  at 12/15/2023 0928                                         User Key       Initials Effective Dates Name Provider Type Discipline     06/16/21 -  Tatiana Dickson, PT Physical Therapist PT     05/02/22 -  Rhona Duarte PT Physical Therapist PT                  PT Recommendation and Plan     Plan of Care Reviewed With: patient  Outcome Evaluation: Pt demonstrates increased activity tolerance and functional strength as he was able to increase his gait distance and required less assistance. Pt was able to ambulate in the le and navigate stairs safely. Acute care PT will sign off at this time. Pt is encouraged to continue ambulating in the halls with family and nursing.     Time Calculation:         PT Charges       Row Name 12/19/23 1105             Time Calculation    Start Time 1030  -      Stop Time 1040  -      Time Calculation (min) 10 min  -      PT Received On 12/19/23  -         Time Calculation- PT    Total Timed Code Minutes- PT 10 minute(s)  -         Timed Charges    44509 - PT Therapeutic Activity Minutes 10  -         Total Minutes    Timed Charges Total Minutes 10  -       Total Minutes 10   -                User Key  (r) = Recorded By, (t) = Taken By, (c) = Cosigned By      Initials Name Provider Type     Tatiana Dickson, PT Physical Therapist                  Therapy Charges for Today       Code Description Service Date Service Provider Modifiers Qty    73185600098  PT THERAPEUTIC ACT EA 15 MIN 12/18/2023 Tatiana Dickson, PT GP 1    30140126520 HC PT THERAPEUTIC ACT EA 15 MIN 12/19/2023 Tatiana Dickson, PT GP 1            PT G-Codes  Outcome Measure Options: AM-PAC 6 Clicks Basic Mobility (PT)  AM-PAC 6 Clicks Score (PT): 24    PT Discharge Summary  Anticipated Discharge Disposition (PT): home with home health, home with assist    Tatiana Dickson, PT  12/19/2023

## 2025-01-31 ENCOUNTER — HOSPITAL ENCOUNTER (EMERGENCY)
Facility: HOSPITAL | Age: 60
Discharge: HOME | End: 2025-01-31
Attending: EMERGENCY MEDICINE
Payer: MEDICARE

## 2025-01-31 ENCOUNTER — APPOINTMENT (OUTPATIENT)
Dept: GENERAL RADIOLOGY | Facility: HOSPITAL | Age: 60
End: 2025-01-31
Payer: MEDICARE

## 2025-01-31 VITALS
DIASTOLIC BLOOD PRESSURE: 89 MMHG | HEIGHT: 68 IN | HEART RATE: 64 BPM | OXYGEN SATURATION: 99 % | RESPIRATION RATE: 18 BRPM | SYSTOLIC BLOOD PRESSURE: 159 MMHG | BODY MASS INDEX: 28.89 KG/M2 | TEMPERATURE: 98.9 F

## 2025-01-31 DIAGNOSIS — R07.9 CHEST PAIN, UNSPECIFIED TYPE: Primary | ICD-10-CM

## 2025-01-31 LAB
ACETONE BLD QL: NEGATIVE
ALBUMIN SERPL-MCNC: 4.2 G/DL (ref 3.5–5.2)
ALBUMIN/GLOB SERPL: 1.6 G/DL
ALP SERPL-CCNC: 75 U/L (ref 39–117)
ALT SERPL W P-5'-P-CCNC: 7 U/L (ref 1–41)
ANION GAP SERPL CALCULATED.3IONS-SCNC: 14.1 MMOL/L (ref 5–15)
AST SERPL-CCNC: 11 U/L (ref 1–40)
BASOPHILS # BLD AUTO: 0.05 10*3/MM3 (ref 0–0.2)
BASOPHILS NFR BLD AUTO: 0.6 % (ref 0–1.5)
BILIRUB SERPL-MCNC: 0.3 MG/DL (ref 0–1.2)
BUN SERPL-MCNC: 8 MG/DL (ref 6–20)
BUN/CREAT SERPL: 9.6 (ref 7–25)
CALCIUM SPEC-SCNC: 8.7 MG/DL (ref 8.6–10.5)
CHLORIDE SERPL-SCNC: 101 MMOL/L (ref 98–107)
CO2 SERPL-SCNC: 24.9 MMOL/L (ref 22–29)
CREAT SERPL-MCNC: 0.83 MG/DL (ref 0.76–1.27)
DEPRECATED RDW RBC AUTO: 39.4 FL (ref 37–54)
EGFRCR SERPLBLD CKD-EPI 2021: 101.4 ML/MIN/1.73
EOSINOPHIL # BLD AUTO: 0.1 10*3/MM3 (ref 0–0.4)
EOSINOPHIL NFR BLD AUTO: 1.2 % (ref 0.3–6.2)
ERYTHROCYTE [DISTWIDTH] IN BLOOD BY AUTOMATED COUNT: 12.6 % (ref 12.3–15.4)
GEN 5 1HR TROPONIN T REFLEX: 8 NG/L
GLOBULIN UR ELPH-MCNC: 2.6 GM/DL
GLUCOSE SERPL-MCNC: 324 MG/DL (ref 65–99)
HCT VFR BLD AUTO: 40.4 % (ref 37.5–51)
HGB BLD-MCNC: 13.9 G/DL (ref 13–17.7)
HOLD SPECIMEN: NORMAL
HOLD SPECIMEN: NORMAL
IMM GRANULOCYTES # BLD AUTO: 0.03 10*3/MM3 (ref 0–0.05)
IMM GRANULOCYTES NFR BLD AUTO: 0.4 % (ref 0–0.5)
LIPASE SERPL-CCNC: 39 U/L (ref 13–60)
LYMPHOCYTES # BLD AUTO: 2.82 10*3/MM3 (ref 0.7–3.1)
LYMPHOCYTES NFR BLD AUTO: 34.2 % (ref 19.6–45.3)
MAGNESIUM SERPL-MCNC: 1.8 MG/DL (ref 1.6–2.6)
MCH RBC QN AUTO: 29.6 PG (ref 26.6–33)
MCHC RBC AUTO-ENTMCNC: 34.4 G/DL (ref 31.5–35.7)
MCV RBC AUTO: 86 FL (ref 79–97)
MONOCYTES # BLD AUTO: 0.51 10*3/MM3 (ref 0.1–0.9)
MONOCYTES NFR BLD AUTO: 6.2 % (ref 5–12)
NEUTROPHILS NFR BLD AUTO: 4.74 10*3/MM3 (ref 1.7–7)
NEUTROPHILS NFR BLD AUTO: 57.4 % (ref 42.7–76)
NRBC BLD AUTO-RTO: 0 /100 WBC (ref 0–0.2)
NT-PROBNP SERPL-MCNC: 802.1 PG/ML (ref 0–900)
PLATELET # BLD AUTO: 161 10*3/MM3 (ref 140–450)
PMV BLD AUTO: 9.2 FL (ref 6–12)
POTASSIUM SERPL-SCNC: 3.8 MMOL/L (ref 3.5–5.2)
PROT SERPL-MCNC: 6.8 G/DL (ref 6–8.5)
QT INTERVAL: 373 MS
QTC INTERVAL: 392 MS
RBC # BLD AUTO: 4.7 10*6/MM3 (ref 4.14–5.8)
SODIUM SERPL-SCNC: 140 MMOL/L (ref 136–145)
TROPONIN T NUMERIC DELTA: -1 NG/L
TROPONIN T SERPL HS-MCNC: 9 NG/L
WBC NRBC COR # BLD AUTO: 8.25 10*3/MM3 (ref 3.4–10.8)
WHOLE BLOOD HOLD COAG: NORMAL
WHOLE BLOOD HOLD SPECIMEN: NORMAL

## 2025-01-31 PROCEDURE — 84484 ASSAY OF TROPONIN QUANT: CPT

## 2025-01-31 PROCEDURE — 93005 ELECTROCARDIOGRAM TRACING: CPT

## 2025-01-31 PROCEDURE — 83880 ASSAY OF NATRIURETIC PEPTIDE: CPT

## 2025-01-31 PROCEDURE — 71045 X-RAY EXAM CHEST 1 VIEW: CPT

## 2025-01-31 PROCEDURE — 93005 ELECTROCARDIOGRAM TRACING: CPT | Performed by: EMERGENCY MEDICINE

## 2025-01-31 PROCEDURE — 36415 COLL VENOUS BLD VENIPUNCTURE: CPT

## 2025-01-31 PROCEDURE — 99284 EMERGENCY DEPT VISIT MOD MDM: CPT

## 2025-01-31 PROCEDURE — 83690 ASSAY OF LIPASE: CPT

## 2025-01-31 PROCEDURE — 85025 COMPLETE CBC W/AUTO DIFF WBC: CPT

## 2025-01-31 PROCEDURE — 82009 KETONE BODYS QUAL: CPT

## 2025-01-31 PROCEDURE — 84484 ASSAY OF TROPONIN QUANT: CPT | Performed by: EMERGENCY MEDICINE

## 2025-01-31 PROCEDURE — 83735 ASSAY OF MAGNESIUM: CPT

## 2025-01-31 PROCEDURE — 80053 COMPREHEN METABOLIC PANEL: CPT

## 2025-01-31 RX ORDER — METOCLOPRAMIDE 10 MG/1
10 TABLET ORAL ONCE
Status: COMPLETED | OUTPATIENT
Start: 2025-01-31 | End: 2025-01-31

## 2025-01-31 RX ORDER — SODIUM CHLORIDE 0.9 % (FLUSH) 0.9 %
10 SYRINGE (ML) INJECTION AS NEEDED
Status: DISCONTINUED | OUTPATIENT
Start: 2025-01-31 | End: 2025-01-31 | Stop reason: HOSPADM

## 2025-01-31 RX ORDER — ASPIRIN 81 MG/1
324 TABLET, CHEWABLE ORAL ONCE
Status: COMPLETED | OUTPATIENT
Start: 2025-01-31 | End: 2025-01-31

## 2025-01-31 RX ORDER — ACETAMINOPHEN 500 MG
1000 TABLET ORAL ONCE
Status: COMPLETED | OUTPATIENT
Start: 2025-01-31 | End: 2025-01-31

## 2025-01-31 RX ADMIN — ACETAMINOPHEN 1000 MG: 500 TABLET ORAL at 20:40

## 2025-01-31 RX ADMIN — ASPIRIN 81 MG 324 MG: 81 TABLET ORAL at 19:54

## 2025-01-31 RX ADMIN — METOCLOPRAMIDE 10 MG: 10 TABLET ORAL at 20:40

## 2025-01-31 NOTE — ED TRIAGE NOTES
Patient to ED from home with left sided chest pain that radiates to left arm, nausea. Patient describes the pain as a 7/10 dull ache. Positive cardiac history, CABG last year.

## 2025-02-01 NOTE — DISCHARGE INSTRUCTIONS
Thank you for allowing us to provide care for you today.  Your workup today revealed evidence of chest pain without evidence of acute ischemia, arrhythmia, or evidence of heart attack.  You are provided a Tylenol and Reglan at bedside secondary to ongoing headache.  I recommend that you follow-up with your primary care provider in the next 7 days related to acute ED event.  Thank you

## 2025-02-01 NOTE — ED PROVIDER NOTES
Time: 8:47 PM EST  Date of encounter:  1/31/2025  Independent Historian/Clinical History and Information was obtained by:   Patient    History is limited by: N/A    Chief Complaint: Chest pain      History of Present Illness:  Patient is a 59 y.o. year old male who presents to the emergency department for evaluation of chest pain x 1 day.  Patient has a prior medical history consistent for hyperlipidemia, CAD, history of previous CABG x 5, hypertension.  Patient currently on daily aspirin therapy.  Patient reports intermittent chest pain today.  Describes the pain as sharp stabbing in the left side of his chest radiating to his left arm.  Denies any hemoptysis or shortness of breath.  Denies any acute chest pain at bedside today.  Reports that initially came on earlier today.  Denies known triggers including ambulating.  Reports his diabetes mellitus has been uncontrolled for some time.      Patient Care Team  Primary Care Provider: Steffany Gruber APRN    Past Medical History:     Allergies   Allergen Reactions    Fentanyl Angioedema and Swelling    Cefdinir Itching    Nifedipine Hives and Itching     Past Medical History:   Diagnosis Date    Arthritis     Biceps tendonitis on left 03/22/2022    Bursitis of shoulder 12/06/2018    CAD (coronary artery disease)     Claustrophobia     Diabetes     Hyperlipidemia     Hypertension     Pneumonia      Past Surgical History:   Procedure Laterality Date    CARDIAC CATHETERIZATION      showed significant coronary artery disease of the LAD with calcification.     CARDIAC CATHETERIZATION Left 12/12/2023    Procedure: Cardiac Catheterization/Vascular Study;  Surgeon: Mario Welch MD;  Location: [facility] INVASIVE LOCATION;  Service: Cardiology;  Laterality: Left;    CHOLECYSTECTOMY      CIRCUMCISION      CORONARY ANGIOPLASTY WITH STENT PLACEMENT      CORONARY ARTERY BYPASS GRAFT N/A 12/14/2023    Procedure: CORONARY ARTERY BYPASS GRAFTING TIMES 5, UTILIZING LEFT AND  RIGHT INTERNAL MAMMARY ARTERIES AND ENDOSCOPICALLY HARVESTED LEFT SAPHENOUS VEIN GRAFT W/ PRP; RIGHT LEG VEIN EXPLORATION; TRANSESOPHAGEAL ECHOCARDIOGRAM WITH ANESTHESIA;  Surgeon: Jr Binu Ribeiro MD;  Location: [facility] CVOR;  Service: Cardiothoracic;  Laterality: N/A;    INGUINAL HERNIA REPAIR      x2    NOSE SURGERY      AR RT/LT HEART CATHETERS N/A 03/22/2016    Procedure: Percutaneous Coronary Intervention - Rota/stent LAD;  Surgeon: Marek Emery MD;  Location: [facility] CATH INVASIVE LOCATION;  Service: Cardiovascular    ROTATOR CUFF REPAIR Left     SHOULDER ARTHROSCOPY Right     x2    SHOULDER ARTHROSCOPY WITH SUBACROMIAL DECOMPRESSION Left 4/11/2022    Procedure: left SHOULDER ARTHROSCOPY SUBACROMIAL DECOMPRESSION WITH ROTATOR CUFF DEBRIDEMENT;  Surgeon: Kam Dick MD;  Location[place] [facility] [place] [facility];  Service: Orthopedics;  Laterality: Left;    SPINAL FUSION       Family History   Problem Relation Age of Onset    Hypertension Mother     Diabetes Mother     Lung cancer Mother     Stroke Mother     Heart attack Father     Heart disease Father     Heart failure Father     Diabetes Father        Home Medications:  Prior to Admission medications    Medication Sig Start Date End Date Taking? Authorizing Provider   aspirin 81 MG EC tablet Take 1 tablet by mouth Daily. 12/20/23   Chelsey Holder APRN   atenolol (TENORMIN) 25 MG tablet Take 1 tablet by mouth Every 12 (Twelve) Hours. 12/19/23   Chelsey Holder APRN   Belsomra 10 MG tablet Take 1 tablet by mouth Every Night.    Provider, MD Jody   clopidogrel (PLAVIX) 75 MG tablet Take 1 tablet by mouth Daily.    Provider, MD Jody   cyclobenzaprine (FLEXERIL) 10 MG tablet Take 1 tablet by mouth 3 (Three) Times a Day As Needed for Muscle Spasms.  Patient not taking: Reported on 4/2/2024 9/11/23   Sherice Melgoza APRN   empagliflozin (JARDIANCE) 25 MG tablet tablet Take 1 tablet by mouth Daily. 12/20/23   Danielito Green MD   furosemide (LASIX) 40 MG  tablet Take 1 tablet by mouth Daily. 12/20/23   Chelsey Holder APRN   HYDROcodone-acetaminophen (NORCO) 7.5-325 MG per tablet Take 1 tablet by mouth 3 times a day. 12/27/23   Jody Bejarano MD   Insulin Degludec (TRESIBA FLEXTOUCH) 200 UNIT/ML solution pen-injector pen injection Inject 30 Units under the skin into the appropriate area as directed Daily. 11/29/23   Jody Bejarano MD   methocarbamol (ROBAXIN) 500 MG tablet Take 1 tablet by mouth 3 (Three) Times a Day. 11/21/23   Jody Bejarano MD   potassium chloride 10 MEQ CR tablet Take 1 tablet by mouth 2 (Two) Times a Day.  Patient not taking: Reported on 4/2/2024 1/25/24   Jody Bejarano MD   rosuvastatin (CRESTOR) 20 MG tablet Take 1 tablet by mouth Daily. 1/25/24   Jody Bejarano MD   simvastatin (Zocor) 20 MG tablet Take 1 tablet by mouth Every Night. Will replace you taking 2 10mg tablets nightly.  Patient not taking: Reported on 4/2/2024 2/19/24   Jr Binu Ribeiro MD   tadalafil (CIALIS) 20 MG tablet TAKE 1 TABLET BY MOUTH AS NEEDED FOR ERECTILE DYSFUNCTION  Patient not taking: Reported on 4/2/2024 10/9/23   Dulce Miguel APRN   traMADol (ULTRAM) 50 MG tablet Take 1 tablet by mouth Every 6 (Six) Hours As Needed for Moderate Pain.  Patient not taking: Reported on 4/2/2024 1/17/24   Jr Binu Ribeiro MD        Social History:   Social History     Tobacco Use    Smoking status: Never     Passive exposure: Past    Smokeless tobacco: Never   Vaping Use    Vaping status: Never Used   Substance Use Topics    Alcohol use: Yes     Comment: OCCASIONAL    Drug use: Never         Review of Systems:  Review of Systems   Constitutional:  Negative for fever.   Respiratory:  Negative for cough, shortness of breath and wheezing.    Cardiovascular:  Positive for chest pain. Negative for palpitations and leg swelling.   Gastrointestinal:  Negative for abdominal distention and abdominal pain.        Physical Exam:  /89   " Pulse 64   Temp 98.9 °F (37.2 °C) (Oral)   Resp 18   Ht 172.7 cm (68\")   SpO2 99%   BMI 28.89 kg/m²     Physical Exam  Vitals reviewed.   Constitutional:       Appearance: Normal appearance.   HENT:      Head: Normocephalic.   Eyes:      Extraocular Movements: Extraocular movements intact.      Conjunctiva/sclera: Conjunctivae normal.   Cardiovascular:      Rate and Rhythm: Normal rate.      Heart sounds: No murmur heard.  Pulmonary:      Effort: Pulmonary effort is normal. No respiratory distress.      Breath sounds: No wheezing.   Abdominal:      General: There is no distension.      Tenderness: There is no abdominal tenderness.   Skin:     General: Skin is warm.      Coloration: Skin is not cyanotic.   Neurological:      Mental Status: He is alert and oriented to person, place, and time.   Psychiatric:         Attention and Perception: Attention and perception normal.         Mood and Affect: Mood normal.                    Medical Decision Making:      Comorbidities that affect care:    Coronary Artery Disease    External Notes reviewed:    Previous Clinic Note: Previous clinic note on 12/11/2024 reviewed      The following orders were placed and all results were independently analyzed by me:  Orders Placed This Encounter   Procedures    XR Chest 1 View    [place] Draw    High Sensitivity Troponin T    Comprehensive Metabolic Panel    Lipase    BNP    Magnesium    CBC Auto Differential    High Sensitivity Troponin T 1Hr    Acetone    Undress & Gown    Continuous Pulse Oximetry    ECG 12 Lead ED Triage Standing Order; Chest Pain    CBC & Differential    Green Top (Gel)    Lavender Top    Gold Top - SST    Light Blue Top       Medications Given in the Emergency Department:  Medications   aspirin chewable tablet 324 mg (324 mg Oral Given 1/31/25 1954)   metoclopramide (REGLAN) tablet 10 mg (10 mg Oral Given 1/31/25 2040)   acetaminophen (TYLENOL) tablet 1,000 mg (1,000 mg Oral Given 1/31/25 2040)        ED " Course:    ED Course as of 02/01/25 0007   Fri Jan 31, 2025 2048 HEART Score for Major Cardiac Events - MDCalc  Calculated on Jan 31 2025 8:48 PM  3 points -> Low Score (0-3 points) Risk of MACE of 0.9-1.7%. [CB]   2050 CMP reviewed.  Evidence of elevated glucose of 324.  No evidence of anion gap disturbance or decreased bicarb.  Acetone negative.  Low suspicion for acute DKA. [CB]   2051 EKG reviewed.  Evidence of significant artifact in precordial leads 345.  Evidence of precordial lead T wave inversion in leads 345 with no appreciable ST segment elevation or depression.  Initial and repeat troponin within negative delta of 1.  Patient's heart score 3. [CB]   2052 Chest x-ray reviewed.  Evidence of stable atelectasis bilaterally.  No evidence of anasarca or bilateral lower extremity edema present.  proBNP within normal limits.  Low suspicion for acute CHF. [CB]   2052 I discussed findings today at bedside with the family for nonspecific chest pain with no appreciable evidence of acute angina either stable nor unstable based on lab work and history. I discussed the heart score with the patient at bedside.  I discussed acute follow-up with PCP in the next 5 days related to ED event.  I discussed strict return precautions.  I discussed importance of adherence to cardiac regimen status post CABG.  Patient and patient's wife voiced understanding and agreement to plan at this time.    Prior to discharge, patient exhibited no acute chest pain at bedside with headache resolved status post Tylenol and Reglan administration.  [CB]      ED Course User Index  [CB] Jono Plascencia, JILL       Labs:    Lab Results (last 24 hours)       Procedure Component Value Units Date/Time    High Sensitivity Troponin T [[id number]]  (Normal) Collected: 01/31/25 1757    Specimen: Blood from Arm, Right Updated: 01/31/25 1852     HS Troponin T 9 ng/L     Narrative:      High Sensitive Troponin T Reference Range:  <14.0 ng/L- Negative  Female for AMI  <22.0 ng/L- Negative Male for AMI  >=14 - Abnormal Female indicating possible myocardial injury.  >=22 - Abnormal Male indicating possible myocardial injury.   Clinicians would have to utilize clinical acumen, EKG, Troponin, and serial changes to determine if it is an Acute Myocardial Infarction or myocardial injury due to an underlying chronic condition.         CBC & Differential [[id number]]  (Normal) Collected: 01/31/25 1757    Specimen: Blood from Arm, Right Updated: 01/31/25 1817    Narrative:      The following orders were created for panel order CBC & Differential.  Procedure                               Abnormality         Status                     ---------                               -----------         ------                     CBC Auto Differential[[id number]]        Normal              Final result                 Please view results for these tests on the individual orders.    Comprehensive Metabolic Panel [[id number]]  (Abnormal) Collected: 01/31/25 1757    Specimen: Blood from Arm, Right Updated: 01/31/25 1852     Glucose 324 mg/dL      BUN 8 mg/dL      Creatinine 0.83 mg/dL      Sodium 140 mmol/L      Potassium 3.8 mmol/L      Chloride 101 mmol/L      CO2 24.9 mmol/L      Calcium 8.7 mg/dL      Total Protein 6.8 g/dL      Albumin 4.2 g/dL      ALT (SGPT) 7 U/L      AST (SGOT) 11 U/L      Alkaline Phosphatase 75 U/L      Total Bilirubin 0.3 mg/dL      Globulin 2.6 gm/dL      A/G Ratio 1.6 g/dL      BUN/Creatinine Ratio 9.6     Anion Gap 14.1 mmol/L      eGFR 101.4 mL/min/1.73     Narrative:      GFR Categories in Chronic Kidney Disease (CKD)      GFR Category          GFR (mL/min/1.73)    Interpretation  G1                     90 or greater         Normal or high (1)  G2                      60-89                Mild decrease (1)  G3a                   45-59                Mild to moderate decrease  G3b                   30-44                Moderate to severe decrease  G4                     15-29                Severe decrease  G5                    14 or less           Kidney failure          (1)In the absence of evidence of kidney disease, neither GFR category G1 or G2 fulfill the criteria for CKD.    eGFR calculation 2021 CKD-EPI creatinine equation, which does not include race as a factor    Lipase [[id number]]  (Normal) Collected: 01/31/25 1757    Specimen: Blood from Arm, Right Updated: 01/31/25 1852     Lipase 39 U/L     BNP [[id number]]  (Normal) Collected: 01/31/25 1757    Specimen: Blood from Arm, Right Updated: 01/31/25 1850     proBNP 802.1 pg/mL     Narrative:      This assay is used as an aid in the diagnosis of individuals suspected of having heart failure. It can be used as an aid in the diagnosis of acute decompensated heart failure (ADHF) in patients presenting with signs and symptoms of ADHF to the emergency department (ED). In addition, NT-proBNP of <300 pg/mL indicates ADHF is not likely.    Age Range Result Interpretation  NT-proBNP Concentration (pg/mL:      <50             Positive            >450                   Gray                 300-450                    Negative             <300    50-75           Positive            >900                  Gray                300-900                  Negative            <300      >75             Positive            >1800                  Gray                300-1800                  Negative            <300    Magnesium [[id number]]  (Normal) Collected: 01/31/25 1757    Specimen: Blood from Arm, Right Updated: 01/31/25 1852     Magnesium 1.8 mg/dL     CBC Auto Differential [[id number]]  (Normal) Collected: 01/31/25 1757    Specimen: Blood from Arm, Right Updated: 01/31/25 1817     WBC 8.25 10*3/mm3      RBC 4.70 10*6/mm3      Hemoglobin 13.9 g/dL      Hematocrit 40.4 %      MCV 86.0 fL      MCH 29.6 pg      MCHC 34.4 g/dL      RDW 12.6 %      RDW-SD 39.4 fl      MPV 9.2 fL      Platelets 161 10*3/mm3      Neutrophil % 57.4 %       Lymphocyte % 34.2 %      Monocyte % 6.2 %      Eosinophil % 1.2 %      Basophil % 0.6 %      Immature Grans % 0.4 %      Neutrophils, Absolute 4.74 10*3/mm3      Lymphocytes, Absolute 2.82 10*3/mm3      Monocytes, Absolute 0.51 10*3/mm3      Eosinophils, Absolute 0.10 10*3/mm3      Basophils, Absolute 0.05 10*3/mm3      Immature Grans, Absolute 0.03 10*3/mm3      nRBC 0.0 /100 WBC     Acetone [[id number]]  (Normal) Collected: 01/31/25 1757    Specimen: Blood from Arm, Right Updated: 01/31/25 2032     Acetone Negative    High Sensitivity Troponin T 1Hr [[id number]]  (Normal) Collected: 01/31/25 1949    Specimen: Blood Updated: 01/31/25 2032     HS Troponin T 8 ng/L      Troponin T Numeric Delta -1 ng/L     Narrative:      High Sensitive Troponin T Reference Range:  <14.0 ng/L- Negative Female for AMI  <22.0 ng/L- Negative Male for AMI  >=14 - Abnormal Female indicating possible myocardial injury.  >=22 - Abnormal Male indicating possible myocardial injury.   Clinicians would have to utilize clinical acumen, EKG, Troponin, and serial changes to determine if it is an Acute Myocardial Infarction or myocardial injury due to an underlying chronic condition.                  Imaging:    XR Chest 1 View    Result Date: 1/31/2025  XR CHEST 1 VW Date of Exam: 1/31/2025 6:10 PM EST Indication: Chest Pain Triage Protocol Comparison: Chest radiograph 4/2/2024 Findings: Mediastinum: Cardiac silhouette appears unchanged including postoperative changes. Lungs: Similar-appearing chronic atelectasis versus scarring in the right lung base with blunting of the right costophrenic sulcus. No new focal consolidation is seen. Pleura: No convincing pleural effusion or pneumothorax. Bones and soft tissues: No acute, displaced fracture seen. Median sternotomy.     Impression: No acute cardiopulmonary abnormality. Stable chronic atelectasis versus scarring in the right lung base. Electronically Signed: Alexandr Pan  1/31/2025 6:47 PM EST   Workstation ID: [id number]       Differential Diagnosis and Discussion:    Chest Pain:  Based on the patient's signs and symptoms, I considered aortic dissection, myocardial infaction, pulmonary embolism, cardiac tamponade, pericarditis, pneumothorax, musculoskeletal chest pain and other differential diagnosis as an etiology of the patient's chest pain.     PROCEDURES:    Labs were collected in the emergency department and all labs were reviewed and interpreted by me.  An EKG was performed and the EKG was interpreted by me.    ECG 12 Lead ED Triage Standing Order; Chest Pain   Preliminary Result   HEART RATE=66  bpm   RR Vqrrpmuq=505  ms   UT Pvfhmpab=574  ms   P Horizontal Axis=18  deg   P Front Axis=27  deg   QRSD Interval=84  ms   QT Auxcddgl=819  ms   SYgK=965  ms   QRS Axis=-8  deg   T Wave Axis=224  deg   - ABNORMAL ECG -   Sinus rhythm   Nonspecific T abnormalities, lateral leads   Date and Time of Study:2025-01-31 17:44:12          Procedures    MDM     Amount and/or Complexity of Data Reviewed  Decide to obtain previous medical records or to obtain history from someone other than the patient: yes                       Patient Care Considerations:    SEPSIS was considered but is NOT present in the emergency department as SIRS criteria is not present. ANTIBIOTICS: I considered prescribing antibiotics as an outpatient however no bacterial focus of infection was found.      Consultants/Shared Management Plan:    None    Social Determinants of Health:    Patient has presented with family members who are responsible, reliable and will ensure follow up care.      Disposition and Care Coordination:    Discharged: I considered escalation of care by admitting this patient to the hospital, however patient only sepsis or SIRS criteria.  Patient's heart score today was 3 considered mild.  Patient's chest pain did not occur during ED course.  Initial troponin with 1 hour repeat with a delta of -1 with troponin within  normal limits.  No evidence on EKG of acute ischemia including ST segment elevation or depression in contiguous leads with reciprocal changes.  Evidence of nonspecific T wave inversions seen previously in former EKGs.     I have explained the patient´s condition, diagnoses and treatment plan based on the information available to me at this time. I have answered questions and addressed any concerns. The patient has a good  understanding of the patient´s diagnosis, condition, and treatment plan as can be expected at this point. The vital signs have been stable. The patient´s condition is stable and appropriate for discharge from the emergency department.      The patient will pursue further outpatient evaluation with the primary care physician or other designated or consulting physician as outlined in the discharge instructions. They are agreeable to this plan of care and follow-up instructions have been explained in detail. The patient has received these instructions in written format and has expressed an understanding of the discharge instructions. The patient is aware that any significant change in condition or worsening of symptoms should prompt an immediate return to this or the closest emergency department or call to 911.  I have explained discharge medications and the need for follow up with the patient/caretakers. This was also printed in the discharge instructions. Patient was discharged with the following medications and follow up:      Medication List      No changes were made to your prescriptions during this visit.      Steffany Gruber, APRN  [address] [place] [address] [phone]    Schedule an appointment as soon as possible for a visit          Final diagnoses:   Chest pain, unspecified type        ED Disposition       ED Disposition   Discharge    Condition   Stable    Comment   --               This medical record created using voice recognition software.             Temo  Jono RODAS PA-C  02/01/25 0012

## 2025-02-08 LAB
QT INTERVAL: 373 MS
QTC INTERVAL: 392 MS

## 2025-08-21 ENCOUNTER — APPOINTMENT (OUTPATIENT)
Dept: GENERAL RADIOLOGY | Facility: HOSPITAL | Age: 60
End: 2025-08-21
Payer: MEDICARE

## 2025-08-21 ENCOUNTER — HOSPITAL ENCOUNTER (EMERGENCY)
Facility: HOSPITAL | Age: 60
Discharge: HOME | End: 2025-08-21
Attending: EMERGENCY MEDICINE
Payer: MEDICARE

## (undated) DEVICE — SYR LUERLOK 20CC BX/50

## (undated) DEVICE — GW FC FLOP/TP .035 260CM 3MM

## (undated) DEVICE — SYS PERFUS SEP PLATLT W TIPS CUST

## (undated) DEVICE — INTENDED FOR TISSUE SEPARATION, AND OTHER PROCEDURES THAT REQUIRE A SHARP SURGICAL BLADE TO PUNCTURE OR CUT.: Brand: BARD-PARKER ® CARBON RIB-BACK BLADES

## (undated) DEVICE — BUR BRL FORMLA 12FLUT 4MM

## (undated) DEVICE — INTEGRATED CASSETTE TUBING, DO NOT USE IF PACKAGE IS DAMAGED: Brand: CROSSFLOW

## (undated) DEVICE — PK SUT OPN HEART POLLOCK CUST

## (undated) DEVICE — TR BAND RADIAL ARTERY COMPRESSION DEVICE: Brand: TR BAND

## (undated) DEVICE — HARMONIC SYNERGY DISSECTING HOOK WITH TORQUE WRENCH. FOR USE WITH BLUE HAND PIECE ONLY: Brand: HARMONIC SYNERGY

## (undated) DEVICE — DRN WND CH RND FUL/FLUT NO/TROC 3/8IN 28F

## (undated) DEVICE — COLD THERAPY WRAP: Brand: DEROYAL

## (undated) DEVICE — HEMOCONCENTRATOR PERFUS LPS06

## (undated) DEVICE — DRSNG SURESITE WNDW 2.38X2.75

## (undated) DEVICE — SHOULDER ARTHROSCOPY-LF: Brand: MEDLINE INDUSTRIES, INC.

## (undated) DEVICE — COLD THERAPY BLANKET: Brand: DEROYAL

## (undated) DEVICE — MAT FLR ABS W/BLU/LINER 56X72IN WHT

## (undated) DEVICE — GLV SURG SENSICARE PI ORTHO SZ8 LF STRL

## (undated) DEVICE — TBG INSUFFLATION LUER LOCK: Brand: MEDLINE INDUSTRIES, INC.

## (undated) DEVICE — SUT MNCRYL PLS ANTIB UD 3/0 PS2 27IN

## (undated) DEVICE — SOL ISO/ALC 70PCT 4OZ

## (undated) DEVICE — SLV DISTRACTION STAR VELCRO XL DISP

## (undated) DEVICE — PAD GRND REM POLYHESIVE A/ DISP

## (undated) DEVICE — SUT ETHLN 3-0 FS118IN 663H

## (undated) DEVICE — Device

## (undated) DEVICE — GLV SURG BIOGEL M LTX PF 7 1/2

## (undated) DEVICE — LOU OPEN HEART DR POLLOCK: Brand: MEDLINE INDUSTRIES, INC.

## (undated) DEVICE — BIOPATCH™ ANTIMICROBIAL DRESSING WITH CHLORHEXIDINE GLUCONATE IS A HYDROPHILLIC POLYURETHANE ABSORPTIVE FOAM WITH CHLORHEXIDINE GLUCONATE (CHG) WHICH INHIBITS BACTERIAL GROWTH UNDER THE DRESSING. THE DRESSING IS INTENDED TO BE USED TO ABSORB EXUDATE, COVER A WOUND CAUSED BY VASCULAR AND NONVASCULAR PERCUTANEOUS MEDICAL DEVICES DURING SURGERY, AS WELL AS REDUCE LOCAL INFECTION AND COLONIZATION OF MICROORGANISMS.: Brand: BIOPATCH

## (undated) DEVICE — Device: Brand: LEVEL 1

## (undated) DEVICE — UNDYED BRAIDED (POLYGLACTIN 910), SYNTHETIC ABSORBABLE SUTURE: Brand: COATED VICRYL

## (undated) DEVICE — DRP SLUSH WARMR MACH CIR 44X44IN

## (undated) DEVICE — COMFORT ARM SLING: Brand: DEROYAL

## (undated) DEVICE — BLD CUT FORMLA AGGR PLS 5.0MM

## (undated) DEVICE — CORONARY ARTERY BYPASS GRAFT MARKERS, STAINLESS STEEL, DISTAL, WITHOUT HOLDER: Brand: ANASTOMARK CORONARY ARTERY BYPASS GRAFT MARKERS, STAINLESS STEEL, DISTAL

## (undated) DEVICE — CVR PROB 96IN LF STRL

## (undated) DEVICE — LABEL SHEET CUSTOM 2X2 YELLOW: Brand: MEDLINE INDUSTRIES, INC.

## (undated) DEVICE — PK PERFUS CUST W/CARDIOPLEGIA

## (undated) DEVICE — SUT VIC 3/0 SH 27IN J416H

## (undated) DEVICE — 12 FOOT DISPOSABLE EXTENSION CABLE WITH SAFE CONNECT / SCREW-DOWN

## (undated) DEVICE — SUT VIC 0 CT1 CR8 27IN JJ41G

## (undated) DEVICE — SLV SCD KN/LEN ADJ EXPRSS BLENDED MD 1P/U

## (undated) DEVICE — GLIDESHEATH SLENDER STAINLESS STEEL KIT: Brand: GLIDESHEATH SLENDER

## (undated) DEVICE — NEEDLE, QUINCKE, 20GX3.5": Brand: MEDLINE

## (undated) DEVICE — 90-S CRUISE, SUCTION PROBE, NON-BENDABLE, MAX CUT LEVEL 1: Brand: SERFAS ENERGY

## (undated) DEVICE — SOL IRR H2O BTL 1000ML STRL

## (undated) DEVICE — SUT PROLN 0 CT1 30IN 8424H

## (undated) DEVICE — BLOWER/MISTER AXIOUS OPCAB W/TBG

## (undated) DEVICE — TOWEL,OR,DSP,ST,WHITE,DLX,4/PK,20PK/CS: Brand: MEDLINE

## (undated) DEVICE — MEDICINE CUP, GRADUATED, STER: Brand: MEDLINE

## (undated) DEVICE — PENCL E/S SMOKEEVAC W/TELESCP CANN

## (undated) DEVICE — DRSNG WND GEL FIBR OPTICELL AG PLS W/SLV LF 4X5IN  STRL

## (undated) DEVICE — SUT VIC UD BR COAT 0 CP2 27IN

## (undated) DEVICE — SENSR CERBRL O2 PK/2

## (undated) DEVICE — SUT PROLN MO.5 7/0 DBLARM BV175 6 2X30 BX/12

## (undated) DEVICE — GLV SURG SENSICARE SLT PF LF 7.5 STRL

## (undated) DEVICE — BG TRANSF W/COUPLER SPK 600ML

## (undated) DEVICE — PK ATS CUST W CARDIOTOMY RESEVOIR

## (undated) DEVICE — ROTATING SURGICAL PUNCHES, 1 PER POUCH: Brand: A&E MEDICAL / ROTATING SURGICAL PUNCHES

## (undated) DEVICE — CONN TBG Y 6 IN 1 LF STRL

## (undated) DEVICE — SYR LL TP 10ML STRL

## (undated) DEVICE — SOL IRR NACL 0.9PCT BT 1000ML

## (undated) DEVICE — SOL IRR NACL 0.9PCT 3000ML

## (undated) DEVICE — DECANTER BAG 9": Brand: MEDLINE INDUSTRIES, INC.

## (undated) DEVICE — SYS VASOVIEW HEMOPRO ENDOSCOPIC HARVST VESL

## (undated) DEVICE — DRSNG GZ PETROLTM XEROFORM CURAD 1X8IN STRL

## (undated) DEVICE — CANN TWST IN TRPL DAM 7CM 8.25MM

## (undated) DEVICE — SUT SILK 0 CT1 CR8 18IN C021D

## (undated) DEVICE — ADHS SKIN SURG TISS VISC PREMIERPRO EXOFIN HI/VISC FAST/DRY

## (undated) DEVICE — NDL HYPO ECLPS SFTY 22G 1 1/2IN

## (undated) DEVICE — GLV SURG SENSICARE PI LF PF 8 GRN STRL

## (undated) DEVICE — PK HEART OPN 40

## (undated) DEVICE — ELECTRD BLD EDGE COAT 3IN

## (undated) DEVICE — DRSNG WND GZ PAD BORDERED 4X8IN STRL

## (undated) DEVICE — RADIFOCUS OPTITORQUE ANGIOGRAPHIC CATHETER: Brand: OPTITORQUE

## (undated) DEVICE — TUBING EXTENSION SET: Brand: ARGYLE

## (undated) DEVICE — CANN ART EOPA 3D NV W/CONN 20F

## (undated) DEVICE — ST. SORBAVIEW ULTIMATE IJ SYSTEM A,C: Brand: CENTURION